# Patient Record
Sex: MALE | Race: WHITE | NOT HISPANIC OR LATINO | Employment: OTHER | URBAN - NONMETROPOLITAN AREA
[De-identification: names, ages, dates, MRNs, and addresses within clinical notes are randomized per-mention and may not be internally consistent; named-entity substitution may affect disease eponyms.]

---

## 2017-02-14 ENCOUNTER — COMMUNICATION - GICH (OUTPATIENT)
Dept: FAMILY MEDICINE | Facility: OTHER | Age: 57
End: 2017-02-14

## 2017-02-14 DIAGNOSIS — M96.1 POSTLAMINECTOMY SYNDROME, NOT ELSEWHERE CLASSIFIED: ICD-10-CM

## 2017-02-15 ENCOUNTER — HISTORY (OUTPATIENT)
Dept: FAMILY MEDICINE | Facility: OTHER | Age: 57
End: 2017-02-15

## 2017-02-15 ENCOUNTER — OFFICE VISIT - GICH (OUTPATIENT)
Dept: FAMILY MEDICINE | Facility: OTHER | Age: 57
End: 2017-02-15

## 2017-02-15 DIAGNOSIS — M96.1 POSTLAMINECTOMY SYNDROME, NOT ELSEWHERE CLASSIFIED: ICD-10-CM

## 2017-02-15 DIAGNOSIS — F43.21 ADJUSTMENT DISORDER WITH DEPRESSED MOOD: ICD-10-CM

## 2017-04-14 ENCOUNTER — COMMUNICATION - GICH (OUTPATIENT)
Dept: FAMILY MEDICINE | Facility: OTHER | Age: 57
End: 2017-04-14

## 2017-04-14 DIAGNOSIS — M96.1 POSTLAMINECTOMY SYNDROME, NOT ELSEWHERE CLASSIFIED: ICD-10-CM

## 2017-05-15 ENCOUNTER — OFFICE VISIT - GICH (OUTPATIENT)
Dept: FAMILY MEDICINE | Facility: OTHER | Age: 57
End: 2017-05-15

## 2017-05-15 ENCOUNTER — HISTORY (OUTPATIENT)
Dept: FAMILY MEDICINE | Facility: OTHER | Age: 57
End: 2017-05-15

## 2017-05-15 ENCOUNTER — COMMUNICATION - GICH (OUTPATIENT)
Dept: FAMILY MEDICINE | Facility: OTHER | Age: 57
End: 2017-05-15

## 2017-05-15 DIAGNOSIS — M96.1 POSTLAMINECTOMY SYNDROME, NOT ELSEWHERE CLASSIFIED: ICD-10-CM

## 2017-05-22 LAB
6-MONOACETYL MORPHINE - HISTORICAL: ABNORMAL NG/ML
AMPHETAMINE URINE - HISTORICAL: ABNORMAL NG/ML
BARBITURATE URINE - HISTORICAL: ABNORMAL NG/ML
BENZODIAZEPINE URINE - HISTORICAL: ABNORMAL NG/ML
BUPRENORPHRINE URINE - HISTORICAL: ABNORMAL NG/ML
COCAINE METAB URINE - HISTORICAL: ABNORMAL NG/ML
CREAT UR - HISTORICAL: 176 MG/DL
ETHYLGLUCURONIDE URINE - HISTORICAL: ABNORMAL NG/ML
FENTANYL URINE - HISTORICAL: ABNORMAL NG/ML
MASS SPECTROMETRY URINE - HISTORICAL: ABNORMAL
METHADONE URINE - HISTORICAL: ABNORMAL NG/ML
OPIATES URINE - HISTORICAL: ABNORMAL NG/ML
OXYCODONE URINE - HISTORICAL: ABNORMAL NG/ML
PH URINE - HISTORICAL: 5.7
PROPOXYPHENE URINE - HISTORICAL: ABNORMAL NG/ML
THC 50 URINE - HISTORICAL: ABNORMAL NG/ML
TRAMADOL - HISTORICAL: ABNORMAL NG/ML

## 2017-07-14 ENCOUNTER — COMMUNICATION - GICH (OUTPATIENT)
Dept: FAMILY MEDICINE | Facility: OTHER | Age: 57
End: 2017-07-14

## 2017-07-14 DIAGNOSIS — M96.1 POSTLAMINECTOMY SYNDROME, NOT ELSEWHERE CLASSIFIED: ICD-10-CM

## 2017-08-15 ENCOUNTER — HISTORY (OUTPATIENT)
Dept: FAMILY MEDICINE | Facility: OTHER | Age: 57
End: 2017-08-15

## 2017-08-15 ENCOUNTER — OFFICE VISIT - GICH (OUTPATIENT)
Dept: FAMILY MEDICINE | Facility: OTHER | Age: 57
End: 2017-08-15

## 2017-08-15 DIAGNOSIS — M96.1 POSTLAMINECTOMY SYNDROME, NOT ELSEWHERE CLASSIFIED: ICD-10-CM

## 2017-09-07 ENCOUNTER — COMMUNICATION - GICH (OUTPATIENT)
Dept: FAMILY MEDICINE | Facility: OTHER | Age: 57
End: 2017-09-07

## 2017-09-07 DIAGNOSIS — F43.21 ADJUSTMENT DISORDER WITH DEPRESSED MOOD: ICD-10-CM

## 2017-09-14 ENCOUNTER — COMMUNICATION - GICH (OUTPATIENT)
Dept: FAMILY MEDICINE | Facility: OTHER | Age: 57
End: 2017-09-14

## 2017-09-14 DIAGNOSIS — M50.30 OTHER CERVICAL DISC DEGENERATION, UNSPECIFIED CERVICAL REGION: ICD-10-CM

## 2017-10-02 ENCOUNTER — COMMUNICATION - GICH (OUTPATIENT)
Dept: FAMILY MEDICINE | Facility: OTHER | Age: 57
End: 2017-10-02

## 2017-10-02 DIAGNOSIS — I10 ESSENTIAL (PRIMARY) HYPERTENSION: ICD-10-CM

## 2017-10-14 ENCOUNTER — COMMUNICATION - GICH (OUTPATIENT)
Dept: FAMILY MEDICINE | Facility: OTHER | Age: 57
End: 2017-10-14

## 2017-10-14 DIAGNOSIS — M96.1 POSTLAMINECTOMY SYNDROME, NOT ELSEWHERE CLASSIFIED: ICD-10-CM

## 2017-11-15 ENCOUNTER — COMMUNICATION - GICH (OUTPATIENT)
Dept: FAMILY MEDICINE | Facility: OTHER | Age: 57
End: 2017-11-15

## 2017-11-15 ENCOUNTER — OFFICE VISIT - GICH (OUTPATIENT)
Dept: FAMILY MEDICINE | Facility: OTHER | Age: 57
End: 2017-11-15

## 2017-11-15 ENCOUNTER — HISTORY (OUTPATIENT)
Dept: FAMILY MEDICINE | Facility: OTHER | Age: 57
End: 2017-11-15

## 2017-11-15 DIAGNOSIS — M96.1 POSTLAMINECTOMY SYNDROME, NOT ELSEWHERE CLASSIFIED: ICD-10-CM

## 2017-11-21 LAB
6-MONOACETYL MORPHINE - HISTORICAL: ABNORMAL NG/ML
AMPHETAMINE URINE - HISTORICAL: ABNORMAL NG/ML
BARBITURATE URINE - HISTORICAL: ABNORMAL NG/ML
BENZODIAZEPINE URINE - HISTORICAL: ABNORMAL NG/ML
BUPRENORPHRINE URINE - HISTORICAL: ABNORMAL NG/ML
COCAINE METAB URINE - HISTORICAL: ABNORMAL NG/ML
CREAT UR - HISTORICAL: 284 MG/DL
ETHYLGLUCURONIDE URINE - HISTORICAL: ABNORMAL NG/ML
FENTANYL URINE - HISTORICAL: ABNORMAL NG/ML
MASS SPECTROMETRY URINE - HISTORICAL: ABNORMAL
METHADONE URINE - HISTORICAL: ABNORMAL NG/ML
OPIATES URINE - HISTORICAL: ABNORMAL NG/ML
OXYCODONE URINE - HISTORICAL: ABNORMAL NG/ML
PH URINE - HISTORICAL: 5.9
PROPOXYPHENE URINE - HISTORICAL: ABNORMAL NG/ML
THC 50 URINE - HISTORICAL: ABNORMAL NG/ML
TRAMADOL - HISTORICAL: ABNORMAL NG/ML

## 2017-12-28 NOTE — TELEPHONE ENCOUNTER
Patient Information     Patient Name MRN Sex Iker Choudhury [id number] Male 1960      Telephone Encounter by Sravani Robles at 2017 10:12 AM     Author:  Sravani Robles Service:  (none) Author Type:  (none)     Filed:  2017 10:12 AM Encounter Date:  11/15/2017 Status:  Signed     :  Sravani Robles            Faxed Jocy Robles ....................  2017   10:12 AM

## 2017-12-28 NOTE — TELEPHONE ENCOUNTER
Patient Information     Patient Name MRN Iker Clark [id number] Male 1960      Telephone Encounter by Haleigh Barfield RN at 2017  8:40 AM     Author:  Haleigh Barfield RN Service:  (none) Author Type:  NURS- Registered Nurse     Filed:  2017  8:51 AM Encounter Date:  11/15/2017 Status:  Signed     :  Haleigh Barfield RN (NURS- Registered Nurse)            PLEASE REVIEW, SIGN AND SEND AS APPROPRIATE    This is a Refill request from: [facility]  Name of Medication: Tramadol HCL 50 mg tablet  Quantity requested: 240  Last fill date: 10/14/17  Due for refill: Yes, per pharmacy  Last visit with SOHAIL HARRIS was on: 11/15/2017 in [facility]  PCP:  Sohail Harris MD  Controlled Substance Agreement: Not for this medication  Diagnosis r/t this medication request: Failed back syndrome of lumbar spine     Unable to complete prescription refill per RN Medication Refill Policy.................... Haleigh Barfield RN ....................  2017   8:43 AM      Nsaids    Office visit in the past 12 months or per provider note.    Last visit with SOHAIL HARRIS was on: 11/15/2017 in [facility]  Next visit with SOHAIL HARRIS is on: 02/15/2018 in [facility]  Next visit with Family Practice is on: 02/15/2018 in [facility]    Max refill for 12 months from last office visit or per provider note.    Nsaids    Office visit in the past 12 months or per provider note.    Last visit with SOHAIL HARRIS was on: 11/15/2017 in [facility]  Next visit with SOHAIL HARRIS is on: 02/15/2018 in [facility]  Next visit with Family Practice is on: 02/15/2018 in [facility]    Max refill for 12 months from last office visit or per provider note.    Prescription refilled per RN Medication Refill Policy.................... Haleigh Barfield RN ....................  2017   8:43 AM

## 2017-12-28 NOTE — TELEPHONE ENCOUNTER
Patient Information     Patient Name MRN Iker Clark [id number] Male 1960      Telephone Encounter by Lucille Barclay RN at 2017 10:36 AM     Author:  Lucille Barclay RN Service:  (none) Author Type:  NURS- Registered Nurse     Filed:  2017 11:10 AM Encounter Date:  2017 Status:  Signed     :  Lucille Barclay RN (NURS- Registered Nurse)            ,  Pt called for an early refill for his Zoloft, last order date was 2/15/2017.  He explained he has been taking Zoloft 50 mg 2 tabs daily.  The RX last written was only for one tab daily.  He stated he was mixed up because at one point he had been prescribed two tabs a day.  There is one RX for 100mg that was discontinued per pt stating he was no longer taking it ().  He stated today that the medication has been working and he'd like to continue taking the 100 mg if possible.  I sarah'd up medication per pt request.    PHQ Depression Screening 5/15/2017 8/15/2017   Date of PHQ exam (doc flow) 5/15/2017 8/15/2017   1. Lack of interest/pleasure 0 - Not at all 0 - Not at all   2. Feeling down/depressed 0 - Not at all 0 - Not at all   PHQ-2 TOTAL SCORE 0 0   3. Trouble sleeping - -   4. Decreased energy - -   5. Appetite change - -   6. Feelings of failure - -   7. Trouble concentrating - -   8. Activity level - -   9. Hurting yourself - -   PHQ-9 TOTAL SCORE - -   PHQ-9 Severity Level - -   Functional Impairment - -   Some recent data might be hidden           Unable to complete prescription refill per RN Medication Refill Policy.................... Lucille Barclay RN ....................  2017   10:47 AM

## 2017-12-28 NOTE — PROGRESS NOTES
Patient Information     Patient Name MRN Sex Iker Choudhury [id number] Male 1960      Progress Notes by Sohail Harris MD at 8/15/2017  9:45 AM     Author:  Sohail Harris MD Service:  (none) Author Type:  Physician     Filed:  8/15/2017 10:55 AM Encounter Date:  8/15/2017 Status:  Signed     :  Sohail Harris MD (Physician)            SUBJECTIVE:    Iker Young is a 57 y.o. male who presents for follow up pain medications and back injury    HPI    tox screen was neg in May apart from the oxycodone.  He was able to take a trip to [place] over the summer, with frequent stops.  Was doing yard work over the weekend and had more pain last night.  No side effects from the medications.  Some tingling in anterior thighs for the last few months.  Last MRI was in , and showed no significant findings then (I reviewed this report again with him).    Allergies     Allergen  Reactions     Betadine [Povidone-Iodine] Contact Dermatitis     Tape [Adhesive] Contact Dermatitis   ,   Current Outpatient Prescriptions on File Prior to Visit       Medication  Sig Dispense Refill     acetaminophen (TYLENOL) 325 mg tablet Take  by mouth every 4 hours if needed. Max acetaminophen dose: 4000mg in 24 hrs.       amitriptyline (ELAVIL) 50 mg tablet TAKE 2 TABLETS BY MOUTH AT BEDTIME 60 tablet 11     atenolol (TENORMIN) 100 mg tablet TAKE ONE TABLET BY MOUTH EVERY DAY 90 tablet 3     gabapentin (NEURONTIN) 300 mg capsule Take 1 capsule by mouth 2 times daily. 180 capsule 2     ibuprofen (ADVIL; MOTRIN) 600 mg tablet Take 1 tablet by mouth every 6 hours if needed. Maximum of 3200 mg in 24 hours. 100 tablet 12     sertraline (ZOLOFT) 50 mg tablet Take 1 tablet by mouth once daily. 90 tablet 3     traMADol (ULTRAM) 50 mg tablet TAKE 1 TO 2 TABLETS BY MOUTH EVERY 6 HOURS AS NEEDED 240 tablet 5     No current facility-administered medications on file prior to visit.    ,   Past Medical History:     Diagnosis  Date      Chest pain 12/12/2016     History of MRI of cervical spine 04/2009    MRI cervical spine      Status post colonoscopy     Status post colonoscopy      and   Past Surgical History:      Procedure  Laterality Date     COLONOSCOPY SCREENING       INCISION AND DRAINAGE      from infection from nerve stimulator       LUMBAR FUSION  2004    Back surgery x 5 (fusion, hardware removal, stimulator and removal)       PERIPHERAL NERVE STIMULATOR  2005    Lumbar nerve stimulator and subsequent removal        REMOVAL OF FOREIGN BODY  2005     subsequent removal        SCAN-MRI INTERPRETATION  04/2009    MRI cervical spine.         REVIEW OF SYSTEMS:  Review of Systems   Musculoskeletal: Positive for back pain.   Neurological: Positive for tingling. Negative for focal weakness.   Psychiatric/Behavioral: Negative for substance abuse.       OBJECTIVE:  /70  Resp 14  Wt 126.9 kg (279 lb 12.8 oz)  BMI 41.32 kg/m2    EXAM:   Physical Exam   Constitutional: He is oriented to person, place, and time and well-developed, well-nourished, and in no distress. No distress.   Musculoskeletal:   No lumbar pain on palpation.  Neg straight leg raise.  Lower extremity reflexes are equal.   Neurological: He is alert and oriented to person, place, and time.   Skin: He is not diaphoretic.   Psychiatric: Memory, affect and judgment normal.       ASSESSMENT/PLAN:    ICD-10-CM    1. Failed back syndrome of lumbar spine M96.1 oxyCODONE (ROXICODONE) 15 mg immediate release tablet      DISCONTINUED: oxyCODONE (ROXICODONE) 15 mg immediate release tablet      DISCONTINUED: oxyCODONE (ROXICODONE) 15 mg immediate release tablet        Plan:  No concerns of abuse or diversion at this time.   reviewed.  Stable.  3 months of medications given.  Advise weight loss as well for pain relief.    Sohail Harris MD ....................  8/15/2017   10:54 AM

## 2017-12-28 NOTE — PROGRESS NOTES
Patient Information     Patient Name MRN Sex Iker Choudhury [id number] Male 1960      Progress Notes by Sohail Harris MD at 11/15/2017  9:30 AM     Author:  Sohail Harris MD Service:  (none) Author Type:  Physician     Filed:  2017  7:36 AM Encounter Date:  11/15/2017 Status:  Signed     :  Sohail Harris MD (Physician)            SUBJECTIVE:    Iker Young is a 57 y.o. male who presents for follow up back injury, pain medications     HPI    His allergies have flared, causing more cough. The coughing makes the lumbar pain worse.  At times will radiate into both legs.  Feet feel cold all the time, uses a heating pad at night.  Leg weakness progressing in the last year as well.  Last MRI was , showed No nerve root compression.    Allergies     Allergen  Reactions     Betadine [Povidone-Iodine] Contact Dermatitis     Tape [Adhesive] Contact Dermatitis    and   Current Outpatient Prescriptions on File Prior to Visit       Medication  Sig Dispense Refill     acetaminophen (TYLENOL) 325 mg tablet Take  by mouth every 4 hours if needed. Max acetaminophen dose: 4000mg in 24 hrs.       amitriptyline (ELAVIL) 50 mg tablet TAKE 2 TABLETS BY MOUTH AT BEDTIME 60 tablet 3     atenolol (TENORMIN) 100 mg tablet TAKE ONE TABLET BY MOUTH EVERY DAY 90 tablet 3     gabapentin (NEURONTIN) 300 mg capsule Take 1 capsule by mouth 2 times daily. 180 capsule 2     ibuprofen (ADVIL; MOTRIN) 600 mg tablet Take 1 tablet by mouth every 6 hours if needed. Maximum of 3200 mg in 24 hours. 100 tablet 12     sertraline (ZOLOFT) 100 mg tablet Take 1 tablet by mouth once daily. 90 tablet 3     sertraline (ZOLOFT) 50 mg tablet Take 1 tablet by mouth once daily. 90 tablet 3     traMADol (ULTRAM) 50 mg tablet TAKE 1 TO 2 TABLETS BY MOUTH EVERY 6 HOURS AS NEEDED 240 tablet 5     No current facility-administered medications on file prior to visit.        REVIEW OF SYSTEMS:  Review of Systems   Constitutional: Negative  for chills and fever.   Musculoskeletal: Positive for back pain.   Neurological: Positive for tingling and focal weakness.       OBJECTIVE:  /68  Pulse 62  Resp 16  Wt 128.8 kg (284 lb)  BMI 41.94 kg/m2    EXAM:   Physical Exam   Constitutional: He is well-developed, well-nourished, and in no distress. No distress.   Musculoskeletal:   No lumbar pain on palpation.  Positive bilateral straight leg raise at 165 degrees or so.  No lower extremity weakness.   Skin: He is not diaphoretic.   Psychiatric: Memory, affect and judgment normal.       ASSESSMENT/PLAN:    ICD-10-CM    1. Failed back syndrome of lumbar spine M96.1 oxyCODONE (ROXICODONE) 15 mg immediate release tablet      DRUG SCREEN - PAIN MANAGEMENT - TOXASSURE      DISCONTINUED: oxyCODONE (ROXICODONE) 15 mg immediate release tablet      DISCONTINUED: oxyCODONE (ROXICODONE) 15 mg immediate release tablet        Plan:  Exam remains stable.  A repeat MRI would be helpful to see if there is progression of dz causing the weakness.  He wants to wait and with an unchanged exam, this seems reasonable.  Medications refilled for 3 months.  Updated contract signed.   reviewed and unchanged.    Sohail Harris MD ....................  11/15/2017   10:01 AM

## 2017-12-28 NOTE — TELEPHONE ENCOUNTER
Patient Information     Patient Name MRN Iker Clark [id number] Male 1960      Telephone Encounter by Debbie Paul RN at 10/4/2017 10:58 AM     Author:  Debbie Paul RN Service:  (none) Author Type:  NURS- Registered Nurse     Filed:  10/4/2017 11:00 AM Encounter Date:  10/2/2017 Status:  Signed     :  Debbie Paul RN (NURS- Registered Nurse)            Beta Blockers     Office visit in the past 12 months or per provider note.    Last visit with KEVIN GOLD was on: 08/15/2017 in [facility]  Next visit with KEVIN GOLD is on: 11/15/2017 in [facility]  Next visit with Family Practice is on: 11/15/2017 in [facility]    Max refill for 12 months from last office visit or per provider note.  Prescription refilled per RN Medication Refill Policy.................... DEBBIE PAUL RN ....................  10/4/2017   10:59 AM

## 2017-12-30 NOTE — NURSING NOTE
Patient Information     Patient Name MRN Sex Iker Choudhury [id number] Male 1960      Nursing Note by Sravani Robles at 11/15/2017  9:30 AM     Author:  Sravani Robles Service:  (none) Author Type:  (none)     Filed:  11/15/2017  9:53 AM Encounter Date:  11/15/2017 Status:  Signed     :  Sravani Robles            Coming in for a f/u on work comp  Sravani Robles ....................  11/15/2017   9:40 AM

## 2017-12-30 NOTE — NURSING NOTE
Patient Information     Patient Name MRN Sex Iker Choudhury [id number] Male 1960      Nursing Note by Sravani Robles at 8/15/2017  9:45 AM     Author:  Sravani Robles Service:  (none) Author Type:  (none)     Filed:  8/15/2017 10:43 AM Encounter Date:  8/15/2017 Status:  Signed     :  Sravani Robles            Coming in for a work comp f/u  Sravani Robles ....................  8/15/2017   10:32 AM

## 2018-01-03 NOTE — TELEPHONE ENCOUNTER
Patient Information     Patient Name MRN Iker Clark [id number] Male 1960      Telephone Encounter by Mirian Andrea at 2017  2:02 PM     Author:  Mirian Andrea Service:  (none) Author Type:  NURS- Registered Nurse     Filed:  2017  2:08 PM Encounter Date:  2017 Status:  Signed     :  Mirian Andrea (NURS- Registered Nurse)            Nsaids    Office visit in the past 12 months or per provider note.    Last visit with KEVIN GOLD was on: 2016 in [facility]  Next visit with KEVIN GOLD is on: 02/15/2017 in [facility]  Next visit with Family Practice is on: 02/15/2017 in [facility]    Max refill for 12 months from last office visit or per provider note.      Prescription refilled per RN Medication Refill Policy.................... Mirian Andrea RN ....................  2017   2:08 PM

## 2018-01-03 NOTE — PROGRESS NOTES
Patient Information     Patient Name MRN Iker Clark [id number] Male 1960      Progress Notes by Sohail Harris MD at 2/15/2017 10:45 AM     Author:  Sohail Harris MD Service:  (none) Author Type:  Physician     Filed:  2017  9:52 AM Encounter Date:  2/15/2017 Status:  Signed     :  Sohail Harris MD (Physician)            See note from Cheyenne Marie, MS 3.  Patient was seen and examined by me as well.    Sohail Harris MD ....................  2017   9:52 AM

## 2018-01-03 NOTE — PROGRESS NOTES
Patient Information     Patient Name MRN Sex Iker Choudhury [id number] Male 1960      Progress Notes by Cheyenne Marie, Med Student at 2/15/2017 10:45 AM     Author:  Cheyenne Marie, Med Student Service:  (none) Author Type:  PHYS- Medical Student     Filed:  2017  9:52 AM Encounter Date:  2/15/2017 Status:  Signed     :  Sohail Harris MD (Physician)            SUBJECTIVE:    Iker Young is a 56 y.o. male who presents for worker's compensation appointment.      HPI Comments: Mr. Young is a 56 year old man with work related chronic back pain.  He reports that his pain is stable and mostly tolerable with current medications and adaptations.  He is having trouble dealing with a new worker's comp. representative who does not believe that the gabapentin and amitriptyline are necessary.  He also has felt the loss of his daughter more acutely over Dawkins's Day and would like to resume sertraline use.  His wife is seeing a therapist in home and he wants to look into this as well.  He also reports that he is working on weight loss by cutting out pop and is going to exercise more and start counting his calories.  He also says that he has frequent blood with bowel movements and has a history of hemorrhoids.        Allergies     Allergen  Reactions     Betadine [Povidone-Iodine] Contact Dermatitis     Tape [Adhesive] Contact Dermatitis   ,   Current Outpatient Prescriptions:      acetaminophen (TYLENOL) 325 mg tablet, Take  by mouth every 4 hours if needed. Max acetaminophen dose: 4000mg in 24 hrs., Disp: , Rfl:      amitriptyline (ELAVIL) 50 mg tablet, TAKE 2 TABLETS BY MOUTH AT BEDTIME, Disp: 60 tablet, Rfl: 11     atenolol (TENORMIN) 100 mg tablet, TAKE ONE TABLET BY MOUTH EVERY DAY, Disp: 90 tablet, Rfl: 3     gabapentin (NEURONTIN) 300 mg capsule, Take 1 capsule by mouth 2 times daily., Disp: 180 capsule, Rfl: 2     ibuprofen (ADVIL; MOTRIN) 600 mg tablet, Take 1 tablet by mouth  every 6 hours if needed. Maximum of 3200 mg in 24 hours., Disp: 100 tablet, Rfl: 12     oxyCODONE (ROXICODONE) 15 mg immediate release tablet, TAKE 1 TABLET BY MOUTH EVERY FOUR HOURS AS NEEDED FOR PAIN, fill on/after 4/15/17, Disp: 180 tablet, Rfl: 0     sertraline (ZOLOFT) 50 mg tablet, Take 1 tablet by mouth once daily., Disp: 90 tablet, Rfl: 3     traMADol (ULTRAM) 50 mg tablet, TAKE 1 TO 2 TABLETS BY MOUTH EVERY 6 HOURS AS NEEDED, Disp: 240 tablet, Rfl: 5  Medications have been reviewed by me and are current to the best of my knowledge and ability.,   Patient Active Problem List      Diagnosis Date Noted     Grief reaction with prolonged bereavement 02/15/2017     Chest pain 12/12/2016     Aortic valve sclerosis 12/08/2016     HTN (hypertension) 06/18/2015     Knee pain, chronic 06/18/2015     Primary osteoarthritis of right knee 06/18/2015     Pain medication agreement signed 7/2/13 02/06/2014     Controlled substance agreement signed 07/02/2013     ALLERGIC  RHINITIS 06/14/2011     HYPERLIPIDEMIA 06/14/2011     BACK PAIN, CHRONIC      DEGENERATIVE DISC DISEASE, CERVICAL SPINE     and   Past Surgical History       Procedure   Laterality Date     Lumbar fusion   2004     Back surgery x 5 (fusion, hardware removal, stimulator and removal)       Peripheral nerve stimulator   2005     Lumbar nerve stimulator and subsequent removal        Removal of foreign body   2005      subsequent removal        Incision and drainage        from infection from nerve stimulator       Colonoscopy screening        Scan-mri interpretation   04/2009     MRI cervical spine.         REVIEW OF SYSTEMS:  Review of Systems   Constitutional: Positive for diaphoresis. Negative for chills, fever, malaise/fatigue and weight loss.   Respiratory: Negative for hemoptysis and shortness of breath.    Cardiovascular: Negative for chest pain and palpitations.   Gastrointestinal: Positive for blood in stool. Negative for abdominal pain, constipation,  diarrhea, heartburn, nausea and vomiting.   Genitourinary: Negative for dysuria, frequency, hematuria and urgency.   Musculoskeletal: Positive for back pain and neck pain. Negative for falls.   Neurological: Negative for dizziness, tingling, sensory change, loss of consciousness, weakness and headaches.   Psychiatric/Behavioral: Positive for depression. Negative for memory loss, substance abuse and suicidal ideas. The patient is not nervous/anxious and does not have insomnia.        OBJECTIVE:  /72  Resp 16  Wt 127.7 kg (281 lb 9.6 oz)  BMI 41.59 kg/m2    EXAM:   Physical Exam   Constitutional: He is oriented to person, place, and time and well-developed, well-nourished, and in no distress. No distress.   HENT:   Head: Normocephalic and atraumatic.   Eyes: Conjunctivae and EOM are normal. Pupils are equal, round, and reactive to light. Right eye exhibits no discharge. Left eye exhibits no discharge. No scleral icterus.   Neck: Neck supple. No JVD present. No tracheal deviation present. No thyromegaly present.   Cardiovascular: Normal rate and regular rhythm.  Exam reveals no gallop and no friction rub.    Murmur heard.  Soft and blowing 2/6 systolic murmur heard best over the right second intercostal space.     Pulmonary/Chest: Effort normal and breath sounds normal. No stridor. No respiratory distress. He has no wheezes. He has no rales. He exhibits no tenderness.   Abdominal: Soft. Bowel sounds are normal. He exhibits no distension and no mass. There is no tenderness. There is no rebound and no guarding.   Obese abdomen.     Musculoskeletal: Normal range of motion. He exhibits no edema, tenderness or deformity.   Lymphadenopathy:     He has no cervical adenopathy.   Neurological: He is alert and oriented to person, place, and time. He has normal reflexes. No cranial nerve deficit. He exhibits normal muscle tone. GCS score is 15.   Skin: Skin is warm and dry. No rash noted. He is not diaphoretic. No erythema.  No pallor.   Psychiatric: Memory, affect and judgment normal.   Tearful while discussing daughter's death.     Vitals reviewed.    PHQ Depression Screen  Date of PHQ exam: 02/15/17  Over the last 2 weeks, how often have you been bothered by any of the following problems?  1. Little interest or pleasure in doing things: 0 - Not at all  2. Feeling down, depressed, or hopeless: 0 - Not at all     ASSESSMENT/PLAN:    ICD-10-CM    1. Failed back syndrome of lumbar spine M96.1 oxyCODONE (ROXICODONE) 15 mg immediate release tablet      DISCONTINUED: oxyCODONE (ROXICODONE) 15 mg immediate release tablet      DISCONTINUED: oxyCODONE (ROXICODONE) 15 mg immediate release tablet   2. Grief reaction with prolonged bereavement F43.21 sertraline (ZOLOFT) 50 mg tablet        Mr. Young is a very nice 56 year old man with work injury related chronic back pain and depression related to the death of his daughter.  He is stable with his pain, but needs to be supported in his attempts to exercise and diet.  He also would benefit from an SSRI and will look into counseling.      Plan:      1.  Depression:  Would like to resume sertraline.  Will assess wife's therapy and decide whether he wishes to pursue counseling as well.  Not suicidal.      2.  Chronic back pain:  Would like medications refilled.  Needs a letter to worker's compensation stating that acetaminophen, amitriptyline, gabapentin, ibuprofen, oxycodone, and tramadol are appropriately prescribed to help manage his chronic back pain.  Encourage weight loss and exercise.      3.  Blood with BM:  Should assess for presence of hemorrhoids with next health maintenance exam.  Last colonoscopy in 2008.      Follow up in 3 months or sooner if necessary.      Forwarded to Dr. Juno Harris for review.    Cheyenne Marie, Med Student ....................  2/15/2017   11:48 AM     reviewed and stable.  Sohail Harris MD ....................  2/16/2017   9:51 AM

## 2018-01-04 NOTE — TELEPHONE ENCOUNTER
Patient Information     Patient Name MRN Iker Clark [id number] Male 1960      Telephone Encounter by Gini Hull RN at 2017  2:44 PM     Author:  Gini Hull RN Service:  (none) Author Type:  (none)     Filed:  2017  2:48 PM Encounter Date:  2017 Status:  Signed     :  Gini Hull RN (NURS- Registered Nurse)            This is a Refill request from: [facility]   Name of Medication: Tramadol  Quantity requested: #240  Last fill date: 2016    Per pharmacy technician this was refilled today. Refill REFUSED.     Unable to complete prescription refill per RN Medication Refill Policy.................... Gini Hull ....................  2017   2:45 PM

## 2018-01-05 NOTE — NURSING NOTE
Patient Information     Patient Name MRN Sex Iker Choudhury [id number] Male 1960      Nursing Note by Sravani Robles at 5/15/2017 10:45 AM     Author:  Sravani Robles Service:  (none) Author Type:  (none)     Filed:  5/15/2017 11:09 AM Encounter Date:  5/15/2017 Status:  Signed     :  Sravani Robles            Coming for a medication check for work comp  Sravani Robles ....................  5/15/2017   10:53 AM

## 2018-01-05 NOTE — PROGRESS NOTES
Patient Information     Patient Name MRN Sex Iker Choudhury [id number] Male 1960      Progress Notes by Sohail Harris MD at 5/15/2017 10:45 AM     Author:  Sohail Harris MD Service:  (none) Author Type:  Physician     Filed:  5/15/2017 12:05 PM Encounter Date:  5/15/2017 Status:  Signed     :  Sohail Harris MD (Physician)            SUBJECTIVE:    Iker Young is a 56 y.o. male who presents for follow up back pain, med refilled    HPI    He has more pain today, spent most of yesterday using a weedeater.  Pain in lumbar area, at times will migrate into legs.  Comes and goes in the legs.  Feels the medications are adequate.  Gets the most relief from the ultram.     Allergies     Allergen  Reactions     Betadine [Povidone-Iodine] Contact Dermatitis     Tape [Adhesive] Contact Dermatitis   ,   Current Outpatient Prescriptions on File Prior to Visit       Medication  Sig Dispense Refill     acetaminophen (TYLENOL) 325 mg tablet Take  by mouth every 4 hours if needed. Max acetaminophen dose: 4000mg in 24 hrs.       amitriptyline (ELAVIL) 50 mg tablet TAKE 2 TABLETS BY MOUTH AT BEDTIME 60 tablet 11     atenolol (TENORMIN) 100 mg tablet TAKE ONE TABLET BY MOUTH EVERY DAY 90 tablet 3     gabapentin (NEURONTIN) 300 mg capsule Take 1 capsule by mouth 2 times daily. 180 capsule 2     ibuprofen (ADVIL; MOTRIN) 600 mg tablet Take 1 tablet by mouth every 6 hours if needed. Maximum of 3200 mg in 24 hours. 100 tablet 12     sertraline (ZOLOFT) 50 mg tablet Take 1 tablet by mouth once daily. 90 tablet 3     No current facility-administered medications on file prior to visit.    ,   Past Medical History:     Diagnosis  Date     Chest pain 2016     History of MRI of cervical spine 2009    MRI cervical spine      Status post colonoscopy     Status post colonoscopy      and   Past Surgical History:      Procedure  Laterality Date     COLONOSCOPY SCREENING       INCISION AND DRAINAGE      from  infection from nerve stimulator       LUMBAR FUSION  2004    Back surgery x 5 (fusion, hardware removal, stimulator and removal)       PERIPHERAL NERVE STIMULATOR  2005    Lumbar nerve stimulator and subsequent removal        REMOVAL OF FOREIGN BODY  2005     subsequent removal        SCAN-MRI INTERPRETATION  04/2009    MRI cervical spine.         REVIEW OF SYSTEMS:  Review of Systems   Constitutional: Negative for chills and fever.   Musculoskeletal: Positive for back pain.   Neurological: Positive for tingling. Negative for sensory change.   Psychiatric/Behavioral: Negative for substance abuse.       OBJECTIVE:  /68  Resp 16  Wt 126.7 kg (279 lb 6.4 oz)  BMI 41.26 kg/m2    EXAM:   Physical Exam   Constitutional: He is oriented to person, place, and time and well-developed, well-nourished, and in no distress. No distress.   Musculoskeletal:   Mild pain at right L3/4 perispinous muscles.  Neg straight leg raise.  Lower extremity reflexes remain equal.   Neurological: He is alert and oriented to person, place, and time.   Skin: He is not diaphoretic.       ASSESSMENT/PLAN:    ICD-10-CM    1. Failed back syndrome of lumbar spine M96.1 traMADol (ULTRAM) 50 mg tablet      oxyCODONE (ROXICODONE) 15 mg immediate release tablet      DISCONTINUED: oxyCODONE (ROXICODONE) 15 mg immediate release tablet      DISCONTINUED: oxyCODONE (ROXICODONE) 15 mg immediate release tablet        Plan:   reviewed and remains stable.  3 months of oxycodone given, follow up in 3 months.  I again discussed with him how weight loss would improve his pain.  We also talked about increased activity (like yard work) will actually be better in the long run.    Sohail Harris MD ....................  5/15/2017   11:15 AM

## 2018-01-15 ENCOUNTER — COMMUNICATION - GICH (OUTPATIENT)
Dept: FAMILY MEDICINE | Facility: OTHER | Age: 58
End: 2018-01-15

## 2018-01-15 DIAGNOSIS — M50.30 OTHER CERVICAL DISC DEGENERATION, UNSPECIFIED CERVICAL REGION: ICD-10-CM

## 2018-01-27 VITALS
SYSTOLIC BLOOD PRESSURE: 128 MMHG | WEIGHT: 279.8 LBS | DIASTOLIC BLOOD PRESSURE: 70 MMHG | BODY MASS INDEX: 41.32 KG/M2 | RESPIRATION RATE: 14 BRPM

## 2018-01-27 VITALS
DIASTOLIC BLOOD PRESSURE: 68 MMHG | BODY MASS INDEX: 41.26 KG/M2 | SYSTOLIC BLOOD PRESSURE: 126 MMHG | RESPIRATION RATE: 16 BRPM | WEIGHT: 279.4 LBS

## 2018-01-27 VITALS
HEART RATE: 62 BPM | BODY MASS INDEX: 41.94 KG/M2 | SYSTOLIC BLOOD PRESSURE: 128 MMHG | WEIGHT: 284 LBS | RESPIRATION RATE: 16 BRPM | DIASTOLIC BLOOD PRESSURE: 68 MMHG

## 2018-01-27 VITALS
DIASTOLIC BLOOD PRESSURE: 72 MMHG | WEIGHT: 281.6 LBS | BODY MASS INDEX: 41.59 KG/M2 | SYSTOLIC BLOOD PRESSURE: 126 MMHG | RESPIRATION RATE: 16 BRPM

## 2018-02-07 ENCOUNTER — DOCUMENTATION ONLY (OUTPATIENT)
Dept: FAMILY MEDICINE | Facility: OTHER | Age: 58
End: 2018-02-07

## 2018-02-07 PROBLEM — F43.29 GRIEF REACTION WITH PROLONGED BEREAVEMENT: Status: ACTIVE | Noted: 2017-02-15

## 2018-02-07 PROBLEM — G89.29 BACK PAIN, CHRONIC: Status: ACTIVE | Noted: 2018-02-07

## 2018-02-07 PROBLEM — M54.9 BACK PAIN, CHRONIC: Status: ACTIVE | Noted: 2018-02-07

## 2018-02-07 PROBLEM — M50.30 DEGENERATION OF CERVICAL INTERVERTEBRAL DISC: Status: ACTIVE | Noted: 2018-02-07

## 2018-02-07 RX ORDER — AMITRIPTYLINE HYDROCHLORIDE 50 MG/1
100 TABLET ORAL AT BEDTIME
COMMUNITY
Start: 2017-09-15 | End: 2018-02-15

## 2018-02-07 RX ORDER — GABAPENTIN 300 MG/1
300 CAPSULE ORAL 2 TIMES DAILY
COMMUNITY
Start: 2017-11-17 | End: 2018-11-14

## 2018-02-07 RX ORDER — ACETAMINOPHEN 500 MG
500 TABLET ORAL EVERY 4 HOURS PRN
COMMUNITY

## 2018-02-07 RX ORDER — OXYCODONE HYDROCHLORIDE 15 MG/1
1 TABLET ORAL EVERY 4 HOURS PRN
COMMUNITY
Start: 2017-11-15 | End: 2018-02-15

## 2018-02-07 RX ORDER — TRAMADOL HYDROCHLORIDE 50 MG/1
1-2 TABLET ORAL EVERY 6 HOURS PRN
COMMUNITY
Start: 2017-11-17 | End: 2018-05-15

## 2018-02-07 RX ORDER — ATENOLOL 100 MG/1
100 TABLET ORAL DAILY
COMMUNITY
Start: 2017-10-04 | End: 2018-10-10

## 2018-02-07 RX ORDER — SERTRALINE HYDROCHLORIDE 100 MG/1
100 TABLET, FILM COATED ORAL DAILY
COMMUNITY
Start: 2017-09-07 | End: 2018-06-28

## 2018-02-07 RX ORDER — IBUPROFEN 600 MG/1
1 TABLET, FILM COATED ORAL EVERY 6 HOURS PRN
COMMUNITY
Start: 2017-11-17 | End: 2018-11-14

## 2018-02-08 ENCOUNTER — DOCUMENTATION ONLY (OUTPATIENT)
Dept: FAMILY MEDICINE | Facility: OTHER | Age: 58
End: 2018-02-08

## 2018-02-13 NOTE — TELEPHONE ENCOUNTER
Patient Information     Patient Name MRN Sex Iker Choudhury [id number] Male 1960      Telephone Encounter by Nilton Valdes RN at 2018  1:13 PM     Author:  Nilton Valdes RN Service:  (none) Author Type:  NURS- Registered Nurse     Filed:  2018  1:18 PM Encounter Date:  1/15/2018 Status:  Signed     :  Nilton Valdes RN (NURS- Registered Nurse)            This is a Refill request from: [facility]  Name of Medication: Amitriptyline  Quantity requested: 60 tabs with 1 refill  Last fill date: 12/15/17 for a 30 day supply  Due for refill: As per rx request and per chart review, yes  Last visit with SOHAIL HARRIS was on: 11/15/2017 in [facility]  PCP:  Sohail Harris MD  Controlled Substance Agreement: N/A   Diagnosis r/t this medication request: Degeneration of cervical intervertebral disc    Chart review shows that patient was seen by PCP last on 11/15/17. Rx as requested was noted in office visit notes on that date. Patient is to follow up in 3 months time. PCP is out of the office until 18. Writer will sarah up rx as requested and will route to PCP's teamlet for her consideration/approval in PCP's absence. Will sarah up a 2 month supply.     Unable to complete prescription refill per RN Medication Refill Policy.................... Nilton Valdes RN ....................  2018   1:13 PM

## 2018-02-15 ENCOUNTER — OFFICE VISIT (OUTPATIENT)
Dept: FAMILY MEDICINE | Facility: OTHER | Age: 58
End: 2018-02-15
Attending: FAMILY MEDICINE
Payer: OTHER MISCELLANEOUS

## 2018-02-15 VITALS
DIASTOLIC BLOOD PRESSURE: 66 MMHG | BODY MASS INDEX: 42.38 KG/M2 | SYSTOLIC BLOOD PRESSURE: 128 MMHG | WEIGHT: 287 LBS | RESPIRATION RATE: 16 BRPM | HEART RATE: 64 BPM

## 2018-02-15 DIAGNOSIS — M54.50 CHRONIC RIGHT-SIDED LOW BACK PAIN WITHOUT SCIATICA: Primary | ICD-10-CM

## 2018-02-15 DIAGNOSIS — G89.29 CHRONIC RIGHT-SIDED LOW BACK PAIN WITHOUT SCIATICA: Primary | ICD-10-CM

## 2018-02-15 PROCEDURE — 99213 OFFICE O/P EST LOW 20 MIN: CPT | Performed by: FAMILY MEDICINE

## 2018-02-15 RX ORDER — AMITRIPTYLINE HYDROCHLORIDE 50 MG/1
100 TABLET ORAL AT BEDTIME
Qty: 60 TABLET | Refills: 11 | Status: SHIPPED | OUTPATIENT
Start: 2018-02-15 | End: 2018-05-15

## 2018-02-15 RX ORDER — OXYCODONE HYDROCHLORIDE 10 MG/1
10 TABLET ORAL EVERY 4 HOURS PRN
Qty: 180 TABLET | Refills: 0 | Status: SHIPPED | OUTPATIENT
Start: 2018-02-15 | End: 2018-02-15

## 2018-02-15 RX ORDER — OXYCODONE HYDROCHLORIDE 10 MG/1
10 TABLET ORAL EVERY 4 HOURS PRN
Qty: 180 TABLET | Refills: 0 | Status: SHIPPED | OUTPATIENT
Start: 2018-02-15 | End: 2018-05-15

## 2018-02-15 ASSESSMENT — ENCOUNTER SYMPTOMS
SLEEP DISTURBANCE: 1
BACK PAIN: 1
NECK PAIN: 0
FATIGUE: 0

## 2018-02-15 ASSESSMENT — PAIN SCALES - GENERAL: PAINLEVEL: MODERATE PAIN (4)

## 2018-02-15 NOTE — MR AVS SNAPSHOT
"              After Visit Summary   2/15/2018    Iker Young    MRN: [id number]           Patient Information     Date Of Birth          1960        Visit Information        Provider Department      2/15/2018 10:00 AM Sohail Harris MD [facility]        Today's Diagnoses     Chronic right-sided low back pain without sciatica    -  1       Follow-ups after your visit        Follow-up notes from your care team     Return in about 3 months (around 5/15/2018).      Your next 10 appointments already scheduled     May 15, 2018 10:15 AM CDT   SHORT with Sohail Harris MD   [facility]    [address]  [place] [address] [phone]              Who to contact     If you have questions or need follow up information about today's clinic visit or your schedule please contact [facility] directly at [phone].  Normal or non-critical lab and imaging results will be communicated to you by Wheelzhart, letter or phone within 4 business days after the clinic has received the results. If you do not hear from us within 7 days, please contact the clinic through ScaleXtremet or phone. If you have a critical or abnormal lab result, we will notify you by phone as soon as possible.  Submit refill requests through playnik or call your pharmacy and they will forward the refill request to us. Please allow 3 business days for your refill to be completed.          Additional Information About Your Visit        Wheelzhart Information     playnik lets you send messages to your doctor, view your test results, renew your prescriptions, schedule appointments and more. To sign up, go to [email or web address] . Click on \"Log in\" on the left side of the screen, which will take you to the Welcome page. Then click on \"Sign up Now\" on the right side of the page.     You will be asked to enter the access code listed below, as well as some " personal information. Please follow the directions to create your username and password.     Your access code is: [email or web address]  Expires: 2018 10:40 AM     Your access code will  in 90 days. If you need help or a new code, please call your [facility] clinic or [phone].        Care EveryWhere ID     This is your Care EveryWhere ID. This could be used by other organizations to access your [facility] medical records  [id number]        Your Vitals Were     Pulse Respirations BMI (Body Mass Index)             64 16 42.38 kg/m2          Blood Pressure from Last 3 Encounters:   02/15/18 128/66   11/15/17 128/68   08/15/17 128/70    Weight from Last 3 Encounters:   02/15/18 130.2 kg (287 lb)   11/15/17 128.8 kg (284 lb)   08/15/17 126.9 kg (279 lb 12.8 oz)              Today, you had the following     No orders found for display         Today's Medication Changes          These changes are accurate as of 2/15/18 10:45 AM.  If you have any questions, ask your nurse or doctor.               These medicines have changed or have updated prescriptions.        Dose/Directions    oxyCODONE IR 10 MG tablet   Commonly known as:  ROXICODONE   This may have changed:    - medication strength  - how much to take  - reasons to take this  - additional instructions   Used for:  Chronic right-sided low back pain without sciatica   Changed by:  Sohail Harris MD        Dose:  10 mg   Take 1 tablet (10 mg) by mouth every 4 hours as needed for moderate to severe pain Fill on/after 18   Quantity:  180 tablet   Refills:  0            Where to get your medicines      These medications were sent to [facility] - [place], [place] - [address]  [address] [facility] [address]     Phone:  [phone]     amitriptyline 50 MG tablet         Some of these will need a paper prescription and others can be bought over the counter.  Ask your nurse if you have questions.     Bring a paper  prescription for each of these medications     oxyCODONE IR 10 MG tablet                Primary Care Provider Office Phone # Fax #    Sohail Harris [phone] [address] [facility] [address]        Equal Access to Services     JAKE COBOS : Brenna nicole figueroastephane Somaniali, waaxda luqadaha, qaybta kaalmada adejudyyada, kay moctezuma barbralizbeth bricejosecatalino pérez. So [facility] [place] [phone].    ATENCIÓN: Si habla español, tiene a dumont disposición servicios gratuitos de asistencia lingüística. Llame al [phone].    We comply with applicable federal civil rights laws and [place] laws. We do not discriminate on the basis of race, color, national origin, age, disability, sex, sexual orientation, or gender identity.            Thank you!     Thank you for choosing [facility] AND [facility]  for your care. Our goal is always to provide you with excellent care. Hearing back from our patients is one way we can continue to improve our services. Please take a few minutes to complete the written survey that you may receive in the mail after your visit with us. Thank you!             Your Updated Medication List - Protect others around you: Learn how to safely use, store and throw away your medicines at www.disposemymeds.org.          This list is accurate as of 2/15/18 10:45 AM.  Always use your most recent med list.                   Brand Name Dispense Instructions for use Diagnosis    acetaminophen 325 MG tablet    TYLENOL     Take by mouth every 4 hours as needed        amitriptyline 50 MG tablet    ELAVIL    60 tablet    Take 2 tablets (100 mg) by mouth At Bedtime    Chronic right-sided low back pain without sciatica       * atenolol 100 MG tablet    TENORMIN    45    1/2 TABLET DAILY    Unspecified essential hypertension       * atenolol 100 MG tablet    TENORMIN     Take 100 mg by mouth daily        gabapentin 300 MG capsule    NEURONTIN     Take 300 mg by mouth 2 times daily        *  ibuprofen 400 MG tablet    ADVIL/MOTRIN    200    Take 1-2 tablets up to 4 times a day for pain    Other chronic pain       * ibuprofen 600 MG tablet    ADVIL/MOTRIN     Take 1 tablet by mouth every 6 hours as needed        oxyCODONE IR 10 MG tablet    ROXICODONE    180 tablet    Take 1 tablet (10 mg) by mouth every 4 hours as needed for moderate to severe pain Fill on/after 4/14/18    Chronic right-sided low back pain without sciatica       * sertraline 50 MG tablet    ZOLOFT     Take 50 mg by mouth daily        * sertraline 100 MG tablet    ZOLOFT     Take 100 mg by mouth daily        SINGULAIR 10 MG tablet   Generic drug:  montelukast     30    ONE TABLET DAILY    Cough       traMADol 50 MG tablet    ULTRAM     Take 1-2 tablets by mouth every 6 hours as needed        * Notice:  This list has 6 medication(s) that are the same as other medications prescribed for you. Read the directions carefully, and ask your doctor or other care provider to review them with you.

## 2018-02-15 NOTE — PROGRESS NOTES
SUBJECTIVE:   Iker Young is a 57 year old male who presents to clinic today for the following health issues:    HPI Comments: Here for follow up on the chronic pain meds.  He is on a contract, does has been stable.  Pain is actually a little improved.  He notes it correlates with what he eats.  Some foods make it worse.  Eats out of boredom mostly.  He will wake up at night and eat a sandwich.  Pain is still in the right lower lumbar area.      Last drug screen was 11/27/17.      Past Medical History:   Diagnosis Date     Chest pain     12/12/2016     Other specified postprocedural states     Status post colonoscopy     Personal history of other medical treatment (CODE)     04/2009,MRI cervical spine      Current Outpatient Prescriptions   Medication Sig Dispense Refill     amitriptyline (ELAVIL) 50 MG tablet Take 2 tablets (100 mg) by mouth At Bedtime 60 tablet 11     oxyCODONE IR (ROXICODONE) 10 MG tablet Take 1 tablet (10 mg) by mouth every 4 hours as needed for moderate to severe pain Fill on/after 4/14/18 180 tablet 0     acetaminophen (TYLENOL) 325 MG tablet Take by mouth every 4 hours as needed       sertraline (ZOLOFT) 100 MG tablet Take 100 mg by mouth daily       sertraline (ZOLOFT) 50 MG tablet Take 50 mg by mouth daily       atenolol (TENORMIN) 100 MG tablet Take 100 mg by mouth daily       gabapentin (NEURONTIN) 300 MG capsule Take 300 mg by mouth 2 times daily       ibuprofen (ADVIL/MOTRIN) 600 MG tablet Take 1 tablet by mouth every 6 hours as needed       traMADol (ULTRAM) 50 MG tablet Take 1-2 tablets by mouth every 6 hours as needed       SINGULAIR 10 MG OR TABS ONE TABLET DAILY 30 0     ATENOLOL 100 MG OR TABS 1/2 TABLET DAILY 45 3     IBUPROFEN 400 MG OR TABS Take 1-2 tablets up to 4 times a day for pain 200 through 9/08     [DISCONTINUED] amitriptyline (ELAVIL) 50 MG tablet Take 100 mg by mouth At Bedtime       Allergies   Allergen Reactions     Betadine [Povidone Iodine] Rash and  Dermatitis     Severe blistering      Liquid Adhesive Dermatitis       Review of Systems   Constitutional: Negative for fatigue.   Musculoskeletal: Positive for back pain. Negative for neck pain.   Psychiatric/Behavioral: Positive for sleep disturbance.        OBJECTIVE:     /66 (BP Location: Right arm, Patient Position: Sitting, Cuff Size: Adult Large)  Pulse 64  Resp 16  Wt 287 lb (130.2 kg)  BMI 42.38 kg/m2  Body mass index is 42.38 kg/(m^2).  Physical Exam   Constitutional: He is oriented to person, place, and time. He appears well-developed. No distress.   Musculoskeletal:   Mild limp.  No pain on palpation to lumbar spine.  Neg straight leg raise.   Neurological: He is alert and oriented to person, place, and time.   Skin: He is not diaphoretic.       Diagnostic Test Results:  none     ASSESSMENT/PLAN:         1. Chronic right-sided low back pain without sciatica  We talked about reducing the dose, to get down to 60 milligram total of oxycodone.  He is willing so new scripts given.  We also talked about opoid induced hyperalgesia.  He is thinking about a complete taper.  When he is ready, I can guide him through a complete taper.   reviewed.  - amitriptyline (ELAVIL) 50 MG tablet; Take 2 tablets (100 mg) by mouth At Bedtime  Dispense: 60 tablet; Refill: 11  - oxyCODONE IR (ROXICODONE) 10 MG tablet; Take 1 tablet (10 mg) by mouth every 4 hours as needed for moderate to severe pain Fill on/after 4/14/18  Dispense: 180 tablet; Refill: 0      Sohail Harris MD  [facility] AND [facility]

## 2018-05-15 ENCOUNTER — HOSPITAL ENCOUNTER (OUTPATIENT)
Dept: GENERAL RADIOLOGY | Facility: OTHER | Age: 58
Discharge: HOME | End: 2018-05-15
Attending: FAMILY MEDICINE | Admitting: FAMILY MEDICINE
Payer: OTHER MISCELLANEOUS

## 2018-05-15 ENCOUNTER — OFFICE VISIT (OUTPATIENT)
Dept: FAMILY MEDICINE | Facility: OTHER | Age: 58
End: 2018-05-15
Attending: FAMILY MEDICINE
Payer: OTHER MISCELLANEOUS

## 2018-05-15 VITALS
RESPIRATION RATE: 18 BRPM | BODY MASS INDEX: 41.79 KG/M2 | WEIGHT: 283 LBS | DIASTOLIC BLOOD PRESSURE: 68 MMHG | SYSTOLIC BLOOD PRESSURE: 138 MMHG

## 2018-05-15 DIAGNOSIS — M50.30 DEGENERATION OF CERVICAL INTERVERTEBRAL DISC: Primary | ICD-10-CM

## 2018-05-15 DIAGNOSIS — G89.29 CHRONIC RIGHT-SIDED LOW BACK PAIN WITHOUT SCIATICA: Primary | ICD-10-CM

## 2018-05-15 DIAGNOSIS — M54.50 CHRONIC RIGHT-SIDED LOW BACK PAIN WITHOUT SCIATICA: Primary | ICD-10-CM

## 2018-05-15 DIAGNOSIS — G89.29 OTHER CHRONIC PAIN: ICD-10-CM

## 2018-05-15 DIAGNOSIS — G89.29 CHRONIC RIGHT-SIDED LOW BACK PAIN WITHOUT SCIATICA: ICD-10-CM

## 2018-05-15 DIAGNOSIS — M54.50 CHRONIC RIGHT-SIDED LOW BACK PAIN WITHOUT SCIATICA: ICD-10-CM

## 2018-05-15 PROCEDURE — 80307 DRUG TEST PRSMV CHEM ANLYZR: CPT | Performed by: FAMILY MEDICINE

## 2018-05-15 PROCEDURE — 72100 X-RAY EXAM L-S SPINE 2/3 VWS: CPT

## 2018-05-15 PROCEDURE — 99213 OFFICE O/P EST LOW 20 MIN: CPT | Performed by: FAMILY MEDICINE

## 2018-05-15 RX ORDER — OXYCODONE HYDROCHLORIDE 10 MG/1
10 TABLET ORAL EVERY 4 HOURS PRN
Qty: 180 TABLET | Refills: 0 | Status: SHIPPED | OUTPATIENT
Start: 2018-05-15 | End: 2018-05-15

## 2018-05-15 RX ORDER — IBUPROFEN 400 MG/1
800 TABLET, FILM COATED ORAL EVERY 6 HOURS PRN
Qty: 200 TABLET | Refills: 3 | Status: SHIPPED | OUTPATIENT
Start: 2018-05-15 | End: 2018-11-14

## 2018-05-15 RX ORDER — OXYCODONE HYDROCHLORIDE 10 MG/1
10 TABLET ORAL EVERY 4 HOURS PRN
Qty: 180 TABLET | Refills: 0 | Status: SHIPPED | OUTPATIENT
Start: 2018-05-15 | End: 2018-08-15

## 2018-05-15 RX ORDER — LORAZEPAM 1 MG/1
0.5-1 TABLET ORAL ONCE
Qty: 1 TABLET | Refills: 0 | Status: SHIPPED | OUTPATIENT
Start: 2018-05-15 | End: 2019-02-14

## 2018-05-15 RX ORDER — ATENOLOL 100 MG/1
50 TABLET ORAL DAILY
Qty: 45 TABLET | Refills: 3 | Status: CANCELLED | OUTPATIENT
Start: 2018-05-15

## 2018-05-15 RX ORDER — AMITRIPTYLINE HYDROCHLORIDE 50 MG/1
100 TABLET ORAL AT BEDTIME
Qty: 60 TABLET | Refills: 11 | Status: SHIPPED | OUTPATIENT
Start: 2018-05-15 | End: 2019-06-14

## 2018-05-15 ASSESSMENT — ENCOUNTER SYMPTOMS
WEAKNESS: 1
FEVER: 0
DYSPHORIC MOOD: 1
BACK PAIN: 1
PARESTHESIAS: 0
FATIGUE: 0
SLEEP DISTURBANCE: 1

## 2018-05-15 ASSESSMENT — ANXIETY QUESTIONNAIRES
2. NOT BEING ABLE TO STOP OR CONTROL WORRYING: NOT AT ALL
5. BEING SO RESTLESS THAT IT IS HARD TO SIT STILL: NOT AT ALL
7. FEELING AFRAID AS IF SOMETHING AWFUL MIGHT HAPPEN: NOT AT ALL
GAD7 TOTAL SCORE: 0
6. BECOMING EASILY ANNOYED OR IRRITABLE: NOT AT ALL
3. WORRYING TOO MUCH ABOUT DIFFERENT THINGS: NOT AT ALL
IF YOU CHECKED OFF ANY PROBLEMS ON THIS QUESTIONNAIRE, HOW DIFFICULT HAVE THESE PROBLEMS MADE IT FOR YOU TO DO YOUR WORK, TAKE CARE OF THINGS AT HOME, OR GET ALONG WITH OTHER PEOPLE: NOT DIFFICULT AT ALL
1. FEELING NERVOUS, ANXIOUS, OR ON EDGE: NOT AT ALL

## 2018-05-15 ASSESSMENT — PAIN SCALES - GENERAL: PAINLEVEL: SEVERE PAIN (6)

## 2018-05-15 ASSESSMENT — PATIENT HEALTH QUESTIONNAIRE - PHQ9: 5. POOR APPETITE OR OVEREATING: NOT AT ALL

## 2018-05-15 NOTE — PROGRESS NOTES
"  SUBJECTIVE:   Iker Young is a 57 year old male who presents to clinic today for the following health issues:    HPI  Follow up WC back injury.  He says the pain is getting even worse.  Having a hard time sleeping, sometimes only can get a few hours.  Usually standing up helps, but not lately. Pinching in right SI area, into right buttock.  At times feel like the leg \"wants to give out\".  He feels this is more from pain than vlad weakness.  Last MRI was 6/13.  tox screen was positive only for the oxycodone.  He feels the drop in dose made the pain worse.        Current Outpatient Prescriptions   Medication Sig Dispense Refill     acetaminophen (TYLENOL) 325 MG tablet Take by mouth every 4 hours as needed       amitriptyline (ELAVIL) 50 MG tablet Take 2 tablets (100 mg) by mouth At Bedtime 60 tablet 11     atenolol (TENORMIN) 100 MG tablet Take 100 mg by mouth daily       ATENOLOL 100 MG OR TABS 1/2 TABLET DAILY 45 3     gabapentin (NEURONTIN) 300 MG capsule Take 300 mg by mouth 2 times daily       ibuprofen (ADVIL/MOTRIN) 400 MG tablet Take 2 tablets (800 mg) by mouth every 6 hours as needed for moderate pain 200 tablet 3     ibuprofen (ADVIL/MOTRIN) 600 MG tablet Take 1 tablet by mouth every 6 hours as needed       oxyCODONE IR (ROXICODONE) 10 MG tablet Take 1 tablet (10 mg) by mouth every 4 hours as needed for moderate to severe pain Fill on/after 7/14/18 180 tablet 0     sertraline (ZOLOFT) 100 MG tablet Take 100 mg by mouth daily       sertraline (ZOLOFT) 50 MG tablet Take 50 mg by mouth daily       SINGULAIR 10 MG OR TABS ONE TABLET DAILY 30 0     traMADol (ULTRAM) 50 MG tablet Take 1-2 tablets by mouth every 6 hours as needed       [DISCONTINUED] amitriptyline (ELAVIL) 50 MG tablet Take 2 tablets (100 mg) by mouth At Bedtime 60 tablet 11     Allergies   Allergen Reactions     Betadine [Povidone Iodine] Rash and Dermatitis     Severe blistering      Liquid Adhesive Dermatitis       Review of Systems "   Constitutional: Negative for fatigue and fever.   Musculoskeletal: Positive for back pain.   Neurological: Positive for weakness. Negative for paresthesias.   Psychiatric/Behavioral: Positive for dysphoric mood and sleep disturbance.        OBJECTIVE:     /68 (BP Location: Right arm, Patient Position: Sitting, Cuff Size: Adult Regular)  Resp 18  Wt 283 lb (128.4 kg)  BMI 41.79 kg/m2  Body mass index is 41.79 kg/(m^2).  Physical Exam   Constitutional: He is oriented to person, place, and time. He appears well-developed. No distress.   Musculoskeletal:   Slow to stand.  Significant loss of flexion in lumbar spine.  4/5 lower extremity weakness, cannot do a squat without using his arms to pulls himself back up.   Neurological: He is alert and oriented to person, place, and time.   Skin: He is not diaphoretic.       Diagnostic Test Results:  X-ray shows no acute changes, lower fusion remains intact      ASSESSMENT/PLAN:         (M54.5,  G89.29) Chronic right-sided low back pain without sciatica  (primary encounter diagnosis)  Comment: progressing  Plan: amitriptyline (ELAVIL) 50 MG tablet, oxyCODONE         IR (ROXICODONE) 10 MG tablet, MR Lumbar Spine         w/o Contrast, XR Lumbar Spine 2/3 Views,         LORazepam (ATIVAN) 1 MG tablet, Drug  Screen         Comprehensive , Urine with Reported Meds         (MedTox) (Pain Care Package), DISCONTINUED:         oxyCODONE IR (ROXICODONE) 10 MG tablet,         DISCONTINUED: oxyCODONE IR (ROXICODONE) 10 MG         tablet        Will get a follow up MRI.  No med changes, refilled the oxycodone for 3 months,  reviewed and stable.    (G89.29) PAIN CHRONIC( NEC)  Comment: see above  Plan: ibuprofen (ADVIL/MOTRIN) 400 MG tablet         Refilled this as well.        Sohail Harris MD  [facility] AND hospitals

## 2018-05-15 NOTE — MR AVS SNAPSHOT
After Visit Summary   5/15/2018    Iker Young    MRN: [id number]           Patient Information     Date Of Birth          1960        Visit Information        Provider Department      5/15/2018 10:15 AM Sohail Harris MD [facility]        Today's Diagnoses     Chronic right-sided low back pain without sciatica    -  1    PAIN CHRONIC( [facility])           Follow-ups after your visit        Your next 10 appointments already scheduled     Aug 15, 2018 10:15 AM CDT   Office Visit with Sohail Harris MD   [facility]    [address]  [place] [address]   [phone]           Bring a current list of meds and any records pertaining to this visit. For Physicals, please bring immunization records and any forms needing to be filled out. Please arrive 10 minutes early to complete paperwork.              Future tests that were ordered for you today     Open Future Orders        Priority Expected Expires Ordered    XR Lumbar Spine 2/3 Views Routine 5/15/2018 5/15/2019 5/15/2018    MR Lumbar Spine w/o Contrast Routine  5/15/2019 5/15/2018            Who to contact     If you have questions or need follow up information about today's clinic visit or your schedule please contact [facility] directly at [phone].  Normal or non-critical lab and imaging results will be communicated to you by Kinnekhart, letter or phone within 4 business days after the clinic has received the results. If you do not hear from us within 7 days, please contact the clinic through Kinnekhart or phone. If you have a critical or abnormal lab result, we will notify you by phone as soon as possible.  Submit refill requests through Fluencr or call your pharmacy and they will forward the refill request to us. Please allow 3 business days for your refill to be completed.          Additional Information About Your Visit        [facility]  "Information     Appuri lets you send messages to your doctor, view your test results, renew your prescriptions, schedule appointments and more. To sign up, go to [email or web address] . Click on \"Log in\" on the left side of the screen, which will take you to the Welcome page. Then click on \"Sign up Now\" on the right side of the page.     You will be asked to enter the access code listed below, as well as some personal information. Please follow the directions to create your username and password.     Your access code is: [email or web address]  Expires: 2018 11:40 AM     Your access code will  in 90 days. If you need help or a new code, please call your [facility] clinic or [phone].        Care EveryWhere ID     This is your Care EveryWhere ID. This could be used by other organizations to access your [facility] medical records  [id number]        Your Vitals Were     Respirations BMI (Body Mass Index)                18 41.79 kg/m2           Blood Pressure from Last 3 Encounters:   05/15/18 138/68   02/15/18 128/66   11/15/17 128/68    Weight from Last 3 Encounters:   05/15/18 283 lb (128.4 kg)   02/15/18 287 lb (130.2 kg)   11/15/17 284 lb (128.8 kg)              We Performed the Following     Drug  Screen Comprehensive , Urine with Reported Meds (MedTox) (Pain Care Package)          Today's Medication Changes          These changes are accurate as of 5/15/18  2:43 PM.  If you have any questions, ask your nurse or doctor.               Start taking these medicines.        Dose/Directions    LORazepam 1 MG tablet   Commonly known as:  ATIVAN   Used for:  Chronic right-sided low back pain without sciatica   Started by:  Sohail Harris MD        Dose:  0.5-1 mg   Take 0.5-1 tablets (0.5-1 mg) by mouth once for 1 dose Take 30 minutes prior to departure.  Do not operate a vehicle after taking this medication   Quantity:  1 tablet   Refills:  0       oxyCODONE IR 10 MG tablet   Commonly known as:  ROXICODONE   Used " for:  Chronic right-sided low back pain without sciatica   Started by:  Sohail Harris MD        Dose:  10 mg   Take 1 tablet (10 mg) by mouth every 4 hours as needed for moderate to severe pain Fill on/after 7/14/18   Quantity:  180 tablet   Refills:  0         These medicines have changed or have updated prescriptions.        Dose/Directions    * ibuprofen 600 MG tablet   Commonly known as:  ADVIL/MOTRIN   This may have changed:  Another medication with the same name was changed. Make sure you understand how and when to take each.   Changed by:  Sohail Harris MD        Dose:  1 tablet   Take 1 tablet by mouth every 6 hours as needed   Refills:  0       * ibuprofen 400 MG tablet   Commonly known as:  ADVIL/MOTRIN   This may have changed:  See the new instructions.   Used for:  Other chronic pain   Changed by:  Sohail Harris MD        Dose:  800 mg   Take 2 tablets (800 mg) by mouth every 6 hours as needed for moderate pain   Quantity:  200 tablet   Refills:  3       * Notice:  This list has 2 medication(s) that are the same as other medications prescribed for you. Read the directions carefully, and ask your doctor or other care provider to review them with you.         Where to get your medicines      These medications were sent to [facility] - [place], [place] - [facility]  [address] [facility] [address] [facility] [address]     Phone:  [phone]     amitriptyline 50 MG tablet    ibuprofen 400 MG tablet         Some of these will need a paper prescription and others can be bought over the counter.  Ask your nurse if you have questions.     Bring a paper prescription for each of these medications     LORazepam 1 MG tablet    oxyCODONE IR 10 MG tablet               Information about OPIOIDS     PRESCRIPTION OPIOIDS: WHAT YOU NEED TO KNOW   You have a prescription for an opioid (narcotic) pain medicine. Opioids can cause addiction. If you have a history of chemical dependency of any type,  you are at a higher risk of becoming addicted to opioids. Only take this medicine after all other options have been tried. Take it for as short a time and as few doses as possible.     Do not:    Drive. If you drive while taking these medicines, you could be arrested for driving under the influence (DUI).    Operate heavy machinery    Do any other dangerous activities while taking these medicines.     Drink any alcohol while taking these medicines.      Take with any other medicines that contain acetaminophen. Read all labels carefully. Look for the word  acetaminophen  or  Tylenol.  Ask your pharmacist if you have questions or are unsure.    Store your pills in a secure place, locked if possible. We will not replace any lost or stolen medicine. If you don t finish your medicine, please throw away (dispose) as directed by your pharmacist. The [place] Pollution [employer] has more information about safe disposal: https://www.pca.[facility].us/living-green/managing-unwanted-medications    All opioids tend to cause constipation. Drink plenty of water and eat foods that have a lot of fiber, such as fruits, vegetables, prune juice, apple juice and high-fiber cereal. Take a laxative (Miralax, milk of magnesia, Colace, Senna) if you don t move your bowels at least every other day.          Primary Care Provider Office Phone # Fax #    Sohail Harris [phone]       [address] [facility] [place] [facility] [address]        Equal Access to Services     JAKE COBOS : Hadii christina sullivan Sonuno, waaxda luqadaha, qaybta kaalkay robert. So [facility] [phone].    ATENCIÓN: Si habla español, tiene a dumont disposición servicios gratuitos de asistencia lingüística. James santacruz [phone].    We comply with applicable federal civil rights laws and [place] laws. We do not discriminate on the basis of race, color, national origin, age, disability, sex, sexual orientation,  or gender identity.            Thank you!     Thank you for choosing [facility]  for your care. Our goal is always to provide you with excellent care. Hearing back from our patients is one way we can continue to improve our services. Please take a few minutes to complete the written survey that you may receive in the mail after your visit with us. Thank you!             Your Updated Medication List - Protect others around you: Learn how to safely use, store and throw away your medicines at www.disposemymeds.org.          This list is accurate as of 5/15/18  2:43 PM.  Always use your most recent med list.                   Brand Name Dispense Instructions for use Diagnosis    acetaminophen 325 MG tablet    TYLENOL     Take by mouth every 4 hours as needed        amitriptyline 50 MG tablet    ELAVIL    60 tablet    Take 2 tablets (100 mg) by mouth At Bedtime    Chronic right-sided low back pain without sciatica       * atenolol 100 MG tablet    TENORMIN    45    1/2 TABLET DAILY    Unspecified essential hypertension       * atenolol 100 MG tablet    TENORMIN     Take 100 mg by mouth daily        gabapentin 300 MG capsule    NEURONTIN     Take 300 mg by mouth 2 times daily        * ibuprofen 600 MG tablet    ADVIL/MOTRIN     Take 1 tablet by mouth every 6 hours as needed        * ibuprofen 400 MG tablet    ADVIL/MOTRIN    200 tablet    Take 2 tablets (800 mg) by mouth every 6 hours as needed for moderate pain    Other chronic pain       LORazepam 1 MG tablet    ATIVAN    1 tablet    Take 0.5-1 tablets (0.5-1 mg) by mouth once for 1 dose Take 30 minutes prior to departure.  Do not operate a vehicle after taking this medication    Chronic right-sided low back pain without sciatica       oxyCODONE IR 10 MG tablet    ROXICODONE    180 tablet    Take 1 tablet (10 mg) by mouth every 4 hours as needed for moderate to severe pain Fill on/after 7/14/18    Chronic right-sided low back pain without sciatica        * sertraline 50 MG tablet    ZOLOFT     Take 50 mg by mouth daily        * sertraline 100 MG tablet    ZOLOFT     Take 100 mg by mouth daily        SINGULAIR 10 MG tablet   Generic drug:  montelukast     30    ONE TABLET DAILY    Cough       traMADol 50 MG tablet    ULTRAM     Take 1-2 tablets by mouth every 6 hours as needed        * Notice:  This list has 6 medication(s) that are the same as other medications prescribed for you. Read the directions carefully, and ask your doctor or other care provider to review them with you.

## 2018-05-15 NOTE — LETTER
May 21, 2018      Iker Young  [address] [address] [facility] [address]        Dear Iker,     The urine drug screen showed only the meds I have been giving you, as expected.        Sincerely,        Sohail Harris MD

## 2018-05-16 ASSESSMENT — ANXIETY QUESTIONNAIRES: GAD7 TOTAL SCORE: 0

## 2018-05-18 RX ORDER — TRAMADOL HYDROCHLORIDE 50 MG/1
TABLET ORAL
Qty: 240 TABLET | Refills: 5 | Status: SHIPPED | OUTPATIENT
Start: 2018-05-18 | End: 2018-11-14

## 2018-05-18 NOTE — TELEPHONE ENCOUNTER
This is a Refill request from: [facility]  Name of Medication and Dose:  Tramadol 50 mg   Quantity requested:  240  Last fill date: 11/17/17 - with 5 refills   Last Office Visit: 05/15/18   PCP:  [facility]  Controlled Substance Agreement: yes - for Oxycodone   Associated Diagnosis: no  Cherelle Merritt LPN........................5/18/2018  3:45 PM

## 2018-05-19 LAB — PAIN DRUG SCR UR W RPTD MEDS: NORMAL

## 2018-06-13 ENCOUNTER — HOSPITAL ENCOUNTER (OUTPATIENT)
Dept: MRI IMAGING | Facility: OTHER | Age: 58
Discharge: HOME | End: 2018-06-13
Attending: FAMILY MEDICINE | Admitting: FAMILY MEDICINE
Payer: OTHER MISCELLANEOUS

## 2018-06-13 DIAGNOSIS — G89.29 CHRONIC RIGHT-SIDED LOW BACK PAIN WITHOUT SCIATICA: ICD-10-CM

## 2018-06-13 DIAGNOSIS — M54.50 CHRONIC RIGHT-SIDED LOW BACK PAIN WITHOUT SCIATICA: ICD-10-CM

## 2018-06-13 PROCEDURE — 72148 MRI LUMBAR SPINE W/O DYE: CPT

## 2018-06-28 DIAGNOSIS — F43.29 GRIEF REACTION WITH PROLONGED BEREAVEMENT: Primary | ICD-10-CM

## 2018-07-05 RX ORDER — SERTRALINE HYDROCHLORIDE 100 MG/1
TABLET, FILM COATED ORAL
Qty: 90 TABLET | Refills: 3 | Status: SHIPPED | OUTPATIENT
Start: 2018-07-05 | End: 2018-11-14

## 2018-07-13 RX ORDER — ATENOLOL 100 MG/1
TABLET ORAL
Qty: 90 TABLET | OUTPATIENT
Start: 2018-07-13

## 2018-07-24 NOTE — PROGRESS NOTES
Patient Information     Patient Name  Iker Young MRN  [id number] Sex  Male   1960      Letter by Sohail Harris MD at      Author:  Sohail Harris MD Service:  (none) Author Type:  (none)    Filed:   Encounter Date:  5/15/2017 Status:  (Other)           Iker Young  [address] [facility] [address]          May 23, 2017    Dear Mr. Young:    Following are the tests completed during your last clinic visit.  The results of these tests are normal and require no further attention unless otherwise noted.    Results for orders placed or performed in visit on 05/15/17      DRUG SCREEN - PAIN MANAGEMENT - TOXASSURE      Result  Value Ref Range    6-MONOACETYL MORPHINE NEG NEG ng/mL    AMPHETAMINE URINE NEG <=500 ng/mL    BARBITURATE URINE NEG <=200 ng/mL    BENZODIAZEPINE URINE NEG <=200 ng/mL    BUPRENORPHRINE URINE NEG <=5 ng/mL    COCAINE METAB URINE NEG <=300 ng/mL    ETHYLGLUCURONIDE URINE NEG <=250 ng/mL    FENTANYL URINE NEG <=4 ng/mL    METHADONE URINE NEG <=300 ng/mL    OPIATES URINE NEG <=300 ng/mL    OXYCODONE URINE POS (A) <=100 ng/mL    PROPOXYPHENE URINE NEG <=300 ng/mL    THC 50 URINE NEG <=50 ng/mL    TRAMADOL POS (A) <=200 ng/mL    PH URINE 5.7 5.0 - 7.0    CREAT  >=20 mg/dL    MASS SPECTROMETRY URINE See Below          If you have any further questions or problems contact my office at  [phone].    Thank you,    Sohail Harris MD

## 2018-07-24 NOTE — PROGRESS NOTES
Patient Information     Patient Name  Iker Young MRN  [id number] Sex  Male   1960      Letter by Sohail Harris MD at      Author:  Sohail Harris MD Service:  (none) Author Type:  (none)    Filed:   Encounter Date:  11/15/2017 Status:  (Other)           Iker Young  [address] [facility] [address]          2017    Dear Mr. Young:    Following are the tests completed during your last clinic visit.  The results of these tests are normal and require no further attention unless otherwise noted.    Results for orders placed or performed in visit on 11/15/17      DRUG SCREEN - PAIN MANAGEMENT - TOXASSURE      Result  Value Ref Range    6-MONOACETYL MORPHINE NEG NEG ng/mL    AMPHETAMINE URINE NEG <=500 ng/mL    BARBITURATE URINE NEG <=200 ng/mL    BENZODIAZEPINE URINE NEG <=200 ng/mL    BUPRENORPHRINE URINE NEG <=5 ng/mL    COCAINE METAB URINE NEG <=300 ng/mL    ETHYLGLUCURONIDE URINE NEG <=250 ng/mL    FENTANYL URINE NEG <=4 ng/mL    METHADONE URINE NEG <=300 ng/mL    OPIATES URINE NEG <=300 ng/mL    OXYCODONE URINE POS (A) <=100 ng/mL    PROPOXYPHENE URINE NEG <=300 ng/mL    THC 50 URINE NEG <=50 ng/mL    TRAMADOL POS (A) <=200 ng/mL    PH URINE 5.9 5.0 - 7.0    CREAT  >=20 mg/dL    MASS SPECTROMETRY URINE See Below          If you have any further questions or problems contact my office at  [phone].    Thank you,    Sohail Harris MD

## 2018-07-24 NOTE — PROGRESS NOTES
Patient Information     Patient Name  Iker Young MRN  [id number] Sex  Male   1960      Letter by Sohail Harris MD at      Author:  Sohail Harris MD Service:  (none) Author Type:  (none)    Filed:   Encounter Date:  2/15/2017 Status:  (Other)           Iker Young  [address] [address] [facility] [address]          February 15, 2017    Dear Mr. Young:    I have been caring for your chronic work related back pain for at least 5 years now.  In this time we have tired several different modalities to get relief, including physical therapy and many different medication combinations.  There has been a national trend towards using lower doses of narcotics, and combining these medications with additional forms of medications that have shown improvement in chronic pain in medical research.  In addition to the oxycodone, the amitriptyline and gabapentin are being used for pain relief.  Although this is off label, it is the standard of care for treating chronic pain.    Sincerely,    Sohail Harris MD

## 2018-08-15 ENCOUNTER — OFFICE VISIT (OUTPATIENT)
Dept: FAMILY MEDICINE | Facility: OTHER | Age: 58
End: 2018-08-15
Attending: FAMILY MEDICINE
Payer: OTHER MISCELLANEOUS

## 2018-08-15 VITALS — DIASTOLIC BLOOD PRESSURE: 72 MMHG | WEIGHT: 279 LBS | SYSTOLIC BLOOD PRESSURE: 122 MMHG | BODY MASS INDEX: 41.2 KG/M2

## 2018-08-15 DIAGNOSIS — G89.29 CHRONIC RIGHT-SIDED LOW BACK PAIN WITHOUT SCIATICA: ICD-10-CM

## 2018-08-15 DIAGNOSIS — M54.50 CHRONIC RIGHT-SIDED LOW BACK PAIN WITHOUT SCIATICA: ICD-10-CM

## 2018-08-15 PROCEDURE — 99213 OFFICE O/P EST LOW 20 MIN: CPT | Performed by: FAMILY MEDICINE

## 2018-08-15 RX ORDER — OXYCODONE HYDROCHLORIDE 10 MG/1
10 TABLET ORAL EVERY 4 HOURS PRN
Qty: 180 TABLET | Refills: 0 | Status: SHIPPED | OUTPATIENT
Start: 2018-08-15 | End: 2018-11-14

## 2018-08-15 RX ORDER — OXYCODONE HYDROCHLORIDE 10 MG/1
10 TABLET ORAL EVERY 4 HOURS PRN
Qty: 180 TABLET | Refills: 0 | Status: SHIPPED | OUTPATIENT
Start: 2018-08-15 | End: 2018-08-15

## 2018-08-15 ASSESSMENT — PAIN SCALES - GENERAL: PAINLEVEL: SEVERE PAIN (6)

## 2018-08-15 ASSESSMENT — ANXIETY QUESTIONNAIRES
IF YOU CHECKED OFF ANY PROBLEMS ON THIS QUESTIONNAIRE, HOW DIFFICULT HAVE THESE PROBLEMS MADE IT FOR YOU TO DO YOUR WORK, TAKE CARE OF THINGS AT HOME, OR GET ALONG WITH OTHER PEOPLE: NOT DIFFICULT AT ALL
5. BEING SO RESTLESS THAT IT IS HARD TO SIT STILL: NOT AT ALL
3. WORRYING TOO MUCH ABOUT DIFFERENT THINGS: NOT AT ALL
1. FEELING NERVOUS, ANXIOUS, OR ON EDGE: NOT AT ALL
7. FEELING AFRAID AS IF SOMETHING AWFUL MIGHT HAPPEN: NOT AT ALL
2. NOT BEING ABLE TO STOP OR CONTROL WORRYING: NOT AT ALL
GAD7 TOTAL SCORE: 0
6. BECOMING EASILY ANNOYED OR IRRITABLE: NOT AT ALL

## 2018-08-15 ASSESSMENT — ENCOUNTER SYMPTOMS
FEVER: 0
NUMBNESS: 0
BACK PAIN: 1
FATIGUE: 0
PARESTHESIAS: 0

## 2018-08-15 ASSESSMENT — PATIENT HEALTH QUESTIONNAIRE - PHQ9: 5. POOR APPETITE OR OVEREATING: NOT AT ALL

## 2018-08-15 NOTE — PROGRESS NOTES
SUBJECTIVE:   Iker Young is a 58 year old male who presents to clinic today for the following health issues:    HPI  Follow up on back pains.  Due for refills on the percocet.  Pain is about the same.  Had a repeat MRI in June, showed only mild progression in the spinal stenosis.  Pains are in the lumbar area only, sometimes with needle like poking sensations in the left L3/4 area.  No lower extremity symptoms with this.  Had a severe flare in the spring with significant trouble even walking.  He has improved from this.  Wonders if injections would help.  He had them prior to the fusion, without any relief.  He feels the percocet continues to provide relief, without significant side effects.  tox screen done 5/15 was as expected.    MRI:    IMPRESSION:     Diffusely progressive mild degenerative changes above a chronic fusion  of L4-S1, when compared to 2007. Findings are most pronounced at L3-4,  where there is mild to moderate spinal stenosis and moderate foraminal  stenoses with possible impingement of the far lateral exiting L3 nerve  roots due to components of a disc bulge.     CHESTER BRYANT MD    Patient Active Problem List    Diagnosis Date Noted     Back pain, chronic 02/07/2018     Priority: Medium     Degeneration of cervical intervertebral disc 02/07/2018     Priority: Medium     Grief reaction with prolonged bereavement 02/15/2017     Priority: Medium     Chest pain 12/12/2016     Priority: Medium     Aortic valve sclerosis 12/08/2016     Priority: Medium     HTN (hypertension) 06/18/2015     Priority: Medium     Knee pain, chronic 06/18/2015     Priority: Medium     Primary osteoarthritis of right knee 06/18/2015     Priority: Medium     Pain medication agreement 02/06/2014     Priority: Medium     Controlled substance agreement signed 07/02/2013     Priority: Medium     Allergic rhinitis 06/14/2011     Priority: Medium     HYPERLIPIDEMIA LDL GOAL <130 10/31/2010     Priority: Medium      PAIN CHRONIC( [facility]) 12/04/2007     Priority: Medium     Obesity 04/24/2007     Priority: Medium     Problem list name updated by automated process. Provider to review       Essential hypertension      Priority: Medium     Problem list name updated by automated process. Provider to review       Hyperlipidemia      Priority: Medium     Problem list name updated by automated process. Provider to review       Past Surgical History:   Procedure Laterality Date     COLONOSCOPY      No Comments Provided     FUSION LUMBAR ANTERIOR ONE LEVEL      2004,Back surgery x 5 (fusion, hardware removal, stimulator and removal)     OTHER SURGICAL HISTORY      2005,205736,PERIPHERAL NERVE STIMULATOR,Lumbar nerve stimulator and subsequent removal     OTHER SURGICAL HISTORY      2005,205448,REMOVAL OF FOREIGN BODY, subsequent removal     OTHER SURGICAL HISTORY      208566,INCISION AND DRAINAGE,from infection from nerve stimulator     OTHER SURGICAL HISTORY      04/2009,207388,SCAN-MRI INTERPRETATION,MRI cervical spine.     Social History   Substance Use Topics     Smoking status: Never Smoker     Smokeless tobacco: Never Used     Alcohol use No     Social History     Social History Narrative    , on disability.    Preloaded 1/22/2013.     Current Outpatient Prescriptions   Medication Sig Dispense Refill     acetaminophen (TYLENOL) 325 MG tablet Take by mouth every 4 hours as needed       amitriptyline (ELAVIL) 50 MG tablet Take 2 tablets (100 mg) by mouth At Bedtime 60 tablet 11     atenolol (TENORMIN) 100 MG tablet Take 100 mg by mouth daily       ATENOLOL 100 MG OR TABS 1/2 TABLET DAILY 45 3     gabapentin (NEURONTIN) 300 MG capsule Take 300 mg by mouth 2 times daily       ibuprofen (ADVIL/MOTRIN) 400 MG tablet Take 2 tablets (800 mg) by mouth every 6 hours as needed for moderate pain 200 tablet 3     ibuprofen (ADVIL/MOTRIN) 600 MG tablet Take 1 tablet by mouth every 6 hours as needed       oxyCODONE IR (ROXICODONE) 10 MG tablet  Take 1 tablet (10 mg) by mouth every 4 hours as needed for moderate to severe pain Fill on/after 10/14/18 180 tablet 0     sertraline (ZOLOFT) 100 MG tablet TAKE 1 TABLET BY MOUTH ONCE DAILY 90 tablet 3     sertraline (ZOLOFT) 50 MG tablet Take 50 mg by mouth daily       SINGULAIR 10 MG OR TABS ONE TABLET DAILY 30 0     traMADol (ULTRAM) 50 MG tablet TAKE 1 OR 2 TABLETS BY MOUTH EVERY 6 HOURS AS NEEDED 240 tablet 5     Allergies   Allergen Reactions     Betadine [Povidone Iodine] Rash and Dermatitis     Severe blistering      Liquid Adhesive Dermatitis       Review of Systems   Constitutional: Negative for fatigue and fever.   Musculoskeletal: Positive for back pain.   Neurological: Negative for numbness and paresthesias.        OBJECTIVE:     /72 (BP Location: Right arm, Patient Position: Sitting, Cuff Size: Adult Regular)  Wt 279 lb (126.6 kg)  BMI 41.2 kg/m2  Body mass index is 41.2 kg/(m^2).  Physical Exam   Constitutional: He is oriented to person, place, and time. He appears well-developed. No distress.   Musculoskeletal:   Negative straight leg raise and no lower extremity weakness.  Some pain at the left L3/4 facet level.   Neurological: He is alert and oriented to person, place, and time.   Skin: He is not diaphoretic.   Psychiatric: He has a normal mood and affect. His behavior is normal. Thought content normal.       Diagnostic Test Results:  none     ASSESSMENT/PLAN:         (M54.5,  G89.29) Chronic right-sided low back pain without sciatica  Comment: stable  Plan: oxyCODONE IR (ROXICODONE) 10 MG tablet,         DISCONTINUED: oxyCODONE IR (ROXICODONE) 10 MG         tablet, DISCONTINUED: oxyCODONE IR (ROXICODONE)        10 MG tablet        Discussed with him some of the risks of the injections, and he is worried about the risks of complications.  Will continue with just the pain meds. Follow up in 3 months.   reviewed and stable.        Sohail Harris MD  [facility]

## 2018-08-15 NOTE — MR AVS SNAPSHOT
After Visit Summary   8/15/2018    Iker Young    MRN: [id number]           Patient Information     Date Of Birth          1960        Visit Information        Provider Department      8/15/2018 10:15 AM Sohail Harris MD [facility]        Today's Diagnoses     Chronic right-sided low back pain without sciatica           Follow-ups after your visit        Follow-up notes from your care team     Return in about 3 months (around 11/15/2018).      Who to contact     If you have questions or need follow up information about today's clinic visit or your schedule please contact [facility] directly at [phone].  Normal or non-critical lab and imaging results will be communicated to you by MyChart, letter or phone within 4 business days after the clinic has received the results. If you do not hear from us within 7 days, please contact the clinic through MyChart or phone. If you have a critical or abnormal lab result, we will notify you by phone as soon as possible.  Submit refill requests through [employer] or call your pharmacy and they will forward the refill request to us. Please allow 3 business days for your refill to be completed.          Additional Information About Your Visit        Care EveryWhere ID     This is your Care EveryWhere ID. This could be used by other organizations to access your [facility] medical records  [id number]        Your Vitals Were     BMI (Body Mass Index)                   41.2 kg/m2            Blood Pressure from Last 3 Encounters:   08/15/18 122/72   05/15/18 138/68   02/15/18 128/66    Weight from Last 3 Encounters:   08/15/18 279 lb (126.6 kg)   05/15/18 283 lb (128.4 kg)   02/15/18 287 lb (130.2 kg)              Today, you had the following     No orders found for display         Today's Medication Changes          These changes are accurate as of 8/15/18 10:43 AM.  If you have any questions, ask your nurse or doctor.                Start taking these medicines.        Dose/Directions    oxyCODONE IR 10 MG tablet   Commonly known as:  ROXICODONE   Used for:  Chronic right-sided low back pain without sciatica   Started by:  Sohail Harris MD        Dose:  10 mg   Take 1 tablet (10 mg) by mouth every 4 hours as needed for moderate to severe pain Fill on/after 10/14/18   Quantity:  180 tablet   Refills:  0            Where to get your medicines      Some of these will need a paper prescription and others can be bought over the counter.  Ask your nurse if you have questions.     Bring a paper prescription for each of these medications     oxyCODONE IR 10 MG tablet               Information about OPIOIDS     PRESCRIPTION OPIOIDS: WHAT YOU NEED TO KNOW   We gave you an opioid (narcotic) pain medicine. It is important to manage your pain, but opioids are not always the best choice. You should first try all the other options your care team gave you. Take this medicine for as short a time (and as few doses) as possible.    Some activities can increase your pain, such as bandage changes or therapy sessions. It may help to take your pain medicine 30 to 60 minutes before these activities. Reduce your stress by getting enough sleep, working on hobbies you enjoy and practicing relaxation or meditation. Talk to your care team about ways to manage your pain beyond prescription opioids.    These medicines have risks:    DO NOT drive when on new or higher doses of pain medicine. These medicines can affect your alertness and reaction times, and you could be arrested for driving under the influence (DUI). If you need to use opioids long-term, talk to your care team about driving.    DO NOT operate heavy machinery    DO NOT do any other dangerous activities while taking these medicines.    DO NOT drink any alcohol while taking these medicines.     If the opioid prescribed includes acetaminophen, DO NOT take with any other medicines that contain  acetaminophen. Read all labels carefully. Look for the word  acetaminophen  or  Tylenol.  Ask your pharmacist if you have questions or are unsure.    You can get addicted to pain medicines, especially if you have a history of addiction (chemical, alcohol or substance dependence). Talk to your care team about ways to reduce this risk.    All opioids tend to cause constipation. Drink plenty of water and eat foods that have a lot of fiber, such as fruits, vegetables, prune juice, apple juice and high-fiber cereal. Take a laxative (Miralax, milk of magnesia, Colace, Senna) if you don t move your bowels at least every other day. Other side effects include upset stomach, sleepiness, dizziness, throwing up, tolerance (needing more of the medicine to have the same effect), physical dependence and slowed breathing.    Store your pills in a secure place, locked if possible. We will not replace any lost or stolen medicine. If you don t finish your medicine, please throw away (dispose) as directed by your pharmacist. The [place] Pollution Control Agency has more information about safe disposal: [email or web address]/living-[email or web address]/managing-unwanted-medications         Primary Care Provider Office Phone # Fax #    Sohail Harris -[phone] [address] [facility] [address]        Equal Access to Services     JAKE COBOS AH: Brenna martio Sonuno, waaxda luqadaha, qaybta kaalmada aderell, kay pérez. So [facility] [phone].    ATENCIÓN: Si habla español, tiene a dumont disposición servicios gratuitos de asistencia lingüística. Llame al [phone].    We comply with applicable federal civil rights laws and [place] laws. We do not discriminate on the basis of race, color, national origin, age, disability, sex, sexual orientation, or gender identity.            Thank you!     Thank you for choosing [facility] AND [facility]  for your care. Our goal  is always to provide you with excellent care. Hearing back from our patients is one way we can continue to improve our services. Please take a few minutes to complete the written survey that you may receive in the mail after your visit with us. Thank you!             Your Updated Medication List - Protect others around you: Learn how to safely use, store and throw away your medicines at www.disposemymeds.org.          This list is accurate as of 8/15/18 10:43 AM.  Always use your most recent med list.                   Brand Name Dispense Instructions for use Diagnosis    acetaminophen 325 MG tablet    TYLENOL     Take by mouth every 4 hours as needed        amitriptyline 50 MG tablet    ELAVIL    60 tablet    Take 2 tablets (100 mg) by mouth At Bedtime    Chronic right-sided low back pain without sciatica       * atenolol 100 MG tablet    TENORMIN    45    1/2 TABLET DAILY    Unspecified essential hypertension       * atenolol 100 MG tablet    TENORMIN     Take 100 mg by mouth daily        gabapentin 300 MG capsule    NEURONTIN     Take 300 mg by mouth 2 times daily        * ibuprofen 600 MG tablet    ADVIL/MOTRIN     Take 1 tablet by mouth every 6 hours as needed        * ibuprofen 400 MG tablet    ADVIL/MOTRIN    200 tablet    Take 2 tablets (800 mg) by mouth every 6 hours as needed for moderate pain    Other chronic pain       oxyCODONE IR 10 MG tablet    ROXICODONE    180 tablet    Take 1 tablet (10 mg) by mouth every 4 hours as needed for moderate to severe pain Fill on/after 10/14/18    Chronic right-sided low back pain without sciatica       * sertraline 50 MG tablet    ZOLOFT     Take 50 mg by mouth daily        * sertraline 100 MG tablet    ZOLOFT    90 tablet    TAKE 1 TABLET BY MOUTH ONCE DAILY    Grief reaction with prolonged bereavement       SINGULAIR 10 MG tablet   Generic drug:  montelukast     30    ONE TABLET DAILY    Cough       traMADol 50 MG tablet    ULTRAM    240 tablet    TAKE 1 OR 2 TABLETS  BY MOUTH EVERY 6 HOURS AS NEEDED    Degeneration of cervical intervertebral disc       * Notice:  This list has 6 medication(s) that are the same as other medications prescribed for you. Read the directions carefully, and ask your doctor or other care provider to review them with you.

## 2018-08-16 ASSESSMENT — ANXIETY QUESTIONNAIRES: GAD7 TOTAL SCORE: 0

## 2018-10-10 DIAGNOSIS — I10 ESSENTIAL HYPERTENSION: Primary | ICD-10-CM

## 2018-10-15 RX ORDER — ATENOLOL 100 MG/1
100 TABLET ORAL DAILY
Qty: 90 TABLET | Refills: 3 | Status: SHIPPED | OUTPATIENT
Start: 2018-10-15 | End: 2019-10-11

## 2018-10-15 NOTE — TELEPHONE ENCOUNTER
Prescription approved per [facility] Refill Protocol.  Haleigh Ortiz RN on 10/15/2018 at 9:40 AM

## 2018-11-14 ENCOUNTER — OFFICE VISIT (OUTPATIENT)
Dept: FAMILY MEDICINE | Facility: OTHER | Age: 58
End: 2018-11-14
Attending: FAMILY MEDICINE
Payer: OTHER MISCELLANEOUS

## 2018-11-14 VITALS
WEIGHT: 282.6 LBS | RESPIRATION RATE: 16 BRPM | BODY MASS INDEX: 41.73 KG/M2 | DIASTOLIC BLOOD PRESSURE: 72 MMHG | SYSTOLIC BLOOD PRESSURE: 124 MMHG

## 2018-11-14 DIAGNOSIS — M54.50 CHRONIC RIGHT-SIDED LOW BACK PAIN WITHOUT SCIATICA: ICD-10-CM

## 2018-11-14 DIAGNOSIS — G89.29 CHRONIC RIGHT-SIDED LOW BACK PAIN WITHOUT SCIATICA: ICD-10-CM

## 2018-11-14 DIAGNOSIS — M50.30 DEGENERATION OF CERVICAL INTERVERTEBRAL DISC: Primary | ICD-10-CM

## 2018-11-14 DIAGNOSIS — F43.29 GRIEF REACTION WITH PROLONGED BEREAVEMENT: ICD-10-CM

## 2018-11-14 PROCEDURE — 80307 DRUG TEST PRSMV CHEM ANLYZR: CPT | Performed by: FAMILY MEDICINE

## 2018-11-14 PROCEDURE — 99214 OFFICE O/P EST MOD 30 MIN: CPT | Performed by: FAMILY MEDICINE

## 2018-11-14 RX ORDER — GABAPENTIN 300 MG/1
300 CAPSULE ORAL 2 TIMES DAILY
Qty: 180 CAPSULE | Refills: 3 | Status: SHIPPED | OUTPATIENT
Start: 2018-11-14 | End: 2019-08-15

## 2018-11-14 RX ORDER — SERTRALINE HYDROCHLORIDE 100 MG/1
100 TABLET, FILM COATED ORAL DAILY
Qty: 90 TABLET | Refills: 3 | Status: CANCELLED | OUTPATIENT
Start: 2018-11-14

## 2018-11-14 RX ORDER — TRAMADOL HYDROCHLORIDE 50 MG/1
100 TABLET ORAL EVERY 6 HOURS PRN
Qty: 240 TABLET | Refills: 5 | Status: SHIPPED | OUTPATIENT
Start: 2018-11-14 | End: 2019-02-14

## 2018-11-14 RX ORDER — OXYCODONE HYDROCHLORIDE 10 MG/1
10 TABLET ORAL EVERY 4 HOURS PRN
Qty: 180 TABLET | Refills: 0 | Status: SHIPPED | OUTPATIENT
Start: 2018-11-14 | End: 2019-02-14

## 2018-11-14 RX ORDER — OXYCODONE HYDROCHLORIDE 10 MG/1
10 TABLET ORAL EVERY 4 HOURS PRN
Qty: 180 TABLET | Refills: 0 | Status: SHIPPED | OUTPATIENT
Start: 2018-11-14 | End: 2018-11-14

## 2018-11-14 RX ORDER — IBUPROFEN 600 MG/1
600 TABLET, FILM COATED ORAL EVERY 6 HOURS PRN
Qty: 120 TABLET | Refills: 3 | Status: SHIPPED | OUTPATIENT
Start: 2018-11-14 | End: 2019-03-14

## 2018-11-14 RX ORDER — SERTRALINE HYDROCHLORIDE 100 MG/1
100 TABLET, FILM COATED ORAL DAILY
Qty: 90 TABLET | Refills: 3 | Status: SHIPPED | OUTPATIENT
Start: 2018-11-14 | End: 2019-08-15

## 2018-11-14 ASSESSMENT — ANXIETY QUESTIONNAIRES
3. WORRYING TOO MUCH ABOUT DIFFERENT THINGS: NOT AT ALL
IF YOU CHECKED OFF ANY PROBLEMS ON THIS QUESTIONNAIRE, HOW DIFFICULT HAVE THESE PROBLEMS MADE IT FOR YOU TO DO YOUR WORK, TAKE CARE OF THINGS AT HOME, OR GET ALONG WITH OTHER PEOPLE: NOT DIFFICULT AT ALL
2. NOT BEING ABLE TO STOP OR CONTROL WORRYING: NOT AT ALL
1. FEELING NERVOUS, ANXIOUS, OR ON EDGE: NOT AT ALL
GAD7 TOTAL SCORE: 0
7. FEELING AFRAID AS IF SOMETHING AWFUL MIGHT HAPPEN: NOT AT ALL
6. BECOMING EASILY ANNOYED OR IRRITABLE: NOT AT ALL
5. BEING SO RESTLESS THAT IT IS HARD TO SIT STILL: NOT AT ALL

## 2018-11-14 ASSESSMENT — PAIN SCALES - GENERAL: PAINLEVEL: MILD PAIN (2)

## 2018-11-14 ASSESSMENT — PATIENT HEALTH QUESTIONNAIRE - PHQ9: 5. POOR APPETITE OR OVEREATING: NOT AT ALL

## 2018-11-14 ASSESSMENT — ENCOUNTER SYMPTOMS
BACK PAIN: 1
CONSTIPATION: 1
SLEEP DISTURBANCE: 1
WEAKNESS: 0
SEIZURES: 0
FATIGUE: 0
FEVER: 0

## 2018-11-14 NOTE — NURSING NOTE
"Coming in for a 3 month work comp f/u    Chief Complaint   Patient presents with     Work Comp     3 month medication check       Initial /72 (BP Location: Right arm, Patient Position: Sitting, Cuff Size: Adult Regular)  Resp 16  Wt 282 lb 9.6 oz (128.2 kg)  BMI 41.73 kg/m2 Estimated body mass index is 41.73 kg/(m^2) as calculated from the following:    Height as of 2/10/16: 5' 9\" (1.753 m).    Weight as of this encounter: 282 lb 9.6 oz (128.2 kg).  Medication Reconciliation: complete    Sravani Robles LPN    "

## 2018-11-14 NOTE — PROGRESS NOTES
SUBJECTIVE:   Iker Young is a 58 year old male who presents to clinic today for the following health issues:    HPI  Follow up on work comp pain meds.  He is considering leaving from Feb to May.  He says pain is still up and down.  A few nights ago only slept about 1 our, due to the pain, then it relented.  Cannot recall a trigger specifically.  Has not moved, has pain only in the lumbar area, not radiating.  Uses Fast Freeze, with some relief.  No significant side effects from the narcotics.  Last tox screen was in May, and was as expected.  Has been on 8 tabs of ultram for about 10 years, on top of the oxycodone.  He says work comp has stopped paying for his meds.    Did a pain clinic several years ago.    Past Medical History:   Diagnosis Date     Chest pain     12/12/2016     Other specified postprocedural states     Status post colonoscopy     Personal history of other medical treatment (CODE)     04/2009,MRI cervical spine      Past Surgical History:   Procedure Laterality Date     COLONOSCOPY      No Comments Provided     FUSION LUMBAR ANTERIOR ONE LEVEL      2004,Back surgery x 5 (fusion, hardware removal, stimulator and removal)     OTHER SURGICAL HISTORY      2005,205736,PERIPHERAL NERVE STIMULATOR,Lumbar nerve stimulator and subsequent removal     OTHER SURGICAL HISTORY      2005,205448,REMOVAL OF FOREIGN BODY, subsequent removal     OTHER SURGICAL HISTORY      208566,INCISION AND DRAINAGE,from infection from nerve stimulator     OTHER SURGICAL HISTORY      04/2009,207388,SCAN-MRI INTERPRETATION,MRI cervical spine.     Social History   Substance Use Topics     Smoking status: Never Smoker     Smokeless tobacco: Never Used     Alcohol use No     Current Outpatient Prescriptions   Medication Sig Dispense Refill     acetaminophen (TYLENOL) 325 MG tablet Take by mouth every 4 hours as needed       amitriptyline (ELAVIL) 50 MG tablet Take 2 tablets (100 mg) by mouth At Bedtime 60 tablet 11     atenolol  (TENORMIN) 100 MG tablet Take 1 tablet (100 mg) by mouth daily 90 tablet 3     gabapentin (NEURONTIN) 300 MG capsule Take 300 mg by mouth 2 times daily       ibuprofen (ADVIL/MOTRIN) 600 MG tablet Take 1 tablet by mouth every 6 hours as needed       oxyCODONE IR (ROXICODONE) 10 MG tablet Take 1 tablet (10 mg) by mouth every 4 hours as needed for moderate to severe pain Fill on/after 10/14/18 180 tablet 0     sertraline (ZOLOFT) 100 MG tablet TAKE 1 TABLET BY MOUTH ONCE DAILY 90 tablet 3     traMADol (ULTRAM) 50 MG tablet TAKE 1 OR 2 TABLETS BY MOUTH EVERY 6 HOURS AS NEEDED 240 tablet 5     [DISCONTINUED] ATENOLOL 100 MG OR TABS 1/2 TABLET DAILY (Patient not taking: No sig reported) 45 3     [DISCONTINUED] sertraline (ZOLOFT) 50 MG tablet Take 50 mg by mouth daily       Allergies   Allergen Reactions     Betadine [Povidone Iodine] Rash and Dermatitis     Severe blistering      Liquid Adhesive Dermatitis       Review of Systems   Constitutional: Negative for fatigue and fever.   Gastrointestinal: Positive for constipation.   Musculoskeletal: Positive for back pain.   Neurological: Negative for seizures and weakness.   Psychiatric/Behavioral: Positive for sleep disturbance.        OBJECTIVE:     /72 (BP Location: Right arm, Patient Position: Sitting, Cuff Size: Adult Regular)  Resp 16  Wt 282 lb 9.6 oz (128.2 kg)  BMI 41.73 kg/m2  Body mass index is 41.73 kg/(m^2).  Physical Exam   Constitutional: He is oriented to person, place, and time. He appears well-developed. No distress.   Musculoskeletal:   slow to stand, but no pain in lumbar spine on palpation, negative straight leg raise.  Can flex and touch toes.   Neurological: He is alert and oriented to person, place, and time.   Skin: Skin is warm. No rash noted. He is not diaphoretic. No erythema.   Psychiatric: He has a normal mood and affect. His behavior is normal. Thought content normal.           ASSESSMENT/PLAN:         (M50.30) Degeneration of cervical  intervertebral disc  (primary encounter diagnosis)  Comment: this is essentially a failed back surgical syndrome.   reviewed and stable.  Discussed with him today how the meds can be altering his pain perception, with a significant overall improvement in health and lowered risk if he cuts back on ultram and oxycodone.  He intends to trial a taper, with reducing by 1 tab daily for 2-3 weeks at a time.  We talked about expectations, pain might flare at first.  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    Sohail Harris MD  [facility] AND [facility]

## 2018-11-14 NOTE — MR AVS SNAPSHOT
After Visit Summary   11/14/2018    Iker Young    MRN: [id number]           Patient Information     Date Of Birth          1960        Visit Information        Provider Department      11/14/2018 11:00 AM Sohail Harris MD [facility]        Today's Diagnoses     Degeneration of cervical intervertebral disc    -  1    Chronic right-sided low back pain without sciatica        Grief reaction with prolonged bereavement           Follow-ups after your visit        Follow-up notes from your care team     Return in about 3 months (around 2/14/2019).      Who to contact     If you have questions or need follow up information about today's clinic visit or your schedule please contact [facility] directly at [phone].  Normal or non-critical lab and imaging results will be communicated to you by MyChart, letter or phone within 4 business days after the clinic has received the results. If you do not hear from us within 7 days, please contact the clinic through MyChart or phone. If you have a critical or abnormal lab result, we will notify you by phone as soon as possible.  Submit refill requests through [employer] or call your pharmacy and they will forward the refill request to us. Please allow 3 business days for your refill to be completed.          Additional Information About Your Visit        Care EveryWhere ID     This is your Care EveryWhere ID. This could be used by other organizations to access your [facility] medical records  [id number]        Your Vitals Were     Respirations BMI (Body Mass Index)                16 41.73 kg/m2           Blood Pressure from Last 3 Encounters:   11/14/18 124/72   08/15/18 122/72   05/15/18 138/68    Weight from Last 3 Encounters:   11/14/18 282 lb 9.6 oz (128.2 kg)   08/15/18 279 lb (126.6 kg)   05/15/18 283 lb (128.4 kg)              We Performed the Following     Drug  Screen Comprehensive , Urine with Reported Meds  (MedTox) (Pain Care Package)          Today's Medication Changes          These changes are accurate as of 11/14/18 11:44 AM.  If you have any questions, ask your nurse or doctor.               Start taking these medicines.        Dose/Directions    oxyCODONE IR 10 MG tablet   Commonly known as:  ROXICODONE   Used for:  Chronic right-sided low back pain without sciatica   Started by:  Sohail Harris MD        Dose:  10 mg   Take 1 tablet (10 mg) by mouth every 4 hours as needed for moderate to severe pain Fill on/after 1/13/19   Quantity:  180 tablet   Refills:  0         These medicines have changed or have updated prescriptions.        Dose/Directions    ibuprofen 600 MG tablet   Commonly known as:  ADVIL/MOTRIN   This may have changed:  reasons to take this   Used for:  Degeneration of cervical intervertebral disc   Changed by:  Sohail Harris MD        Dose:  600 mg   Take 1 tablet (600 mg) by mouth every 6 hours as needed for moderate pain   Quantity:  120 tablet   Refills:  3       sertraline 100 MG tablet   Commonly known as:  ZOLOFT   This may have changed:  See the new instructions.   Used for:  Grief reaction with prolonged bereavement   Changed by:  Sohail Harris MD        Dose:  100 mg   Take 1 tablet (100 mg) by mouth daily   Quantity:  90 tablet   Refills:  3       traMADol 50 MG tablet   Commonly known as:  ULTRAM   This may have changed:  See the new instructions.   Used for:  Degeneration of cervical intervertebral disc   Changed by:  Sohail Harris MD        Dose:  100 mg   Take 2 tablets (100 mg) by mouth every 6 hours as needed for severe pain   Quantity:  240 tablet   Refills:  5            Where to get your medicines      These medications were sent to [id number] - [place], [place] - [address]  [address] [facility] [address]     Phone:  [phone]     gabapentin 300 MG capsule    ibuprofen 600 MG tablet    sertraline 100 MG tablet         Some of  these will need a paper prescription and others can be bought over the counter.  Ask your nurse if you have questions.     Bring a paper prescription for each of these medications     oxyCODONE IR 10 MG tablet    traMADol 50 MG tablet               Information about OPIOIDS     PRESCRIPTION OPIOIDS: WHAT YOU NEED TO KNOW   We gave you an opioid (narcotic) pain medicine. It is important to manage your pain, but opioids are not always the best choice. You should first try all the other options your care team gave you. Take this medicine for as short a time (and as few doses) as possible.    Some activities can increase your pain, such as bandage changes or therapy sessions. It may help to take your pain medicine 30 to 60 minutes before these activities. Reduce your stress by getting enough sleep, working on hobbies you enjoy and practicing relaxation or meditation. Talk to your care team about ways to manage your pain beyond prescription opioids.    These medicines have risks:    DO NOT drive when on new or higher doses of pain medicine. These medicines can affect your alertness and reaction times, and you could be arrested for driving under the influence (DUI). If you need to use opioids long-term, talk to your care team about driving.    DO NOT operate heavy machinery    DO NOT do any other dangerous activities while taking these medicines.    DO NOT drink any alcohol while taking these medicines.     If the opioid prescribed includes acetaminophen, DO NOT take with any other medicines that contain acetaminophen. Read all labels carefully. Look for the word  acetaminophen  or  Tylenol.  Ask your pharmacist if you have questions or are unsure.    You can get addicted to pain medicines, especially if you have a history of addiction (chemical, alcohol or substance dependence). Talk to your care team about ways to reduce this risk.    All opioids tend to cause constipation. Drink plenty of water and eat foods that have  a lot of fiber, such as fruits, vegetables, prune juice, apple juice and high-fiber cereal. Take a laxative (Miralax, milk of magnesia, Colace, Senna) if you don t move your bowels at least every other day. Other side effects include upset stomach, sleepiness, dizziness, throwing up, tolerance (needing more of the medicine to have the same effect), physical dependence and slowed breathing.    Store your pills in a secure place, locked if possible. We will not replace any lost or stolen medicine. If you don t finish your medicine, please throw away (dispose) as directed by your pharmacist. The [place] Pollution Control Agency has more information about safe disposal: [email or web address]/living-[email or web address]/managing-unwanted-medications         Primary Care Provider Office Phone # Fax #    Sohail Harris -[phone] [address] [facility] [address]        Equal Access to Services     JAKE COBOS : Hadii aad ku hadasho Sonuno, waaxda luqadaha, qaybta kaalmada aderell, kay adan . So [facility] [phone].    ATENCIÓN: Si habla español, tiene a dumont disposición servicios gratuitos de asistencia lingüística. Llame al [phone].    We comply with applicable federal civil rights laws and [place] laws. We do not discriminate on the basis of race, color, national origin, age, disability, sex, sexual orientation, or gender identity.            Thank you!     Thank you for choosing [facility] AND [facility]  for your care. Our goal is always to provide you with excellent care. Hearing back from our patients is one way we can continue to improve our services. Please take a few minutes to complete the written survey that you may receive in the mail after your visit with us. Thank you!             Your Updated Medication List - Protect others around you: Learn how to safely use, store and throw away your medicines at www.disposemymeds.org.           This list is accurate as of 11/14/18 11:44 AM.  Always use your most recent med list.                   Brand Name Dispense Instructions for use Diagnosis    acetaminophen 325 MG tablet    TYLENOL     Take by mouth every 4 hours as needed        amitriptyline 50 MG tablet    ELAVIL    60 tablet    Take 2 tablets (100 mg) by mouth At Bedtime    Chronic right-sided low back pain without sciatica       atenolol 100 MG tablet    TENORMIN    90 tablet    Take 1 tablet (100 mg) by mouth daily    Essential hypertension       gabapentin 300 MG capsule    NEURONTIN    180 capsule    Take 1 capsule (300 mg) by mouth 2 times daily    Degeneration of cervical intervertebral disc       ibuprofen 600 MG tablet    ADVIL/MOTRIN    120 tablet    Take 1 tablet (600 mg) by mouth every 6 hours as needed for moderate pain    Degeneration of cervical intervertebral disc       oxyCODONE IR 10 MG tablet    ROXICODONE    180 tablet    Take 1 tablet (10 mg) by mouth every 4 hours as needed for moderate to severe pain Fill on/after 1/13/19    Chronic right-sided low back pain without sciatica       sertraline 100 MG tablet    ZOLOFT    90 tablet    Take 1 tablet (100 mg) by mouth daily    Grief reaction with prolonged bereavement       traMADol 50 MG tablet    ULTRAM    240 tablet    Take 2 tablets (100 mg) by mouth every 6 hours as needed for severe pain    Degeneration of cervical intervertebral disc

## 2018-11-15 ASSESSMENT — ANXIETY QUESTIONNAIRES: GAD7 TOTAL SCORE: 0

## 2018-11-21 LAB — PAIN DRUG SCR UR W RPTD MEDS: NORMAL

## 2018-12-14 NOTE — TELEPHONE ENCOUNTER
"Refill request from  728 for:  Oxycodone 10 mg tablet  \"pt request\"       Note Rx to fill on or after 12/14/2018 and one to fill on or after 1/13/2019.    Contacted [facility] and they share that both are on file and to disregard.    Unable to complete prescription refill per RN Medication Refill Policy.................... Miranda Martinez ....................  12/14/2018   11:25 AM        "

## 2019-02-14 ENCOUNTER — OFFICE VISIT (OUTPATIENT)
Dept: FAMILY MEDICINE | Facility: OTHER | Age: 59
End: 2019-02-14
Attending: FAMILY MEDICINE
Payer: OTHER MISCELLANEOUS

## 2019-02-14 VITALS
RESPIRATION RATE: 18 BRPM | HEART RATE: 58 BPM | TEMPERATURE: 98 F | SYSTOLIC BLOOD PRESSURE: 126 MMHG | WEIGHT: 283 LBS | DIASTOLIC BLOOD PRESSURE: 74 MMHG | BODY MASS INDEX: 41.79 KG/M2

## 2019-02-14 DIAGNOSIS — G89.29 CHRONIC RIGHT-SIDED LOW BACK PAIN WITHOUT SCIATICA: ICD-10-CM

## 2019-02-14 DIAGNOSIS — M50.30 DEGENERATION OF CERVICAL INTERVERTEBRAL DISC: ICD-10-CM

## 2019-02-14 DIAGNOSIS — M54.50 CHRONIC RIGHT-SIDED LOW BACK PAIN WITHOUT SCIATICA: ICD-10-CM

## 2019-02-14 PROCEDURE — 99213 OFFICE O/P EST LOW 20 MIN: CPT | Performed by: FAMILY MEDICINE

## 2019-02-14 RX ORDER — OXYCODONE HYDROCHLORIDE 10 MG/1
10 TABLET ORAL EVERY 4 HOURS PRN
Qty: 180 TABLET | Refills: 0 | Status: SHIPPED | OUTPATIENT
Start: 2019-02-14 | End: 2019-05-15

## 2019-02-14 RX ORDER — TRAMADOL HYDROCHLORIDE 50 MG/1
50 TABLET ORAL EVERY 6 HOURS PRN
Qty: 120 TABLET | Refills: 5 | Status: SHIPPED | OUTPATIENT
Start: 2019-02-14 | End: 2019-07-15

## 2019-02-14 RX ORDER — OXYCODONE HYDROCHLORIDE 10 MG/1
10 TABLET ORAL EVERY 4 HOURS PRN
Qty: 180 TABLET | Refills: 0 | Status: SHIPPED | OUTPATIENT
Start: 2019-02-14 | End: 2019-02-14

## 2019-02-14 ASSESSMENT — ANXIETY QUESTIONNAIRES
IF YOU CHECKED OFF ANY PROBLEMS ON THIS QUESTIONNAIRE, HOW DIFFICULT HAVE THESE PROBLEMS MADE IT FOR YOU TO DO YOUR WORK, TAKE CARE OF THINGS AT HOME, OR GET ALONG WITH OTHER PEOPLE: NOT DIFFICULT AT ALL
5. BEING SO RESTLESS THAT IT IS HARD TO SIT STILL: NOT AT ALL
7. FEELING AFRAID AS IF SOMETHING AWFUL MIGHT HAPPEN: NOT AT ALL
3. WORRYING TOO MUCH ABOUT DIFFERENT THINGS: NOT AT ALL
1. FEELING NERVOUS, ANXIOUS, OR ON EDGE: NOT AT ALL
GAD7 TOTAL SCORE: 0
6. BECOMING EASILY ANNOYED OR IRRITABLE: NOT AT ALL
2. NOT BEING ABLE TO STOP OR CONTROL WORRYING: NOT AT ALL

## 2019-02-14 ASSESSMENT — PAIN SCALES - GENERAL: PAINLEVEL: MODERATE PAIN (4)

## 2019-02-14 ASSESSMENT — PATIENT HEALTH QUESTIONNAIRE - PHQ9: 5. POOR APPETITE OR OVEREATING: NOT AT ALL

## 2019-02-14 NOTE — LETTER
February 14, 2019      Iker Young  [id number] [facility] [id number]        Dear Iker,     For your work related back injury we are using Ibuprofen, Amitriptyline, Neurontin, oxycodone and tramadol.  This is all from the work related injury and due to chronic back pain.  I have been treating you for over 7 years for this now.  I continue to see you every 3 months for refills on the narcotics, and your serial drug testing has shown only the meds I am prescribing.      Sincerely,        Sohail Harris MD

## 2019-02-14 NOTE — NURSING NOTE
"Coming in for a 3 month f/u on work comp    Chief Complaint   Patient presents with     Work Comp     medication check       Initial /74 (BP Location: Right arm, Patient Position: Sitting, Cuff Size: Adult Large)   Pulse 58   Temp 98  F (36.7  C) (Tympanic)   Resp 18   Wt 128.4 kg (283 lb)   BMI 41.79 kg/m   Estimated body mass index is 41.79 kg/m  as calculated from the following:    Height as of 2/10/16: 1.753 m (5' 9\").    Weight as of this encounter: 128.4 kg (283 lb).  Medication Reconciliation: complete    Sravani Robles LPN  "

## 2019-02-15 ASSESSMENT — ANXIETY QUESTIONNAIRES: GAD7 TOTAL SCORE: 0

## 2019-03-14 DIAGNOSIS — M50.30 DEGENERATION OF CERVICAL INTERVERTEBRAL DISC: ICD-10-CM

## 2019-03-15 RX ORDER — IBUPROFEN 600 MG/1
TABLET, FILM COATED ORAL
Qty: 120 TABLET | Refills: 11 | Status: SHIPPED | OUTPATIENT
Start: 2019-03-15 | End: 2019-08-15

## 2019-03-15 NOTE — TELEPHONE ENCOUNTER
"Refill request received from [id number] for: Ibuprofen (ADVIL/MOTRIN) 600 MG tablet; take 1 tablet by PO Q6H as needed for moderate pain.      Last Written Prescription Date: 11/14/18  Last Fill Quantity: 120,   # refills: 3  Last Office Visit: 02/19/19  Future Office Visit: 05/15/19 with PCP    Requested Prescriptions   Pending Prescriptions Disp Refills     ibuprofen (ADVIL/MOTRIN) 600 MG tablet  120 tablet 3     Sig: TAKE 1 TABLET (600 MG) BY MOUTH EVERY 6 HOURS AS NEEDED FOR MODERATEPAIN    NSAID Medications Failed - 3/15/2019  3:34 PM       Failed - Normal ALT on file in past 12 months    No lab results found.       Failed - Normal AST on file in past 12 months    No lab results found.       Failed - Normal CBC on file in past 12 months    No lab results found.       Failed - Normal serum creatinine on file in past 12 months    No lab results found.       Passed - Blood pressure under 140/90 in past 12 months    BP Readings from Last 3 Encounters:   02/14/19 126/74   11/14/18 124/72   08/15/18 122/72          Passed - Recent (12 mo) or future (30 days) visit within the authorizing provider's specialty    Patient had office visit in the last 12 months or has a visit in the next 30 days with authorizing provider or within the authorizing provider's specialty.  See \"Patient Info\" tab in inbasket, or \"Choose Columns\" in Meds & Orders section of the refill encounter.         Passed - Patient is age 6-64 years       Passed - Medication is active on med list          Routing refill request to provider for review/approval because:  Would like PCP to review due to failed protocol and review necessity of medication.     Joanne Rubin RN on 3/15/2019 at 3:40 PM    "

## 2019-05-15 ENCOUNTER — OFFICE VISIT (OUTPATIENT)
Dept: FAMILY MEDICINE | Facility: OTHER | Age: 59
End: 2019-05-15
Attending: FAMILY MEDICINE
Payer: OTHER MISCELLANEOUS

## 2019-05-15 VITALS
RESPIRATION RATE: 14 BRPM | HEART RATE: 58 BPM | WEIGHT: 284 LBS | TEMPERATURE: 98.2 F | DIASTOLIC BLOOD PRESSURE: 72 MMHG | SYSTOLIC BLOOD PRESSURE: 126 MMHG | BODY MASS INDEX: 41.94 KG/M2

## 2019-05-15 DIAGNOSIS — J01.00 ACUTE NON-RECURRENT MAXILLARY SINUSITIS: Primary | ICD-10-CM

## 2019-05-15 DIAGNOSIS — G89.29 CHRONIC RIGHT-SIDED LOW BACK PAIN WITHOUT SCIATICA: Primary | ICD-10-CM

## 2019-05-15 DIAGNOSIS — M54.50 CHRONIC RIGHT-SIDED LOW BACK PAIN WITHOUT SCIATICA: Primary | ICD-10-CM

## 2019-05-15 PROCEDURE — 99213 OFFICE O/P EST LOW 20 MIN: CPT | Performed by: FAMILY MEDICINE

## 2019-05-15 RX ORDER — OXYCODONE HYDROCHLORIDE 10 MG/1
10 TABLET ORAL EVERY 4 HOURS PRN
Qty: 180 TABLET | Refills: 0 | Status: SHIPPED | OUTPATIENT
Start: 2019-05-15 | End: 2019-05-15

## 2019-05-15 RX ORDER — OXYCODONE HYDROCHLORIDE 10 MG/1
10 TABLET ORAL EVERY 4 HOURS PRN
Qty: 180 TABLET | Refills: 0 | Status: SHIPPED | OUTPATIENT
Start: 2019-05-15 | End: 2019-07-15

## 2019-05-15 ASSESSMENT — ANXIETY QUESTIONNAIRES
7. FEELING AFRAID AS IF SOMETHING AWFUL MIGHT HAPPEN: NOT AT ALL
5. BEING SO RESTLESS THAT IT IS HARD TO SIT STILL: NOT AT ALL
6. BECOMING EASILY ANNOYED OR IRRITABLE: NOT AT ALL
GAD7 TOTAL SCORE: 0
1. FEELING NERVOUS, ANXIOUS, OR ON EDGE: NOT AT ALL
2. NOT BEING ABLE TO STOP OR CONTROL WORRYING: NOT AT ALL
3. WORRYING TOO MUCH ABOUT DIFFERENT THINGS: NOT AT ALL
IF YOU CHECKED OFF ANY PROBLEMS ON THIS QUESTIONNAIRE, HOW DIFFICULT HAVE THESE PROBLEMS MADE IT FOR YOU TO DO YOUR WORK, TAKE CARE OF THINGS AT HOME, OR GET ALONG WITH OTHER PEOPLE: NOT DIFFICULT AT ALL

## 2019-05-15 ASSESSMENT — PATIENT HEALTH QUESTIONNAIRE - PHQ9: 5. POOR APPETITE OR OVEREATING: NOT AT ALL

## 2019-05-15 ASSESSMENT — ENCOUNTER SYMPTOMS
BACK PAIN: 1
FATIGUE: 0
WEAKNESS: 0

## 2019-05-15 ASSESSMENT — PAIN SCALES - GENERAL: PAINLEVEL: MODERATE PAIN (4)

## 2019-05-15 NOTE — NURSING NOTE
"Coming in for a 3 month WC and has a bad sinus infection    Chief Complaint   Patient presents with     Recheck Medication     3 month, has a bad sinus infection       Initial /72 (BP Location: Right arm, Patient Position: Sitting, Cuff Size: Adult Large)   Pulse 58   Temp 98.2  F (36.8  C) (Tympanic)   Resp 14   Wt 128.8 kg (284 lb)   BMI 41.94 kg/m   Estimated body mass index is 41.94 kg/m  as calculated from the following:    Height as of 2/10/16: 1.753 m (5' 9\").    Weight as of this encounter: 128.8 kg (284 lb).  Medication Reconciliation: complete    Sravani Robles LPN  "

## 2019-05-15 NOTE — PROGRESS NOTES
SUBJECTIVE:   Iker Young is a 58 year old male who presents to clinic today for the following health issues:    HPI  Follow up on work comp pain issues.  He had more pain last night, improved by today.  Feels with dropping the ultram dose he has had more pain.  Down from 8 daily to 4 daily.  Due for refills on the narcotics.  Does mix in tylenol as well as ibuprofen OTC as well.  Pain is still only in lumbar area.  No radiating symptoms.  No lower extremity weakness.  Has had a prior lumbar fusion. Last MRI was 6/18:    IMPRESSION:     Diffusely progressive mild degenerative changes above a chronic fusion  of L4-S1, when compared to 2007. Findings are most pronounced at L3-4,  where there is mild to moderate spinal stenosis and moderate foraminal  stenoses with possible impingement of the far lateral exiting L3 nerve  roots due to components of a disc bulge.     CHESTER BRYANT MD    Current Outpatient Medications   Medication Sig Dispense Refill     acetaminophen (TYLENOL) 325 MG tablet Take by mouth every 4 hours as needed       amitriptyline (ELAVIL) 50 MG tablet Take 2 tablets (100 mg) by mouth At Bedtime 60 tablet 11     atenolol (TENORMIN) 100 MG tablet Take 1 tablet (100 mg) by mouth daily 90 tablet 3     gabapentin (NEURONTIN) 300 MG capsule Take 1 capsule (300 mg) by mouth 2 times daily 180 capsule 3     ibuprofen (ADVIL/MOTRIN) 600 MG tablet TAKE 1 TABLET (600 MG) BY MOUTH EVERY 6 HOURS AS NEEDED FOR MODERATEPAIN 120 tablet 11     naloxone (NARCAN) 4 MG/0.1ML nasal spray Spray 1 spray (4 mg) into one nostril alternating nostrils once as needed for opioid reversal every 2-3 minutes until assistance arrives 0.2 mL 3     oxyCODONE IR (ROXICODONE) 10 MG tablet Take 1 tablet (10 mg) by mouth every 4 hours as needed for moderate to severe pain Fill on/after 5/15/19 180 tablet 0     sertraline (ZOLOFT) 100 MG tablet Take 1 tablet (100 mg) by mouth daily 90 tablet 3     traMADol (ULTRAM) 50 MG tablet  Take 1 tablet (50 mg) by mouth every 6 hours as needed for severe pain 120 tablet 5     Allergies   Allergen Reactions     Betadine [Povidone Iodine] Rash and Dermatitis     Severe blistering      Liquid Adhesive Dermatitis       Review of Systems   Constitutional: Negative for fatigue.   Musculoskeletal: Positive for back pain.   Neurological: Negative for weakness.        OBJECTIVE:     /72 (BP Location: Right arm, Patient Position: Sitting, Cuff Size: Adult Large)   Pulse 58   Temp 98.2  F (36.8  C) (Tympanic)   Resp 14   Wt 128.8 kg (284 lb)   BMI 41.94 kg/m    Body mass index is 41.94 kg/m .  Physical Exam   Constitutional: He is oriented to person, place, and time. He appears well-developed. No distress.   Musculoskeletal:   Able to get on table without assistance.  Mild to mod pain on palpation of lumbar area diffusely.  Negative straight leg raise    Neurological: He is alert and oriented to person, place, and time.   Skin: He is not diaphoretic.       Diagnostic Test Results:  none     ASSESSMENT/PLAN:         (M54.5,  G89.29) Chronic right-sided low back pain without sciatica  (primary encounter diagnosis)  Comment:  reviewed.  3 months of meds given. Follow up in 3 months  Plan: naloxone (NARCAN) 4 MG/0.1ML nasal spray,         oxyCODONE IR (ROXICODONE) 10 MG tablet,         DISCONTINUED: oxyCODONE IR (ROXICODONE) 10 MG         tablet, DISCONTINUED: oxyCODONE IR (ROXICODONE)        10 MG tablet                 Sohail Harris MD  [facility]

## 2019-05-16 ASSESSMENT — ANXIETY QUESTIONNAIRES: GAD7 TOTAL SCORE: 0

## 2019-06-13 DIAGNOSIS — G89.29 CHRONIC RIGHT-SIDED LOW BACK PAIN WITHOUT SCIATICA: Primary | ICD-10-CM

## 2019-06-13 DIAGNOSIS — M54.50 CHRONIC RIGHT-SIDED LOW BACK PAIN WITHOUT SCIATICA: Primary | ICD-10-CM

## 2019-06-14 RX ORDER — AMITRIPTYLINE HYDROCHLORIDE 50 MG/1
100 TABLET ORAL AT BEDTIME
Qty: 60 TABLET | Refills: 10 | Status: SHIPPED | OUTPATIENT
Start: 2019-06-14 | End: 2019-08-15

## 2019-06-14 NOTE — TELEPHONE ENCOUNTER
"Requested Prescriptions   Pending Prescriptions Disp Refills     amitriptyline (ELAVIL) 50 MG tablet 60 tablet 11     Sig: Take 2 tablets (100 mg) by mouth At Bedtime       Tricyclic Agents ( Annual appt and no PHQ9) Passed - 6/13/2019 12:04 PM        Passed - Blood Pressure under 140/90 in past 12 mos     BP Readings from Last 3 Encounters:   05/15/19 126/72   02/14/19 126/74   11/14/18 124/72                 Passed - Recent (12 mo) or future (30 days) visit within authorizing provider's specialty     Patient had office visit in the last 12 months or has a visit in the next 30 days with authorizing provider or within the authorizing provider's specialty.  See \"Patient Info\" tab in inbasket, or \"Choose Columns\" in Meds & Orders section of the refill encounter.              Passed - Medication is active on med list        Passed - Patient is age 18 or older        LOV 5/15/19    Prescription approved per [facility] Refill Protocol.    "

## 2019-07-15 ENCOUNTER — OFFICE VISIT (OUTPATIENT)
Dept: FAMILY MEDICINE | Facility: OTHER | Age: 59
End: 2019-07-15
Attending: PHYSICIAN ASSISTANT
Payer: MEDICARE

## 2019-07-15 ENCOUNTER — OFFICE VISIT (OUTPATIENT)
Dept: FAMILY MEDICINE | Facility: OTHER | Age: 59
End: 2019-07-15
Attending: FAMILY MEDICINE
Payer: OTHER MISCELLANEOUS

## 2019-07-15 VITALS
TEMPERATURE: 98.2 F | DIASTOLIC BLOOD PRESSURE: 90 MMHG | WEIGHT: 285.8 LBS | SYSTOLIC BLOOD PRESSURE: 142 MMHG | BODY MASS INDEX: 42.33 KG/M2 | HEIGHT: 69 IN | HEART RATE: 64 BPM | RESPIRATION RATE: 12 BRPM

## 2019-07-15 VITALS
HEART RATE: 64 BPM | RESPIRATION RATE: 20 BRPM | BODY MASS INDEX: 42.37 KG/M2 | TEMPERATURE: 96.5 F | DIASTOLIC BLOOD PRESSURE: 90 MMHG | WEIGHT: 286.1 LBS | HEIGHT: 69 IN | SYSTOLIC BLOOD PRESSURE: 150 MMHG

## 2019-07-15 DIAGNOSIS — M50.30 DEGENERATION OF CERVICAL INTERVERTEBRAL DISC: ICD-10-CM

## 2019-07-15 DIAGNOSIS — M10.9 ACUTE GOUT OF RIGHT FOOT, UNSPECIFIED CAUSE: Primary | ICD-10-CM

## 2019-07-15 DIAGNOSIS — M54.50 CHRONIC RIGHT-SIDED LOW BACK PAIN WITHOUT SCIATICA: ICD-10-CM

## 2019-07-15 DIAGNOSIS — M25.571 ARTHRALGIA OF RIGHT FOOT: ICD-10-CM

## 2019-07-15 DIAGNOSIS — G89.29 CHRONIC RIGHT-SIDED LOW BACK PAIN WITHOUT SCIATICA: ICD-10-CM

## 2019-07-15 DIAGNOSIS — G89.29 OTHER CHRONIC PAIN: Primary | ICD-10-CM

## 2019-07-15 LAB
ALBUMIN SERPL-MCNC: 4.4 G/DL (ref 3.5–5.7)
ALP SERPL-CCNC: 84 U/L (ref 34–104)
ALT SERPL W P-5'-P-CCNC: 28 U/L (ref 7–52)
ANION GAP SERPL CALCULATED.3IONS-SCNC: 6 MMOL/L (ref 3–14)
AST SERPL W P-5'-P-CCNC: 18 U/L (ref 13–39)
BASOPHILS # BLD AUTO: 0.1 10E9/L (ref 0–0.2)
BASOPHILS NFR BLD AUTO: 0.7 %
BILIRUB SERPL-MCNC: 0.4 MG/DL (ref 0.3–1)
BUN SERPL-MCNC: 14 MG/DL (ref 7–25)
CALCIUM SERPL-MCNC: 9.4 MG/DL (ref 8.6–10.3)
CHLORIDE SERPL-SCNC: 100 MMOL/L (ref 98–107)
CO2 SERPL-SCNC: 31 MMOL/L (ref 21–31)
CREAT SERPL-MCNC: 0.69 MG/DL (ref 0.7–1.3)
DIFFERENTIAL METHOD BLD: ABNORMAL
EOSINOPHIL # BLD AUTO: 0.3 10E9/L (ref 0–0.7)
EOSINOPHIL NFR BLD AUTO: 2.8 %
ERYTHROCYTE [DISTWIDTH] IN BLOOD BY AUTOMATED COUNT: 12.9 % (ref 10–15)
ERYTHROCYTE [SEDIMENTATION RATE] IN BLOOD BY WESTERGREN METHOD: 13 MM/H (ref 1–10)
GFR SERPL CREATININE-BSD FRML MDRD: >90 ML/MIN/{1.73_M2}
GLUCOSE SERPL-MCNC: 98 MG/DL (ref 70–105)
HCT VFR BLD AUTO: 41.1 % (ref 40–53)
HGB BLD-MCNC: 13.7 G/DL (ref 13.3–17.7)
IMM GRANULOCYTES # BLD: 0.1 10E9/L (ref 0–0.4)
IMM GRANULOCYTES NFR BLD: 0.6 %
LYMPHOCYTES # BLD AUTO: 2.5 10E9/L (ref 0.8–5.3)
LYMPHOCYTES NFR BLD AUTO: 22.6 %
MCH RBC QN AUTO: 30.3 PG (ref 26.5–33)
MCHC RBC AUTO-ENTMCNC: 33.3 G/DL (ref 31.5–36.5)
MCV RBC AUTO: 91 FL (ref 78–100)
MONOCYTES # BLD AUTO: 0.8 10E9/L (ref 0–1.3)
MONOCYTES NFR BLD AUTO: 7.1 %
NEUTROPHILS # BLD AUTO: 7.3 10E9/L (ref 1.6–8.3)
NEUTROPHILS NFR BLD AUTO: 66.2 %
PLATELET # BLD AUTO: 188 10E9/L (ref 150–450)
POTASSIUM SERPL-SCNC: 3.7 MMOL/L (ref 3.5–5.1)
PROT SERPL-MCNC: 7.5 G/DL (ref 6.4–8.9)
RBC # BLD AUTO: 4.52 10E12/L (ref 4.4–5.9)
SODIUM SERPL-SCNC: 137 MMOL/L (ref 134–144)
URATE SERPL-MCNC: 4.3 MG/DL (ref 4.4–7.6)
WBC # BLD AUTO: 11.1 10E9/L (ref 4–11)

## 2019-07-15 PROCEDURE — 80053 COMPREHEN METABOLIC PANEL: CPT | Mod: ZL | Performed by: NURSE PRACTITIONER

## 2019-07-15 PROCEDURE — 85652 RBC SED RATE AUTOMATED: CPT | Mod: ZL | Performed by: NURSE PRACTITIONER

## 2019-07-15 PROCEDURE — 36415 COLL VENOUS BLD VENIPUNCTURE: CPT | Mod: ZL | Performed by: NURSE PRACTITIONER

## 2019-07-15 PROCEDURE — 84550 ASSAY OF BLOOD/URIC ACID: CPT | Mod: ZL | Performed by: NURSE PRACTITIONER

## 2019-07-15 PROCEDURE — 99213 OFFICE O/P EST LOW 20 MIN: CPT | Performed by: NURSE PRACTITIONER

## 2019-07-15 PROCEDURE — 85025 COMPLETE CBC W/AUTO DIFF WBC: CPT | Mod: ZL | Performed by: NURSE PRACTITIONER

## 2019-07-15 PROCEDURE — G0463 HOSPITAL OUTPT CLINIC VISIT: HCPCS

## 2019-07-15 PROCEDURE — 99214 OFFICE O/P EST MOD 30 MIN: CPT | Performed by: FAMILY MEDICINE

## 2019-07-15 RX ORDER — TRAMADOL HYDROCHLORIDE 50 MG/1
50 TABLET ORAL EVERY 6 HOURS PRN
Qty: 120 TABLET | Refills: 5 | Status: SHIPPED | OUTPATIENT
Start: 2019-07-15 | End: 2019-08-15

## 2019-07-15 RX ORDER — PREDNISONE 20 MG/1
TABLET ORAL
Qty: 10 TABLET | Refills: 0 | Status: SHIPPED | OUTPATIENT
Start: 2019-07-15 | End: 2019-09-12

## 2019-07-15 RX ORDER — OXYCODONE HYDROCHLORIDE 10 MG/1
10 TABLET ORAL EVERY 4 HOURS PRN
Qty: 180 TABLET | Refills: 0 | Status: SHIPPED | OUTPATIENT
Start: 2019-07-15 | End: 2019-08-15

## 2019-07-15 ASSESSMENT — PAIN SCALES - GENERAL
PAINLEVEL: SEVERE PAIN (6)
PAINLEVEL: SEVERE PAIN (7)

## 2019-07-15 ASSESSMENT — MIFFLIN-ST. JEOR
SCORE: 2093.12
SCORE: 2091.76

## 2019-07-15 NOTE — NURSING NOTE
Patient presents to the clinic for right foot pain and swelling x 1 week. Patient reports no known injury.  Medication Reconciliation: complete    Kristina Aggarwal, CMA

## 2019-07-15 NOTE — NURSING NOTE
Patient here for 30 day medication refill for pain medication for Work comp for back pain,Medication Reconciliation: complete.    Portia Ramírez LPN  7/15/2019 2:13 PM

## 2019-07-15 NOTE — PATIENT INSTRUCTIONS
"Patient Education     What is Gout?  Gout is a disease that affects the joints. Left untreated, it can lead to painful foot and joint deformities and even kidney problems. But, by treating gout early, you can relieve pain and help prevent future problems. Gout can usually be treated with medicine and proper diet. In severe cases, surgery may be needed.  What causes gout?  Gout is caused by an excess of uric acid (a waste product made by the body). Uric acid is excreted by the kidneys. If the uric acid level in your blood rises too high, the uric acid may form crystals that collect in the joints, bringing on a gout attack. If you have many gout attacks, crystals may form large deposits called tophi. Tophi can damage joints and cause deformity.  Who is at risk for gout?  Men are more likely to have gout than women. But women can also be affected, mostly after menopause. Some health problems, such as obesity and high cholesterol, make gout more likely. And some medicines, such as diuretics ( water pills ), can trigger a gout attack. People who drink a lot of alcohol are at high risk for gout. Certain foods can also trigger a gout attack.  Substances that may trigger a gout attack  To help prevent a gout attack, avoid these foods:    Alcohol (particularly beer, but also red wine and spirits)    Certain meats (red meat, processed meat, turkey)    Organ meats (kidney, liver, sweetbread)    Shellfish (lobster, crab, shrimp, scallop, mussel)    Certain fish (anchovy, sardine, herring, mackerel)   Treatment    Lifestyle changes, including weight loss, exercise, and quitting tobacco use    Reducing consumption of the food groups above as well as high fructose corn syrup, found in many foods including sodas and energy drinks    Changing non-essential medicines that may contribute to gout (such as thiazide diuretics--\"water pills\")    Medicines to reduce the amount of uric acid in the blood, such as allopurinol, probencid, " febuxostat, and lesinurad.    Medicines to treat acute gout attacks, including NSAIDs (nonsteroidal anti-inflammatory medicines), steroids, and colchicine  Date Last Reviewed: 4/1/2018 2000-2018 [employer]. [address] [place] [address], [place] [address]. All rights reserved. This information is not intended as a substitute for professional medical care. Always follow your healthcare professional's instructions.

## 2019-07-15 NOTE — PROGRESS NOTES
SUBJECTIVE:   Iker Young is a 59 year old male who presents to clinic today for the following health issues:    HPI  Red painful right foot for 1 week. No injury. Concerned with gout. Eats a lot of red meat. No recent deer tick bites.       Patient Active Problem List    Diagnosis Date Noted     Back pain, chronic 02/07/2018     Priority: Medium     Degeneration of cervical intervertebral disc 02/07/2018     Priority: Medium     Grief reaction with prolonged bereavement 02/15/2017     Priority: Medium     Chest pain 12/12/2016     Priority: Medium     Aortic valve sclerosis 12/08/2016     Priority: Medium     HTN (hypertension) 06/18/2015     Priority: Medium     Knee pain, chronic 06/18/2015     Priority: Medium     Primary osteoarthritis of right knee 06/18/2015     Priority: Medium     Pain medication agreement 02/06/2014     Priority: Medium     Controlled substance agreement signed 07/02/2013     Priority: Medium     Allergic rhinitis 06/14/2011     Priority: Medium     HYPERLIPIDEMIA LDL GOAL <130 10/31/2010     Priority: Medium     PAIN CHRONIC( NEC) 12/04/2007     Priority: Medium     Obesity 04/24/2007     Priority: Medium     Problem list name updated by automated process. Provider to review       Essential hypertension      Priority: Medium     Problem list name updated by automated process. Provider to review       Hyperlipidemia      Priority: Medium     Problem list name updated by automated process. Provider to review       Past Medical History:   Diagnosis Date     Chest pain     12/12/2016     Other specified postprocedural states     Status post colonoscopy     Personal history of other medical treatment (CODE)     04/2009,MRI cervical spine      Past Surgical History:   Procedure Laterality Date     COLONOSCOPY      No Comments Provided     FUSION LUMBAR ANTERIOR ONE LEVEL      2004,Back surgery x 5 (fusion, hardware removal, stimulator and removal)     OTHER SURGICAL HISTORY       2005,205736,PERIPHERAL NERVE STIMULATOR,Lumbar nerve stimulator and subsequent removal     OTHER SURGICAL HISTORY      2005,205448,REMOVAL OF FOREIGN BODY, subsequent removal     OTHER SURGICAL HISTORY      [id number],INCISION AND DRAINAGE,from infection from nerve stimulator     OTHER SURGICAL HISTORY      04/2009,207388,SCAN-MRI INTERPRETATION,MRI cervical spine.     Family History   Problem Relation Age of Onset     Family History Negative Daughter         Good Health     Social History     Tobacco Use     Smoking status: Never Smoker     Smokeless tobacco: Never Used   Substance Use Topics     Alcohol use: No     Social History     Social History Narrative    , on disability.    Preloaded 1/22/2013.     Current Outpatient Medications   Medication Sig Dispense Refill     predniSONE (DELTASONE) 20 MG tablet Take two tablets (= 40mg) each day for 5 (five) days 10 tablet 0     acetaminophen (TYLENOL) 325 MG tablet Take by mouth every 4 hours as needed       amitriptyline (ELAVIL) 50 MG tablet Take 2 tablets (100 mg) by mouth At Bedtime 60 tablet 10     atenolol (TENORMIN) 100 MG tablet Take 1 tablet (100 mg) by mouth daily 90 tablet 3     gabapentin (NEURONTIN) 300 MG capsule Take 1 capsule (300 mg) by mouth 2 times daily 180 capsule 3     ibuprofen (ADVIL/MOTRIN) 600 MG tablet TAKE 1 TABLET (600 MG) BY MOUTH EVERY 6 HOURS AS NEEDED FOR MODERATEPAIN 120 tablet 11     naloxone (NARCAN) 4 MG/0.1ML nasal spray Spray 1 spray (4 mg) into one nostril alternating nostrils once as needed for opioid reversal every 2-3 minutes until assistance arrives 0.2 mL 3     oxyCODONE IR (ROXICODONE) 10 MG tablet Take 1 tablet (10 mg) by mouth every 4 hours as needed for moderate to severe pain Fill on/after 7/14/19 180 tablet 0     sertraline (ZOLOFT) 100 MG tablet Take 1 tablet (100 mg) by mouth daily 90 tablet 3     traMADol (ULTRAM) 50 MG tablet Take 1 tablet (50 mg) by mouth every 6 hours as needed for severe pain 120 tablet 5  "    Allergies   Allergen Reactions     Betadine [Povidone Iodine] Rash and Dermatitis     Severe blistering      Liquid Adhesive Dermatitis       Review of Systems   Constitutional: Negative.    Musculoskeletal: Positive for joint swelling.   Skin: Positive for color change. Negative for rash and wound.   Neurological: Negative for dizziness and weakness.   Hematological: Does not bruise/bleed easily.        OBJECTIVE:     /90 (BP Location: Right arm, Patient Position: Sitting, Cuff Size: Adult Regular)   Pulse 64   Temp 96.5  F (35.8  C) (Tympanic)   Resp 20   Ht 1.753 m (5' 9\")   Wt 129.8 kg (286 lb 1.6 oz)   BMI 42.25 kg/m    Body mass index is 42.25 kg/m .  Physical Exam   Constitutional: He is oriented to person, place, and time. He appears well-developed and well-nourished. No distress.   Cardiovascular: Normal rate.   Pulmonary/Chest: Effort normal.   Musculoskeletal: Normal range of motion.   Erythematous, warm and pain out of proportion to the right foot, no abrasions, no fluctuance, no ecchymosis.    Neurological: He is alert and oriented to person, place, and time.   Skin: Skin is warm and dry. He is not diaphoretic.   Nursing note and vitals reviewed.      Diagnostic Test Results:  No results found for this or any previous visit (from the past 24 hour(s)).  Results for orders placed or performed in visit on 07/15/19   Uric acid   Result Value Ref Range    Uric Acid 4.3 (L) 4.4 - 7.6 mg/dL   Comprehensive metabolic panel   Result Value Ref Range    Sodium 137 134 - 144 mmol/L    Potassium 3.7 3.5 - 5.1 mmol/L    Chloride 100 98 - 107 mmol/L    Carbon Dioxide 31 21 - 31 mmol/L    Anion Gap 6 3 - 14 mmol/L    Glucose 98 70 - 105 mg/dL    Urea Nitrogen 14 7 - 25 mg/dL    Creatinine 0.69 (L) 0.70 - 1.30 mg/dL    GFR Estimate >90 >60 mL/min/[1.73_m2]    GFR Estimate If Black >90 >60 mL/min/[1.73_m2]    Calcium 9.4 8.6 - 10.3 mg/dL    Bilirubin Total 0.4 0.3 - 1.0 mg/dL    Albumin 4.4 3.5 - 5.7 g/dL "    Protein Total 7.5 6.4 - 8.9 g/dL    Alkaline Phosphatase 84 34 - 104 U/L    ALT 28 7 - 52 U/L    AST 18 13 - 39 U/L   CBC with platelets differential   Result Value Ref Range    WBC 11.1 (H) 4.0 - 11.0 10e9/L    RBC Count 4.52 4.4 - 5.9 10e12/L    Hemoglobin 13.7 13.3 - 17.7 g/dL    Hematocrit 41.1 40.0 - 53.0 %    MCV 91 78 - 100 fl    MCH 30.3 26.5 - 33.0 pg    MCHC 33.3 31.5 - 36.5 g/dL    RDW 12.9 10.0 - 15.0 %    Platelet Count 188 150 - 450 10e9/L    Diff Method Automated Method     % Neutrophils 66.2 %    % Lymphocytes 22.6 %    % Monocytes 7.1 %    % Eosinophils 2.8 %    % Basophils 0.7 %    % Immature Granulocytes 0.6 %    Absolute Neutrophil 7.3 1.6 - 8.3 10e9/L    Absolute Lymphocytes 2.5 0.8 - 5.3 10e9/L    Absolute Monocytes 0.8 0.0 - 1.3 10e9/L    Absolute Eosinophils 0.3 0.0 - 0.7 10e9/L    Absolute Basophils 0.1 0.0 - 0.2 10e9/L    Abs Immature Granulocytes 0.1 0 - 0.4 10e9/L   Erythrocyte sedimentation rate auto   Result Value Ref Range    Sed Rate 13 (H) 1 - 10 mm/h     ASSESSMENT/PLAN:       ICD-10-CM    1. Acute gout of right foot, unspecified cause M10.9 predniSONE (DELTASONE) 20 MG tablet   2. Arthralgia of right foot M25.571 Uric acid     Comprehensive metabolic panel     CBC with platelets differential     Erythrocyte sedimentation rate auto     CANCELED: XR Foot Right G/E 3 Views       PLAN:  Patient is afebrile, he has mild erythema, tenderness and swelling to the right foot without injury.  His uric acid level is 4.3, WBCs 11.1, no shift, sed rate is 13.  Patient declined for x-ray as he is able to walk on it and did not injure it.  He would prefer to treat this as gout after we discussed the risks and benefits.  We will order him a 5-day burst of prednisone.  Advised to elevate his foot and apply ice for pain.  Reviewed diet causes with patient.  Encouraged to drink extra fluids and avoid foods and drinks that are more likely to increase his symptoms.  Given epic education  materials.  Advised to follow-up with primary care in 3 to 5 days if not improving, sooner if symptoms worsen.  I explained my diagnostic considerations and recommendations to pt who voiced understanding and agreement with the treatment plan. All questions were answered. We discussed potential side effects of any prescribed or recommended therapies, as well as expectations for response to treatments.    Disclaimer:  This note consists of words and symbols derived from keyboarding, dictation, or using voice recognition software. As a result, there may be errors in the script that have gone undetected. Please consider this when interpreting information found in this note.      STEFFEN Mariee, NP-C  7/15/2019 at 3:47 PM  [facility]

## 2019-07-16 DIAGNOSIS — M50.30 DEGENERATION OF CERVICAL INTERVERTEBRAL DISC: ICD-10-CM

## 2019-07-16 DIAGNOSIS — G89.29 CHRONIC RIGHT-SIDED LOW BACK PAIN WITHOUT SCIATICA: ICD-10-CM

## 2019-07-16 DIAGNOSIS — M54.50 CHRONIC RIGHT-SIDED LOW BACK PAIN WITHOUT SCIATICA: ICD-10-CM

## 2019-07-16 RX ORDER — OXYCODONE HYDROCHLORIDE 10 MG/1
10 TABLET ORAL EVERY 4 HOURS PRN
Qty: 180 TABLET | Refills: 0 | Status: CANCELLED | OUTPATIENT
Start: 2019-07-16

## 2019-07-16 RX ORDER — TRAMADOL HYDROCHLORIDE 50 MG/1
50 TABLET ORAL EVERY 6 HOURS PRN
Qty: 120 TABLET | Refills: 5 | Status: CANCELLED | OUTPATIENT
Start: 2019-07-16

## 2019-07-16 NOTE — TELEPHONE ENCOUNTER
Pharmacy states- Patient needs hard copy.  Please call [facility] as soon as we can  hard copy!  Patient out Sunday.  All hard copies on file are in VOID.  **HIGH PRIORITY**  Thanks.

## 2019-07-16 NOTE — TELEPHONE ENCOUNTER
Both noted as refilled yesterday and patient had OV.  Called patient and patient states that he already had refilled yesterday.    Cass Augustin RN on 7/16/2019 at 11:44 AM

## 2019-07-17 ASSESSMENT — ENCOUNTER SYMPTOMS
JOINT SWELLING: 1
DIZZINESS: 0
COLOR CHANGE: 1
BRUISES/BLEEDS EASILY: 0
WOUND: 0
WEAKNESS: 0
CONSTITUTIONAL NEGATIVE: 1

## 2019-07-17 NOTE — PROGRESS NOTES
SUBJECTIVE:   Iker Young is a 59 year old male who presents to clinic today for the following health issues:    Patient arrives here for refill of his tramadol and oxycodone.  He does have an appointment with his PCP on August 15 but is in need of a prescription until then.  He has a history of low back pain.  Degenerative arthritis.  Chronic knee pain.  Patient has a diagnosis of postlaminectomy syndrome.  He has been tolerating the medications well.  Reports no difficulty with him.        Patient Active Problem List    Diagnosis Date Noted     Back pain, chronic 02/07/2018     Priority: Medium     Degeneration of cervical intervertebral disc 02/07/2018     Priority: Medium     Grief reaction with prolonged bereavement 02/15/2017     Priority: Medium     Chest pain 12/12/2016     Priority: Medium     Aortic valve sclerosis 12/08/2016     Priority: Medium     HTN (hypertension) 06/18/2015     Priority: Medium     Knee pain, chronic 06/18/2015     Priority: Medium     Primary osteoarthritis of right knee 06/18/2015     Priority: Medium     Pain medication agreement 02/06/2014     Priority: Medium     Controlled substance agreement signed 07/02/2013     Priority: Medium     Allergic rhinitis 06/14/2011     Priority: Medium     HYPERLIPIDEMIA LDL GOAL <130 10/31/2010     Priority: Medium     PAIN CHRONIC( NEC) 12/04/2007     Priority: Medium     Obesity 04/24/2007     Priority: Medium     Problem list name updated by automated process. Provider to review       Essential hypertension      Priority: Medium     Problem list name updated by automated process. Provider to review       Hyperlipidemia      Priority: Medium     Problem list name updated by automated process. Provider to review       Past Medical History:   Diagnosis Date     Chest pain     12/12/2016     Other specified postprocedural states     Status post colonoscopy     Personal history of other medical treatment (CODE)     04/2009,MRI cervical spine       Past Surgical History:   Procedure Laterality Date     COLONOSCOPY      No Comments Provided     FUSION LUMBAR ANTERIOR ONE LEVEL      2004,Back surgery x 5 (fusion, hardware removal, stimulator and removal)     OTHER SURGICAL HISTORY      2005,205736,PERIPHERAL NERVE STIMULATOR,Lumbar nerve stimulator and subsequent removal     OTHER SURGICAL HISTORY      2005,205448,REMOVAL OF FOREIGN BODY, subsequent removal     OTHER SURGICAL HISTORY      208566,INCISION AND DRAINAGE,from infection from nerve stimulator     OTHER SURGICAL HISTORY      04/2009,207388,SCAN-MRI INTERPRETATION,MRI cervical spine.     Social History     Tobacco Use     Smoking status: Never Smoker     Smokeless tobacco: Never Used   Substance Use Topics     Alcohol use: No     Current Outpatient Medications   Medication Sig Dispense Refill     acetaminophen (TYLENOL) 325 MG tablet Take by mouth every 4 hours as needed       amitriptyline (ELAVIL) 50 MG tablet Take 2 tablets (100 mg) by mouth At Bedtime 60 tablet 10     atenolol (TENORMIN) 100 MG tablet Take 1 tablet (100 mg) by mouth daily 90 tablet 3     gabapentin (NEURONTIN) 300 MG capsule Take 1 capsule (300 mg) by mouth 2 times daily 180 capsule 3     ibuprofen (ADVIL/MOTRIN) 600 MG tablet TAKE 1 TABLET (600 MG) BY MOUTH EVERY 6 HOURS AS NEEDED FOR MODERATEPAIN 120 tablet 11     naloxone (NARCAN) 4 MG/0.1ML nasal spray Spray 1 spray (4 mg) into one nostril alternating nostrils once as needed for opioid reversal every 2-3 minutes until assistance arrives 0.2 mL 3     oxyCODONE IR (ROXICODONE) 10 MG tablet Take 1 tablet (10 mg) by mouth every 4 hours as needed for moderate to severe pain Fill on/after 7/14/19 180 tablet 0     sertraline (ZOLOFT) 100 MG tablet Take 1 tablet (100 mg) by mouth daily 90 tablet 3     traMADol (ULTRAM) 50 MG tablet Take 1 tablet (50 mg) by mouth every 6 hours as needed for severe pain 120 tablet 5     predniSONE (DELTASONE) 20 MG tablet Take two tablets (= 40mg)  "each day for 5 (five) days 10 tablet 0     Allergies   Allergen Reactions     Betadine [Povidone Iodine] Rash and Dermatitis     Severe blistering      Liquid Adhesive Dermatitis       Review of Systems     OBJECTIVE:     /90   Pulse 64   Temp 98.2  F (36.8  C)   Resp 12   Ht 1.753 m (5' 9\")   Wt 129.6 kg (285 lb 12.8 oz)   BMI 42.21 kg/m    Body mass index is 42.21 kg/m .  Physical Exam   Constitutional: He appears well-developed.   Eyes: Pupils are equal, round, and reactive to light.   Pulmonary/Chest: Effort normal.   Neurological: He is alert.   Skin: Skin is warm.           ASSESSMENT/PLAN:         1. PAIN CHRONIC( NEC)  His  from May appears to be appropriate.  Pain drug screen from November also appears to be appropriate.    2. Chronic right-sided low back pain without sciatica  Refill  - oxyCODONE IR (ROXICODONE) 10 MG tablet; Take 1 tablet (10 mg) by mouth every 4 hours as needed for moderate to severe pain Fill on/after 7/14/19  Dispense: 180 tablet; Refill: 0    3. Degeneration of cervical intervertebral disc  Refill  - traMADol (ULTRAM) 50 MG tablet; Take 1 tablet (50 mg) by mouth every 6 hours as needed for severe pain  Dispense: 120 tablet; Refill: 5    Patient is advised to follow-up with his PCP for further refills.  Encouraged always to have follow-up appointments made before he leaves the clinic.  27  Minutes spent with the patient reviewing his medical history reviewing  drug screens.  Claude Merlos MD  [facility] AND \[facility]\""  "

## 2019-08-15 ENCOUNTER — OFFICE VISIT (OUTPATIENT)
Dept: FAMILY MEDICINE | Facility: OTHER | Age: 59
End: 2019-08-15
Attending: FAMILY MEDICINE
Payer: MEDICARE

## 2019-08-15 VITALS
HEART RATE: 62 BPM | WEIGHT: 290.2 LBS | SYSTOLIC BLOOD PRESSURE: 136 MMHG | RESPIRATION RATE: 18 BRPM | BODY MASS INDEX: 42.86 KG/M2 | DIASTOLIC BLOOD PRESSURE: 76 MMHG | TEMPERATURE: 98.7 F

## 2019-08-15 DIAGNOSIS — F43.29 GRIEF REACTION WITH PROLONGED BEREAVEMENT: ICD-10-CM

## 2019-08-15 DIAGNOSIS — M54.50 CHRONIC RIGHT-SIDED LOW BACK PAIN WITHOUT SCIATICA: ICD-10-CM

## 2019-08-15 DIAGNOSIS — G89.29 CHRONIC RIGHT-SIDED LOW BACK PAIN WITHOUT SCIATICA: ICD-10-CM

## 2019-08-15 DIAGNOSIS — M50.30 DEGENERATION OF CERVICAL INTERVERTEBRAL DISC: Primary | ICD-10-CM

## 2019-08-15 DIAGNOSIS — Z79.891 LONG TERM CURRENT USE OF OPIATE ANALGESIC: ICD-10-CM

## 2019-08-15 PROCEDURE — 99213 OFFICE O/P EST LOW 20 MIN: CPT | Performed by: FAMILY MEDICINE

## 2019-08-15 PROCEDURE — G0463 HOSPITAL OUTPT CLINIC VISIT: HCPCS

## 2019-08-15 PROCEDURE — 80307 DRUG TEST PRSMV CHEM ANLYZR: CPT | Mod: ZL | Performed by: FAMILY MEDICINE

## 2019-08-15 RX ORDER — TRAMADOL HYDROCHLORIDE 50 MG/1
50 TABLET ORAL EVERY 6 HOURS PRN
Qty: 120 TABLET | Refills: 5 | Status: SHIPPED | OUTPATIENT
Start: 2019-08-15 | End: 2019-11-05

## 2019-08-15 RX ORDER — SERTRALINE HYDROCHLORIDE 100 MG/1
100 TABLET, FILM COATED ORAL DAILY
Qty: 90 TABLET | Refills: 3 | Status: SHIPPED | OUTPATIENT
Start: 2019-08-15 | End: 2020-07-17

## 2019-08-15 RX ORDER — AMITRIPTYLINE HYDROCHLORIDE 50 MG/1
100 TABLET ORAL AT BEDTIME
Qty: 60 TABLET | Refills: 10 | Status: SHIPPED | OUTPATIENT
Start: 2019-08-15 | End: 2020-07-15

## 2019-08-15 RX ORDER — GABAPENTIN 300 MG/1
300 CAPSULE ORAL 2 TIMES DAILY
Qty: 180 CAPSULE | Refills: 3 | Status: SHIPPED | OUTPATIENT
Start: 2019-08-15 | End: 2019-12-06

## 2019-08-15 RX ORDER — IBUPROFEN 600 MG/1
600 TABLET, FILM COATED ORAL EVERY 6 HOURS PRN
Qty: 120 TABLET | Refills: 11 | Status: SHIPPED | OUTPATIENT
Start: 2019-08-15 | End: 2020-07-17

## 2019-08-15 RX ORDER — OXYCODONE HYDROCHLORIDE 10 MG/1
10 TABLET ORAL EVERY 4 HOURS PRN
Qty: 180 TABLET | Refills: 0 | Status: SHIPPED | OUTPATIENT
Start: 2019-08-15 | End: 2019-09-12

## 2019-08-15 ASSESSMENT — ENCOUNTER SYMPTOMS
BACK PAIN: 1
WEAKNESS: 0
FATIGUE: 0
FEVER: 0

## 2019-08-15 ASSESSMENT — PAIN SCALES - GENERAL: PAINLEVEL: MILD PAIN (3)

## 2019-08-15 NOTE — LETTER
August 26, 2019      Iker Young  [id number] [address] [id number]        Dear Iker,     The urine testing showed only the meds I have been prescribing and a few over the counter cold meds.      Sincerely,        Sohail Harris MD

## 2019-08-15 NOTE — NURSING NOTE
"coming in for a medication check up    Chief Complaint   Patient presents with     Work Comp     medication check up       Initial /76 (BP Location: Right arm, Patient Position: Sitting, Cuff Size: Adult Large)   Pulse 62   Temp 98.7  F (37.1  C) (Tympanic)   Resp 18   Wt 131.6 kg (290 lb 3.2 oz)   BMI 42.86 kg/m   Estimated body mass index is 42.86 kg/m  as calculated from the following:    Height as of 7/15/19: 1.753 m (5' 9\").    Weight as of this encounter: 131.6 kg (290 lb 3.2 oz).  Medication Reconciliation: complete    Sravani Robles LPN  "

## 2019-08-15 NOTE — PROGRESS NOTES
SUBJECTIVE:   Iker Young is a 59 year old male who presents to clinic today for the following health issues:    HPI  Follow up on chronic back pain. Last tox screen was 11/18.  Has cut back on tramadol to 4 daily from 8. Noted no worsening of pain with this change.  No withdrawl symptoms at all.  Back pain remains about the same at 3/10.  Pain sometimes will keep him awake at night, perhaps once a week or so.      4 weeks ago had foot pain, was seen and diagnosed with gout.  Prednisone resolved the symptoms.  Rare beer, likes lots processed meats.    Patient Active Problem List    Diagnosis Date Noted     Back pain, chronic 02/07/2018     Priority: Medium     Degeneration of cervical intervertebral disc 02/07/2018     Priority: Medium     Grief reaction with prolonged bereavement 02/15/2017     Priority: Medium     Chest pain 12/12/2016     Priority: Medium     Aortic valve sclerosis 12/08/2016     Priority: Medium     HTN (hypertension) 06/18/2015     Priority: Medium     Knee pain, chronic 06/18/2015     Priority: Medium     Primary osteoarthritis of right knee 06/18/2015     Priority: Medium     Pain medication agreement 02/06/2014     Priority: Medium     Controlled substance agreement signed 07/02/2013     Priority: Medium     Allergic rhinitis 06/14/2011     Priority: Medium     HYPERLIPIDEMIA LDL GOAL <130 10/31/2010     Priority: Medium     PAIN CHRONIC( NEC) 12/04/2007     Priority: Medium     Obesity 04/24/2007     Priority: Medium     Problem list name updated by automated process. Provider to review       Essential hypertension      Priority: Medium     Problem list name updated by automated process. Provider to review       Hyperlipidemia      Priority: Medium     Problem list name updated by automated process. Provider to review       Past Surgical History:   Procedure Laterality Date     COLONOSCOPY      No Comments Provided     FUSION LUMBAR ANTERIOR ONE LEVEL      2004,Back surgery x 5  (fusion, hardware removal, stimulator and removal)     OTHER SURGICAL HISTORY      2005,205736,PERIPHERAL NERVE STIMULATOR,Lumbar nerve stimulator and subsequent removal     OTHER SURGICAL HISTORY      2005,205448,REMOVAL OF FOREIGN BODY, subsequent removal     OTHER SURGICAL HISTORY      208566,INCISION AND DRAINAGE,from infection from nerve stimulator     OTHER SURGICAL HISTORY      04/2009,207388,SCAN-MRI INTERPRETATION,MRI cervical spine.     Social History     Tobacco Use     Smoking status: Never Smoker     Smokeless tobacco: Never Used   Substance Use Topics     Alcohol use: No     Current Outpatient Medications   Medication Sig Dispense Refill     acetaminophen (TYLENOL) 325 MG tablet Take by mouth every 4 hours as needed       amitriptyline (ELAVIL) 50 MG tablet Take 2 tablets (100 mg) by mouth At Bedtime 60 tablet 10     atenolol (TENORMIN) 100 MG tablet Take 1 tablet (100 mg) by mouth daily 90 tablet 3     gabapentin (NEURONTIN) 300 MG capsule Take 1 capsule (300 mg) by mouth 2 times daily 180 capsule 3     ibuprofen (ADVIL/MOTRIN) 600 MG tablet Take 1 tablet (600 mg) by mouth every 6 hours as needed for moderate pain 120 tablet 11     naloxone (NARCAN) 4 MG/0.1ML nasal spray Spray 1 spray (4 mg) into one nostril alternating nostrils once as needed for opioid reversal every 2-3 minutes until assistance arrives 0.2 mL 3     oxyCODONE IR (ROXICODONE) 10 MG tablet Take 1 tablet (10 mg) by mouth every 4 hours as needed for moderate to severe pain Fill on/after 8/15/19 180 tablet 0     predniSONE (DELTASONE) 20 MG tablet Take two tablets (= 40mg) each day for 5 (five) days 10 tablet 0     sertraline (ZOLOFT) 100 MG tablet Take 1 tablet (100 mg) by mouth daily 90 tablet 3     traMADol (ULTRAM) 50 MG tablet Take 1 tablet (50 mg) by mouth every 6 hours as needed for severe pain 120 tablet 5     Allergies   Allergen Reactions     Betadine [Povidone Iodine] Rash and Dermatitis     Severe blistering      Liquid  Adhesive Dermatitis       Review of Systems   Constitutional: Negative for fatigue and fever.   Musculoskeletal: Positive for back pain.   Neurological: Negative for weakness.        OBJECTIVE:     /76 (BP Location: Right arm, Patient Position: Sitting, Cuff Size: Adult Large)   Pulse 62   Temp 98.7  F (37.1  C) (Tympanic)   Resp 18   Wt 131.6 kg (290 lb 3.2 oz)   BMI 42.86 kg/m    Body mass index is 42.86 kg/m .  Physical Exam   Constitutional: He appears well-developed and well-nourished. No distress.   Musculoskeletal:   No lumbar pain on palpation, negative straight leg raise.   Skin: Skin is warm and dry. He is not diaphoretic.           ASSESSMENT/PLAN:         (M50.30) Degeneration of cervical intervertebral disc  (primary encounter diagnosis)  Comment: stable  Plan: gabapentin (NEURONTIN) 300 MG capsule,         ibuprofen (ADVIL/MOTRIN) 600 MG tablet,         traMADol (ULTRAM) 50 MG tablet, Drug  Screen         Comprehensive , Urine with Reported Meds         ([facility]) (Pain Care Package)         He is going to try to stop the tramadol completely, if unable to do this, at next visit would cut oxycodone to 5 daily.    (M54.5,  G89.29) Chronic right-sided low back pain without sciatica  Comment:  stable  Plan: amitriptyline (ELAVIL) 50 MG tablet, oxyCODONE         IR (ROXICODONE) 10 MG tablet        Refilled, follow up in 4 weeks.    (F43.21) Grief reaction with prolonged bereavement  Comment:    Plan: sertraline (ZOLOFT) 100 MG tablet             (Z79.891) Long term current use of opiate analgesic   Comment:    Plan: Drug  Screen Comprehensive , Urine with         Reported Meds ([facility]) (Pain Care Package)                 Sohail Harris MD  [facility] [place]

## 2019-08-21 LAB — PAIN DRUG SCR UR W RPTD MEDS: NORMAL

## 2019-09-12 ENCOUNTER — OFFICE VISIT (OUTPATIENT)
Dept: FAMILY MEDICINE | Facility: OTHER | Age: 59
End: 2019-09-12
Attending: FAMILY MEDICINE
Payer: OTHER MISCELLANEOUS

## 2019-09-12 VITALS
WEIGHT: 284.2 LBS | DIASTOLIC BLOOD PRESSURE: 83 MMHG | OXYGEN SATURATION: 96 % | TEMPERATURE: 97.2 F | SYSTOLIC BLOOD PRESSURE: 152 MMHG | HEART RATE: 64 BPM | HEIGHT: 68 IN | BODY MASS INDEX: 43.07 KG/M2 | RESPIRATION RATE: 16 BRPM

## 2019-09-12 DIAGNOSIS — M54.50 CHRONIC RIGHT-SIDED LOW BACK PAIN WITHOUT SCIATICA: ICD-10-CM

## 2019-09-12 DIAGNOSIS — G89.29 CHRONIC RIGHT-SIDED LOW BACK PAIN WITHOUT SCIATICA: ICD-10-CM

## 2019-09-12 PROCEDURE — 99213 OFFICE O/P EST LOW 20 MIN: CPT | Performed by: FAMILY MEDICINE

## 2019-09-12 RX ORDER — OXYCODONE HYDROCHLORIDE 10 MG/1
10 TABLET ORAL EVERY 4 HOURS PRN
Qty: 180 TABLET | Refills: 0 | Status: SHIPPED | OUTPATIENT
Start: 2019-09-12 | End: 2019-10-11

## 2019-09-12 ASSESSMENT — ENCOUNTER SYMPTOMS
BACK PAIN: 1
FATIGUE: 0
WEAKNESS: 0

## 2019-09-12 ASSESSMENT — PAIN SCALES - GENERAL: PAINLEVEL: MODERATE PAIN (5)

## 2019-09-12 ASSESSMENT — MIFFLIN-ST. JEOR: SCORE: 2068.62

## 2019-09-12 NOTE — PROGRESS NOTES
SUBJECTIVE:   Iker Young is a 59 year old male who presents to clinic today for the following health issues:    HPI  Follow up work comp chronic pain.  Is about the same today, lumbar pain at 5/10 or so.  Has been helping his brother by building a WC ramp.  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  tox screen was as expected on 8/15/19.  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  Instead of stopping the ultram he would rather taper down on the oxycodone.  Has lost 6 # from last month.  Is trying to lose weight to improve the pain.      Patient Active Problem List    Diagnosis Date Noted     Back pain, chronic 02/07/2018     Priority: Medium     Degeneration of cervical intervertebral disc 02/07/2018     Priority: Medium     Grief reaction with prolonged bereavement 02/15/2017     Priority: Medium     Chest pain 12/12/2016     Priority: Medium     Aortic valve sclerosis 12/08/2016     Priority: Medium     HTN (hypertension) 06/18/2015     Priority: Medium     Knee pain, chronic 06/18/2015     Priority: Medium     Primary osteoarthritis of right knee 06/18/2015     Priority: Medium     Pain medication agreement 02/06/2014     Priority: Medium     Controlled substance agreement signed 07/02/2013     Priority: Medium     Allergic rhinitis 06/14/2011     Priority: Medium     HYPERLIPIDEMIA LDL GOAL <130 10/31/2010     Priority: Medium     PAIN CHRONIC( NEC) 12/04/2007     Priority: Medium     Obesity 04/24/2007     Priority: Medium     Problem list name updated by automated process. Provider to review       Essential hypertension      Priority: Medium     Problem list name updated by automated process. Provider to review       Hyperlipidemia      Priority: Medium     Problem list name updated by automated process. Provider to review       Past Surgical History:   Procedure  Laterality Date     COLONOSCOPY      No Comments Provided     FUSION LUMBAR ANTERIOR ONE LEVEL      2004,Back surgery x 5 (fusion, hardware removal, stimulator and removal)     OTHER SURGICAL HISTORY      2005,205736,PERIPHERAL NERVE STIMULATOR,Lumbar nerve stimulator and subsequent removal     OTHER SURGICAL HISTORY      2005,205448,REMOVAL OF FOREIGN BODY, subsequent removal     OTHER SURGICAL HISTORY      208566,INCISION AND DRAINAGE,from infection from nerve stimulator     OTHER SURGICAL HISTORY      04/2009,207388,SCAN-MRI INTERPRETATION,MRI cervical spine.     Social History     Tobacco Use     Smoking status: Never Smoker     Smokeless tobacco: Never Used   Substance Use Topics     Alcohol use: Yes     Comment: rare     Current Outpatient Medications   Medication Sig Dispense Refill     acetaminophen (TYLENOL) 325 MG tablet Take by mouth every 4 hours as needed       amitriptyline (ELAVIL) 50 MG tablet Take 2 tablets (100 mg) by mouth At Bedtime 60 tablet 10     atenolol (TENORMIN) 100 MG tablet Take 1 tablet (100 mg) by mouth daily 90 tablet 3     gabapentin (NEURONTIN) 300 MG capsule Take 1 capsule (300 mg) by mouth 2 times daily 180 capsule 3     ibuprofen (ADVIL/MOTRIN) 600 MG tablet Take 1 tablet (600 mg) by mouth every 6 hours as needed for moderate pain 120 tablet 11     naloxone (NARCAN) 4 MG/0.1ML nasal spray Spray 1 spray (4 mg) into one nostril alternating nostrils once as needed for opioid reversal every 2-3 minutes until assistance arrives 0.2 mL 3     oxyCODONE IR (ROXICODONE) 10 MG tablet Take 1 tablet (10 mg) by mouth every 4 hours as needed for moderate to severe pain Fill on/after 8/15/19 180 tablet 0     sertraline (ZOLOFT) 100 MG tablet Take 1 tablet (100 mg) by mouth daily 90 tablet 3     traMADol (ULTRAM) 50 MG tablet Take 1 tablet (50 mg) by mouth every 6 hours as needed for severe pain 120 tablet 5     Allergies   Allergen Reactions     Betadine [Povidone Iodine] Rash and Dermatitis     " Severe blistering      Liquid Adhesive Dermatitis       Review of Systems   Constitutional: Negative for fatigue.   Musculoskeletal: Positive for back pain.   Neurological: Negative for weakness.        OBJECTIVE:     BP (!) 152/83   Pulse 64   Temp 97.2  F (36.2  C) (Tympanic)   Resp 16   Ht 1.727 m (5' 8\")   Wt 128.9 kg (284 lb 3.2 oz)   SpO2 96%   BMI 43.21 kg/m    Body mass index is 43.21 kg/m .  Physical Exam   Constitutional: He is oriented to person, place, and time. He appears well-developed and well-nourished. No distress.   Musculoskeletal:   Slow to stand.  Negative straight leg raise.   Neurological: He is alert and oriented to person, place, and time.   Skin: Skin is warm and dry. He is not diaphoretic.       Diagnostic Test Results:  none     ASSESSMENT/PLAN:     {    (M54.5,  G89.29) Chronic right-sided low back pain without sciatica  Comment: he now intends to do a slow taper off the oxycodone, to initiate in 30 days.  I gave him another 1 month supply.    Plan: oxyCODONE IR (ROXICODONE) 10 MG tablet        Follow up in 4 weeks.          Sohail Harris MD  [facility]    "

## 2019-09-12 NOTE — NURSING NOTE
"Patient presents to clinic for 3 month follow up work comp-controlled substance.   Chief Complaint   Patient presents with     Work Comp       Initial BP (!) 152/83   Pulse 64   Temp 97.2  F (36.2  C) (Tympanic)   Resp 16   Ht 1.727 m (5' 8\")   Wt 128.9 kg (284 lb 3.2 oz)   SpO2 96%   BMI 43.21 kg/m   Estimated body mass index is 43.21 kg/m  as calculated from the following:    Height as of this encounter: 1.727 m (5' 8\").    Weight as of this encounter: 128.9 kg (284 lb 3.2 oz).  Medication Reconciliation: complete    Odessa Boo LPN    "

## 2019-10-11 ENCOUNTER — OFFICE VISIT (OUTPATIENT)
Dept: FAMILY MEDICINE | Facility: OTHER | Age: 59
End: 2019-10-11
Attending: FAMILY MEDICINE
Payer: OTHER MISCELLANEOUS

## 2019-10-11 VITALS
OXYGEN SATURATION: 96 % | HEART RATE: 71 BPM | BODY MASS INDEX: 43.03 KG/M2 | DIASTOLIC BLOOD PRESSURE: 86 MMHG | WEIGHT: 283 LBS | RESPIRATION RATE: 16 BRPM | TEMPERATURE: 98.6 F | SYSTOLIC BLOOD PRESSURE: 160 MMHG

## 2019-10-11 DIAGNOSIS — M54.50 CHRONIC RIGHT-SIDED LOW BACK PAIN WITHOUT SCIATICA: Primary | ICD-10-CM

## 2019-10-11 DIAGNOSIS — G89.29 CHRONIC RIGHT-SIDED LOW BACK PAIN WITHOUT SCIATICA: Primary | ICD-10-CM

## 2019-10-11 DIAGNOSIS — I10 ESSENTIAL HYPERTENSION: ICD-10-CM

## 2019-10-11 PROCEDURE — 99213 OFFICE O/P EST LOW 20 MIN: CPT | Performed by: FAMILY MEDICINE

## 2019-10-11 RX ORDER — OXYCODONE HYDROCHLORIDE 10 MG/1
10 TABLET ORAL EVERY 6 HOURS PRN
Qty: 120 TABLET | Refills: 0 | Status: SHIPPED | OUTPATIENT
Start: 2019-10-11 | End: 2019-11-05

## 2019-10-11 RX ORDER — ATENOLOL 100 MG/1
TABLET ORAL
Qty: 90 TABLET | Refills: 3 | Status: SHIPPED | OUTPATIENT
Start: 2019-10-11 | End: 2020-07-17

## 2019-10-11 ASSESSMENT — ENCOUNTER SYMPTOMS
EYE ITCHING: 1
FEVER: 0
BACK PAIN: 1
CHILLS: 0
FATIGUE: 0
SHORTNESS OF BREATH: 0
HEADACHES: 0
COUGH: 0
DIFFICULTY URINATING: 0

## 2019-10-11 ASSESSMENT — PAIN SCALES - GENERAL: PAINLEVEL: SEVERE PAIN (6)

## 2019-10-11 NOTE — TELEPHONE ENCOUNTER
Routing refill request to provider for review/approval because:  Labs out of range:  Blood pressure under 140/90 in past 12 months          BP Readings from Last 3 Encounters:   10/11/19 (!) 160/86   09/12/19 (!) 152/83   08/15/19 136/76        LOV; 10/11/19  Cass Augustin RN on 10/11/2019 at 3:15 PM

## 2019-10-11 NOTE — NURSING NOTE
"coming in for work comp back pain    Chief Complaint   Patient presents with     Work Comp       Initial BP (!) 160/86 (BP Location: Right arm, Patient Position: Sitting, Cuff Size: Adult Large)   Pulse 71   Temp 98.6  F (37  C) (Tympanic)   Resp 16   Wt 128.4 kg (283 lb)   SpO2 96%   BMI 43.03 kg/m   Estimated body mass index is 43.03 kg/m  as calculated from the following:    Height as of 9/12/19: 1.727 m (5' 8\").    Weight as of this encounter: 128.4 kg (283 lb).  Medication Reconciliation: complete    Sravani Robles LPN  "

## 2019-10-11 NOTE — PROGRESS NOTES
SUBJECTIVE:   Iker Young is a 59 year old male who presents to clinic today for the following health issues:    Refill medications, no side effects. Is okay with lowering dose of narcotic and re-evaluating in a month. [facility]     Had a tox screen 8/15/19, which was as expected.    Patient Active Problem List    Diagnosis Date Noted     Back pain, chronic 02/07/2018     Priority: Medium     Degeneration of cervical intervertebral disc 02/07/2018     Priority: Medium     Grief reaction with prolonged bereavement 02/15/2017     Priority: Medium     Chest pain 12/12/2016     Priority: Medium     Aortic valve sclerosis 12/08/2016     Priority: Medium     HTN (hypertension) 06/18/2015     Priority: Medium     Knee pain, chronic 06/18/2015     Priority: Medium     Primary osteoarthritis of right knee 06/18/2015     Priority: Medium     Pain medication agreement 02/06/2014     Priority: Medium     Controlled substance agreement signed 07/02/2013     Priority: Medium     Allergic rhinitis 06/14/2011     Priority: Medium     HYPERLIPIDEMIA LDL GOAL <130 10/31/2010     Priority: Medium     PAIN CHRONIC( NEC) 12/04/2007     Priority: Medium     Obesity 04/24/2007     Priority: Medium     Problem list name updated by automated process. Provider to review       Essential hypertension      Priority: Medium     Problem list name updated by automated process. Provider to review       Hyperlipidemia      Priority: Medium     Problem list name updated by automated process. Provider to review       Past Medical History:   Diagnosis Date     Chest pain     12/12/2016     Other specified postprocedural states     Status post colonoscopy     Personal history of other medical treatment (CODE)     04/2009,MRI cervical spine      Family History   Problem Relation Age of Onset     Family History Negative Daughter         Good Health     Current Outpatient Medications   Medication Sig Dispense  Refill     acetaminophen (TYLENOL) 325 MG tablet Take by mouth every 4 hours as needed       amitriptyline (ELAVIL) 50 MG tablet Take 2 tablets (100 mg) by mouth At Bedtime 60 tablet 10     atenolol (TENORMIN) 100 MG tablet Take 1 tablet (100 mg) by mouth daily 90 tablet 3     gabapentin (NEURONTIN) 300 MG capsule Take 1 capsule (300 mg) by mouth 2 times daily 180 capsule 3     ibuprofen (ADVIL/MOTRIN) 600 MG tablet Take 1 tablet (600 mg) by mouth every 6 hours as needed for moderate pain 120 tablet 11     naloxone (NARCAN) 4 MG/0.1ML nasal spray Spray 1 spray (4 mg) into one nostril alternating nostrils once as needed for opioid reversal every 2-3 minutes until assistance arrives 0.2 mL 3     oxyCODONE IR (ROXICODONE) 10 MG tablet Take 1 tablet (10 mg) by mouth every 4 hours as needed for moderate to severe pain Fill on/after 8/15/19 180 tablet 0     sertraline (ZOLOFT) 100 MG tablet Take 1 tablet (100 mg) by mouth daily 90 tablet 3     traMADol (ULTRAM) 50 MG tablet Take 1 tablet (50 mg) by mouth every 6 hours as needed for severe pain 120 tablet 5     Allergies   Allergen Reactions     Betadine [Povidone Iodine] Rash and Dermatitis     Severe blistering      Liquid Adhesive Dermatitis       Review of Systems   Constitutional: Negative for chills, fatigue and fever.   HENT: Positive for congestion and postnasal drip.    Eyes: Positive for itching.   Respiratory: Negative for cough and shortness of breath.    Cardiovascular: Negative for chest pain.   Genitourinary: Negative for difficulty urinating.   Musculoskeletal: Positive for back pain.   Neurological: Negative for headaches.        OBJECTIVE:     BP (!) 160/86 (BP Location: Right arm, Patient Position: Sitting, Cuff Size: Adult Large)   Pulse 71   Temp 98.6  F (37  C) (Tympanic)   Resp 16   Wt 128.4 kg (283 lb)   SpO2 96%   BMI 43.03 kg/m    Body mass index is 43.03 kg/m .  Physical Exam  Constitutional:       Appearance: Normal appearance.   HENT:       Mouth/Throat:      Mouth: Mucous membranes are moist.   Cardiovascular:      Rate and Rhythm: Normal rate and regular rhythm.      Heart sounds: Normal heart sounds.   Pulmonary:      Effort: Pulmonary effort is normal.      Breath sounds: Normal breath sounds.   Skin:     General: Skin is warm and dry.   Neurological:      Mental Status: He is alert.   Psychiatric:         Mood and Affect: Mood normal.         Diagnostic Test Results:  Labs reviewed in Epic  none     ASSESSMENT/PLAN:       (M54.5,  G89.29) Chronic right-sided low back pain without sciatica  (primary encounter diagnosis)  Comment: Stable/improving. Patient decreased dose to 4x daily from 6x daily. Will re-evaluate in a month. Has no side effects but would like to be off the medications. Talked about supplemented narcotic with ibuprofen or tylenol for breakthrough pain.   Plan: oxyCODONE IR (ROXICODONE) 10 MG tablet        Follow up in a month for new prescription.     Rosette Hussein, MS3, RPAP student   Working under the supervision of MD Sohail Steel MD  [facility]    Pt was seen and examined by me as well as Rsoette Hussein, MS 3, I was present for the exam portion also.     stable.    Sohail Harris MD on 10/14/2019 at 8:02 AM

## 2019-11-01 ENCOUNTER — TELEPHONE (OUTPATIENT)
Dept: FAMILY MEDICINE | Facility: OTHER | Age: 59
End: 2019-11-01

## 2019-11-01 NOTE — TELEPHONE ENCOUNTER
No phone refills, will have to come in and see me sooner.  Offer him a double book visit next week somewhere.  Sohail Harris MD on 11/1/2019 at 1:19 PM

## 2019-11-01 NOTE — TELEPHONE ENCOUNTER
Spoke with patient and scheduled him for Tuesday next week.   Chaya Dietz LPN...................11/1/2019  1:45 PM

## 2019-11-01 NOTE — TELEPHONE ENCOUNTER
Spoke with patient  At his last appointment his Oxycodone 10 mg tablets were cut down from 6 tablets a day to 4 tablets a day.He could not stand the pain and increased his meds to 5 tablets a day. He is scheduled to see you on Monday 11/11/2019 but will run out prior to that. He is wondering if you can write a script for 20 pills today to get him through the weekend. Please advise. Portia Ramírez LPN .......................11/1/2019  12:52 PM

## 2019-11-05 ENCOUNTER — OFFICE VISIT (OUTPATIENT)
Dept: FAMILY MEDICINE | Facility: OTHER | Age: 59
End: 2019-11-05
Attending: FAMILY MEDICINE
Payer: OTHER MISCELLANEOUS

## 2019-11-05 VITALS
WEIGHT: 279.6 LBS | DIASTOLIC BLOOD PRESSURE: 76 MMHG | TEMPERATURE: 98.7 F | BODY MASS INDEX: 42.51 KG/M2 | SYSTOLIC BLOOD PRESSURE: 144 MMHG | OXYGEN SATURATION: 98 % | RESPIRATION RATE: 16 BRPM | HEART RATE: 61 BPM

## 2019-11-05 DIAGNOSIS — M54.50 CHRONIC RIGHT-SIDED LOW BACK PAIN WITHOUT SCIATICA: ICD-10-CM

## 2019-11-05 DIAGNOSIS — M50.30 DEGENERATION OF CERVICAL INTERVERTEBRAL DISC: ICD-10-CM

## 2019-11-05 DIAGNOSIS — G89.29 CHRONIC RIGHT-SIDED LOW BACK PAIN WITHOUT SCIATICA: ICD-10-CM

## 2019-11-05 PROCEDURE — 99213 OFFICE O/P EST LOW 20 MIN: CPT | Performed by: FAMILY MEDICINE

## 2019-11-05 RX ORDER — TRAMADOL HYDROCHLORIDE 50 MG/1
50 TABLET ORAL EVERY 6 HOURS PRN
Qty: 120 TABLET | Refills: 5 | Status: SHIPPED | OUTPATIENT
Start: 2019-11-05 | End: 2020-01-07

## 2019-11-05 RX ORDER — OXYCODONE HYDROCHLORIDE 10 MG/1
10 TABLET ORAL EVERY 4 HOURS PRN
Qty: 150 TABLET | Refills: 0 | Status: SHIPPED | OUTPATIENT
Start: 2019-11-05 | End: 2019-12-06

## 2019-11-05 RX ORDER — OXYCODONE HYDROCHLORIDE 10 MG/1
10 TABLET ORAL EVERY 4 HOURS PRN
Qty: 120 TABLET | Refills: 0 | Status: SHIPPED | OUTPATIENT
Start: 2019-11-05 | End: 2019-11-05

## 2019-11-05 ASSESSMENT — ENCOUNTER SYMPTOMS
FEVER: 0
FATIGUE: 0
DYSURIA: 0
DIFFICULTY URINATING: 0
BACK PAIN: 1
WEAKNESS: 1

## 2019-11-05 ASSESSMENT — ANXIETY QUESTIONNAIRES
IF YOU CHECKED OFF ANY PROBLEMS ON THIS QUESTIONNAIRE, HOW DIFFICULT HAVE THESE PROBLEMS MADE IT FOR YOU TO DO YOUR WORK, TAKE CARE OF THINGS AT HOME, OR GET ALONG WITH OTHER PEOPLE: NOT DIFFICULT AT ALL
3. WORRYING TOO MUCH ABOUT DIFFERENT THINGS: NOT AT ALL
6. BECOMING EASILY ANNOYED OR IRRITABLE: NOT AT ALL
2. NOT BEING ABLE TO STOP OR CONTROL WORRYING: NOT AT ALL
GAD7 TOTAL SCORE: 0
7. FEELING AFRAID AS IF SOMETHING AWFUL MIGHT HAPPEN: NOT AT ALL
5. BEING SO RESTLESS THAT IT IS HARD TO SIT STILL: NOT AT ALL
1. FEELING NERVOUS, ANXIOUS, OR ON EDGE: NOT AT ALL

## 2019-11-05 ASSESSMENT — PATIENT HEALTH QUESTIONNAIRE - PHQ9: 5. POOR APPETITE OR OVEREATING: NOT AT ALL

## 2019-11-05 ASSESSMENT — PAIN SCALES - GENERAL: PAINLEVEL: SEVERE PAIN (7)

## 2019-11-05 NOTE — NURSING NOTE
"Coming in for a medication check up    Chief Complaint   Patient presents with     Recheck Medication     check up       Initial BP (!) 144/76 (BP Location: Right arm, Patient Position: Sitting, Cuff Size: Adult Large)   Pulse 61   Temp 98.7  F (37.1  C) (Tympanic)   Resp 16   Wt 126.8 kg (279 lb 9.6 oz)   SpO2 98%   BMI 42.51 kg/m   Estimated body mass index is 42.51 kg/m  as calculated from the following:    Height as of 9/12/19: 1.727 m (5' 8\").    Weight as of this encounter: 126.8 kg (279 lb 9.6 oz).  Medication Reconciliation: complete    Sravani Robles LPN  "

## 2019-11-05 NOTE — PROGRESS NOTES
"  SUBJECTIVE:   Iker Young is a 59 year old male who presents to clinic today for the following health issues:    HPI  Follow up on pain meds. I had dropped his dose to 4 daily.  We had dropped tramadol by 2 daily too, but with all of this his pain flared and he went back up to 5 tabs daily.  Ran out yesterday of oxycodone.  This is 6 days early.  Has a follow up with me next week.  Pain is in his low back and into both legs.  The leg pain is much worse than ever before- \"terrible\".  Had a few nights of chills, without fevers.  No longer.  No fevers.  Some mild nausea, he felt was from \"eating something\".  Last night he had no oxycodone and did not sleep at all.  Emotionally has a hard time in the fall, due to memories of hunting with daughter who  5 years ago.  Standing always helps pain out.  tox screen 8/15 was as expected.     MRI was :    IMPRESSION:     Diffusely progressive mild degenerative changes above a chronic fusion  of L4-S1, when compared to . Findings are most pronounced at L3-4,  where there is mild to moderate spinal stenosis and moderate foraminal  stenoses with possible impingement of the far lateral exiting L3 nerve  roots due to components of a disc bulge.     CHESTER BRYANT MD    Patient Active Problem List    Diagnosis Date Noted     Back pain, chronic 2018     Priority: Medium     Degeneration of cervical intervertebral disc 2018     Priority: Medium     Grief reaction with prolonged bereavement 02/15/2017     Priority: Medium     Chest pain 2016     Priority: Medium     Aortic valve sclerosis 2016     Priority: Medium     HTN (hypertension) 2015     Priority: Medium     Knee pain, chronic 2015     Priority: Medium     Primary osteoarthritis of right knee 2015     Priority: Medium     Pain medication agreement 2014     Priority: Medium     Controlled substance agreement signed 2013     Priority: Medium     Allergic " rhinitis 06/14/2011     Priority: Medium     HYPERLIPIDEMIA LDL GOAL <130 10/31/2010     Priority: Medium     PAIN CHRONIC( NEC) 12/04/2007     Priority: Medium     Obesity 04/24/2007     Priority: Medium     Problem list name updated by automated process. Provider to review       Essential hypertension      Priority: Medium     Problem list name updated by automated process. Provider to review       Hyperlipidemia      Priority: Medium     Problem list name updated by automated process. Provider to review       Past Surgical History:   Procedure Laterality Date     COLONOSCOPY      No Comments Provided     FUSION LUMBAR ANTERIOR ONE LEVEL      2004,Back surgery x 5 (fusion, hardware removal, stimulator and removal)     OTHER SURGICAL HISTORY      2005,205736,PERIPHERAL NERVE STIMULATOR,Lumbar nerve stimulator and subsequent removal     OTHER SURGICAL HISTORY      2005,205448,REMOVAL OF FOREIGN BODY, subsequent removal     OTHER SURGICAL HISTORY      208566,INCISION AND DRAINAGE,from infection from nerve stimulator     OTHER SURGICAL HISTORY      04/2009,207388,SCAN-MRI INTERPRETATION,MRI cervical spine.     Social History     Tobacco Use     Smoking status: Never Smoker     Smokeless tobacco: Never Used   Substance Use Topics     Alcohol use: Yes     Comment: rare     Current Outpatient Medications   Medication Sig Dispense Refill     acetaminophen (TYLENOL) 325 MG tablet Take by mouth every 4 hours as needed       amitriptyline (ELAVIL) 50 MG tablet Take 2 tablets (100 mg) by mouth At Bedtime 60 tablet 10     atenolol (TENORMIN) 100 MG tablet TAKE 1 TABLET BY MOUTH EVERY DAY 90 tablet 3     gabapentin (NEURONTIN) 300 MG capsule Take 1 capsule (300 mg) by mouth 2 times daily 180 capsule 3     ibuprofen (ADVIL/MOTRIN) 600 MG tablet Take 1 tablet (600 mg) by mouth every 6 hours as needed for moderate pain 120 tablet 11     naloxone (NARCAN) 4 MG/0.1ML nasal spray Spray 1 spray (4 mg) into one nostril alternating  nostrils once as needed for opioid reversal every 2-3 minutes until assistance arrives 0.2 mL 3     oxyCODONE IR (ROXICODONE) 10 MG tablet Take 1 tablet (10 mg) by mouth every 6 hours as needed for moderate to severe pain Fill on/after 10/11/2019 120 tablet 0     sertraline (ZOLOFT) 100 MG tablet Take 1 tablet (100 mg) by mouth daily 90 tablet 3     traMADol (ULTRAM) 50 MG tablet Take 1 tablet (50 mg) by mouth every 6 hours as needed for severe pain 120 tablet 5     Allergies   Allergen Reactions     Betadine [Povidone Iodine] Rash and Dermatitis     Severe blistering      Liquid Adhesive Dermatitis       Review of Systems   Constitutional: Negative for fatigue and fever.   Genitourinary: Negative for difficulty urinating, dysuria and urgency.   Musculoskeletal: Positive for back pain.   Neurological: Positive for weakness.        OBJECTIVE:     BP (!) 144/76 (BP Location: Right arm, Patient Position: Sitting, Cuff Size: Adult Large)   Pulse 61   Temp 98.7  F (37.1  C) (Tympanic)   Resp 16   Wt 126.8 kg (279 lb 9.6 oz)   SpO2 98%   BMI 42.51 kg/m    Body mass index is 42.51 kg/m .  Physical Exam  Constitutional:       Appearance: Normal appearance.   Musculoskeletal:      Comments: Stands for about half of the visit.  Negative straight leg raise.  No lumbar pain on palpation    Skin:     General: Skin is warm.   Neurological:      General: No focal deficit present.      Mental Status: He is alert.         Diagnostic Test Results:  none     ASSESSMENT/PLAN:         (M54.5,  G89.29) Chronic right-sided low back pain without sciatica  Comment: I wrote a new prescription for 5 tabs daily.  Will continue the taper very slowly, with goal of getting back down to 4 daily total (would then be at 80 MEQ).  Some of the symptoms he had are likely withdrawal. I shared this opinion with him.  Plan: oxyCODONE IR (ROXICODONE) 10 MG tablet                 Sohail Harris MD  [facility]

## 2019-11-06 ASSESSMENT — ANXIETY QUESTIONNAIRES: GAD7 TOTAL SCORE: 0

## 2019-12-06 ENCOUNTER — OFFICE VISIT (OUTPATIENT)
Dept: FAMILY MEDICINE | Facility: OTHER | Age: 59
End: 2019-12-06
Attending: FAMILY MEDICINE
Payer: OTHER MISCELLANEOUS

## 2019-12-06 VITALS
BODY MASS INDEX: 42.73 KG/M2 | DIASTOLIC BLOOD PRESSURE: 102 MMHG | HEART RATE: 79 BPM | TEMPERATURE: 98.6 F | RESPIRATION RATE: 16 BRPM | SYSTOLIC BLOOD PRESSURE: 157 MMHG | WEIGHT: 281 LBS | OXYGEN SATURATION: 94 %

## 2019-12-06 DIAGNOSIS — G89.29 CHRONIC RIGHT-SIDED LOW BACK PAIN WITHOUT SCIATICA: Primary | ICD-10-CM

## 2019-12-06 DIAGNOSIS — M54.50 CHRONIC RIGHT-SIDED LOW BACK PAIN WITHOUT SCIATICA: Primary | ICD-10-CM

## 2019-12-06 PROCEDURE — 99213 OFFICE O/P EST LOW 20 MIN: CPT | Performed by: FAMILY MEDICINE

## 2019-12-06 RX ORDER — GABAPENTIN 300 MG/1
300 CAPSULE ORAL 3 TIMES DAILY
Qty: 270 CAPSULE | Refills: 3 | Status: SHIPPED | OUTPATIENT
Start: 2019-12-06 | End: 2020-12-04

## 2019-12-06 RX ORDER — OXYCODONE HYDROCHLORIDE 10 MG/1
10 TABLET ORAL EVERY 4 HOURS PRN
Qty: 150 TABLET | Refills: 0 | Status: SHIPPED | OUTPATIENT
Start: 2019-12-06 | End: 2020-01-06

## 2019-12-06 ASSESSMENT — ANXIETY QUESTIONNAIRES
3. WORRYING TOO MUCH ABOUT DIFFERENT THINGS: NOT AT ALL
6. BECOMING EASILY ANNOYED OR IRRITABLE: NOT AT ALL
2. NOT BEING ABLE TO STOP OR CONTROL WORRYING: NOT AT ALL
IF YOU CHECKED OFF ANY PROBLEMS ON THIS QUESTIONNAIRE, HOW DIFFICULT HAVE THESE PROBLEMS MADE IT FOR YOU TO DO YOUR WORK, TAKE CARE OF THINGS AT HOME, OR GET ALONG WITH OTHER PEOPLE: NOT DIFFICULT AT ALL
5. BEING SO RESTLESS THAT IT IS HARD TO SIT STILL: NOT AT ALL
GAD7 TOTAL SCORE: 0
7. FEELING AFRAID AS IF SOMETHING AWFUL MIGHT HAPPEN: NOT AT ALL
1. FEELING NERVOUS, ANXIOUS, OR ON EDGE: NOT AT ALL

## 2019-12-06 ASSESSMENT — PATIENT HEALTH QUESTIONNAIRE - PHQ9: 5. POOR APPETITE OR OVEREATING: NOT AT ALL

## 2019-12-06 ASSESSMENT — PAIN SCALES - GENERAL: PAINLEVEL: SEVERE PAIN (7)

## 2019-12-06 ASSESSMENT — ENCOUNTER SYMPTOMS
WEAKNESS: 0
FATIGUE: 0
BACK PAIN: 1

## 2019-12-06 NOTE — PROGRESS NOTES
SUBJECTIVE:   Iker Young is a 59 year old male who presents to clinic today for the following health issues:    HPI  Follow up on WC back problems.  Pain remains about the same.  Has been trying to taper down a bit on the narcotics, and at times will get severe pain.  Max of 6 tabs on the severe nights.  Pain now also radiating into both legs, right more than left.  Would like to increase the gabapentin if possible, doing this 300 milligram twice daily now.  bladder function is normal.  No vlad lower extremity weakness.  Last tox screen was as expected 8/15/19.    Last MRI 6/18:    IMPRESSION:     Diffusely progressive mild degenerative changes above a chronic fusion  of L4-S1, when compared to 2007. Findings are most pronounced at L3-4,  where there is mild to moderate spinal stenosis and moderate foraminal  stenoses with possible impingement of the far lateral exiting L3 nerve  roots due to components of a disc bulge.     CHESTER BRYANT MD    Current Outpatient Medications   Medication Sig Dispense Refill     acetaminophen (TYLENOL) 325 MG tablet Take by mouth every 4 hours as needed       amitriptyline (ELAVIL) 50 MG tablet Take 2 tablets (100 mg) by mouth At Bedtime 60 tablet 10     atenolol (TENORMIN) 100 MG tablet TAKE 1 TABLET BY MOUTH EVERY DAY 90 tablet 3     gabapentin (NEURONTIN) 300 MG capsule Take 1 capsule (300 mg) by mouth 2 times daily 180 capsule 3     ibuprofen (ADVIL/MOTRIN) 600 MG tablet Take 1 tablet (600 mg) by mouth every 6 hours as needed for moderate pain 120 tablet 11     naloxone (NARCAN) 4 MG/0.1ML nasal spray Spray 1 spray (4 mg) into one nostril alternating nostrils once as needed for opioid reversal every 2-3 minutes until assistance arrives 0.2 mL 3     oxyCODONE IR (ROXICODONE) 10 MG tablet Take 1 tablet (10 mg) by mouth every 4 hours as needed for moderate to severe pain Max 5 daily. 150 tablet 0     sertraline (ZOLOFT) 100 MG tablet Take 1 tablet (100 mg) by mouth daily  90 tablet 3     traMADol (ULTRAM) 50 MG tablet Take 1 tablet (50 mg) by mouth every 6 hours as needed for severe pain 120 tablet 5     Allergies   Allergen Reactions     Betadine [Povidone Iodine] Rash and Dermatitis     Severe blistering      Liquid Adhesive Dermatitis       Review of Systems   Constitutional: Negative for fatigue.   Musculoskeletal: Positive for back pain.   Neurological: Negative for weakness.        OBJECTIVE:     BP (!) 157/102   Pulse 79   Temp 98.6  F (37  C) (Tympanic)   Resp 16   Wt 127.5 kg (281 lb)   SpO2 94%   BMI 42.73 kg/m    Body mass index is 42.73 kg/m .  Physical Exam  Constitutional:       Appearance: Normal appearance.   Musculoskeletal:      Comments: Somewhat slow to stand but has normal lower extremity strength. Is able to bend forward to touch toes, with knees bent.     Neurological:      General: No focal deficit present.      Mental Status: He is alert and oriented to person, place, and time.         Diagnostic Test Results:  none     ASSESSMENT/PLAN:         (M54.5,  G89.29) Chronic right-sided low back pain without sciatica  (primary encounter diagnosis)  Comment: New contract filled up.  Dose unchanged.  Plan: oxyCODONE IR (ROXICODONE) 10 MG tablet,         gabapentin (NEURONTIN) 300 MG capsule                 Sohail Harris MD  [facility]

## 2019-12-06 NOTE — NURSING NOTE
"Coming in for a work comp    Chief Complaint   Patient presents with     Work Comp       Initial BP (!) 157/102   Pulse 79   Temp 98.6  F (37  C) (Tympanic)   Resp 16   Wt 127.5 kg (281 lb)   SpO2 94%   BMI 42.73 kg/m   Estimated body mass index is 42.73 kg/m  as calculated from the following:    Height as of 9/12/19: 1.727 m (5' 8\").    Weight as of this encounter: 127.5 kg (281 lb).  Medication Reconciliation: complete    Sravani Robles LPN  "

## 2019-12-07 ASSESSMENT — ANXIETY QUESTIONNAIRES: GAD7 TOTAL SCORE: 0

## 2020-01-06 ENCOUNTER — OFFICE VISIT (OUTPATIENT)
Dept: FAMILY MEDICINE | Facility: OTHER | Age: 60
End: 2020-01-06
Attending: FAMILY MEDICINE
Payer: OTHER MISCELLANEOUS

## 2020-01-06 ENCOUNTER — OFFICE VISIT (OUTPATIENT)
Dept: FAMILY MEDICINE | Facility: OTHER | Age: 60
End: 2020-01-06
Attending: FAMILY MEDICINE
Payer: MEDICARE

## 2020-01-06 VITALS
HEART RATE: 72 BPM | SYSTOLIC BLOOD PRESSURE: 142 MMHG | TEMPERATURE: 98.6 F | RESPIRATION RATE: 16 BRPM | WEIGHT: 279 LBS | DIASTOLIC BLOOD PRESSURE: 72 MMHG | BODY MASS INDEX: 42.42 KG/M2 | OXYGEN SATURATION: 93 %

## 2020-01-06 VITALS
TEMPERATURE: 98.6 F | WEIGHT: 279.6 LBS | OXYGEN SATURATION: 93 % | DIASTOLIC BLOOD PRESSURE: 72 MMHG | BODY MASS INDEX: 42.51 KG/M2 | HEART RATE: 72 BPM | RESPIRATION RATE: 16 BRPM | SYSTOLIC BLOOD PRESSURE: 142 MMHG

## 2020-01-06 DIAGNOSIS — G89.29 CHRONIC RIGHT-SIDED LOW BACK PAIN WITHOUT SCIATICA: ICD-10-CM

## 2020-01-06 DIAGNOSIS — M50.30 DEGENERATION OF CERVICAL INTERVERTEBRAL DISC: ICD-10-CM

## 2020-01-06 DIAGNOSIS — M54.50 CHRONIC RIGHT-SIDED LOW BACK PAIN WITHOUT SCIATICA: ICD-10-CM

## 2020-01-06 DIAGNOSIS — M75.101 ROTATOR CUFF SYNDROME, RIGHT: Primary | ICD-10-CM

## 2020-01-06 PROCEDURE — G0463 HOSPITAL OUTPT CLINIC VISIT: HCPCS

## 2020-01-06 PROCEDURE — 99213 OFFICE O/P EST LOW 20 MIN: CPT | Performed by: FAMILY MEDICINE

## 2020-01-06 RX ORDER — OXYCODONE HYDROCHLORIDE 10 MG/1
10 TABLET ORAL EVERY 4 HOURS PRN
Qty: 150 TABLET | Refills: 0 | Status: SHIPPED | OUTPATIENT
Start: 2020-01-06 | End: 2020-02-07

## 2020-01-06 ASSESSMENT — ENCOUNTER SYMPTOMS
DIFFICULTY URINATING: 0
CONSTIPATION: 1
BACK PAIN: 1
ARTHRALGIAS: 1
FEVER: 0
FATIGUE: 0
WEAKNESS: 0
FATIGUE: 0

## 2020-01-06 ASSESSMENT — ANXIETY QUESTIONNAIRES
7. FEELING AFRAID AS IF SOMETHING AWFUL MIGHT HAPPEN: NOT AT ALL
6. BECOMING EASILY ANNOYED OR IRRITABLE: NOT AT ALL
1. FEELING NERVOUS, ANXIOUS, OR ON EDGE: NOT AT ALL
3. WORRYING TOO MUCH ABOUT DIFFERENT THINGS: NOT AT ALL
2. NOT BEING ABLE TO STOP OR CONTROL WORRYING: NOT AT ALL
5. BEING SO RESTLESS THAT IT IS HARD TO SIT STILL: NOT AT ALL
GAD7 TOTAL SCORE: 0

## 2020-01-06 ASSESSMENT — PAIN SCALES - GENERAL
PAINLEVEL: SEVERE PAIN (7)
PAINLEVEL: MODERATE PAIN (4)

## 2020-01-06 ASSESSMENT — PATIENT HEALTH QUESTIONNAIRE - PHQ9: 5. POOR APPETITE OR OVEREATING: NOT AT ALL

## 2020-01-06 NOTE — NURSING NOTE
"Coming in for right shoulder pain and along with right foot pain ? Gout    Chief Complaint   Patient presents with     Shoulder Pain     rignt and right foot gout       Initial BP (!) 142/72   Pulse 72   Temp 98.6  F (37  C) (Tympanic)   Resp 16   Wt 126.6 kg (279 lb)   SpO2 93%   BMI 42.42 kg/m   Estimated body mass index is 42.42 kg/m  as calculated from the following:    Height as of 9/12/19: 1.727 m (5' 8\").    Weight as of this encounter: 126.6 kg (279 lb).  Medication Reconciliation: complete    Sravani Robles LPN  "

## 2020-01-06 NOTE — PROGRESS NOTES
SUBJECTIVE:   Iker Young is a 59 year old male who presents to clinic today for the following health issues:    HPI  Follow up on work comp back pain.  Pain is about the same to perhaps a little worse at 7/10.  meds are adequate, but he feels he did better on the higher dose.  Is not interested in further tapering.  tox screen was as expected on 8/15/19.  If no longer is radiating down right posterior leg.  Some in bilateral anterior upper legs which is chronic.      Current Outpatient Medications   Medication Sig Dispense Refill     acetaminophen (TYLENOL) 325 MG tablet Take by mouth every 4 hours as needed       amitriptyline (ELAVIL) 50 MG tablet Take 2 tablets (100 mg) by mouth At Bedtime 60 tablet 10     atenolol (TENORMIN) 100 MG tablet TAKE 1 TABLET BY MOUTH EVERY DAY 90 tablet 3     gabapentin (NEURONTIN) 300 MG capsule Take 1 capsule (300 mg) by mouth 3 times daily 270 capsule 3     ibuprofen (ADVIL/MOTRIN) 600 MG tablet Take 1 tablet (600 mg) by mouth every 6 hours as needed for moderate pain 120 tablet 11     naloxone (NARCAN) 4 MG/0.1ML nasal spray Spray 1 spray (4 mg) into one nostril alternating nostrils once as needed for opioid reversal every 2-3 minutes until assistance arrives 0.2 mL 3     oxyCODONE IR (ROXICODONE) 10 MG tablet Take 1 tablet (10 mg) by mouth every 4 hours as needed for moderate to severe pain Max 5 daily. 150 tablet 0     sertraline (ZOLOFT) 100 MG tablet Take 1 tablet (100 mg) by mouth daily 90 tablet 3     traMADol (ULTRAM) 50 MG tablet Take 1 tablet (50 mg) by mouth every 6 hours as needed for severe pain 120 tablet 5     Allergies   Allergen Reactions     Betadine [Povidone Iodine] Rash and Dermatitis     Severe blistering      Liquid Adhesive Dermatitis       Review of Systems   Constitutional: Negative for fatigue and fever.   Gastrointestinal: Positive for constipation.   Endocrine: Negative for polyuria.   Genitourinary: Negative for difficulty urinating.    Musculoskeletal: Positive for back pain.        OBJECTIVE:     BP (!) 142/72   Pulse 72   Temp 98.6  F (37  C) (Tympanic)   Resp 16   Wt 126.8 kg (279 lb 9.6 oz)   SpO2 93%   BMI 42.51 kg/m    Body mass index is 42.51 kg/m .  Physical Exam  Constitutional:       Appearance: Normal appearance.   Musculoskeletal:      Comments: Mild pain on palpation at L3/4 facets bilateral.  Negative straight leg raise.     Neurological:      General: No focal deficit present.      Mental Status: He is alert and oriented to person, place, and time.   Psychiatric:         Mood and Affect: Mood normal.         Behavior: Behavior normal.         Thought Content: Thought content normal.         Diagnostic Test Results:  none     ASSESSMENT/PLAN:         (M54.5,  G89.29) Chronic right-sided low back pain without sciatica  Comment: Stable  Plan: oxyCODONE IR (ROXICODONE) 10 MG tablet         reviewed and stable.  Refilled for 4 weeks.  Discussed with him again the heal benefits of reduced dose.  He is not ready.        Sohail Harris MD  [facility] AND \[facility]\""

## 2020-01-06 NOTE — PROGRESS NOTES
SUBJECTIVE:   Iker Young is a 59 year old male who presents to clinic today for the following health issues:    HPI  Right shoulder pain and right foot gout.  Fell on the shoulder in early Nov.  Now with 2 months of pain, slowly improving.  Cannot reach overhead with flexion.  Needs to use the other hand to lift it up. Pain is also into the lateral epicondyle area.  Cannot brush his teeth.  Landed on the elbow proper.  Some relief with topicals.      Patient Active Problem List    Diagnosis Date Noted     Back pain, chronic 02/07/2018     Priority: Medium     Degeneration of cervical intervertebral disc 02/07/2018     Priority: Medium     Grief reaction with prolonged bereavement 02/15/2017     Priority: Medium     Chest pain 12/12/2016     Priority: Medium     Aortic valve sclerosis 12/08/2016     Priority: Medium     HTN (hypertension) 06/18/2015     Priority: Medium     Knee pain, chronic 06/18/2015     Priority: Medium     Primary osteoarthritis of right knee 06/18/2015     Priority: Medium     Pain medication agreement 02/06/2014     Priority: Medium     Controlled substance agreement signed 11/15/17 07/02/2013     Priority: Medium     Allergic rhinitis 06/14/2011     Priority: Medium     HYPERLIPIDEMIA LDL GOAL <130 10/31/2010     Priority: Medium     PAIN CHRONIC( NEC) 12/04/2007     Priority: Medium     Obesity 04/24/2007     Priority: Medium     Problem list name updated by automated process. Provider to review       Essential hypertension      Priority: Medium     Problem list name updated by automated process. Provider to review       Hyperlipidemia      Priority: Medium     Problem list name updated by automated process. Provider to review       Past Surgical History:   Procedure Laterality Date     COLONOSCOPY      No Comments Provided     FUSION LUMBAR ANTERIOR ONE LEVEL      2004,Back surgery x 5 (fusion, hardware removal, stimulator and removal)     OTHER SURGICAL HISTORY       2005,205736,PERIPHERAL NERVE STIMULATOR,Lumbar nerve stimulator and subsequent removal     OTHER SURGICAL HISTORY      2005,205448,REMOVAL OF FOREIGN BODY, subsequent removal     OTHER SURGICAL HISTORY      208566,INCISION AND DRAINAGE,from infection from nerve stimulator     OTHER SURGICAL HISTORY      04/2009,207388,SCAN-MRI INTERPRETATION,MRI cervical spine.     Social History     Tobacco Use     Smoking status: Never Smoker     Smokeless tobacco: Never Used   Substance Use Topics     Alcohol use: Yes     Comment: rare     Current Outpatient Medications   Medication Sig Dispense Refill     acetaminophen (TYLENOL) 325 MG tablet Take by mouth every 4 hours as needed       amitriptyline (ELAVIL) 50 MG tablet Take 2 tablets (100 mg) by mouth At Bedtime 60 tablet 10     atenolol (TENORMIN) 100 MG tablet TAKE 1 TABLET BY MOUTH EVERY DAY 90 tablet 3     gabapentin (NEURONTIN) 300 MG capsule Take 1 capsule (300 mg) by mouth 3 times daily 270 capsule 3     ibuprofen (ADVIL/MOTRIN) 600 MG tablet Take 1 tablet (600 mg) by mouth every 6 hours as needed for moderate pain 120 tablet 11     naloxone (NARCAN) 4 MG/0.1ML nasal spray Spray 1 spray (4 mg) into one nostril alternating nostrils once as needed for opioid reversal every 2-3 minutes until assistance arrives 0.2 mL 3     sertraline (ZOLOFT) 100 MG tablet Take 1 tablet (100 mg) by mouth daily 90 tablet 3     traMADol (ULTRAM) 50 MG tablet Take 1 tablet (50 mg) by mouth every 6 hours as needed for severe pain 120 tablet 5     oxyCODONE IR (ROXICODONE) 10 MG tablet Take 1 tablet (10 mg) by mouth every 4 hours as needed for moderate to severe pain Max 5 daily. 150 tablet 0     Allergies   Allergen Reactions     Betadine [Povidone Iodine] Rash and Dermatitis     Severe blistering      Liquid Adhesive Dermatitis       Review of Systems   Constitutional: Negative for fatigue.   Musculoskeletal: Positive for arthralgias.   Neurological: Negative for weakness.        OBJECTIVE:      BP (!) 142/72   Pulse 72   Temp 98.6  F (37  C) (Tympanic)   Resp 16   Wt 126.6 kg (279 lb)   SpO2 93%   BMI 42.42 kg/m    Body mass index is 42.42 kg/m .  Physical Exam  Constitutional:       Appearance: Normal appearance.   Musculoskeletal:      Comments: Right shoulder with loss of internal rotation and can abduct about 150 degrees only.  Mild weakness with empty can and mild pain with impingement testing.  Over coracoid, mild pain on palpation.   Neurological:      General: No focal deficit present.      Mental Status: He is alert and oriented to person, place, and time.         Diagnostic Test Results:  none     ASSESSMENT/PLAN:         (M75.101) Rotator cuff syndrome, right  (primary encounter diagnosis)  Comment: I suspect a partial tear of the supraspinatus.  Given the loss of range of motion is mild and is improving, I offered him PT, or simply observation.  MRI would give us the answer, but surgery would not really improve upon what he has on exam currently  Plan: he wants to just observe for now.      Sohail Harris MD  [facility]

## 2020-01-06 NOTE — NURSING NOTE
"Coming in for a work comp check up    Chief Complaint   Patient presents with     Work Comp     medication check up       Initial BP (!) 142/72   Pulse 72   Temp 98.6  F (37  C) (Tympanic)   Resp 16   Wt 126.8 kg (279 lb 9.6 oz)   SpO2 93%   BMI 42.51 kg/m   Estimated body mass index is 42.51 kg/m  as calculated from the following:    Height as of 9/12/19: 1.727 m (5' 8\").    Weight as of this encounter: 126.8 kg (279 lb 9.6 oz).  Medication Reconciliation: complete    Sravani Robles LPN  "

## 2020-01-07 DIAGNOSIS — M50.30 DEGENERATION OF CERVICAL INTERVERTEBRAL DISC: ICD-10-CM

## 2020-01-07 RX ORDER — TRAMADOL HYDROCHLORIDE 50 MG/1
50 TABLET ORAL EVERY 6 HOURS PRN
Qty: 120 TABLET | Refills: 5 | OUTPATIENT
Start: 2020-01-07

## 2020-01-07 RX ORDER — TRAMADOL HYDROCHLORIDE 50 MG/1
50 TABLET ORAL EVERY 6 HOURS PRN
Qty: 120 TABLET | Refills: 5 | Status: SHIPPED | OUTPATIENT
Start: 2020-01-07 | End: 2020-04-07

## 2020-01-07 ASSESSMENT — ANXIETY QUESTIONNAIRES: GAD7 TOTAL SCORE: 0

## 2020-01-07 NOTE — TELEPHONE ENCOUNTER
TJP-Patient called and stated he was seen yesterday and the refill for his tramadol didn't get sent to the pharmacy. He states he is out. [facility] and he stated when it is faxed just give him a call and leave a message if needed.   Mike Johnston on 1/7/2020 at 10:13 AM

## 2020-01-08 RX ORDER — TRAMADOL HYDROCHLORIDE 50 MG/1
TABLET ORAL
Qty: 120 TABLET | OUTPATIENT
Start: 2020-01-08

## 2020-01-08 NOTE — TELEPHONE ENCOUNTER
Redundant refill request refused: Too soon:    traMADol (ULTRAM) 50 MG tablet 120 tablet 5 1/7/2020  No   Sig - Route: Take 1 tablet (50 mg) by mouth every 6 hours as needed for severe pain - Oral   Sent to pharmacy as: traMADol (ULTRAM) 50 MG tablet   Class: E-Prescribe   Order: [id number]   E-Prescribing Status: Receipt confirmed by pharmacy (1/7/2020 11:16 AM CST)     [id number] - [place], [place] - [address]     Unable to complete prescription refill per RN Medication Refill Policy. Haleigh Barfield RN .............. 1/8/2020  9:35 AM

## 2020-02-07 ENCOUNTER — OFFICE VISIT (OUTPATIENT)
Dept: FAMILY MEDICINE | Facility: OTHER | Age: 60
End: 2020-02-07
Attending: FAMILY MEDICINE
Payer: OTHER MISCELLANEOUS

## 2020-02-07 VITALS
TEMPERATURE: 97.5 F | DIASTOLIC BLOOD PRESSURE: 93 MMHG | SYSTOLIC BLOOD PRESSURE: 142 MMHG | WEIGHT: 280.8 LBS | HEART RATE: 71 BPM | RESPIRATION RATE: 24 BRPM | OXYGEN SATURATION: 93 % | BODY MASS INDEX: 42.7 KG/M2

## 2020-02-07 DIAGNOSIS — M54.50 CHRONIC RIGHT-SIDED LOW BACK PAIN WITHOUT SCIATICA: Primary | ICD-10-CM

## 2020-02-07 DIAGNOSIS — G89.29 CHRONIC RIGHT-SIDED LOW BACK PAIN WITHOUT SCIATICA: Primary | ICD-10-CM

## 2020-02-07 PROCEDURE — 80307 DRUG TEST PRSMV CHEM ANLYZR: CPT | Mod: ZL | Performed by: FAMILY MEDICINE

## 2020-02-07 PROCEDURE — 99213 OFFICE O/P EST LOW 20 MIN: CPT | Performed by: FAMILY MEDICINE

## 2020-02-07 RX ORDER — OXYCODONE HYDROCHLORIDE 10 MG/1
10 TABLET ORAL EVERY 4 HOURS PRN
Qty: 150 TABLET | Refills: 0 | Status: SHIPPED | OUTPATIENT
Start: 2020-02-07 | End: 2020-03-06

## 2020-02-07 ASSESSMENT — ENCOUNTER SYMPTOMS
BACK PAIN: 1
DIFFICULTY URINATING: 0
WEAKNESS: 0

## 2020-02-07 ASSESSMENT — PAIN SCALES - GENERAL: PAINLEVEL: MODERATE PAIN (5)

## 2020-02-07 NOTE — LETTER
February 18, 2020      Iker Young  [address] [place] [id number]        Dear Iker,     The urine testing showed only the meds I have been prescribing.      Sincerely,        Sohail Harris MD

## 2020-02-07 NOTE — NURSING NOTE
"Chief Complaint   Patient presents with     Work Comp     Medication Managment        Initial There were no vitals taken for this visit. Estimated body mass index is 42.42 kg/m  as calculated from the following:    Height as of 9/12/19: 1.727 m (5' 8\").    Weight as of 1/6/20: 126.6 kg (279 lb).  Medication Reconciliation: complete    Nicole Garcia LPN  "

## 2020-02-07 NOTE — PROGRESS NOTES
SUBJECTIVE:   Iker Young is a 59 year old male who presents to clinic today for the following health issues:    HPI  Follow up on work related back pain.  He is on tramadol and oxycodone.  Feels this is about the same.  Rates it at 5/10.  Has no significant side effects.  No diversion or abuse worries.  Last tox screen was as expected on 8/15/19.    Current Outpatient Medications   Medication Sig Dispense Refill     acetaminophen (TYLENOL) 325 MG tablet Take by mouth every 4 hours as needed       amitriptyline (ELAVIL) 50 MG tablet Take 2 tablets (100 mg) by mouth At Bedtime 60 tablet 10     atenolol (TENORMIN) 100 MG tablet TAKE 1 TABLET BY MOUTH EVERY DAY 90 tablet 3     gabapentin (NEURONTIN) 300 MG capsule Take 1 capsule (300 mg) by mouth 3 times daily 270 capsule 3     ibuprofen (ADVIL/MOTRIN) 600 MG tablet Take 1 tablet (600 mg) by mouth every 6 hours as needed for moderate pain 120 tablet 11     naloxone (NARCAN) 4 MG/0.1ML nasal spray Spray 1 spray (4 mg) into one nostril alternating nostrils once as needed for opioid reversal every 2-3 minutes until assistance arrives 0.2 mL 3     oxyCODONE IR (ROXICODONE) 10 MG tablet Take 1 tablet (10 mg) by mouth every 4 hours as needed for moderate to severe pain Max 5 daily. 150 tablet 0     sertraline (ZOLOFT) 100 MG tablet Take 1 tablet (100 mg) by mouth daily 90 tablet 3     traMADol (ULTRAM) 50 MG tablet Take 1 tablet (50 mg) by mouth every 6 hours as needed for severe pain 120 tablet 5     Allergies   Allergen Reactions     Betadine [Povidone Iodine] Rash and Dermatitis     Severe blistering      Liquid Adhesive Dermatitis       Review of Systems   Genitourinary: Negative for difficulty urinating.   Musculoskeletal: Positive for back pain.   Neurological: Negative for weakness.        OBJECTIVE:     BP (!) 142/93   Pulse 71   Temp 97.5  F (36.4  C) (Tympanic)   Resp 24   Wt 127.4 kg (280 lb 12.8 oz)   SpO2 93%   BMI 42.70 kg/m    Body mass index is  42.7 kg/m .  Physical Exam  Constitutional:       General: He is not in acute distress.     Appearance: He is not diaphoretic.   Musculoskeletal:      Comments: No lumbar pain on palpation and negative straight leg raise    Neurological:      General: No focal deficit present.      Mental Status: He is alert and oriented to person, place, and time.         Diagnostic Test Results:  tox screen ordered    ASSESSMENT/PLAN:         (M54.5,  G89.29) Chronic right-sided low back pain without sciatica  (primary encounter diagnosis)  Comment: stable.  reviewed and stable.  Refilled for 4 weeks  Plan: oxyCODONE IR (ROXICODONE) 10 MG tablet, Drug          Screen Comprehensive , Urine with Reported Meds        ([facility]) (Pain Care Package)                 Sohail Harris MD  [facility]

## 2020-02-14 LAB — PAIN DRUG SCR UR W RPTD MEDS: NORMAL

## 2020-03-06 ENCOUNTER — OFFICE VISIT (OUTPATIENT)
Dept: FAMILY MEDICINE | Facility: OTHER | Age: 60
End: 2020-03-06
Attending: FAMILY MEDICINE
Payer: OTHER MISCELLANEOUS

## 2020-03-06 VITALS
DIASTOLIC BLOOD PRESSURE: 92 MMHG | HEART RATE: 72 BPM | TEMPERATURE: 98.6 F | WEIGHT: 281.4 LBS | RESPIRATION RATE: 16 BRPM | SYSTOLIC BLOOD PRESSURE: 152 MMHG | OXYGEN SATURATION: 94 % | BODY MASS INDEX: 42.79 KG/M2

## 2020-03-06 DIAGNOSIS — M54.50 CHRONIC RIGHT-SIDED LOW BACK PAIN WITHOUT SCIATICA: Primary | ICD-10-CM

## 2020-03-06 DIAGNOSIS — I10 ESSENTIAL HYPERTENSION: Primary | ICD-10-CM

## 2020-03-06 DIAGNOSIS — G89.29 CHRONIC RIGHT-SIDED LOW BACK PAIN WITHOUT SCIATICA: Primary | ICD-10-CM

## 2020-03-06 PROCEDURE — 99213 OFFICE O/P EST LOW 20 MIN: CPT | Performed by: FAMILY MEDICINE

## 2020-03-06 RX ORDER — LISINOPRIL 20 MG/1
20 TABLET ORAL DAILY
Qty: 90 TABLET | Refills: 3 | Status: SHIPPED | OUTPATIENT
Start: 2020-03-06 | End: 2020-06-04

## 2020-03-06 RX ORDER — OXYCODONE HYDROCHLORIDE 10 MG/1
10 TABLET ORAL EVERY 4 HOURS PRN
Qty: 150 TABLET | Refills: 0 | Status: SHIPPED | OUTPATIENT
Start: 2020-03-06 | End: 2020-04-07

## 2020-03-06 ASSESSMENT — ENCOUNTER SYMPTOMS
NUMBNESS: 1
FATIGUE: 0
WEAKNESS: 0
BACK PAIN: 1

## 2020-03-06 ASSESSMENT — ANXIETY QUESTIONNAIRES
7. FEELING AFRAID AS IF SOMETHING AWFUL MIGHT HAPPEN: NOT AT ALL
IF YOU CHECKED OFF ANY PROBLEMS ON THIS QUESTIONNAIRE, HOW DIFFICULT HAVE THESE PROBLEMS MADE IT FOR YOU TO DO YOUR WORK, TAKE CARE OF THINGS AT HOME, OR GET ALONG WITH OTHER PEOPLE: NOT DIFFICULT AT ALL
5. BEING SO RESTLESS THAT IT IS HARD TO SIT STILL: NOT AT ALL
GAD7 TOTAL SCORE: 0
2. NOT BEING ABLE TO STOP OR CONTROL WORRYING: NOT AT ALL
1. FEELING NERVOUS, ANXIOUS, OR ON EDGE: NOT AT ALL
6. BECOMING EASILY ANNOYED OR IRRITABLE: NOT AT ALL
3. WORRYING TOO MUCH ABOUT DIFFERENT THINGS: NOT AT ALL

## 2020-03-06 ASSESSMENT — PAIN SCALES - GENERAL: PAINLEVEL: SEVERE PAIN (6)

## 2020-03-06 ASSESSMENT — PATIENT HEALTH QUESTIONNAIRE - PHQ9: 5. POOR APPETITE OR OVEREATING: NOT AT ALL

## 2020-03-06 NOTE — PROGRESS NOTES
SUBJECTIVE:   Iker Young is a 59 year old male who presents to clinic today for the following health issues:    HPI  Follow up on chronic back pain.  Overall symptoms are about the same.  Gets some pain in right foot, he feels is like gout, but burning pans into the foot. Worse at night.  Has never had an EMG of legs. No vlad weakness.  Walking seems to help a little.  Lat MRI was 6/18:    IMPRESSION:     Diffusely progressive mild degenerative changes above a chronic fusion  of L4-S1, when compared to 2007. Findings are most pronounced at L3-4,  where there is mild to moderate spinal stenosis and moderate foraminal  stenoses with possible impingement of the far lateral exiting L3 nerve  roots due to components of a disc bulge.     CHESTER BRYANT MD    tox screen as expected 2/7/20.    Past Surgical History:   Procedure Laterality Date     COLONOSCOPY      No Comments Provided     FUSION LUMBAR ANTERIOR ONE LEVEL      2004,Back surgery x 5 (fusion, hardware removal, stimulator and removal)     OTHER SURGICAL HISTORY      2005,205736,PERIPHERAL NERVE STIMULATOR,Lumbar nerve stimulator and subsequent removal     OTHER SURGICAL HISTORY      2005,205448,REMOVAL OF FOREIGN BODY, subsequent removal     OTHER SURGICAL HISTORY      208566,INCISION AND DRAINAGE,from infection from nerve stimulator     OTHER SURGICAL HISTORY      04/2009,207388,SCAN-MRI INTERPRETATION,MRI cervical spine.     Current Outpatient Medications   Medication Sig Dispense Refill     acetaminophen (TYLENOL) 325 MG tablet Take by mouth every 4 hours as needed       amitriptyline (ELAVIL) 50 MG tablet Take 2 tablets (100 mg) by mouth At Bedtime 60 tablet 10     atenolol (TENORMIN) 100 MG tablet TAKE 1 TABLET BY MOUTH EVERY DAY 90 tablet 3     gabapentin (NEURONTIN) 300 MG capsule Take 1 capsule (300 mg) by mouth 3 times daily 270 capsule 3     ibuprofen (ADVIL/MOTRIN) 600 MG tablet Take 1 tablet (600 mg) by mouth every 6 hours as needed for  moderate pain 120 tablet 11     naloxone (NARCAN) 4 MG/0.1ML nasal spray Spray 1 spray (4 mg) into one nostril alternating nostrils once as needed for opioid reversal every 2-3 minutes until assistance arrives 0.2 mL 3     oxyCODONE IR (ROXICODONE) 10 MG tablet Take 1 tablet (10 mg) by mouth every 4 hours as needed for moderate to severe pain Max 5 daily. 150 tablet 0     sertraline (ZOLOFT) 100 MG tablet Take 1 tablet (100 mg) by mouth daily 90 tablet 3     traMADol (ULTRAM) 50 MG tablet Take 1 tablet (50 mg) by mouth every 6 hours as needed for severe pain 120 tablet 5     Allergies   Allergen Reactions     Betadine [Povidone Iodine] Rash and Dermatitis     Severe blistering      Liquid Adhesive Dermatitis       Review of Systems   Constitutional: Negative for fatigue.   Musculoskeletal: Positive for back pain.   Neurological: Positive for numbness. Negative for weakness.        OBJECTIVE:     BP (!) 152/92   Pulse 72   Temp 98.6  F (37  C) (Tympanic)   Resp 16   Wt 127.6 kg (281 lb 6.4 oz)   SpO2 94%   BMI 42.79 kg/m    Body mass index is 42.79 kg/m .  Physical Exam  Constitutional:       Appearance: Normal appearance.   Musculoskeletal:      Comments: No lumbar pain on palpation, negative straight leg raise. Lower extremity strength is normal bilateral    Neurological:      General: No focal deficit present.      Mental Status: He is alert and oriented to person, place, and time.         Diagnostic Test Results:  none     ASSESSMENT/PLAN:         (M54.5,  G89.29) Chronic right-sided low back pain without sciatica  (primary encounter diagnosis)  Comment: I suspect he is getting right lower extremity sciatica now, on history.  Refilled oxycodone at 150 tabs.  stable. Follow up in 4 weeks.    Plan: oxyCODONE IR (ROXICODONE) 10 MG tablet,         NEUROLOGY ADULT REFERRAL                 Sohail Harris MD  [facility]

## 2020-03-06 NOTE — NURSING NOTE
"Coming in for work comp  Low back pain    Chief Complaint   Patient presents with     Work Comp     medication       Initial BP (!) 152/92   Pulse 72   Temp 98.6  F (37  C) (Tympanic)   Resp 16   Wt 127.6 kg (281 lb 6.4 oz)   SpO2 94%   BMI 42.79 kg/m   Estimated body mass index is 42.79 kg/m  as calculated from the following:    Height as of 9/12/19: 1.727 m (5' 8\").    Weight as of this encounter: 127.6 kg (281 lb 6.4 oz).  Medication Reconciliation: complete    Sravani Robles LPN    "

## 2020-03-07 ASSESSMENT — ANXIETY QUESTIONNAIRES: GAD7 TOTAL SCORE: 0

## 2020-03-09 NOTE — TELEPHONE ENCOUNTER
Was just seen 1/6/20 and went to fill Tramadol and since it's been 31 days he was unable to get that refilled.  Please send new Rx.  Norma J. Gosselin, LPN .......  1/6/2020  3:54 PM       came back from cath lab. Patient unable to be cardioverted, still in afib.

## 2020-03-25 DIAGNOSIS — M54.50 CHRONIC RIGHT-SIDED LOW BACK PAIN WITHOUT SCIATICA: ICD-10-CM

## 2020-03-25 DIAGNOSIS — G89.29 CHRONIC RIGHT-SIDED LOW BACK PAIN WITHOUT SCIATICA: ICD-10-CM

## 2020-03-26 RX ORDER — OXYCODONE HYDROCHLORIDE 10 MG/1
TABLET ORAL
Qty: 150 TABLET | OUTPATIENT
Start: 2020-03-26

## 2020-03-26 NOTE — TELEPHONE ENCOUNTER
[facility] sent Rx request for the following:   oxyCODONE IR (ROXICODONE) 10 MG tablet     Sig: TAKE 1 TABLET (10 MG) BY MOUTH EVERY 4 HOURS AS NEEDED FOR MODERATETO SEVERE PAIN MAX 5 DAILY.      Last Prescription Date:   03/06/2020  Last Fill Qty/Refills:         150, R-0    Last Office Visit:              03/06/2020  Future Office visit:           04/07/2020    Call to patient as he should still have medication to last until his upcoming appointment. He states he should have enough until that day.     Loren Gutierrez RN on 3/26/2020 at 8:09 AM

## 2020-04-07 ENCOUNTER — VIRTUAL VISIT (OUTPATIENT)
Dept: FAMILY MEDICINE | Facility: OTHER | Age: 60
End: 2020-04-07
Attending: FAMILY MEDICINE
Payer: MEDICARE

## 2020-04-07 ENCOUNTER — VIRTUAL VISIT (OUTPATIENT)
Dept: FAMILY MEDICINE | Facility: OTHER | Age: 60
End: 2020-04-07
Attending: FAMILY MEDICINE
Payer: OTHER MISCELLANEOUS

## 2020-04-07 DIAGNOSIS — M54.50 CHRONIC RIGHT-SIDED LOW BACK PAIN WITHOUT SCIATICA: Primary | ICD-10-CM

## 2020-04-07 DIAGNOSIS — G89.29 CHRONIC RIGHT-SIDED LOW BACK PAIN WITHOUT SCIATICA: Primary | ICD-10-CM

## 2020-04-07 DIAGNOSIS — Z53.9 ERRONEOUS ENCOUNTER--DISREGARD: Primary | ICD-10-CM

## 2020-04-07 PROCEDURE — 99212 OFFICE O/P EST SF 10 MIN: CPT | Mod: 95 | Performed by: FAMILY MEDICINE

## 2020-04-07 RX ORDER — TRAMADOL HYDROCHLORIDE 50 MG/1
50 TABLET ORAL EVERY 6 HOURS PRN
Qty: 120 TABLET | Refills: 3 | Status: SHIPPED | OUTPATIENT
Start: 2020-04-07 | End: 2020-07-17

## 2020-04-07 RX ORDER — OXYCODONE HYDROCHLORIDE 10 MG/1
10 TABLET ORAL EVERY 4 HOURS PRN
Qty: 150 TABLET | Refills: 0 | Status: SHIPPED | OUTPATIENT
Start: 2020-04-07 | End: 2020-05-01

## 2020-04-07 RX ORDER — OXYCODONE HYDROCHLORIDE 10 MG/1
5 TABLET ORAL EVERY 4 HOURS PRN
Qty: 150 TABLET | Refills: 0 | Status: SHIPPED | OUTPATIENT
Start: 2020-04-07 | End: 2020-04-07

## 2020-04-07 ASSESSMENT — PAIN SCALES - GENERAL: PAINLEVEL: MODERATE PAIN (5)

## 2020-04-07 NOTE — PROGRESS NOTES
"Subjective     Iker Young is a 59 year old male who is being evaluated via a billable telephone visit.      The patient has been notified of following:     \"This telephone visit will be conducted via a call between you and your physician/provider. We have found that certain health care needs can be provided without the need for a physical exam.  This service lets us provide the care you need with a short phone conversation.  If a prescription is necessary we can send it directly to your pharmacy.  If lab work is needed we can place an order for that and you can then stop by our lab to have the test done at a later time.    Telephone visits are billed at different rates depending on your insurance coverage. During this emergency period, for some insurers they may be billed the same as an in-person visit.  Please reach out to your insurance provider with any questions.    If during the course of the call the physician/provider feels a telephone visit is not appropriate, you will not be charged for this service.\"    Patient has given verbal consent for Telephone visit?  Yes    Iker Young complains of   Chief Complaint   Patient presents with     Work Comp     medication     Follow up on  chronic pain meds.  He has some burning type pains in both feet.  Get red at times too.  Radiates into legs.  Worried it is from his back injury.  Has tried soaking them in cold water , does help.   has been covering the elavil, oxycodone and the gabapentin as well as the tramadol.  Back pain is a little worse, has stopped the OTC ibuprofen out of fear it makes COVID worse.  He resumed it at a lower dose.  Was taking 2400 milligram daily, now at 1200mg total. Had a tox screen 2/7/20, was as expected.    ALLERGIES  Betadine [povidone iodine] and Liquid adhesive               Current Outpatient Medications   Medication Sig Dispense Refill     acetaminophen (TYLENOL) 325 MG tablet Take by mouth every 4 hours as needed       " amitriptyline (ELAVIL) 50 MG tablet Take 2 tablets (100 mg) by mouth At Bedtime 60 tablet 10     atenolol (TENORMIN) 100 MG tablet TAKE 1 TABLET BY MOUTH EVERY DAY 90 tablet 3     gabapentin (NEURONTIN) 300 MG capsule Take 1 capsule (300 mg) by mouth 3 times daily 270 capsule 3     ibuprofen (ADVIL/MOTRIN) 600 MG tablet Take 1 tablet (600 mg) by mouth every 6 hours as needed for moderate pain 120 tablet 11     lisinopril (ZESTRIL) 20 MG tablet Take 1 tablet (20 mg) by mouth daily 90 tablet 3     naloxone (NARCAN) 4 MG/0.1ML nasal spray Spray 1 spray (4 mg) into one nostril alternating nostrils once as needed for opioid reversal every 2-3 minutes until assistance arrives 0.2 mL 3     oxyCODONE IR (ROXICODONE) 10 MG tablet Take 1 tablet (10 mg) by mouth every 4 hours as needed for moderate to severe pain Max 5 daily. 150 tablet 0     sertraline (ZOLOFT) 100 MG tablet Take 1 tablet (100 mg) by mouth daily 90 tablet 3     traMADol (ULTRAM) 50 MG tablet Take 1 tablet (50 mg) by mouth every 6 hours as needed for severe pain 120 tablet 5     Allergies   Allergen Reactions     Betadine [Povidone Iodine] Rash and Dermatitis     Severe blistering      Liquid Adhesive Dermatitis       Reviewed and updated as needed this visit by Provider         Review of Systems   ROS COMP:         Objective   Reported vitals:  There were no vitals taken for this visit.   healthy, alert and no distress  Psych: Alert and oriented times 3; coherent speech, normal   rate and volume, able to articulate logical thoughts, able   to abstract reason, no tangential thoughts, no hallucinations   or delusions  His affect is normal and full      Diagnostic Test Results:  Labs reviewed in Epic        Assessment/Plan:  1. Chronic right-sided low back pain without sciatica  I filled the meds for the next 3 months.  Will need a follow up EMG at some point, can delay after COVID.   stable.  - oxyCODONE IR (ROXICODONE) 10 MG tablet; Take 1 tablet (10 mg) by  mouth every 4 hours as needed for moderate to severe pain Max 5 daily.  Dispense: 150 tablet; Refill: 0  - oxyCODONE IR (ROXICODONE) 10 MG tablet; Take 1 tablet (10 mg) by mouth every 4 hours as needed for severe pain Max 5 daily, fill on/after 6/4/20  Dispense: 150 tablet; Refill: 0  - oxyCODONE IR (ROXICODONE) 10 MG tablet; Take 1 tablet (10 mg) by mouth every 4 hours as needed for severe pain Fill on/after 5/5/2020.  Max of 5 daily  Dispense: 150 tablet; Refill: 0    No follow-ups on file.      Phone call duration:  13 minutes    Sohail Harris MD

## 2020-04-07 NOTE — PROGRESS NOTES
"Subjective     Iker Young is a 59 year old male who is being evaluated via a billable telephone visit.      The patient has been notified of following:     \"This telephone visit will be conducted via a call between you and your physician/provider. We have found that certain health care needs can be provided without the need for a physical exam.  This service lets us provide the care you need with a short phone conversation.  If a prescription is necessary we can send it directly to your pharmacy.  If lab work is needed we can place an order for that and you can then stop by our lab to have the test done at a later time.    Telephone visits are billed at different rates depending on your insurance coverage. During this emergency period, for some insurers they may be billed the same as an in-person visit.  Please reach out to your insurance provider with any questions.    If during the course of the call the physician/provider feels a telephone visit is not appropriate, you will not be charged for this service.\"    Patient has given verbal consent for Telephone visit?      Iker Young complains of   Chief Complaint   Patient presents with     RECHECK       ALLERGIES  Betadine [povidone iodine] and Liquid adhesive                   Reviewed and updated as needed this visit by Provider         Review of Systems          Objective   Reported vitals:  There were no vitals taken for this visit.     Psych: Alert and oriented times 3; coherent speech, normal   rate and volume, able to articulate logical thoughts, able   to abstract reason, no tangential thoughts, no hallucinations   or delusions  His affect is              Assessment/Plan:      No follow-ups on file.      Phone call duration:  0 minutes    Sohail Harris MD      "

## 2020-04-07 NOTE — NURSING NOTE
Patient is needing a telephone visit today for work com.  Medication Reconciliation Complete    Zuri Domingo LPN  4/7/2020 1:52 PM

## 2020-05-01 ENCOUNTER — VIRTUAL VISIT (OUTPATIENT)
Dept: FAMILY MEDICINE | Facility: OTHER | Age: 60
End: 2020-05-01
Attending: FAMILY MEDICINE
Payer: MEDICARE

## 2020-05-01 VITALS — DIASTOLIC BLOOD PRESSURE: 81 MMHG | SYSTOLIC BLOOD PRESSURE: 127 MMHG | HEART RATE: 61 BPM

## 2020-05-01 DIAGNOSIS — G89.29 CHRONIC RIGHT-SIDED LOW BACK PAIN WITHOUT SCIATICA: ICD-10-CM

## 2020-05-01 DIAGNOSIS — M54.50 CHRONIC RIGHT-SIDED LOW BACK PAIN WITHOUT SCIATICA: ICD-10-CM

## 2020-05-01 PROCEDURE — 99441 ZZC PHYSICIAN TELEPHONE EVALUATION 5-10 MIN: CPT | Performed by: FAMILY MEDICINE

## 2020-05-01 RX ORDER — OXYCODONE HYDROCHLORIDE 10 MG/1
10 TABLET ORAL EVERY 4 HOURS PRN
Qty: 150 TABLET | Refills: 0 | Status: SHIPPED | OUTPATIENT
Start: 2020-05-01 | End: 2020-09-28

## 2020-05-01 RX ORDER — OXYCODONE HYDROCHLORIDE 10 MG/1
10 TABLET ORAL EVERY 4 HOURS PRN
Qty: 150 TABLET | Refills: 0 | Status: SHIPPED | OUTPATIENT
Start: 2020-05-01 | End: 2020-08-17

## 2020-05-01 RX ORDER — OXYCODONE HYDROCHLORIDE 10 MG/1
10 TABLET ORAL EVERY 4 HOURS PRN
Qty: 150 TABLET | Refills: 0 | Status: ON HOLD | OUTPATIENT
Start: 2020-05-01 | End: 2020-08-24

## 2020-05-01 ASSESSMENT — PAIN SCALES - GENERAL: PAINLEVEL: MODERATE PAIN (5)

## 2020-05-01 NOTE — NURSING NOTE
Patient calling to get refill of medications.   Medication Reconciliation: complete    Jennifer Lee LPN  5/1/2020 1:04 PM

## 2020-05-01 NOTE — PROGRESS NOTES
"Iker Young is a 59 year old male who is being evaluated via a billable telephone visit.      The patient has been notified of following:     \"This telephone visit will be conducted via a call between you and your physician/provider. We have found that certain health care needs can be provided without the need for a physical exam.  This service lets us provide the care you need with a short phone conversation.  If a prescription is necessary we can send it directly to your pharmacy.  If lab work is needed we can place an order for that and you can then stop by our lab to have the test done at a later time.    Telephone visits are billed at different rates depending on your insurance coverage. During this emergency period, for some insurers they may be billed the same as an in-person visit.  Please reach out to your insurance provider with any questions.    If during the course of the call the physician/provider feels a telephone visit is not appropriate, you will not be charged for this service.\"    Patient has given verbal consent for Telephone visit?  Yes    How would you like to obtain your AVS? Send copy in mail     Iker Young is a 59 year old male who presents to clinic today for the following health issues:    HPI    Follow up on work comp pain.  Still has significant foot symptoms.  Was going to get an EMG, but was delayed for COVID.  No leg weakness, it has a sense of burning in the foot and the toes feel \"tight\".   Bladder and bowels work well.  Had a tox screen 2/7/20, was as expected.  Feels perhaps the low back pain is progressing.  Last MRI was 6/13/18    IMPRESSION:     Diffusely progressive mild degenerative changes above a chronic fusion  of L4-S1, when compared to 2007. Findings are most pronounced at L3-4,  where there is mild to moderate spinal stenosis and moderate foraminal  stenoses with possible impingement of the far lateral exiting L3 nerve  roots due to components of a disc " bulge.     CHESTER BRYANT MD         Current Outpatient Medications   Medication Sig Dispense Refill     acetaminophen (TYLENOL) 325 MG tablet Take by mouth every 4 hours as needed       amitriptyline (ELAVIL) 50 MG tablet Take 2 tablets (100 mg) by mouth At Bedtime 60 tablet 10     atenolol (TENORMIN) 100 MG tablet TAKE 1 TABLET BY MOUTH EVERY DAY 90 tablet 3     gabapentin (NEURONTIN) 300 MG capsule Take 1 capsule (300 mg) by mouth 3 times daily 270 capsule 3     ibuprofen (ADVIL/MOTRIN) 600 MG tablet Take 1 tablet (600 mg) by mouth every 6 hours as needed for moderate pain 120 tablet 11     lisinopril (ZESTRIL) 20 MG tablet Take 1 tablet (20 mg) by mouth daily 90 tablet 3     oxyCODONE IR (ROXICODONE) 10 MG tablet Take 1 tablet (10 mg) by mouth every 4 hours as needed for moderate to severe pain Max 5 daily. 150 tablet 0     oxyCODONE IR (ROXICODONE) 10 MG tablet Take 1 tablet (10 mg) by mouth every 4 hours as needed for severe pain Max 5 daily, fill on/after 6/4/20 150 tablet 0     oxyCODONE IR (ROXICODONE) 10 MG tablet Take 1 tablet (10 mg) by mouth every 4 hours as needed for severe pain Fill on/after 5/5/2020.  Max of 5 daily 150 tablet 0     sertraline (ZOLOFT) 100 MG tablet Take 1 tablet (100 mg) by mouth daily 90 tablet 3     traMADol (ULTRAM) 50 MG tablet Take 1 tablet (50 mg) by mouth every 6 hours as needed for severe pain 120 tablet 3     naloxone (NARCAN) 4 MG/0.1ML nasal spray Spray 1 spray (4 mg) into one nostril alternating nostrils once as needed for opioid reversal every 2-3 minutes until assistance arrives (Patient not taking: Reported on 5/1/2020) 0.2 mL 3     Allergies   Allergen Reactions     Betadine [Povidone Iodine] Rash and Dermatitis     Severe blistering      Liquid Adhesive Dermatitis       Reviewed and updated as needed this visit by Provider         Review of Systems   ROS COMP:         Objective   Reported vitals:  /81   Pulse 61    healthy, alert and no distress  PSYCH:  Alert and oriented times 3; coherent speech, normal   rate and volume, able to articulate logical thoughts, able   to abstract reason, no tangential thoughts, no hallucinations   or delusions  His affect is normal  RESP: No cough, no audible wheezing, able to talk in full sentences  Remainder of exam unable to be completed due to telephone visits    Diagnostic Test Results:  Labs reviewed in Epic        Assessment/Plan:  1. Chronic right-sided low back pain without sciatica  Has some radiculopathy, basically stable. Awaiting EMG ,on hold with CME.   reviewed and stable.  - oxyCODONE IR (ROXICODONE) 10 MG tablet; Take 1 tablet (10 mg) by mouth every 4 hours as needed for moderate to severe pain Max 5 daily.  Dispense: 150 tablet; Refill: 0  - oxyCODONE IR (ROXICODONE) 10 MG tablet; Take 1 tablet (10 mg) by mouth every 4 hours as needed for severe pain Max 5 daily, fill on/after 6/4/20  Dispense: 150 tablet; Refill: 0  - oxyCODONE IR (ROXICODONE) 10 MG tablet; Take 1 tablet (10 mg) by mouth every 4 hours as needed for severe pain Fill on/after 5/5/2020.  Max of 5 daily  Dispense: 150 tablet; Refill: 0    Return in about 3 months (around 8/1/2020).      Phone call duration:  6 minutes    Sohail Harris MD

## 2020-05-29 ENCOUNTER — VIRTUAL VISIT (OUTPATIENT)
Dept: FAMILY MEDICINE | Facility: OTHER | Age: 60
End: 2020-05-29
Attending: FAMILY MEDICINE
Payer: MEDICARE

## 2020-05-29 VITALS — BODY MASS INDEX: 42.57 KG/M2 | WEIGHT: 280 LBS

## 2020-05-29 DIAGNOSIS — G89.29 CHRONIC RIGHT-SIDED LOW BACK PAIN WITHOUT SCIATICA: Primary | ICD-10-CM

## 2020-05-29 DIAGNOSIS — M54.50 CHRONIC RIGHT-SIDED LOW BACK PAIN WITHOUT SCIATICA: Primary | ICD-10-CM

## 2020-05-29 PROCEDURE — 99441 ZZC PHYSICIAN TELEPHONE EVALUATION 5-10 MIN: CPT | Performed by: FAMILY MEDICINE

## 2020-05-29 ASSESSMENT — ANXIETY QUESTIONNAIRES
1. FEELING NERVOUS, ANXIOUS, OR ON EDGE: NOT AT ALL
GAD7 TOTAL SCORE: 0
5. BEING SO RESTLESS THAT IT IS HARD TO SIT STILL: NOT AT ALL
3. WORRYING TOO MUCH ABOUT DIFFERENT THINGS: NOT AT ALL
7. FEELING AFRAID AS IF SOMETHING AWFUL MIGHT HAPPEN: NOT AT ALL
2. NOT BEING ABLE TO STOP OR CONTROL WORRYING: NOT AT ALL
6. BECOMING EASILY ANNOYED OR IRRITABLE: NOT AT ALL
IF YOU CHECKED OFF ANY PROBLEMS ON THIS QUESTIONNAIRE, HOW DIFFICULT HAVE THESE PROBLEMS MADE IT FOR YOU TO DO YOUR WORK, TAKE CARE OF THINGS AT HOME, OR GET ALONG WITH OTHER PEOPLE: NOT DIFFICULT AT ALL

## 2020-05-29 ASSESSMENT — PAIN SCALES - GENERAL: PAINLEVEL: MODERATE PAIN (4)

## 2020-05-29 ASSESSMENT — PATIENT HEALTH QUESTIONNAIRE - PHQ9: 5. POOR APPETITE OR OVEREATING: NOT AT ALL

## 2020-05-29 NOTE — PROGRESS NOTES
"Iker Young is a 59 year old male who is being evaluated via a billable telephone visit.      The patient has been notified of following:     \"This telephone visit will be conducted via a call between you and your physician/provider. We have found that certain health care needs can be provided without the need for a physical exam.  This service lets us provide the care you need with a short phone conversation.  If a prescription is necessary we can send it directly to your pharmacy.  If lab work is needed we can place an order for that and you can then stop by our lab to have the test done at a later time.    Telephone visits are billed at different rates depending on your insurance coverage. During this emergency period, for some insurers they may be billed the same as an in-person visit.  Please reach out to your insurance provider with any questions.    If during the course of the call the physician/provider feels a telephone visit is not appropriate, you will not be charged for this service.\"    Patient has given verbal consent for Telephone visit?  Yes    What phone number would you like to be contacted at? [phone]    How would you like to obtain your AVS?     Subjective     Iker Young is a 59 year old male who presents via phone visit today for the following health issues:    HPI  Work comp for low back pain.   Patient is doing okay today, notes he had some severe lower back pain last night. Notes it is usually worse when lying down, but today it is improved. He notes the pain is both dull and sharp and is located in his lower back. No bowel or bladder incontinence. He states the pain medication does help his symptoms.   He notes his feet are still bothering since the last visit. They are still tingling at times. He would like to reschedule the EMG now that things are starting to open up again.   He has no other concerns today.   It looks like he already has the refills scheduled for this month and " next month so his pharmacy should have the refills.   He does note that he needs to come in to fill out the pain medication agreement form.            Patient Active Problem List   Diagnosis     Essential hypertension     Hyperlipidemia     Obesity     PAIN CHRONIC( NEC)     HYPERLIPIDEMIA LDL GOAL <130     Allergic rhinitis     Aortic valve sclerosis     Back pain, chronic     Chest pain     Controlled substance agreement signed 12/6/19     Degeneration of cervical intervertebral disc     Grief reaction with prolonged bereavement     HTN (hypertension)     Knee pain, chronic     Primary osteoarthritis of right knee     Past Surgical History:   Procedure Laterality Date     COLONOSCOPY      No Comments Provided     FUSION LUMBAR ANTERIOR ONE LEVEL      2004,Back surgery x 5 (fusion, hardware removal, stimulator and removal)     OTHER SURGICAL HISTORY      2005,205736,PERIPHERAL NERVE STIMULATOR,Lumbar nerve stimulator and subsequent removal     OTHER SURGICAL HISTORY      2005,205448,REMOVAL OF FOREIGN BODY, subsequent removal     OTHER SURGICAL HISTORY      208566,INCISION AND DRAINAGE,from infection from nerve stimulator     OTHER SURGICAL HISTORY      04/2009,207388,SCAN-MRI INTERPRETATION,MRI cervical spine.       Social History     Tobacco Use     Smoking status: Never Smoker     Smokeless tobacco: Never Used   Substance Use Topics     Alcohol use: Yes     Comment: rare     Family History   Problem Relation Age of Onset     Family History Negative Daughter         Good Health         Current Outpatient Medications   Medication Sig Dispense Refill     acetaminophen (TYLENOL) 325 MG tablet Take by mouth every 4 hours as needed       amitriptyline (ELAVIL) 50 MG tablet Take 2 tablets (100 mg) by mouth At Bedtime 60 tablet 10     atenolol (TENORMIN) 100 MG tablet TAKE 1 TABLET BY MOUTH EVERY DAY 90 tablet 3     gabapentin (NEURONTIN) 300 MG capsule Take 1 capsule (300 mg) by mouth 3 times daily 270 capsule 3      ibuprofen (ADVIL/MOTRIN) 600 MG tablet Take 1 tablet (600 mg) by mouth every 6 hours as needed for moderate pain 120 tablet 11     lisinopril (ZESTRIL) 20 MG tablet Take 1 tablet (20 mg) by mouth daily 90 tablet 3     naloxone (NARCAN) 4 MG/0.1ML nasal spray Spray 1 spray (4 mg) into one nostril alternating nostrils once as needed for opioid reversal every 2-3 minutes until assistance arrives 0.2 mL 3     oxyCODONE IR (ROXICODONE) 10 MG tablet Take 1 tablet (10 mg) by mouth every 4 hours as needed for moderate to severe pain Max 5 daily. 150 tablet 0     oxyCODONE IR (ROXICODONE) 10 MG tablet Take 1 tablet (10 mg) by mouth every 4 hours as needed for severe pain Max 5 daily, fill on/after 6/4/20 150 tablet 0     oxyCODONE IR (ROXICODONE) 10 MG tablet Take 1 tablet (10 mg) by mouth every 4 hours as needed for severe pain Fill on/after 5/5/2020.  Max of 5 daily 150 tablet 0     sertraline (ZOLOFT) 100 MG tablet Take 1 tablet (100 mg) by mouth daily 90 tablet 3     traMADol (ULTRAM) 50 MG tablet Take 1 tablet (50 mg) by mouth every 6 hours as needed for severe pain 120 tablet 3     Allergies   Allergen Reactions     Betadine [Povidone Iodine] Rash and Dermatitis     Severe blistering      Liquid Adhesive Dermatitis       Reviewed and updated as needed this visit by Provider         Review of Systems   Constitutional, HEENT, cardiovascular, pulmonary, gi and gu systems are negative, except as otherwise noted.       Objective   Reported vitals:  There were no vitals taken for this visit.   healthy, alert and no distress  PSYCH: Alert and oriented times 3; coherent speech, normal   rate and volume, able to articulate logical thoughts, able   to abstract reason, no tangential thoughts, no hallucinations   or delusions  His affect is normal  RESP: No cough, no audible wheezing, able to talk in full sentences  Remainder of exam unable to be completed due to telephone visits    Diagnostic Test Results:  Labs reviewed in  "Epic        Assessment/Plan:  (M54.5,  G89.29) Chronic right-sided low back pain without sciatica  (primary encounter diagnosis)  Comment: Ongoing. Still experiencing pain when he is lying flat at night when sleeping. Pain improves when he is up during the day. The pain medication is improving his symptoms as well. No recent fevers, or bladder or bowel incontinence. Will reschedule EMG due to radiculopathy in his legs.   Plan: Schedule EMG, Refill pain medication. Have him fill out pain agreement form.         No follow-ups on file.      Phone call duration:  6 minutes    Rosette Husseni, MS3, RPAP student   Working under the supervision of Dr. Juno Harris MD    I called him personally.  He had gotten a 3 month supply of the oxycodone 4 weeks ago, so should be fine for 2 more months.  Legs getting \"squeezing\" and pinching sensation.  In both feet from mid foot to toes, in all 10 toes.  Gets worse at night as his back pain flares, he feels it is linked.  This distribution argues more for a peripheral neuropathy more then radiculopathy.  Needs the EMG next to sort this out.       Follow up in 2 months, with tox screen and new contract for opiates.    Sohail Harris MD on 5/29/2020 at 10:56 AM          "

## 2020-05-30 ASSESSMENT — ANXIETY QUESTIONNAIRES: GAD7 TOTAL SCORE: 0

## 2020-06-03 DIAGNOSIS — I10 ESSENTIAL HYPERTENSION: Primary | ICD-10-CM

## 2020-06-04 RX ORDER — LISINOPRIL 20 MG/1
20 TABLET ORAL DAILY
Qty: 90 TABLET | Refills: 3 | Status: SHIPPED | OUTPATIENT
Start: 2020-06-04 | End: 2021-06-11

## 2020-06-25 ENCOUNTER — HOSPITAL ENCOUNTER (OUTPATIENT)
Dept: GENERAL RADIOLOGY | Facility: OTHER | Age: 60
End: 2020-06-25
Attending: FAMILY MEDICINE
Payer: MEDICARE

## 2020-06-25 ENCOUNTER — OFFICE VISIT (OUTPATIENT)
Dept: FAMILY MEDICINE | Facility: OTHER | Age: 60
End: 2020-06-25
Attending: FAMILY MEDICINE
Payer: MEDICARE

## 2020-06-25 VITALS
DIASTOLIC BLOOD PRESSURE: 88 MMHG | OXYGEN SATURATION: 97 % | HEART RATE: 65 BPM | RESPIRATION RATE: 16 BRPM | TEMPERATURE: 98.6 F | SYSTOLIC BLOOD PRESSURE: 142 MMHG | WEIGHT: 277.4 LBS | BODY MASS INDEX: 42.18 KG/M2

## 2020-06-25 DIAGNOSIS — G89.29 CHRONIC RIGHT SHOULDER PAIN: ICD-10-CM

## 2020-06-25 DIAGNOSIS — G89.29 CHRONIC RIGHT SHOULDER PAIN: Primary | ICD-10-CM

## 2020-06-25 DIAGNOSIS — M25.511 CHRONIC RIGHT SHOULDER PAIN: ICD-10-CM

## 2020-06-25 DIAGNOSIS — M25.511 CHRONIC RIGHT SHOULDER PAIN: Primary | ICD-10-CM

## 2020-06-25 PROCEDURE — 73030 X-RAY EXAM OF SHOULDER: CPT | Mod: RT

## 2020-06-25 PROCEDURE — G0463 HOSPITAL OUTPT CLINIC VISIT: HCPCS | Mod: 25

## 2020-06-25 PROCEDURE — 99213 OFFICE O/P EST LOW 20 MIN: CPT | Performed by: FAMILY MEDICINE

## 2020-06-25 PROCEDURE — G0463 HOSPITAL OUTPT CLINIC VISIT: HCPCS

## 2020-06-25 RX ORDER — LORAZEPAM 1 MG/1
TABLET ORAL
Qty: 1 TABLET | Refills: 0 | Status: SHIPPED | OUTPATIENT
Start: 2020-06-25 | End: 2020-07-17

## 2020-06-25 ASSESSMENT — ANXIETY QUESTIONNAIRES
6. BECOMING EASILY ANNOYED OR IRRITABLE: NOT AT ALL
GAD7 TOTAL SCORE: 0
1. FEELING NERVOUS, ANXIOUS, OR ON EDGE: NOT AT ALL
IF YOU CHECKED OFF ANY PROBLEMS ON THIS QUESTIONNAIRE, HOW DIFFICULT HAVE THESE PROBLEMS MADE IT FOR YOU TO DO YOUR WORK, TAKE CARE OF THINGS AT HOME, OR GET ALONG WITH OTHER PEOPLE: NOT DIFFICULT AT ALL
3. WORRYING TOO MUCH ABOUT DIFFERENT THINGS: NOT AT ALL
5. BEING SO RESTLESS THAT IT IS HARD TO SIT STILL: NOT AT ALL
2. NOT BEING ABLE TO STOP OR CONTROL WORRYING: NOT AT ALL
7. FEELING AFRAID AS IF SOMETHING AWFUL MIGHT HAPPEN: NOT AT ALL

## 2020-06-25 ASSESSMENT — ENCOUNTER SYMPTOMS
FEVER: 0
PARESTHESIAS: 1
WEAKNESS: 0
ARTHRALGIAS: 1
FATIGUE: 0

## 2020-06-25 ASSESSMENT — PAIN SCALES - GENERAL: PAINLEVEL: MILD PAIN (3)

## 2020-06-25 ASSESSMENT — PATIENT HEALTH QUESTIONNAIRE - PHQ9: 5. POOR APPETITE OR OVEREATING: NOT AT ALL

## 2020-06-25 NOTE — PROGRESS NOTES
SUBJECTIVE:   Iker Young is a 59 year old male who presents to clinic today for the following health issues:    HPI    Right shoulder follow up.  He had this for several months now.  Injured before deer hunting last fall, had slipped on a sidewalk with hands in coat pockets. Landed on lateral shoulder. Pain mostly now over the AC joint. Now cannot lift a case of water into a grocery cart.  Uses the left hand to move the right hand with certain activities, like brushing teeth.  Pain especially bad if he lets it drop quickly.      Patient Active Problem List    Diagnosis Date Noted     Back pain, chronic 02/07/2018     Priority: Medium     Degeneration of cervical intervertebral disc 02/07/2018     Priority: Medium     Grief reaction with prolonged bereavement 02/15/2017     Priority: Medium     Chest pain 12/12/2016     Priority: Medium     Aortic valve sclerosis 12/08/2016     Priority: Medium     HTN (hypertension) 06/18/2015     Priority: Medium     Knee pain, chronic 06/18/2015     Priority: Medium     Primary osteoarthritis of right knee 06/18/2015     Priority: Medium     Controlled substance agreement signed 12/6/19 07/02/2013     Priority: Medium     Allergic rhinitis 06/14/2011     Priority: Medium     HYPERLIPIDEMIA LDL GOAL <130 10/31/2010     Priority: Medium     PAIN CHRONIC( NEC) 12/04/2007     Priority: Medium     Obesity 04/24/2007     Priority: Medium     Problem list name updated by automated process. Provider to review       Essential hypertension      Priority: Medium     Problem list name updated by automated process. Provider to review       Hyperlipidemia      Priority: Medium     Problem list name updated by automated process. Provider to review       Past Surgical History:   Procedure Laterality Date     COLONOSCOPY      No Comments Provided     FUSION LUMBAR ANTERIOR ONE LEVEL      2004,Back surgery x 5 (fusion, hardware removal, stimulator and removal)     OTHER SURGICAL HISTORY       2005,205736,PERIPHERAL NERVE STIMULATOR,Lumbar nerve stimulator and subsequent removal     OTHER SURGICAL HISTORY      2005,205448,REMOVAL OF FOREIGN BODY, subsequent removal     OTHER SURGICAL HISTORY      208566,INCISION AND DRAINAGE,from infection from nerve stimulator     OTHER SURGICAL HISTORY      04/2009,207388,SCAN-MRI INTERPRETATION,MRI cervical spine.     Social History     Tobacco Use     Smoking status: Never Smoker     Smokeless tobacco: Never Used   Substance Use Topics     Alcohol use: Yes     Comment: rare     Current Outpatient Medications   Medication Sig Dispense Refill     acetaminophen (TYLENOL) 325 MG tablet Take by mouth every 4 hours as needed       amitriptyline (ELAVIL) 50 MG tablet Take 2 tablets (100 mg) by mouth At Bedtime 60 tablet 10     atenolol (TENORMIN) 100 MG tablet TAKE 1 TABLET BY MOUTH EVERY DAY 90 tablet 3     gabapentin (NEURONTIN) 300 MG capsule Take 1 capsule (300 mg) by mouth 3 times daily 270 capsule 3     ibuprofen (ADVIL/MOTRIN) 600 MG tablet Take 1 tablet (600 mg) by mouth every 6 hours as needed for moderate pain 120 tablet 11     lisinopril (ZESTRIL) 20 MG tablet Take 1 tablet (20 mg) by mouth daily 90 tablet 3     naloxone (NARCAN) 4 MG/0.1ML nasal spray Spray 1 spray (4 mg) into one nostril alternating nostrils once as needed for opioid reversal every 2-3 minutes until assistance arrives 0.2 mL 3     oxyCODONE IR (ROXICODONE) 10 MG tablet Take 1 tablet (10 mg) by mouth every 4 hours as needed for moderate to severe pain Max 5 daily. 150 tablet 0     oxyCODONE IR (ROXICODONE) 10 MG tablet Take 1 tablet (10 mg) by mouth every 4 hours as needed for severe pain Max 5 daily, fill on/after 6/4/20 150 tablet 0     oxyCODONE IR (ROXICODONE) 10 MG tablet Take 1 tablet (10 mg) by mouth every 4 hours as needed for severe pain Fill on/after 5/5/2020.  Max of 5 daily 150 tablet 0     sertraline (ZOLOFT) 100 MG tablet Take 1 tablet (100 mg) by mouth daily 90 tablet 3      traMADol (ULTRAM) 50 MG tablet Take 1 tablet (50 mg) by mouth every 6 hours as needed for severe pain 120 tablet 3     Allergies   Allergen Reactions     Betadine [Povidone Iodine] Rash and Dermatitis     Severe blistering      Liquid Adhesive Dermatitis       Review of Systems   Constitutional: Negative for fatigue and fever.   Musculoskeletal: Positive for arthralgias.   Neurological: Positive for paresthesias. Negative for weakness.        OBJECTIVE:     BP (!) 142/88   Pulse 65   Temp 98.6  F (37  C)   Resp 16   Wt 125.8 kg (277 lb 6.4 oz)   SpO2 97%   BMI 42.18 kg/m    Body mass index is 42.18 kg/m .  Physical Exam  Constitutional:       Appearance: Normal appearance.   Musculoskeletal:      Comments: Right shoulder with large osteophyte over the AC joint. No pain on palpation.  Moderate pain with impingement testing.  Has loss of abduction, needs left arm to get to 90 degrees of abduction, then can slowly get to full abduction.     Neurological:      General: No focal deficit present.      Mental Status: He is alert and oriented to person, place, and time.   Psychiatric:         Mood and Affect: Mood normal.         Behavior: Behavior normal.         Thought Content: Thought content normal.         Diagnostic Test Results:  Xray - AC hypertrophy otherwise no bony abnormalities.     ASSESSMENT/PLAN:         (M25.511,  G89.29) Chronic right shoulder pain  (primary encounter diagnosis)  Comment: I suspect a tear of the rotator cuff, perhaps laraum too.  Plan: XR Shoulder Right G/E 3 Views, MR Shoulder         Right w/o Contrast        Follow up based on the MRI.        Sohail Harris MD  [facility] AND [facility]

## 2020-06-25 NOTE — NURSING NOTE
"Coming in for a sore right shoulder that is getting worse    Chief Complaint   Patient presents with     Shoulder Pain     right, getting worse       Initial BP (!) 142/88   Pulse 65   Temp 98.6  F (37  C)   Resp 16   Wt 125.8 kg (277 lb 6.4 oz)   SpO2 97%   BMI 42.18 kg/m   Estimated body mass index is 42.18 kg/m  as calculated from the following:    Height as of 9/12/19: 1.727 m (5' 8\").    Weight as of this encounter: 125.8 kg (277 lb 6.4 oz).  Medication Reconciliation: complete    Sravani Robles LPN  "

## 2020-06-26 ASSESSMENT — ANXIETY QUESTIONNAIRES: GAD7 TOTAL SCORE: 0

## 2020-06-28 DIAGNOSIS — M25.511 CHRONIC RIGHT SHOULDER PAIN: ICD-10-CM

## 2020-06-28 DIAGNOSIS — G89.29 CHRONIC RIGHT SHOULDER PAIN: ICD-10-CM

## 2020-06-29 RX ORDER — LORAZEPAM 1 MG/1
TABLET ORAL
Qty: 1 TABLET | OUTPATIENT
Start: 2020-06-29

## 2020-06-29 NOTE — TELEPHONE ENCOUNTER
Pharmacy [facility][id number] sent Rx request for the following:         Last Office Visit:              6/25/2020  Future Office visit:           7/24/2020      Request for ativan refill from pharmacy. One time dose, no refill needed. Komal Weinstein RN .............. 6/29/2020  2:54 PM

## 2020-06-30 ENCOUNTER — TRANSFERRED RECORDS (OUTPATIENT)
Dept: HEALTH INFORMATION MANAGEMENT | Facility: OTHER | Age: 60
End: 2020-06-30

## 2020-06-30 DIAGNOSIS — M75.100 ROTATOR CUFF TEAR: Primary | ICD-10-CM

## 2020-07-01 NOTE — PROGRESS NOTES
Call him, MRI shows tear in rotator cuff.  Can either see ortho for possible surgery or try PT.  Sohail Harris MD on 7/1/2020 at 12:31 PM

## 2020-07-03 ENCOUNTER — TELEPHONE (OUTPATIENT)
Dept: FAMILY MEDICINE | Facility: OTHER | Age: 60
End: 2020-07-03

## 2020-07-03 DIAGNOSIS — M75.121 NONTRAUMATIC COMPLETE TEAR OF RIGHT ROTATOR CUFF: Primary | ICD-10-CM

## 2020-07-03 NOTE — PROGRESS NOTES
Notified patien tof providers note and he would like to see orthoHarry Blackman LPN ....................  7/3/2020   3:33 PM

## 2020-07-03 NOTE — TELEPHONE ENCOUNTER
Patient would like to see ortho. He feels like PT wont work at this point.  Misti Blackman LPN ....................  7/3/2020   4:06 PM       Providing appeals information      Pt was on extended release metformin which is a plan exclusion  Need letter of medical necessity stating why pt needs excluded medication    Pt has adverse reaction to immediate release metformin       Fax:    Attn: Pharmacy Appeals  [phone]    Please include clinical notes showing where extended release worked, and where immediate release did not  The more supporting information, the better

## 2020-07-17 DIAGNOSIS — M25.511 CHRONIC RIGHT SHOULDER PAIN: ICD-10-CM

## 2020-07-17 DIAGNOSIS — G89.29 CHRONIC RIGHT SHOULDER PAIN: ICD-10-CM

## 2020-07-17 RX ORDER — LORAZEPAM 1 MG/1
TABLET ORAL
Qty: 1 TABLET | Refills: 0 | Status: SHIPPED | OUTPATIENT
Start: 2020-07-17 | End: 2020-08-17

## 2020-07-27 ENCOUNTER — OFFICE VISIT (OUTPATIENT)
Dept: ORTHOPEDICS | Facility: OTHER | Age: 60
End: 2020-07-27
Attending: FAMILY MEDICINE
Payer: MEDICARE

## 2020-07-27 VITALS — WEIGHT: 285 LBS | SYSTOLIC BLOOD PRESSURE: 124 MMHG | BODY MASS INDEX: 43.33 KG/M2 | DIASTOLIC BLOOD PRESSURE: 76 MMHG

## 2020-07-27 DIAGNOSIS — M75.121 NONTRAUMATIC COMPLETE TEAR OF RIGHT ROTATOR CUFF: ICD-10-CM

## 2020-07-27 PROCEDURE — G0463 HOSPITAL OUTPT CLINIC VISIT: HCPCS

## 2020-07-27 PROCEDURE — 99203 OFFICE O/P NEW LOW 30 MIN: CPT | Performed by: ORTHOPAEDIC SURGERY

## 2020-07-27 ASSESSMENT — PAIN SCALES - GENERAL: PAINLEVEL: MODERATE PAIN (5)

## 2020-07-27 NOTE — NURSING NOTE
Chief Complaint   Patient presents with     Consult     Right shoulder       Norma J. Gosselin, HYUNN

## 2020-07-27 NOTE — PROGRESS NOTES
Visit Date:   07/27/2020      CHIEF COMPLAINT:  A 60-year-old gentleman with right shoulder pain.      HISTORY OF PRESENT ILLNESS:  Iker Young is a 60-year-old gentleman with right shoulder pain going all the way back to November, just a few days before deer season opener, and he was walking through [place] and slipped right in front of the [facility].  He ended up having a significant amount of right shoulder pain after that, and it got a little bit better and then slowly started to get worse.  He has difficulty raising his arm  at this point, was going to actually try and see somebody about his shoulder but just never really got around to it.  At this point, he is having significant pain in the shoulder, difficulty raising it up, difficulty sleeping, and finally decided to get an MRI.  The MRI showed a massive rotator cuff tear with quite a bit of atrophy, and so he was asked to follow up with a specialist; hence, he arrives today to see me.      PAST MEDICAL HISTORY:  Significant for chest pain in the past, morbid obesity, hyperlipidemia, knee pain, back pain.      SURGICAL HISTORY:  Multiple lumbar fusions in the past, history of colonoscopy.      SOCIAL HISTORY:  Negative for tobacco.  Uses alcohol occasionally.      ALLERGIES:  BETADINE AND LIQUID ADHESIVE.      MEDICATIONS:  Amitriptyline, atenolol, gabapentin, ibuprofen, lisinopril, lorazepam, Naloxone, and oxycodone.      PHYSICAL EXAMINATION:  Today, this is a 60-year-old gentleman in no acute distress, very pleasant on examination, morbidly obese.  He has about 90 degrees of forward flexion today, very poor external rotation with his arm at 90 degrees, and has no external rotation with his arm at his side either.  Full range of motion of the hand, elbow and wrist.  He has severe weakness of supraspinatus, infraspinatus and teres minor testing with the right shoulder but is otherwise neuro and vascularly intact.      He is tender to  palpation at Codman's point, tender to palpation of the AC joint, tender to palpation of the bicipital groove.      IMAGING:  Review of the MRI done at [facility] in the open MRI shows chondromalacia on the humeral head as well as the glenoid cartilage.  There is a large rotator cuff tear involving the supraspinatus, infraspinatus and teres minor.  There is significant atrophy of all of these as well, worse with teres minor and infraspinatus.      Marked AC joint arthritis is present as well.      IMPRESSION AND PLAN:  This is a 60-year-old gentleman who likely has early right shoulder rotator cuff tear arthropathy.  As he already had the MRI, it is time to proceed with a diagnostic arthroscopy.  We are going to try and repair his rotator cuff if there is no significant arthritis.  However, I fear that this may be the case.  If we cannot fix it, we will go ahead and give him an injection and start down the road, but he is not going to be one to tolerate a reverse total shoulder very well at all until he is willing to accept the restrictions.  I am going to see him back at the time of surgery.  All the risks and benefits of surgical intervention were discussed with him.  Again, this is just going to be a diagnostic arthroscopy with a possible open rotator cuff repair, subacromial decompression, distal clavicle excision and a biceps tenodesis.         ELLA SOFIA MD             D: 2020   T: 2020   MT: [id number]      Name:     GARRICK ROWELL   MRN:      [id number]        Account:      [id number]   :      1960           Visit Date:   2020      Document: [id number]

## 2020-08-17 ENCOUNTER — OFFICE VISIT (OUTPATIENT)
Dept: FAMILY MEDICINE | Facility: OTHER | Age: 60
End: 2020-08-17
Attending: PHYSICIAN ASSISTANT
Payer: MEDICARE

## 2020-08-17 VITALS
HEART RATE: 58 BPM | BODY MASS INDEX: 43.35 KG/M2 | DIASTOLIC BLOOD PRESSURE: 84 MMHG | OXYGEN SATURATION: 94 % | SYSTOLIC BLOOD PRESSURE: 132 MMHG | WEIGHT: 286 LBS | TEMPERATURE: 97.9 F | RESPIRATION RATE: 18 BRPM | HEIGHT: 68 IN

## 2020-08-17 DIAGNOSIS — Z01.818 PRE-OPERATIVE EXAMINATION: Primary | ICD-10-CM

## 2020-08-17 DIAGNOSIS — E66.01 CLASS 3 SEVERE OBESITY DUE TO EXCESS CALORIES WITHOUT SERIOUS COMORBIDITY WITH BODY MASS INDEX (BMI) OF 40.0 TO 44.9 IN ADULT (H): ICD-10-CM

## 2020-08-17 DIAGNOSIS — M75.121 COMPLETE TEAR OF RIGHT ROTATOR CUFF, UNSPECIFIED WHETHER TRAUMATIC: ICD-10-CM

## 2020-08-17 DIAGNOSIS — M54.50 CHRONIC RIGHT-SIDED LOW BACK PAIN WITHOUT SCIATICA: ICD-10-CM

## 2020-08-17 DIAGNOSIS — E78.5 HYPERLIPIDEMIA, UNSPECIFIED HYPERLIPIDEMIA TYPE: ICD-10-CM

## 2020-08-17 DIAGNOSIS — G89.29 CHRONIC RIGHT-SIDED LOW BACK PAIN WITHOUT SCIATICA: ICD-10-CM

## 2020-08-17 DIAGNOSIS — I10 ESSENTIAL HYPERTENSION: ICD-10-CM

## 2020-08-17 DIAGNOSIS — E66.813 CLASS 3 SEVERE OBESITY DUE TO EXCESS CALORIES WITHOUT SERIOUS COMORBIDITY WITH BODY MASS INDEX (BMI) OF 40.0 TO 44.9 IN ADULT (H): ICD-10-CM

## 2020-08-17 LAB — HGB BLD-MCNC: 12.3 G/DL (ref 13.3–17.7)

## 2020-08-17 PROCEDURE — 93010 ELECTROCARDIOGRAM REPORT: CPT | Performed by: INTERNAL MEDICINE

## 2020-08-17 PROCEDURE — 85018 HEMOGLOBIN: CPT | Mod: ZL | Performed by: PHYSICIAN ASSISTANT

## 2020-08-17 PROCEDURE — 36415 COLL VENOUS BLD VENIPUNCTURE: CPT | Mod: ZL | Performed by: PHYSICIAN ASSISTANT

## 2020-08-17 PROCEDURE — 99214 OFFICE O/P EST MOD 30 MIN: CPT | Performed by: PHYSICIAN ASSISTANT

## 2020-08-17 PROCEDURE — G0463 HOSPITAL OUTPT CLINIC VISIT: HCPCS

## 2020-08-17 PROCEDURE — 93005 ELECTROCARDIOGRAM TRACING: CPT

## 2020-08-17 RX ORDER — OXYCODONE HYDROCHLORIDE 10 MG/1
10 TABLET ORAL EVERY 4 HOURS PRN
Qty: 150 TABLET | Refills: 0 | Status: ON HOLD | OUTPATIENT
Start: 2020-08-17 | End: 2020-08-24

## 2020-08-17 ASSESSMENT — MIFFLIN-ST. JEOR: SCORE: 2063.85

## 2020-08-17 ASSESSMENT — PAIN SCALES - GENERAL: PAINLEVEL: MODERATE PAIN (4)

## 2020-08-17 NOTE — PROGRESS NOTES
[facility] AND \[facility]\""  [address]  [place] [address]  [phone]  Dept: [phone]    PRE-OP EVALUATION:  Today's date: 2020    Iker Young (: 1960) presents for pre-operative evaluation assessment as requested by Dr. Dean.  He requires evaluation and anesthesia risk assessment prior to undergoing surgery/procedure for treatment of tear of rotator cuff .    Proposed Surgery/ Procedure: Diagnostic Right Shulder Scope w/Subacromial Decompression, Distal Clavicle Excision, Rotator Cuff Repair & Biceps Tenodesis   Date of Surgery/ Procedure: 20  Time of Surgery/ Procedure: [facility]  Hospital/Surgical Facility: [facility]  Surgery Fax Number: Note does not need to be faxed, will be available electronically in Epic.  Primary Physician: Sohail Harris  Type of Anesthesia Anticipated: General    Preoperative Questionnaire:   No - Have you ever had a heart attack or stroke?  No - Have you ever had surgery on your heart or blood vessels, such as a stent, coronary (heart) bypass, or surgery on an artery in the head, neck, heart, or legs?  No - Do you have chest pain when you are physically active?  No - Do you have a history of heart failure?  No - Do you currently have a cold, bronchitis, or symptoms of other respiratory (head and chest) infections?  No - Do you have a cough, shortness of breath, or wheezing?  No - Do you or anyone in your family have a history of blood clots?  No - Do you or anyone in your family have a serious bleeding problem, such as long-lasting bleeding after surgeries or cuts?  No - Have you ever had anemia or been told to take iron pills?  No - Have you had any abnormal blood loss such as black, tarry or bloody stools, or abnormal vaginal bleeding?  No - Have you ever had a blood transfusion?  Yes - Are you willing to have a blood transfusion if it is medically needed before, during, or after your surgery?  YES, notes he had difficulties waking last time -  Have you or anyone in your family ever had problems with anesthesia (sedation for surgery)?  No - Do you have sleep apnea, excessive snoring, or daytime drowsiness?   No - Do you have any artifical heart valves or other implanted medical devices, such as a pacemaker, defibrillator, or continuous glucose monitor?  No - Do you have any artifical joints?  No - Are you allergic to latex?  No - Is there any chance that you may be pregnant?    Patient has a Health Care Directive or Living Will:  NO    HPI:     HPI related to upcoming procedure:   Patient fell while walking outside in 11/2019 and he developed right shoulder pain. This pain came and went. Difficulty raising right arm. Shoulder MRI completed 06/30/2020 and showed rotator cuff tear arthropathy. He is scheduled to have diagnostic arthroscopy completed with possible open rotator cuff repair. Has been taking Tylenol, iburpofen, and oxycodone for pain relief.     Patient has a history of difficulty when waking up from anesthesia per his report. Cannot see records of this in his chart. Also has allergies to betadine and liquid adhesive causing blistering.       Pertinent Medical Problems:  Hyperlipidemia: Currently diet controlled. Last lipid panel completed in 2015 and showed elevated cholesterol.     Hypertension: Currently well controlled with atenolol 100 mg once daily and lisinopril 20 mg once daily. Occasionally checks his blood pressure at home and is always under goal of <130/80. Denies chest pain or pressure, palpitations, dizziness, lightheadedness, syncope, headaches, vision changes, or shortness of breath.     No recent cough or cold symptoms.  No fevers, chills, sore throat, ear pain.  No chest pain or palpitations, shortness of breath today.  No stomachaches, nausea, vomiting, constipation, dysuria, frequency, urgency, blood in stool or urine.      MEDICAL HISTORY:     Patient Active Problem List    Diagnosis Date Noted     Back pain, chronic  02/07/2018     Priority: Medium     Degeneration of cervical intervertebral disc 02/07/2018     Priority: Medium     Grief reaction with prolonged bereavement 02/15/2017     Priority: Medium     Chest pain 12/12/2016     Priority: Medium     Aortic valve sclerosis 12/08/2016     Priority: Medium     HTN (hypertension) 06/18/2015     Priority: Medium     Knee pain, chronic 06/18/2015     Priority: Medium     Primary osteoarthritis of right knee 06/18/2015     Priority: Medium     Controlled substance agreement signed 12/6/19 07/02/2013     Priority: Medium     Allergic rhinitis 06/14/2011     Priority: Medium     HYPERLIPIDEMIA LDL GOAL <130 10/31/2010     Priority: Medium     PAIN CHRONIC( [facility]) 12/04/2007     Priority: Medium     Obesity 04/24/2007     Priority: Medium     Problem list name updated by automated process. Provider to review       Essential hypertension      Priority: Medium     Problem list name updated by automated process. Provider to review       Hyperlipidemia      Priority: Medium     Problem list name updated by automated process. Provider to review        Past Medical History:   Diagnosis Date     Chest pain     12/12/2016     Other specified postprocedural states     Status post colonoscopy     Personal history of other medical treatment (CODE)     04/2009,MRI cervical spine     Past Surgical History:   Procedure Laterality Date     COLONOSCOPY      No Comments Provided     FUSION LUMBAR ANTERIOR ONE LEVEL      2004,Back surgery x 5 (fusion, hardware removal, stimulator and removal)     OTHER SURGICAL HISTORY      2005,205736,PERIPHERAL NERVE STIMULATOR,Lumbar nerve stimulator and subsequent removal     OTHER SURGICAL HISTORY      2005,205448,REMOVAL OF FOREIGN BODY, subsequent removal     OTHER SURGICAL HISTORY      208566,INCISION AND DRAINAGE,from infection from nerve stimulator     OTHER SURGICAL HISTORY      04/2009,207388,SCAN-MRI INTERPRETATION,MRI cervical spine.     Current Outpatient  Medications   Medication Sig Dispense Refill     acetaminophen (TYLENOL) 325 MG tablet Take by mouth every 4 hours as needed       amitriptyline (ELAVIL) 50 MG tablet TAKE 2 TABLETS (100 MG) BY MOUTH AT BEDTIME 60 tablet 10     atenolol (TENORMIN) 100 MG tablet TAKE 1 TABLET BY MOUTH EVERY DAY 90 tablet 3     gabapentin (NEURONTIN) 300 MG capsule Take 1 capsule (300 mg) by mouth 3 times daily 270 capsule 3     ibuprofen (ADVIL/MOTRIN) 600 MG tablet TAKE 1 TABLET (600 MG) BY MOUTH EVERY 6 HOURS AS NEEDED FOR MODERATEPAIN 120 tablet 11     lisinopril (ZESTRIL) 20 MG tablet Take 1 tablet (20 mg) by mouth daily 90 tablet 3     naloxone (NARCAN) 4 MG/0.1ML nasal spray Spray 1 spray (4 mg) into one nostril alternating nostrils once as needed for opioid reversal every 2-3 minutes until assistance arrives 0.2 mL 3     oxyCODONE IR (ROXICODONE) 10 MG tablet Take 1 tablet (10 mg) by mouth every 4 hours as needed for moderate to severe pain Max 5 daily. Fill on 08/21/2020 150 tablet 0     oxyCODONE IR (ROXICODONE) 10 MG tablet Take 1 tablet (10 mg) by mouth every 4 hours as needed for severe pain Max 5 daily, fill on/after 6/4/20 150 tablet 0     oxyCODONE IR (ROXICODONE) 10 MG tablet Take 1 tablet (10 mg) by mouth every 4 hours as needed for severe pain Fill on/after 5/5/2020.  Max of 5 daily 150 tablet 0     sertraline (ZOLOFT) 100 MG tablet TAKE 1 TABLET (100 MG) BY MOUTH DAILY 90 tablet 3     traMADol (ULTRAM) 50 MG tablet TAKE 1 TABLET (50 MG) BY MOUTH EVERY 6 HOURS AS NEEDED FOR SEVERE PAIN 120 tablet 1     OTC products: None, except as noted above    Allergies   Allergen Reactions     Betadine [Povidone Iodine] Rash and Dermatitis     Severe blistering      Liquid Adhesive Dermatitis      Latex Allergy: YES: Precautions to take: Patient is allergic to liquid adhesive and betadine    Social History     Tobacco Use     Smoking status: Never Smoker     Smokeless tobacco: Never Used   Substance Use Topics     Alcohol use:  "Yes     Comment: rare     History   Drug Use Unknown       REVIEW OF SYSTEMS:   CONSTITUTIONAL: NEGATIVE for fever, chills, change in weight  INTEGUMENTARY/SKIN: NEGATIVE for worrisome rashes, moles or lesions  EYES: NEGATIVE for vision changes or irritation  ENT/MOUTH: NEGATIVE for ear, mouth and throat problems  RESP: NEGATIVE for significant cough or SOB  BREAST: NEGATIVE for masses, tenderness or discharge  CV: NEGATIVE for chest pain, palpitations or peripheral edema  GI: NEGATIVE for nausea, abdominal pain, heartburn, or change in bowel habits  : NEGATIVE for frequency, dysuria, or hematuria  MUSCULOSKELETAL: NEGATIVE for significant arthralgias or myalgia  NEURO: NEGATIVE for weakness, dizziness or paresthesias  ENDOCRINE: NEGATIVE for temperature intolerance, skin/hair changes  HEME: NEGATIVE for bleeding problems  PSYCHIATRIC: NEGATIVE for changes in mood or affect    EXAM:   /84 (BP Location: Right arm, Patient Position: Sitting, Cuff Size: Adult Large)   Pulse 58   Temp 97.9  F (36.6  C) (Tympanic)   Resp 18   Ht 1.715 m (5' 7.5\")   Wt 129.7 kg (286 lb)   SpO2 94%   BMI 44.13 kg/m    General: Pleasant, in no apparent distress.  Eyes: Sclera are white and conjunctiva are clear bilaterally. Lacrimal apparatus free of erythema, edema, and discharge bilaterally.  Ears: External ears without erythema or edema. Tympanic membranes are pearly white and without erythema, scarring or perforations bilaterally. External auditory canals are free of foreign bodies, erythema, ulcers, and masses.  Nose: External nose is symmetrical and free of lesions and deformities. Mucosa is soft pink and without erythema, edema, bleeding, or exudate. No septal perforation or deviation.  Oropharynx: Oral mucosa is pink and without ulcers, nodules, and white patches. Tongue is symmetrical, pink, and without masses or lesions. Pharynx is pink, symmetrical, and without lesions. Uvula is midline. Tonsils are pink, " symmetrical, and without edema, ulcers, or exudates, and 1+ bilaterally.  Neck: No cervical lymphadenopathy on inspection and palpation.  Cardiovascular: Regular rate and rhythm with S1 equal to S2. No murmurs, friction rubs, or gallops.   Respiratory: Lungs are resonant and clear to auscultation bilaterally. No wheezes, crackles, or rhonchi.  Abdomen: Abdomen is non-distended and without bulging flanks, prominent venous markings, or ecchymosis. Active bowel sounds heard in all four quadrants. No tenderness to palpation in all four quadrants. No rebound tenderness or guarding. No palpable masses noted. No hepatosplenomegaly.  Skin: No jaundice, pallor, rashes, or lesions.  Psych: Appropriate mood and affect.        DIAGNOSTICS:   EKG: Normal Sinus Rhythm, sinus bradycardia, normal axis, normal intervals, no acute ST/T changes c/w ischemia, no LVH by voltage criteria, unchanged from previous tracings    Results for orders placed or performed in visit on 08/17/20   Hemoglobin     Status: Abnormal   Result Value Ref Range    Hemoglobin 12.3 (L) 13.3 - 17.7 g/dL         IMPRESSION:   Reason for surgery/procedure: Right rotator cuff tear   Diagnosis/reason for consult: Preoperative exam    The proposed surgical procedure is considered INTERMEDIATE risk.    REVISED CARDIAC RISK INDEX  The patient has the following serious cardiovascular risks for perioperative complications such as (MI, PE, VFib and 3  AV Block):  No serious cardiac risks  INTERPRETATION: 0 risks: Class I (very low risk - 0.4% complication rate)    The patient has the following additional risks for perioperative complications:  No identified additional risks      ICD-10-CM    1. Pre-operative examination  Z01.818 EKG 12-lead, tracing only     Hemoglobin     Asymptomatic COVID-19 Virus (Coronavirus) by PCR     Hemoglobin   2. Complete tear of right rotator cuff, unspecified whether traumatic  M75.121 oxyCODONE IR (ROXICODONE) 10 MG tablet   3.  Hyperlipidemia, unspecified hyperlipidemia type  E78.5    4. Essential hypertension  I10    5. Class 3 severe obesity due to excess calories without serious comorbidity with body mass index (BMI) of 40.0 to 44.9 in adult (H)  E66.01     Z68.41    6. Chronic right-sided low back pain without sciatica  M54.5 oxyCODONE IR (ROXICODONE) 10 MG tablet    G89.29        RECOMMENDATIONS:   Patient is scheduled to have COVID testing completed on 08/21/2020.   Patient also requesting refill of oxycodone for which he takes for chronic pain. One refill given after reviewing  and it appeared appropriate. Educated on medication, use, and side effects.     --Patient is to take all scheduled medications on the day of surgery EXCEPT for modifications listed below.   --Discontinue ibuprofen today until after surgery    APPROVAL GIVEN to proceed with proposed procedure, without further diagnostic evaluation       Signed Electronically by: Sophie Martinez PA-C

## 2020-08-17 NOTE — NURSING NOTE
Patient is here for preop.      Medication Reconciliation: complete    Lena Blair LPN  8/17/2020 12:36 PM

## 2020-08-17 NOTE — H&P (VIEW-ONLY)
[facility] AND \[facility]\""  [address]  [place] [address]  [phone]  Dept: [phone]    PRE-OP EVALUATION:  Today's date: 2020    Iker Young (: 1960) presents for pre-operative evaluation assessment as requested by Dr. Dean.  He requires evaluation and anesthesia risk assessment prior to undergoing surgery/procedure for treatment of tear of rotator cuff .    Proposed Surgery/ Procedure: Diagnostic Right Shulder Scope w/Subacromial Decompression, Distal Clavicle Excision, Rotator Cuff Repair & Biceps Tenodesis   Date of Surgery/ Procedure: 20  Time of Surgery/ Procedure: [facility]  Hospital/Surgical Facility: [facility]  Surgery Fax Number: Note does not need to be faxed, will be available electronically in Epic.  Primary Physician: Sohail Harris  Type of Anesthesia Anticipated: General    Preoperative Questionnaire:   No - Have you ever had a heart attack or stroke?  No - Have you ever had surgery on your heart or blood vessels, such as a stent, coronary (heart) bypass, or surgery on an artery in the head, neck, heart, or legs?  No - Do you have chest pain when you are physically active?  No - Do you have a history of heart failure?  No - Do you currently have a cold, bronchitis, or symptoms of other respiratory (head and chest) infections?  No - Do you have a cough, shortness of breath, or wheezing?  No - Do you or anyone in your family have a history of blood clots?  No - Do you or anyone in your family have a serious bleeding problem, such as long-lasting bleeding after surgeries or cuts?  No - Have you ever had anemia or been told to take iron pills?  No - Have you had any abnormal blood loss such as black, tarry or bloody stools, or abnormal vaginal bleeding?  No - Have you ever had a blood transfusion?  Yes - Are you willing to have a blood transfusion if it is medically needed before, during, or after your surgery?  YES, notes he had difficulties waking last time -  Have you or anyone in your family ever had problems with anesthesia (sedation for surgery)?  No - Do you have sleep apnea, excessive snoring, or daytime drowsiness?   No - Do you have any artifical heart valves or other implanted medical devices, such as a pacemaker, defibrillator, or continuous glucose monitor?  No - Do you have any artifical joints?  No - Are you allergic to latex?  No - Is there any chance that you may be pregnant?    Patient has a Health Care Directive or Living Will:  NO    HPI:     HPI related to upcoming procedure:   Patient fell while walking outside in 11/2019 and he developed right shoulder pain. This pain came and went. Difficulty raising right arm. Shoulder MRI completed 06/30/2020 and showed rotator cuff tear arthropathy. He is scheduled to have diagnostic arthroscopy completed with possible open rotator cuff repair. Has been taking Tylenol, iburpofen, and oxycodone for pain relief.     Patient has a history of difficulty when waking up from anesthesia per his report. Cannot see records of this in his chart. Also has allergies to betadine and liquid adhesive causing blistering.       Pertinent Medical Problems:  Hyperlipidemia: Currently diet controlled. Last lipid panel completed in 2015 and showed elevated cholesterol.     Hypertension: Currently well controlled with atenolol 100 mg once daily and lisinopril 20 mg once daily. Occasionally checks his blood pressure at home and is always under goal of <130/80. Denies chest pain or pressure, palpitations, dizziness, lightheadedness, syncope, headaches, vision changes, or shortness of breath.     No recent cough or cold symptoms.  No fevers, chills, sore throat, ear pain.  No chest pain or palpitations, shortness of breath today.  No stomachaches, nausea, vomiting, constipation, dysuria, frequency, urgency, blood in stool or urine.      MEDICAL HISTORY:     Patient Active Problem List    Diagnosis Date Noted     Back pain, chronic  02/07/2018     Priority: Medium     Degeneration of cervical intervertebral disc 02/07/2018     Priority: Medium     Grief reaction with prolonged bereavement 02/15/2017     Priority: Medium     Chest pain 12/12/2016     Priority: Medium     Aortic valve sclerosis 12/08/2016     Priority: Medium     HTN (hypertension) 06/18/2015     Priority: Medium     Knee pain, chronic 06/18/2015     Priority: Medium     Primary osteoarthritis of right knee 06/18/2015     Priority: Medium     Controlled substance agreement signed 12/6/19 07/02/2013     Priority: Medium     Allergic rhinitis 06/14/2011     Priority: Medium     HYPERLIPIDEMIA LDL GOAL <130 10/31/2010     Priority: Medium     PAIN CHRONIC( NEC) 12/04/2007     Priority: Medium     Obesity 04/24/2007     Priority: Medium     Problem list name updated by automated process. Provider to review       Essential hypertension      Priority: Medium     Problem list name updated by automated process. Provider to review       Hyperlipidemia      Priority: Medium     Problem list name updated by automated process. Provider to review        Past Medical History:   Diagnosis Date     Chest pain     12/12/2016     Other specified postprocedural states     Status post colonoscopy     Personal history of other medical treatment (CODE)     04/2009,MRI cervical spine     Past Surgical History:   Procedure Laterality Date     COLONOSCOPY      No Comments Provided     FUSION LUMBAR ANTERIOR ONE LEVEL      2004,Back surgery x 5 (fusion, hardware removal, stimulator and removal)     OTHER SURGICAL HISTORY      2005,205736,PERIPHERAL NERVE STIMULATOR,Lumbar nerve stimulator and subsequent removal     OTHER SURGICAL HISTORY      2005,205448,REMOVAL OF FOREIGN BODY, subsequent removal     OTHER SURGICAL HISTORY      208566,INCISION AND DRAINAGE,from infection from nerve stimulator     OTHER SURGICAL HISTORY      04/2009,207388,SCAN-MRI INTERPRETATION,MRI cervical spine.     Current Outpatient  Medications   Medication Sig Dispense Refill     acetaminophen (TYLENOL) 325 MG tablet Take by mouth every 4 hours as needed       amitriptyline (ELAVIL) 50 MG tablet TAKE 2 TABLETS (100 MG) BY MOUTH AT BEDTIME 60 tablet 10     atenolol (TENORMIN) 100 MG tablet TAKE 1 TABLET BY MOUTH EVERY DAY 90 tablet 3     gabapentin (NEURONTIN) 300 MG capsule Take 1 capsule (300 mg) by mouth 3 times daily 270 capsule 3     ibuprofen (ADVIL/MOTRIN) 600 MG tablet TAKE 1 TABLET (600 MG) BY MOUTH EVERY 6 HOURS AS NEEDED FOR MODERATEPAIN 120 tablet 11     lisinopril (ZESTRIL) 20 MG tablet Take 1 tablet (20 mg) by mouth daily 90 tablet 3     naloxone (NARCAN) 4 MG/0.1ML nasal spray Spray 1 spray (4 mg) into one nostril alternating nostrils once as needed for opioid reversal every 2-3 minutes until assistance arrives 0.2 mL 3     oxyCODONE IR (ROXICODONE) 10 MG tablet Take 1 tablet (10 mg) by mouth every 4 hours as needed for moderate to severe pain Max 5 daily. Fill on 08/21/2020 150 tablet 0     oxyCODONE IR (ROXICODONE) 10 MG tablet Take 1 tablet (10 mg) by mouth every 4 hours as needed for severe pain Max 5 daily, fill on/after 6/4/20 150 tablet 0     oxyCODONE IR (ROXICODONE) 10 MG tablet Take 1 tablet (10 mg) by mouth every 4 hours as needed for severe pain Fill on/after 5/5/2020.  Max of 5 daily 150 tablet 0     sertraline (ZOLOFT) 100 MG tablet TAKE 1 TABLET (100 MG) BY MOUTH DAILY 90 tablet 3     traMADol (ULTRAM) 50 MG tablet TAKE 1 TABLET (50 MG) BY MOUTH EVERY 6 HOURS AS NEEDED FOR SEVERE PAIN 120 tablet 1     OTC products: None, except as noted above    Allergies   Allergen Reactions     Betadine [Povidone Iodine] Rash and Dermatitis     Severe blistering      Liquid Adhesive Dermatitis      Latex Allergy: YES: Precautions to take: Patient is allergic to liquid adhesive and betadine    Social History     Tobacco Use     Smoking status: Never Smoker     Smokeless tobacco: Never Used   Substance Use Topics     Alcohol use:  "Yes     Comment: rare     History   Drug Use Unknown       REVIEW OF SYSTEMS:   CONSTITUTIONAL: NEGATIVE for fever, chills, change in weight  INTEGUMENTARY/SKIN: NEGATIVE for worrisome rashes, moles or lesions  EYES: NEGATIVE for vision changes or irritation  ENT/MOUTH: NEGATIVE for ear, mouth and throat problems  RESP: NEGATIVE for significant cough or SOB  BREAST: NEGATIVE for masses, tenderness or discharge  CV: NEGATIVE for chest pain, palpitations or peripheral edema  GI: NEGATIVE for nausea, abdominal pain, heartburn, or change in bowel habits  : NEGATIVE for frequency, dysuria, or hematuria  MUSCULOSKELETAL: NEGATIVE for significant arthralgias or myalgia  NEURO: NEGATIVE for weakness, dizziness or paresthesias  ENDOCRINE: NEGATIVE for temperature intolerance, skin/hair changes  HEME: NEGATIVE for bleeding problems  PSYCHIATRIC: NEGATIVE for changes in mood or affect    EXAM:   /84 (BP Location: Right arm, Patient Position: Sitting, Cuff Size: Adult Large)   Pulse 58   Temp 97.9  F (36.6  C) (Tympanic)   Resp 18   Ht 1.715 m (5' 7.5\")   Wt 129.7 kg (286 lb)   SpO2 94%   BMI 44.13 kg/m    General: Pleasant, in no apparent distress.  Eyes: Sclera are white and conjunctiva are clear bilaterally. Lacrimal apparatus free of erythema, edema, and discharge bilaterally.  Ears: External ears without erythema or edema. Tympanic membranes are pearly white and without erythema, scarring or perforations bilaterally. External auditory canals are free of foreign bodies, erythema, ulcers, and masses.  Nose: External nose is symmetrical and free of lesions and deformities. Mucosa is soft pink and without erythema, edema, bleeding, or exudate. No septal perforation or deviation.  Oropharynx: Oral mucosa is pink and without ulcers, nodules, and white patches. Tongue is symmetrical, pink, and without masses or lesions. Pharynx is pink, symmetrical, and without lesions. Uvula is midline. Tonsils are pink, " symmetrical, and without edema, ulcers, or exudates, and 1+ bilaterally.  Neck: No cervical lymphadenopathy on inspection and palpation.  Cardiovascular: Regular rate and rhythm with S1 equal to S2. No murmurs, friction rubs, or gallops.   Respiratory: Lungs are resonant and clear to auscultation bilaterally. No wheezes, crackles, or rhonchi.  Abdomen: Abdomen is non-distended and without bulging flanks, prominent venous markings, or ecchymosis. Active bowel sounds heard in all four quadrants. No tenderness to palpation in all four quadrants. No rebound tenderness or guarding. No palpable masses noted. No hepatosplenomegaly.  Skin: No jaundice, pallor, rashes, or lesions.  Psych: Appropriate mood and affect.        DIAGNOSTICS:   EKG: Normal Sinus Rhythm, sinus bradycardia, normal axis, normal intervals, no acute ST/T changes c/w ischemia, no LVH by voltage criteria, unchanged from previous tracings    Results for orders placed or performed in visit on 08/17/20   Hemoglobin     Status: Abnormal   Result Value Ref Range    Hemoglobin 12.3 (L) 13.3 - 17.7 g/dL         IMPRESSION:   Reason for surgery/procedure: Right rotator cuff tear   Diagnosis/reason for consult: Preoperative exam    The proposed surgical procedure is considered INTERMEDIATE risk.    REVISED CARDIAC RISK INDEX  The patient has the following serious cardiovascular risks for perioperative complications such as (MI, PE, VFib and 3  AV Block):  No serious cardiac risks  INTERPRETATION: 0 risks: Class I (very low risk - 0.4% complication rate)    The patient has the following additional risks for perioperative complications:  No identified additional risks      ICD-10-CM    1. Pre-operative examination  Z01.818 EKG 12-lead, tracing only     Hemoglobin     Asymptomatic COVID-19 Virus (Coronavirus) by PCR     Hemoglobin   2. Complete tear of right rotator cuff, unspecified whether traumatic  M75.121 oxyCODONE IR (ROXICODONE) 10 MG tablet   3.  Hyperlipidemia, unspecified hyperlipidemia type  E78.5    4. Essential hypertension  I10    5. Class 3 severe obesity due to excess calories without serious comorbidity with body mass index (BMI) of 40.0 to 44.9 in adult (H)  E66.01     Z68.41    6. Chronic right-sided low back pain without sciatica  M54.5 oxyCODONE IR (ROXICODONE) 10 MG tablet    G89.29        RECOMMENDATIONS:   Patient is scheduled to have COVID testing completed on 08/21/2020.   Patient also requesting refill of oxycodone for which he takes for chronic pain. One refill given after reviewing  and it appeared appropriate. Educated on medication, use, and side effects.     --Patient is to take all scheduled medications on the day of surgery EXCEPT for modifications listed below.   --Discontinue ibuprofen today until after surgery    APPROVAL GIVEN to proceed with proposed procedure, without further diagnostic evaluation       Signed Electronically by: Sophie Martinez PA-C

## 2020-08-18 LAB — INTERPRETATION ECG - MUSE: NORMAL

## 2020-08-21 ENCOUNTER — ALLIED HEALTH/NURSE VISIT (OUTPATIENT)
Dept: FAMILY MEDICINE | Facility: OTHER | Age: 60
End: 2020-08-21
Attending: ORTHOPAEDIC SURGERY
Payer: MEDICARE

## 2020-08-21 ENCOUNTER — ANESTHESIA EVENT (OUTPATIENT)
Dept: SURGERY | Facility: OTHER | Age: 60
End: 2020-08-21
Payer: MEDICARE

## 2020-08-21 DIAGNOSIS — Z20.822 ENCOUNTER FOR LABORATORY TESTING FOR COVID-19 VIRUS: Primary | ICD-10-CM

## 2020-08-21 PROCEDURE — U0003 INFECTIOUS AGENT DETECTION BY NUCLEIC ACID (DNA OR RNA); SEVERE ACUTE RESPIRATORY SYNDROME CORONAVIRUS 2 (SARS-COV-2) (CORONAVIRUS DISEASE [COVID-19]), AMPLIFIED PROBE TECHNIQUE, MAKING USE OF HIGH THROUGHPUT TECHNOLOGIES AS DESCRIBED BY CMS-2020-01-R: HCPCS | Mod: ZL | Performed by: ORTHOPAEDIC SURGERY

## 2020-08-21 PROCEDURE — 99207 ZZC NO CHARGE NURSE ONLY: CPT

## 2020-08-21 PROCEDURE — C9803 HOPD COVID-19 SPEC COLLECT: HCPCS

## 2020-08-22 LAB
SARS-COV-2 RNA SPEC QL NAA+PROBE: NOT DETECTED
SPECIMEN SOURCE: NORMAL

## 2020-08-24 ENCOUNTER — SURGERY (OUTPATIENT)
Age: 60
End: 2020-08-24
Payer: MEDICARE

## 2020-08-24 ENCOUNTER — HOSPITAL ENCOUNTER (OUTPATIENT)
Facility: OTHER | Age: 60
Setting detail: OBSERVATION
Discharge: HOME | End: 2020-08-24
Attending: ORTHOPAEDIC SURGERY | Admitting: FAMILY MEDICINE
Payer: MEDICARE

## 2020-08-24 ENCOUNTER — ANESTHESIA (OUTPATIENT)
Dept: SURGERY | Facility: OTHER | Age: 60
End: 2020-08-24
Payer: MEDICARE

## 2020-08-24 ENCOUNTER — APPOINTMENT (OUTPATIENT)
Dept: GENERAL RADIOLOGY | Facility: OTHER | Age: 60
End: 2020-08-24
Attending: ORTHOPAEDIC SURGERY
Payer: MEDICARE

## 2020-08-24 VITALS
OXYGEN SATURATION: 95 % | HEART RATE: 63 BPM | DIASTOLIC BLOOD PRESSURE: 64 MMHG | WEIGHT: 286 LBS | RESPIRATION RATE: 16 BRPM | BODY MASS INDEX: 43.35 KG/M2 | HEIGHT: 68 IN | TEMPERATURE: 96.9 F | SYSTOLIC BLOOD PRESSURE: 106 MMHG

## 2020-08-24 DIAGNOSIS — M12.811 ROTATOR CUFF TEAR ARTHROPATHY OF RIGHT SHOULDER: Primary | ICD-10-CM

## 2020-08-24 DIAGNOSIS — M75.101 ROTATOR CUFF TEAR ARTHROPATHY OF RIGHT SHOULDER: Primary | ICD-10-CM

## 2020-08-24 PROBLEM — J98.11 ATELECTASIS: Status: ACTIVE | Noted: 2020-08-24

## 2020-08-24 PROBLEM — R09.02 HYPOXIA: Status: ACTIVE | Noted: 2020-08-24

## 2020-08-24 PROCEDURE — 29824 SHO ARTHRS SRG DSTL CLAVICLC: CPT | Mod: RT | Performed by: ORTHOPAEDIC SURGERY

## 2020-08-24 PROCEDURE — 25000125 ZZHC RX 250: Performed by: NURSE ANESTHETIST, CERTIFIED REGISTERED

## 2020-08-24 PROCEDURE — 71000027 ZZH RECOVERY PHASE 2 EACH 15 MINS: Performed by: ORTHOPAEDIC SURGERY

## 2020-08-24 PROCEDURE — 36000056 ZZH SURGERY LEVEL 3 1ST 30 MIN: Performed by: ORTHOPAEDIC SURGERY

## 2020-08-24 PROCEDURE — 40000306 ZZH STATISTIC PRE PROC ASSESS II: Performed by: ORTHOPAEDIC SURGERY

## 2020-08-24 PROCEDURE — 64415 NJX AA&/STRD BRCH PLXS IMG: CPT | Mod: RT | Performed by: NURSE ANESTHETIST, CERTIFIED REGISTERED

## 2020-08-24 PROCEDURE — 36000058 ZZH SURGERY LEVEL 3 EA 15 ADDTL MIN: Performed by: ORTHOPAEDIC SURGERY

## 2020-08-24 PROCEDURE — 20610 DRAIN/INJ JOINT/BURSA W/O US: CPT | Mod: RT | Performed by: ORTHOPAEDIC SURGERY

## 2020-08-24 PROCEDURE — 37000008 ZZH ANESTHESIA TECHNICAL FEE, 1ST 30 MIN: Performed by: ORTHOPAEDIC SURGERY

## 2020-08-24 PROCEDURE — 25000128 H RX IP 250 OP 636: Performed by: ORTHOPAEDIC SURGERY

## 2020-08-24 PROCEDURE — 29823 SHO ARTHRS SRG XTNSV DBRDMT: CPT | Mod: RT | Performed by: ORTHOPAEDIC SURGERY

## 2020-08-24 PROCEDURE — 27210794 ZZH OR GENERAL SUPPLY STERILE: Performed by: ORTHOPAEDIC SURGERY

## 2020-08-24 PROCEDURE — 40000275 ZZH STATISTIC RCP TIME EA 10 MIN

## 2020-08-24 PROCEDURE — 99219 ZZC INITIAL OBSERVATION CARE,LEVL II: CPT | Performed by: FAMILY MEDICINE

## 2020-08-24 PROCEDURE — 25800030 ZZH RX IP 258 OP 636: Performed by: NURSE ANESTHETIST, CERTIFIED REGISTERED

## 2020-08-24 PROCEDURE — 25000128 H RX IP 250 OP 636: Performed by: NURSE ANESTHETIST, CERTIFIED REGISTERED

## 2020-08-24 PROCEDURE — 25000566 ZZH SEVOFLURANE, EA 15 MIN: Performed by: ORTHOPAEDIC SURGERY

## 2020-08-24 PROCEDURE — 37000009 ZZH ANESTHESIA TECHNICAL FEE, EACH ADDTL 15 MIN: Performed by: ORTHOPAEDIC SURGERY

## 2020-08-24 PROCEDURE — G0378 HOSPITAL OBSERVATION PER HR: HCPCS

## 2020-08-24 PROCEDURE — 76942 ECHO GUIDE FOR BIOPSY: CPT | Mod: 26 | Performed by: NURSE ANESTHETIST, CERTIFIED REGISTERED

## 2020-08-24 PROCEDURE — A9270 NON-COVERED ITEM OR SERVICE: HCPCS | Mod: GY | Performed by: NURSE ANESTHETIST, CERTIFIED REGISTERED

## 2020-08-24 PROCEDURE — 29824 SHO ARTHRS SRG DSTL CLAVICLC: CPT | Performed by: NURSE ANESTHETIST, CERTIFIED REGISTERED

## 2020-08-24 PROCEDURE — 25000132 ZZH RX MED GY IP 250 OP 250 PS 637: Mod: GY | Performed by: ORTHOPAEDIC SURGERY

## 2020-08-24 PROCEDURE — 71045 X-RAY EXAM CHEST 1 VIEW: CPT

## 2020-08-24 PROCEDURE — 71000014 ZZH RECOVERY PHASE 1 LEVEL 2 FIRST HR: Performed by: ORTHOPAEDIC SURGERY

## 2020-08-24 PROCEDURE — 25000132 ZZH RX MED GY IP 250 OP 250 PS 637: Mod: GY | Performed by: NURSE ANESTHETIST, CERTIFIED REGISTERED

## 2020-08-24 PROCEDURE — 94640 AIRWAY INHALATION TREATMENT: CPT

## 2020-08-24 PROCEDURE — 25800025 ZZH RX 258: Performed by: ORTHOPAEDIC SURGERY

## 2020-08-24 RX ORDER — NALOXONE HYDROCHLORIDE 0.4 MG/ML
.1-.4 INJECTION, SOLUTION INTRAMUSCULAR; INTRAVENOUS; SUBCUTANEOUS
Status: CANCELLED | OUTPATIENT
Start: 2020-08-24

## 2020-08-24 RX ORDER — CEFAZOLIN SODIUM IN 0.9 % NACL 3 G/100 ML
3 INTRAVENOUS SOLUTION, PIGGYBACK (ML) INTRAVENOUS
Status: COMPLETED | OUTPATIENT
Start: 2020-08-24 | End: 2020-08-24

## 2020-08-24 RX ORDER — OXYCODONE HYDROCHLORIDE 5 MG/1
10 TABLET ORAL EVERY 4 HOURS PRN
Status: CANCELLED | OUTPATIENT
Start: 2020-08-24

## 2020-08-24 RX ORDER — AMOXICILLIN 250 MG
1 CAPSULE ORAL 2 TIMES DAILY PRN
Status: CANCELLED | OUTPATIENT
Start: 2020-08-24

## 2020-08-24 RX ORDER — ONDANSETRON 2 MG/ML
INJECTION INTRAMUSCULAR; INTRAVENOUS PRN
Status: DISCONTINUED | OUTPATIENT
Start: 2020-08-24 | End: 2020-08-24

## 2020-08-24 RX ORDER — ONDANSETRON 4 MG/1
4 TABLET, ORALLY DISINTEGRATING ORAL EVERY 6 HOURS PRN
Status: CANCELLED | OUTPATIENT
Start: 2020-08-24

## 2020-08-24 RX ORDER — CEFAZOLIN SODIUM 1 G/50ML
1 INJECTION, SOLUTION INTRAVENOUS SEE ADMIN INSTRUCTIONS
Status: DISCONTINUED | OUTPATIENT
Start: 2020-08-24 | End: 2020-08-24 | Stop reason: HOSPADM

## 2020-08-24 RX ORDER — FENTANYL CITRATE 50 UG/ML
25-50 INJECTION, SOLUTION INTRAMUSCULAR; INTRAVENOUS
Status: DISCONTINUED | OUTPATIENT
Start: 2020-08-24 | End: 2020-08-24 | Stop reason: HOSPADM

## 2020-08-24 RX ORDER — SODIUM CHLORIDE, SODIUM LACTATE, POTASSIUM CHLORIDE, CALCIUM CHLORIDE 600; 310; 30; 20 MG/100ML; MG/100ML; MG/100ML; MG/100ML
INJECTION, SOLUTION INTRAVENOUS CONTINUOUS
Status: DISCONTINUED | OUTPATIENT
Start: 2020-08-24 | End: 2020-08-24 | Stop reason: HOSPADM

## 2020-08-24 RX ORDER — PROPOFOL 10 MG/ML
INJECTION, EMULSION INTRAVENOUS PRN
Status: DISCONTINUED | OUTPATIENT
Start: 2020-08-24 | End: 2020-08-24

## 2020-08-24 RX ORDER — OXYCODONE HYDROCHLORIDE 5 MG/1
5-10 TABLET ORAL EVERY 4 HOURS PRN
Qty: 20 TABLET | Refills: 0 | Status: SHIPPED | OUTPATIENT
Start: 2020-08-24 | End: 2020-09-28

## 2020-08-24 RX ORDER — GABAPENTIN 300 MG/1
300 CAPSULE ORAL 3 TIMES DAILY
Status: CANCELLED | OUTPATIENT
Start: 2020-08-24

## 2020-08-24 RX ORDER — ACETAMINOPHEN 650 MG/1
650 SUPPOSITORY RECTAL EVERY 4 HOURS PRN
Status: CANCELLED | OUTPATIENT
Start: 2020-08-24

## 2020-08-24 RX ORDER — PROCHLORPERAZINE MALEATE 5 MG
10 TABLET ORAL EVERY 6 HOURS PRN
Status: CANCELLED | OUTPATIENT
Start: 2020-08-24

## 2020-08-24 RX ORDER — ACETAMINOPHEN 325 MG/1
650 TABLET ORAL EVERY 4 HOURS PRN
Status: CANCELLED | OUTPATIENT
Start: 2020-08-24

## 2020-08-24 RX ORDER — IBUPROFEN 200 MG
600 TABLET ORAL EVERY 6 HOURS PRN
Status: CANCELLED | OUTPATIENT
Start: 2020-08-24

## 2020-08-24 RX ORDER — PROCHLORPERAZINE 25 MG
25 SUPPOSITORY, RECTAL RECTAL EVERY 12 HOURS PRN
Status: CANCELLED | OUTPATIENT
Start: 2020-08-24

## 2020-08-24 RX ORDER — LORATADINE 10 MG/1
10 TABLET ORAL DAILY PRN
Status: ON HOLD | COMMUNITY
End: 2020-08-24

## 2020-08-24 RX ORDER — AMOXICILLIN 250 MG
2 CAPSULE ORAL 2 TIMES DAILY PRN
Status: CANCELLED | OUTPATIENT
Start: 2020-08-24

## 2020-08-24 RX ORDER — BUPIVACAINE HYDROCHLORIDE 5 MG/ML
INJECTION, SOLUTION EPIDURAL; INTRACAUDAL PRN
Status: DISCONTINUED | OUTPATIENT
Start: 2020-08-24 | End: 2020-08-24

## 2020-08-24 RX ORDER — ONDANSETRON 2 MG/ML
4 INJECTION INTRAMUSCULAR; INTRAVENOUS EVERY 30 MIN PRN
Status: DISCONTINUED | OUTPATIENT
Start: 2020-08-24 | End: 2020-08-24 | Stop reason: HOSPADM

## 2020-08-24 RX ORDER — FENTANYL CITRATE-0.9 % NACL/PF 10 MCG/ML
PLASTIC BAG, INJECTION (ML) INTRAVENOUS PRN
Status: DISCONTINUED | OUTPATIENT
Start: 2020-08-24 | End: 2020-08-24

## 2020-08-24 RX ORDER — KETOROLAC TROMETHAMINE 30 MG/ML
INJECTION, SOLUTION INTRAMUSCULAR; INTRAVENOUS PRN
Status: DISCONTINUED | OUTPATIENT
Start: 2020-08-24 | End: 2020-08-24

## 2020-08-24 RX ORDER — DEXAMETHASONE SODIUM PHOSPHATE 4 MG/ML
INJECTION, SOLUTION INTRA-ARTICULAR; INTRALESIONAL; INTRAMUSCULAR; INTRAVENOUS; SOFT TISSUE PRN
Status: DISCONTINUED | OUTPATIENT
Start: 2020-08-24 | End: 2020-08-24

## 2020-08-24 RX ORDER — LIDOCAINE HYDROCHLORIDE 20 MG/ML
INJECTION, SOLUTION INFILTRATION; PERINEURAL PRN
Status: DISCONTINUED | OUTPATIENT
Start: 2020-08-24 | End: 2020-08-24

## 2020-08-24 RX ORDER — ATENOLOL 25 MG/1
100 TABLET ORAL DAILY
Status: CANCELLED | OUTPATIENT
Start: 2020-08-24

## 2020-08-24 RX ORDER — ONDANSETRON 4 MG/1
4 TABLET, ORALLY DISINTEGRATING ORAL EVERY 30 MIN PRN
Status: DISCONTINUED | OUTPATIENT
Start: 2020-08-24 | End: 2020-08-24 | Stop reason: HOSPADM

## 2020-08-24 RX ORDER — MEPERIDINE HYDROCHLORIDE 50 MG/ML
12.5 INJECTION INTRAMUSCULAR; INTRAVENOUS; SUBCUTANEOUS
Status: DISCONTINUED | OUTPATIENT
Start: 2020-08-24 | End: 2020-08-24 | Stop reason: HOSPADM

## 2020-08-24 RX ORDER — DEXAMETHASONE SODIUM PHOSPHATE 10 MG/ML
INJECTION, SOLUTION INTRAMUSCULAR; INTRAVENOUS PRN
Status: DISCONTINUED | OUTPATIENT
Start: 2020-08-24 | End: 2020-08-24

## 2020-08-24 RX ORDER — HYDROMORPHONE HYDROCHLORIDE 1 MG/ML
.3-.5 INJECTION, SOLUTION INTRAMUSCULAR; INTRAVENOUS; SUBCUTANEOUS EVERY 10 MIN PRN
Status: DISCONTINUED | OUTPATIENT
Start: 2020-08-24 | End: 2020-08-24 | Stop reason: HOSPADM

## 2020-08-24 RX ORDER — IBUPROFEN 600 MG/1
600 TABLET, FILM COATED ORAL 4 TIMES DAILY
Status: ON HOLD | COMMUNITY
End: 2020-08-24

## 2020-08-24 RX ORDER — ONDANSETRON 2 MG/ML
4 INJECTION INTRAMUSCULAR; INTRAVENOUS EVERY 6 HOURS PRN
Status: CANCELLED | OUTPATIENT
Start: 2020-08-24

## 2020-08-24 RX ORDER — NALOXONE HYDROCHLORIDE 0.4 MG/ML
.1-.4 INJECTION, SOLUTION INTRAMUSCULAR; INTRAVENOUS; SUBCUTANEOUS
Status: DISCONTINUED | OUTPATIENT
Start: 2020-08-24 | End: 2020-08-24 | Stop reason: HOSPADM

## 2020-08-24 RX ORDER — ZOLPIDEM TARTRATE 5 MG/1
5 TABLET ORAL
Status: CANCELLED | OUTPATIENT
Start: 2020-08-24

## 2020-08-24 RX ORDER — LISINOPRIL 20 MG/1
20 TABLET ORAL DAILY
Status: CANCELLED | OUTPATIENT
Start: 2020-08-24

## 2020-08-24 RX ORDER — CETIRIZINE HYDROCHLORIDE 10 MG/1
10 TABLET ORAL DAILY PRN
Status: ON HOLD | COMMUNITY
End: 2020-08-24

## 2020-08-24 RX ORDER — OXYCODONE AND ACETAMINOPHEN 5; 325 MG/1; MG/1
2 TABLET ORAL
Status: COMPLETED | OUTPATIENT
Start: 2020-08-24 | End: 2020-08-24

## 2020-08-24 RX ORDER — FLUMAZENIL 0.1 MG/ML
0.2 INJECTION, SOLUTION INTRAVENOUS
Status: DISCONTINUED | OUTPATIENT
Start: 2020-08-24 | End: 2020-08-24 | Stop reason: HOSPADM

## 2020-08-24 RX ORDER — KETAMINE HYDROCHLORIDE 10 MG/ML
INJECTION INTRAMUSCULAR; INTRAVENOUS PRN
Status: DISCONTINUED | OUTPATIENT
Start: 2020-08-24 | End: 2020-08-24

## 2020-08-24 RX ORDER — ALBUTEROL SULFATE 90 UG/1
2 AEROSOL, METERED RESPIRATORY (INHALATION)
Status: DISCONTINUED | OUTPATIENT
Start: 2020-08-24 | End: 2020-08-24 | Stop reason: HOSPADM

## 2020-08-24 RX ADMIN — SODIUM CHLORIDE, POTASSIUM CHLORIDE, SODIUM LACTATE AND CALCIUM CHLORIDE: 600; 310; 30; 20 INJECTION, SOLUTION INTRAVENOUS at 09:51

## 2020-08-24 RX ADMIN — Medication 3 G: at 10:21

## 2020-08-24 RX ADMIN — PHENYLEPHRINE HYDROCHLORIDE 0.1 MCG/KG/MIN: 10 INJECTION INTRAVENOUS at 10:44

## 2020-08-24 RX ADMIN — SODIUM CHLORIDE 3000 ML: 900 IRRIGANT IRRIGATION at 11:02

## 2020-08-24 RX ADMIN — TRIAMCINOLONE ACETONIDE 5 ML: 40 INJECTION, SUSPENSION INTRA-ARTICULAR; INTRAMUSCULAR at 11:01

## 2020-08-24 RX ADMIN — OXYCODONE AND ACETAMINOPHEN 2 TABLET: 5; 325 TABLET ORAL at 15:23

## 2020-08-24 RX ADMIN — ONDANSETRON 4 MG: 2 INJECTION INTRAMUSCULAR; INTRAVENOUS at 10:18

## 2020-08-24 RX ADMIN — LIDOCAINE HYDROCHLORIDE 60 MG: 20 INJECTION, SOLUTION INFILTRATION; PERINEURAL at 10:18

## 2020-08-24 RX ADMIN — ALBUTEROL SULFATE 2 PUFF: 90 AEROSOL, METERED RESPIRATORY (INHALATION) at 13:42

## 2020-08-24 RX ADMIN — MIDAZOLAM 2 MG: 1 INJECTION INTRAMUSCULAR; INTRAVENOUS at 09:16

## 2020-08-24 RX ADMIN — PROPOFOL 200 MG: 10 INJECTION, EMULSION INTRAVENOUS at 10:18

## 2020-08-24 RX ADMIN — Medication 200 MCG: at 10:35

## 2020-08-24 RX ADMIN — Medication 40 MG: at 10:18

## 2020-08-24 RX ADMIN — KETOROLAC TROMETHAMINE 30 MG: 30 INJECTION, SOLUTION INTRAMUSCULAR at 10:54

## 2020-08-24 RX ADMIN — Medication 200 MCG: at 10:39

## 2020-08-24 RX ADMIN — MIDAZOLAM 2 MG: 1 INJECTION INTRAMUSCULAR; INTRAVENOUS at 10:14

## 2020-08-24 RX ADMIN — BUPIVACAINE HYDROCHLORIDE 25 ML: 5 INJECTION, SOLUTION EPIDURAL; INTRACAUDAL; PERINEURAL at 09:37

## 2020-08-24 RX ADMIN — DEXAMETHASONE SODIUM PHOSPHATE 4 MG: 4 INJECTION, SOLUTION INTRA-ARTICULAR; INTRALESIONAL; INTRAMUSCULAR; INTRAVENOUS; SOFT TISSUE at 10:49

## 2020-08-24 RX ADMIN — DEXAMETHASONE SODIUM PHOSPHATE 2 MG: 10 INJECTION, SOLUTION INTRAMUSCULAR; INTRAVENOUS at 09:37

## 2020-08-24 SDOH — HEALTH STABILITY: MENTAL HEALTH: CURRENT SMOKER: 0

## 2020-08-24 ASSESSMENT — MIFFLIN-ST. JEOR: SCORE: 2063.85

## 2020-08-24 NOTE — OR NURSING
PACU Transfer Note    Iker Young was transferred to 736 via cart.  Equipment used for transport:  none.  Accompanied by:  RN  Prescriptions were: printed in chart    PACU Respiratory Event Documentation     1) Episodes of Apnea greater than or equal to 10 seconds: none    2) Bradypnea - less than 8 breaths per minute: no    3) Pain score on 0 to 10 scale: 0    4) Pain-sedation mismatch (yes or no): no    5) Repeated 02 desaturation less than 90% (yes or no): no    Anesthesia notified? (yes or no): no    Any of the above events occuring repeatedly in separate 30 minute intervals may be considered recurrent PACU respiratory events.    Patient stable and meets phase 1 discharge criteria for transport from PACU.

## 2020-08-24 NOTE — OR NURSING
Pt continues to have sats of 87% on RA, despite multiple IS breaths. On 1L NC pt continues to be 92-94%. When RN auscultated lungs, crackles noted in R base. It was also diminished. Pt was clear in bilateral lobes prior to surgery. Wife is now back and was updated.

## 2020-08-24 NOTE — OR NURSING
Pt taken for a walk in the hallways on RA, O2 sat was 86% upon arrival back in to the room. Improved to 88% then pt placed back on 1L NC. Multiple fingers, earlobe checked with 2 different monitors, sat continues to read oscillating between 92-88% on RA. 92-94% on 1 L.

## 2020-08-24 NOTE — ANESTHESIA PROCEDURE NOTES
Airway    Patient location during procedure: OR    General Information and Staff   Performed: CRNA         Indications and Patient Condition  Indications for airway management: kristofer-procedural  Induction type:intravenous  Mask difficulty assessment: no attempt    Final Airway Details  Final airway type: supraglottic airway  Successful airway:LMA  Supraglottic airway: Size 5 (IGEL)    Number of attempts at approach: 1    Secured with:tape  Ease of procedure: easy    Additional Comments  [employer]. Pt in POC prior to induction with head elevated.

## 2020-08-24 NOTE — OP NOTE
Procedure Date: 08/24/2020      PREOPERATIVE DIAGNOSIS:  Right shoulder rotator cuff tear, Biceps tendon rupture, AC arthritis      POSTOPERATIVE DIAGNOSIS:  Right rotator cuff tear arthropathy, Biceps tendon rupture, AC arthritis      PROCEDURE PERFORMED:  Right shoulder arthroscopy, extensive debridement, distal clavicle excision, corticosteroid injection R shoulder      INDICATIONS FOR PROCEDURE:  A 60-year-old gentleman with right shoulder pain.      HISTORY OF PRESENT ILLNESS:  Iker Young is a 60-year-old gentleman with right shoulder pain.  He had a history of a small rotator cuff tear.  Subsequently, tore it more.  Could not get an MRI on his shoulder, so we chose to do a diagnostic arthroscopy, with plans to fix his rotator cuff in the event he does not have significant arthritis in his shoulder and it was actually reparable.  All the risks and benefits of surgical intervention were discussed with him and he wished to proceed.      DESCRIPTION OF PROCEDURE:  The patient was taken to the operating room.  After adequate induction of anesthesia, was positioned, prepped and draped in the usual sterile fashion on the operative table.  Universal protocol timeout was initiated.  Once all in the room in agreement, incision was made posterior of the shoulder for posterior portal.  The scope, trocar and cannula were advanced into the glenohumeral joint.  The trocar was then swabbed for the camera.  The glenoid cartilage showed some grade 2-3 wear.  The humeral cartilage showed grade 4 wear with full-thickness loss of cartilage on the mating surface to the glenoid.  There was a large rotator cuff tear involving the supraspinatus and infraspinatus tendons completely.  This appeared to be quite old.  There was no stump of tendon left and this had scarred in quite readily.  Note, the biceps was absent from the joint as well.  An anterolateral portal was created via needle localization and the rotator cuff was debrided  slightly to facilitate inspection of the shoulder.  We also debrided a small amount of the coracoacromial ligament that was significantly frayed.  We were able to identify the AC joint, which had already been worn down on its undersurface.  There was significant arthritis within the AC joint, and so the camera was then removed from the glenohumeral joint and placed in the subacromial space via the anterolateral portal and via the anterior portal created at this time.  A distal clavicle excision was also performed.  This actually concluded the arthroscopy.  It was not advised to fix the rotator cuff, given the severity of the arthritis within his shoulder already, and so we chose not to do so.  He was given an injection of 40 mg of Kenalog and 5 mL of local anesthetic following closure of the wounds.  His arm was placed in a sling.  We will place him into Physical Therapy for a rotator cuff deficient protocol with plans to eventually do a reverse total shoulder on him.         ELLA SOFIA MD             D: 2020   T: 2020   MT: JUDE      Name:     GARRICK ROWELL   MRN:      [id number]        Account:        [id number]   :      1960           Procedure Date: 2020      Document: [id number]

## 2020-08-24 NOTE — ANESTHESIA CARE TRANSFER NOTE
Patient: Iker Young    Procedure(s):  Diagnostic Right Shulder Scope w/ extensive debridement, and distal clavicle excision.    Diagnosis: Shoulder pain, right [M25.511]  Diagnosis Additional Information: No value filed.    Anesthesia Type:   General, Peripheral Nerve Block     Note:  Airway :Face Mask  Patient transferred to:PACU  Handoff Report: Identifed the Patient, Identified the Reponsible Provider, Reviewed the pertinent medical history, Discussed the surgical course, Reviewed Intra-OP anesthesia mangement and issues during anesthesia, Set expectations for post-procedure period and Allowed opportunity for questions and acknowledgement of understanding      Vitals: (Last set prior to Anesthesia Care Transfer)    CRNA VITALS  8/24/2020 1037 - 8/24/2020 1112      8/24/2020             Resp Rate (set):  10                Electronically Signed By: STEFFEN Ramos CRNA  August 24, 2020  11:12 AM

## 2020-08-24 NOTE — ANESTHESIA POSTPROCEDURE EVALUATION
Patient: Iker Young    Procedure(s):  Diagnostic Right Shulder Scope w/ extensive debridement, and distal clavicle excision.    Diagnosis:Shoulder pain, right [M25.511]  Diagnosis Additional Information: No value filed.    Anesthesia Type:  General, Peripheral Nerve Block    Note:  Anesthesia Post Evaluation    Patient location during evaluation: Phase 2  Patient participation: Able to fully participate in evaluation  Level of consciousness: awake and alert  Pain management: adequate  Airway patency: patent  Cardiovascular status: acceptable  Respiratory status: nasal cannula  Hydration status: acceptable  PONV: none     Anesthetic complications: None    Comments: Pt unable to maintain O2 saturation on room air with decreased lungs sounds and crackles to the right side even after incentive spirometry and an albuterol inhaler tx.  1 liter oxygen per nc applied and pt is 96%.  He denies any sob, other vital signs are stable.  He was able to walk around the department without any sob, no coughing. Chest x ray ordered.  Spoke with hospitalist Dr. Flynn for admission to floor.  Pt is stable otherwise and pain free.  His right sided lung issues may improve after the interscalene block wears off which will take time.  Pt is in agreement with this plan of care.        Last vitals:  Vitals:    08/24/20 1140 08/24/20 1145 08/24/20 1200   BP:  97/66 99/61   Pulse:  54 50   Resp:      Temp: 96.9  F (36.1  C)     SpO2:  91% 91%         Electronically Signed By: STEFFEN Ramos CRNA  August 24, 2020  12:06 PM

## 2020-08-24 NOTE — OR NURSING
.pacanPACU Transfer Note    Iker Young was transferred to [place] [id number] via wheelchair.  Equipment used for transport:  O2.  Accompanied by:  RN  Prescriptions were: printed and sent home with pts wife    PACU Respiratory Event Documentation     1) Episodes of Apnea greater than or equal to 10 seconds: no    2) Bradypnea - less than 8 breaths per minute: no    3) Pain score on 0 to 10 scale: 5/10 back pain, given 10 mg Percocet PO    4) Pain-sedation mismatch (yes or no): no    5) Repeated 02 desaturation less than 90% (yes or no): no    Anesthesia notified? (yes or no): no    Any of the above events occuring repeatedly in separate 30 minute intervals may be considered recurrent PACU respiratory events.    Patient stable and meets phase 1 discharge criteria for transport from PACU.

## 2020-08-24 NOTE — ANESTHESIA PROCEDURE NOTES
Procedure note : Interscalene  Staff -       CRNA: Shea Howell APRN CRNA    Performed By: CRNA        Pre-Procedure    Location: pre-op    Procedure Times:8/24/2020 9:15 AM and 8/24/2020 9:37 AM  Pre-Anesthestic Checklist: patient identified, IV checked, site marked, risks and benefits discussed, informed consent, monitors and equipment checked, pre-op evaluation, at physician/surgeon's request and post-op pain management    Timeout  Correct Patient: Yes   Correct Procedure: Yes   Correct Site: Yes   Correct Laterality: Yes   Correct Position: Yes   Site Marked: Yes   .   Procedure Documentation    Diagnosis:RIGHT SHOULDER ARTHROSCOPY.    Procedure: Interscalene, right.   Patient Position:sitting   Ultrasound used to identify targeted nerve, plexus, or vascular marker and placed a needle adjacent to it., Ultrasound was used to visualize the spread of the anesthetic in close proximity to the above stated nerve. A permanent image is entered into the patient's record.  Patient Prep/Sterile Barriers; mask, sterile gloves, chlorhexidine gluconate and isopropyl alcohol.  .  Needle:  Needle Gauge: 22.    Needle Length (Inches) 3.13   Insertion Method: Single Shot.        Assessment/Narrative  Paresthesias: No.  .  The placement was negative for: blood aspirated, painful injection and site bleeding.  Bolus given via. No blood aspirated via catheter.   Secured via.   Complications: none. Comments:  Fly Mason assisted with procedure    8/24/2020 9:37 AM

## 2020-08-24 NOTE — ANESTHESIA PREPROCEDURE EVALUATION
Anesthesia Pre-Procedure Evaluation    Patient: Iker Young   MRN: [id number] : 1960          Preoperative Diagnosis: Shoulder pain, right [M25.511]    Procedure(s):  Diagnostic Right Shulder Scope w/Subacromial Decompression, Distal Clavicle Excision, Rotator Cuff Repair & Biceps Tenodesis    Past Medical History:   Diagnosis Date     Chest pain     2016     Other specified postprocedural states     Status post colonoscopy     Personal history of other medical treatment (CODE)     2009,MRI cervical spine     Past Surgical History:   Procedure Laterality Date     COLONOSCOPY      No Comments Provided     FUSION LUMBAR ANTERIOR ONE LEVEL      ,Back surgery x 5 (fusion, hardware removal, stimulator and removal)     OTHER SURGICAL HISTORY      ,2057,PERIPHERAL NERVE STIMULATOR,Lumbar nerve stimulator and subsequent removal     OTHER SURGICAL HISTORY      ,REMOVAL OF FOREIGN BODY, subsequent removal     OTHER SURGICAL HISTORY      2085,INCISION AND DRAINAGE,from infection from nerve stimulator     OTHER SURGICAL HISTORY      2009,2073,SCAN-MRI INTERPRETATION,MRI cervical spine.       Anesthesia Evaluation     . Pt has had prior anesthetic.     History of anesthetic complications    slow to wake with back surgery      ROS/MED HX    ENT/Pulmonary:       Neurologic:  - neg neurologic ROS     Cardiovascular: Comment: Mild to moderate Aortic sclerosis    (+) hypertension----. : . . . :. .       METS/Exercise Tolerance: Comment: Slower pace at this time due to shoulder pain 4 - Raking leaves, gardening   Hematologic:  - neg hematologic  ROS       Musculoskeletal:  - neg musculoskeletal ROS       GI/Hepatic: Comment: GERD depends what he eats    (+) GERD       Renal/Genitourinary:  - ROS Renal section negative       Endo:     (+) Obesity, .      Psychiatric:  - neg psychiatric ROS       Infectious Disease:  - neg infectious disease ROS       Malignancy:      - no malignancy  "  Other:    (+) H/O Chronic Pain,H/O chronic opiod use ,                         Physical Exam  Normal systems: pulmonary and dental    Airway   Mallampati: II  TM distance: >3 FB  Neck ROM: full  Comment: beard    Dental     Cardiovascular   Rhythm and rate: regular and normal      Pulmonary             Lab Results   Component Value Date    WBC 11.1 (H) 07/15/2019    HGB 12.3 (L) 08/17/2020    HCT 41.1 07/15/2019     07/15/2019    SED 13 (H) 07/15/2019     07/15/2019    POTASSIUM 3.7 07/15/2019    CHLORIDE 100 07/15/2019    CO2 31 07/15/2019    BUN 14 07/15/2019    CR 0.69 (L) 07/15/2019    GLC 98 07/15/2019    ALYSA 9.4 07/15/2019    ALBUMIN 4.4 07/15/2019    PROTTOTAL 7.5 07/15/2019    ALT 28 07/15/2019    AST 18 07/15/2019    ALKPHOS 84 07/15/2019    BILITOTAL 0.4 07/15/2019    TSH 0.55 10/23/2003       Preop Vitals  BP Readings from Last 3 Encounters:   08/24/20 (!) 120/91   08/17/20 132/84   07/27/20 124/76    Pulse Readings from Last 3 Encounters:   08/17/20 58   06/25/20 65   05/01/20 61      Resp Readings from Last 3 Encounters:   08/24/20 16   08/17/20 18   06/25/20 16    SpO2 Readings from Last 3 Encounters:   08/24/20 94%   08/17/20 94%   06/25/20 97%      Temp Readings from Last 1 Encounters:   08/24/20 98.7  F (37.1  C) (Tympanic)    Ht Readings from Last 1 Encounters:   08/24/20 1.715 m (5' 7.5\")      Wt Readings from Last 1 Encounters:   08/24/20 129.7 kg (286 lb)    Estimated body mass index is 44.13 kg/m  as calculated from the following:    Height as of this encounter: 1.715 m (5' 7.5\").    Weight as of this encounter: 129.7 kg (286 lb).       Anesthesia Plan      History & Physical Review      ASA Status:  3 .    NPO Status:  > 4 hours (water with tramadol and tylenol this am)    Plan for General and Peripheral Nerve Block with Propofol induction. Maintenance will be Balanced.    PONV prophylaxis:  Ondansetron (or other 5HT-3) and Dexamethasone or Solumedrol    The patient is not a " current smoker      Postoperative Care  Postoperative pain management:  IV analgesics, Multi-modal analgesia and Peripheral nerve block (Single Shot).      Consents  Anesthetic plan, risks, benefits and alternatives discussed with:  Patient..                 STEFFEN BATISTA CRNA

## 2020-08-24 NOTE — H&P
[facility]    History and Physical  Hospitalist       Date of Admission:  8/24/2020    Assessment & Plan   Iker Young is a 60 year old male who presents from same day surgery for low oxygen after anesthesia for shoulder arthroscopy    Principal Problem:    Hypoxia due to atelectasis from anesthesia.  Chest x-ray negative for acute infiltrate or effusion.  Does however show atelectasis.  Had preoperative COVID testing which was negative.  -admit obs  -continuous oxygen monitoring  -may have undiagnosed sleep apnea, outpt sleep study  -continue home meds  -incentive spirometer  -ambulate as able  -diet as tolerated  -if maintains oxygen >90% on RA, ok to discharge later tonight.       Obesity    HTN (hypertension)   -resume home meds    DVT Prophylaxis: Low Risk/Ambulatory with no VTE prophylaxis indicated  Code Status: No Order    Sheri Flynn    Primary Care Physician   Sohail Harris    Chief Complaint   Low oxygen after anesthesia    History is obtained from the patient and chart review.    History of Present Illness   Iker Young is a 60 year old male who I am asked to evaluate after right shoulder arthroscopy requiring general anesthesia with peripheral nerve block.  Did well operatively.  However, had low oxygen saturation on the monitor while waking up from surgery.    Past Medical History    I have reviewed this patient's medical history and updated it with pertinent information if needed.   Past Medical History:   Diagnosis Date     Chest pain     12/12/2016     Other specified postprocedural states     Status post colonoscopy     Personal history of other medical treatment (CODE)     04/2009,MRI cervical spine       Past Surgical History   I have reviewed this patient's surgical history and updated it with pertinent information if needed.  Past Surgical History:   Procedure Laterality Date     COLONOSCOPY      No Comments Provided     FUSION LUMBAR ANTERIOR ONE LEVEL       2004,Back surgery x 5 (fusion, hardware removal, stimulator and removal)     OTHER SURGICAL HISTORY      2005,205736,PERIPHERAL NERVE STIMULATOR,Lumbar nerve stimulator and subsequent removal     OTHER SURGICAL HISTORY      2005,205448,REMOVAL OF FOREIGN BODY, subsequent removal     OTHER SURGICAL HISTORY      208566,INCISION AND DRAINAGE,from infection from nerve stimulator     OTHER SURGICAL HISTORY      04/2009,207388,SCAN-MRI INTERPRETATION,MRI cervical spine.       Prior to Admission Medications   Prior to Admission Medications   Prescriptions Last Dose Informant Patient Reported? Taking?   acetaminophen (TYLENOL) 325 MG tablet 8/23/2020 at 2000  Yes Yes   Sig: Take by mouth every 4 hours as needed   amitriptyline (ELAVIL) 50 MG tablet 8/23/2020 at 2000  No Yes   Sig: TAKE 2 TABLETS (100 MG) BY MOUTH AT BEDTIME   atenolol (TENORMIN) 100 MG tablet 8/24/2020 at 0630  No Yes   Sig: TAKE 1 TABLET BY MOUTH EVERY DAY   gabapentin (NEURONTIN) 300 MG capsule 8/23/2020 at 2000  No Yes   Sig: Take 1 capsule (300 mg) by mouth 3 times daily   ibuprofen (ADVIL/MOTRIN) 600 MG tablet 8/23/2020 at 0900  No Yes   Sig: TAKE 1 TABLET (600 MG) BY MOUTH EVERY 6 HOURS AS NEEDED FOR MODERATEPAIN   lisinopril (ZESTRIL) 20 MG tablet 8/23/2020 at 0800  No Yes   Sig: Take 1 tablet (20 mg) by mouth daily   naloxone (NARCAN) 4 MG/0.1ML nasal spray More than a month at Unknown time  No No   Sig: Spray 1 spray (4 mg) into one nostril alternating nostrils once as needed for opioid reversal every 2-3 minutes until assistance arrives   oxyCODONE IR (ROXICODONE) 10 MG tablet 8/23/2020 at 2100  No Yes   Sig: Take 1 tablet (10 mg) by mouth every 4 hours as needed for severe pain Max 5 daily, fill on/after 6/4/20   oxyCODONE IR (ROXICODONE) 10 MG tablet Unknown at Unknown time  No No   Sig: Take 1 tablet (10 mg) by mouth every 4 hours as needed for severe pain Fill on/after 5/5/2020.  Max of 5 daily   oxyCODONE IR (ROXICODONE) 10 MG tablet Unknown  at Unknown time  No No   Sig: Take 1 tablet (10 mg) by mouth every 4 hours as needed for moderate to severe pain Max 5 daily. Fill on 08/21/2020   sertraline (ZOLOFT) 100 MG tablet Past Month at Unknown time  No Yes   Sig: TAKE 1 TABLET (100 MG) BY MOUTH DAILY   traMADol (ULTRAM) 50 MG tablet 8/24/2020 at 0600  No Yes   Sig: TAKE 1 TABLET (50 MG) BY MOUTH EVERY 6 HOURS AS NEEDED FOR SEVERE PAIN      Facility-Administered Medications: None     Allergies   Allergies   Allergen Reactions     Betadine [Povidone Iodine] Rash and Dermatitis     Severe blistering      Liquid Adhesive Dermatitis       Social History   I have reviewed this patient's social history and updated it with pertinent information if needed. Iker Halllizbeth  reports that he has never smoked. He has never used smokeless tobacco. He reports current alcohol use. He reports that he does not use drugs.    Family History   I have reviewed this patient's family history and updated it with pertinent information if needed.   Family History   Problem Relation Age of Onset     Family History Negative Daughter         Good Health       Review of Systems     REVIEW OF SYSTEMS:    Constitutional: normal energy and appetite, no recent sick contacts  Eyes: no changes in vision  Ears, nose, mouth, throat, and face: no mouth sores, dysphagia, or odynophagia  Respiratory: no shortness of breath, cough, or wheezing. No aspiration symptoms.   Cardiovascular: no chest pain, palpitations, orthopnea, increased lower extremity edema, or syncope.   Gastrointestinal: no constipation, diarrhea, nausea, vomiting or abdominal pain.  Genitourinary: no dysuria, hematuria, urgency or frequency.   Hematologic/lymphatic: no unintentional weight loss or night sweats.  Musculoskeletal: no pain to extremities or falls.   Neurological: no new weakness, tingling, numbness.   Psychiatric: no hallucinations ordelusions.  Endocrine:  not a known diabetic.     Additions to the above include:  see HPI.    Physical Exam   Temp: 96.9  F (36.1  C) Temp src: Tympanic BP: 104/67 Pulse: 54   Resp: 15 SpO2: 93 % O2 Device: Nasal cannula Oxygen Delivery: 1 LPM  Vital Signs with Ranges  Temp:  [96.9  F (36.1  C)-98.7  F (37.1  C)] 96.9  F (36.1  C)  Pulse:  [50-66] 54  Resp:  [15-20] 15  BP: ()/(61-91) 104/67  FiO2 (%):  [3 %] 3 %  SpO2:  [89 %-96 %] 93 %  286 lbs 0 oz    Constitutional: AAO, NAD. Obese.   Eyes: EOMI. Lids normal.   HEENT: MMM. NC, AT.   Respiratory: rales at R base otherwise clear.   Cardiovascular: RR -murmur -edema  Skin: warm, dry intact  Musculoskeletal: R arm in sling. Finger sensation and strength baeline.   Psychiatric: appropriate affect and insight.     Data   Data reviewed today:  I personally reviewed the chest x-ray image(s) showing atelectasis R base. .  No lab results found in last 7 days.    Recent Results (from the past 24 hour(s))   XR Chest Port 1 View    Narrative    PROCEDURE: XR CHEST PORT 1 VW 8/24/2020 2:45 PM    HISTORY: post procedure     COMPARISONS: 11/29/2016.    TECHNIQUE: Single view.    FINDINGS: Heart is enlarged with some elongation of the thoracic  aorta. There is mild linear change at the right lung base, probably  some atelectasis. There is some elevation of the right hemidiaphragm  which was also seen previously. Left lung and pleural space are  relatively clear.         Impression    IMPRESSION: Probable atelectasis at the right lung base. Cardiomegaly  accentuated by portable technique.    SHASHANK SHERMAN MD

## 2020-08-24 NOTE — PROGRESS NOTES
Patient is alert, oriented and pleasant.  Is independent in room and on the unit with transfers and mobility.  Right arm in sling, dressing CDI, able to move fingers/hand, reports hand still feels heavy/numb, CMS/pulse intact.  Oxygen saturation between 89-93% on room air since transfer to the unit.  Has been using incentive spirometer independently, lungs clear/equal.  Denies pain.  All discharge instructions reviewed with patient by surgical nurse and patient verbalized understanding with no further questions of this writer.   Kristie Chávez RN on 8/24/2020 at 6:27 PM     Able to maintain oxygen saturation on room air at rest and up ambulating in the hallway (91-95%).  Reviewed how to put shirt and sling on with patient- patient verbalized understanding.  Ice to right shoulder, denies pain.  Patients wife will be here in an hour to  patient.  Kristie Chávez RN on 8/24/2020 at 6:53 PM

## 2020-08-24 NOTE — OR NURSING
Having difficulty weaning pt from oxygen, despite repositioning and deep breathing/coughing. Pt was 92-94% preop. Pt sats 90-91% on 1L NC, drops to 88% on RA. BERT Valdivia contacted for further direction, she states pt's RLL is diminished. Pt then given IS instruct by RN. Pt goal is approx. 2850. Pt achieving approx 2618-4224 upon first initial breaths. Instructed to attempt about 10 times at 1405, 1410 and 1415. Pt remains on 1L NC and sats are 94% when pt is doing IS.

## 2020-08-24 NOTE — OR NURSING
PCXR done. BERT Valdivia spoke with Dr Flynn who will be admitting pt overnight. Pt remains on 1L NC with sats 92-94%. [facility] charge nurse called and notified of pt's admission. Paper prescription sent home with wife. Pt remains in arm sling. Now elevated up on 1 pillow and ice pack in place to R shoulder. Pt's arm continues to be numb, has some movement and full movement of fingers.

## 2020-08-24 NOTE — PROGRESS NOTES
"ADMISSION NOTE    Patient admitted to room 315 at approximately 1600 via wheel chair from surgery. Patient was accompanied by nurse.     Verbal SBAR report received from CARRILLO Carroll prior to patient arrival.     Patient ambulated to bed independently. Patient alert and oriented X 3. The patient is not having any pain. 0-10 Pain Scale: 5. Admission vital signs: Blood pressure 106/64, pulse 63, temperature 96.9  F (36.1  C), temperature source Tympanic, resp. rate 16, height 1.715 m (5' 7.5\"), weight 129.7 kg (286 lb), SpO2 92 %. Patient was oriented to plan of care, call light, bed controls, tv, telephone, bathroom and visiting hours.     Risk Assessment    The following safety risks were identified during admission: none. Yellow risk band applied: YES.     Education    Patient has a [place] to Observation order: Yes  Observation education completed and documented: Yes      Kristie Chávez RN    "

## 2020-08-25 ENCOUNTER — PATIENT OUTREACH (OUTPATIENT)
Dept: CARE COORDINATION | Facility: CLINIC | Age: 60
End: 2020-08-25

## 2020-08-25 NOTE — PROGRESS NOTES
Clinic Care Coordination Contact    Transitional Care Management Phone Call    Patient states he had a great experience with the staff here at [facility].   Questioned if he could begin takign his ibuprofen again along with any specific dressing changes.     Writer reached out to ortho nurse, she states, yes patient can resume ibuprofen and dressing can be removed after 72 hours, cleaned and covered with band aid.     Information relayed to patient.       Summary of hospitalization:  [facility] discharge summary reviewed    DISCHARGE DIAGNOSIS: Rotator cuff tear    DATE OF DISCHARGE: 08/24/2020    Diagnostic Tests/Treatments reviewed.  Follow up needed: F/u Ortho    Post Discharge Medication Reconciliation: discharge medications reconciled, continue medications without change.    Medications reviewed by: by myself    Problems taking medications regularly:  None    Problems adhering to non-medication therapy:  None    Other Healthcare Providers Involved in Patient s Care:         None     Update since discharge: improved.     Plan of care communicated with patient    Just a friendly reminder that you appointment is   Next 5 appointments (look out 90 days)    Sep 03, 2020 11:00 AM CDT  SHORT with Sohail Harris MD  [facility] ([facility]) [address]  [place] [address]  [phone]   Sep 09, 2020 12:15 PM CDT  Return Visit with David Ibarra MD  [facility] ([facility]) [address]  [place] [address]  [phone]      .  We encourage you to keep this appointment.    Please remember to bring all of your pills in their bottles (including any vitamins or over the counter pills) with you to your appointment.     The patient indicates understanding of these issues and agrees with the plan of care.   Yes    Was the patient contacted within the 2 business days or other approved  timeframe?  Yes    Was the Medication reconciliation and management done since the patient was discharged? Yes    LACHELLE Richmond  8/25/2020 1:24 PM                  Clinic Care Coordination Contact    Writer reached out to patient on this date to f/u after recent hospitalization.     Writer placed TCM call this morning. Left  requesting return call.     LACHELLE Richmond on 8/25/2020 at 8:51 AM

## 2020-08-25 NOTE — PHARMACY - DISCHARGE MEDICATION RECONCILIATION AND EDUCATION
Pharmacy:  Discharge Counseling and Medication Reconciliation    Iker Young  [address] [facility] [address]  [phone] (home)   60 year old male  PCP: Sohail Harris    Allergies: Betadine [povidone iodine] and Liquid adhesive    Discharge Counseling:    Patient discharged after pharmacy hours.  No new medications.  No changed to medications.  Pharmacist did NOT met with patient today to review the medication portion of the After Visit Summary.    It has been determined that the patient has an adequate supply of medications available or which can be obtained from the patient's preferred pharmacy, which he has confirmed as: [email or web address].    Thank you for the consult.    Debbie Lindsey [facility]........August 25, 2020 7:24 AM

## 2020-08-25 NOTE — DISCHARGE SUMMARY
[facility]    Discharge Summary  Hospitalist    Date of Admission:  8/24/2020  Date of Discharge:  8/24/2020  Discharging Provider: Sheri Flynn  Date of Service (when I saw the patient): 08/24/20    Discharge Diagnoses   Principal Problem:    Hypoxia (8/24/2020), POA resolved  Active Problems:    Obesity (4/24/2007)    HTN (hypertension) (6/18/2015)    Atelectasis (8/24/2020), POA      History of Present Illness   Iker Young is an 60 year old male who presented with hypoxia.    Hospital Course   Iker Young was admitted on 8/24/2020.  The following problems were addressed during his hospitalization:    Hypoxia due to atelectasis from anesthesia.  Patient had brief extended postoperative stay due to hypoxia.  X-ray showed atelectasis.  He was able to ambulate and use incentive spirometer with improvement of his oxygen saturation to greater than 90% on room air.  No other further postoperative complications.  He discharged home with pain medication and weightbearing instructions from orthopedic surgery.    Sheri Flynn, DO    Significant Results and Procedures   R shoulder arthroscpy    Pending Results   These results will be followed up by n/a  Unresulted Labs Ordered in the Past 30 Days of this Admission     No orders found from 7/25/2020 to 8/25/2020.          Code Status   Full Code       Primary Care Physician   Sohail Harris    Physical Exam   Temp: 96.9  F (36.1  C) Temp src: Tympanic BP: 106/64 Pulse: 63   Resp: 16 SpO2: 95 % O2 Device: None (Room air) Oxygen Delivery: 1 LPM  Vitals:    08/24/20 0830   Weight: 129.7 kg (286 lb)     Vital Signs with Ranges  Temp:  [96.9  F (36.1  C)-98.7  F (37.1  C)] 96.9  F (36.1  C)  Pulse:  [50-66] 63  Resp:  [15-20] 16  BP: ()/(61-91) 106/64  FiO2 (%):  [3 %] 3 %  SpO2:  [89 %-96 %] 95 %  I/O last 3 completed shifts:  In: 1500 [I.V.:1500]  Out: -     See H and P    Discharge Disposition   Discharged to home  Condition at discharge:  Stable    Consultations This Hospital Stay   None    Time Spent on this Encounter   I, Sheri Flynn, DO, discharged this patient today but I did not personally see the patient today and will not be billing for the patient's discharge.    Discharge Orders      Discharge Instructions    Review outpatient procedure discharge instructions with patient as directed by Provider     Ice to affected area    Ice pack to surgical site every 15 minutes per hour for 24 hours     Remove dressing - at 72 hours       Diet as Tolerated    Return to diet before surgery, unless instructed otherwise.     Weight bearing - As tolerated     Return to clinic    Return to clinic in 2 weeks     Reason for your hospital stay    Shoulder arthroscopy     Follow-up and recommended labs and tests     Follow up with Dr. Dean, as scheduled     Activity    Your activity upon discharge: activity as tolerated     Discharge Instructions     Full Code     Diet    Follow this diet upon discharge: None     Discharge Medications   Current Discharge Medication List      START taking these medications    Details   !! oxyCODONE (ROXICODONE) 5 MG tablet Take 1-2 tablets (5-10 mg) by mouth every 4 hours as needed for moderate to severe pain  Qty: 20 tablet, Refills: 0    Associated Diagnoses: Rotator cuff tear arthropathy of right shoulder       !! - Potential duplicate medications found. Please discuss with provider.      CONTINUE these medications which have NOT CHANGED    Details   acetaminophen (TYLENOL) 500 MG tablet Take 500 mg by mouth every 4 hours as needed       amitriptyline (ELAVIL) 50 MG tablet TAKE 2 TABLETS (100 MG) BY MOUTH AT BEDTIME  Qty: 60 tablet, Refills: 10    Comments: Patient enrolled in our Ready Refill service to improveadherence. We are requesting a refill authorization inadvance to ensure an active prescription is on file.  Associated Diagnoses: Chronic right-sided low back pain without sciatica      atenolol (TENORMIN) 100 MG  tablet TAKE 1 TABLET BY MOUTH EVERY DAY  Qty: 90 tablet, Refills: 3    Associated Diagnoses: Essential hypertension      gabapentin (NEURONTIN) 300 MG capsule Take 1 capsule (300 mg) by mouth 3 times daily  Qty: 270 capsule, Refills: 3    Associated Diagnoses: Chronic right-sided low back pain without sciatica      lisinopril (ZESTRIL) 20 MG tablet Take 1 tablet (20 mg) by mouth daily  Qty: 90 tablet, Refills: 3    Associated Diagnoses: Essential hypertension      !! oxyCODONE IR (ROXICODONE) 10 MG tablet Take 1 tablet (10 mg) by mouth every 4 hours as needed for severe pain Max 5 daily, fill on/after 6/4/20  Qty: 150 tablet, Refills: 0    Associated Diagnoses: Chronic right-sided low back pain without sciatica      sertraline (ZOLOFT) 100 MG tablet TAKE 1 TABLET (100 MG) BY MOUTH DAILY  Qty: 90 tablet, Refills: 3    Associated Diagnoses: Grief reaction with prolonged bereavement      traMADol (ULTRAM) 50 MG tablet TAKE 1 TABLET (50 MG) BY MOUTH EVERY 6 HOURS AS NEEDED FOR SEVERE PAIN  Qty: 120 tablet, Refills: 1    Associated Diagnoses: Degeneration of cervical intervertebral disc      naloxone (NARCAN) 4 MG/0.1ML nasal spray Spray 1 spray (4 mg) into one nostril alternating nostrils once as needed for opioid reversal every 2-3 minutes until assistance arrives  Qty: 0.2 mL, Refills: 3    Associated Diagnoses: Chronic right-sided low back pain without sciatica       !! - Potential duplicate medications found. Please discuss with provider.      STOP taking these medications       cetirizine (ZYRTEC) 10 MG tablet Comments:   Reason for Stopping:         ibuprofen (ADVIL/MOTRIN) 600 MG tablet Comments:   Reason for Stopping:         loratadine (CLARITIN) 10 MG tablet Comments:   Reason for Stopping:             Allergies   Allergies   Allergen Reactions     Betadine [Povidone Iodine] Rash and Dermatitis     Severe blistering      Liquid Adhesive Dermatitis     Data   Most Recent 3 CBC's:  Recent Labs   Lab Test  08/17/20  1317 07/15/19  1610   WBC  --  11.1*   HGB 12.3* 13.7   MCV  --  91   PLT  --  188      Most Recent 3 BMP's:  Recent Labs   Lab Test 07/15/19  1610      POTASSIUM 3.7   CHLORIDE 100   CO2 31   BUN 14   CR 0.69*   ANIONGAP 6   ALYSA 9.4   GLC 98     Most Recent 2 LFT's:  Recent Labs   Lab Test 07/15/19  1610   AST 18   ALT 28   ALKPHOS 84   BILITOTAL 0.4     Most Recent INR's and Anticoagulation Dosing History:  Anticoagulation Dose History     There is no flowsheet data to display.        Most Recent 3 Troponin's:No lab results found.  Most Recent Cholesterol Panel:  Recent Labs   Lab Test 06/18/15  1625   *   HDL 38   TRIG 172*     Most Recent 6 Bacteria Isolates From Any Culture (See EPIC Reports for Culture Details):No lab results found.  Most Recent TSH, T4 and A1c Labs:No lab results found.  Results for orders placed or performed during the hospital encounter of 08/24/20   XR Chest Port 1 View    Narrative    PROCEDURE: XR CHEST PORT 1 VW 8/24/2020 2:45 PM    HISTORY: post procedure     COMPARISONS: 11/29/2016.    TECHNIQUE: Single view.    FINDINGS: Heart is enlarged with some elongation of the thoracic  aorta. There is mild linear change at the right lung base, probably  some atelectasis. There is some elevation of the right hemidiaphragm  which was also seen previously. Left lung and pleural space are  relatively clear.         Impression    IMPRESSION: Probable atelectasis at the right lung base. Cardiomegaly  accentuated by portable technique.    SHASHANK SHERMAN MD

## 2020-09-17 DIAGNOSIS — G89.29 CHRONIC RIGHT-SIDED LOW BACK PAIN WITHOUT SCIATICA: ICD-10-CM

## 2020-09-17 DIAGNOSIS — M50.30 DEGENERATION OF CERVICAL INTERVERTEBRAL DISC: ICD-10-CM

## 2020-09-17 DIAGNOSIS — M54.50 CHRONIC RIGHT-SIDED LOW BACK PAIN WITHOUT SCIATICA: ICD-10-CM

## 2020-09-18 RX ORDER — OXYCODONE HYDROCHLORIDE 10 MG/1
10 TABLET ORAL EVERY 4 HOURS PRN
Qty: 150 TABLET | OUTPATIENT
Start: 2020-09-18

## 2020-09-18 RX ORDER — TRAMADOL HYDROCHLORIDE 50 MG/1
50 TABLET ORAL EVERY 6 HOURS PRN
Qty: 120 TABLET | Refills: 1 | Status: SHIPPED | OUTPATIENT
Start: 2020-09-18 | End: 2020-12-28

## 2020-09-18 NOTE — TELEPHONE ENCOUNTER
Routing refill request to provider for review/approval because:  Drug not on the [facility] refill protocol     LOV: 8/17/2020    Cass Augustin RN on 9/18/2020 at 8:53 AM

## 2020-09-18 NOTE — TELEPHONE ENCOUNTER
[facility] sent Rx request for the following:   oxyCODONE (ROXICODONE) 10 MG tablet   Sig: Take 1 tablet (10 mg) by mouth every 4 hours as needed for severe pain Max 5 daily, fill on/after 6/4/20 - Oral   Last Prescription Date:   05/01/2020  Last Fill Qty/Refills:         150, R-0    Last Office Visit:              08/17/2020   Future Office visit:           none  Routing refill request to provider for review/approval because:  Drug not on the [facility], [facility] or [facility] refill protocol or controlled substance      Unable to reach pt via phone, LMTCB. Pt needs appot for further Oxycodone refills.    Nicole Christina RN  ....................  9/18/2020   8:57 AM

## 2020-09-28 ENCOUNTER — OFFICE VISIT (OUTPATIENT)
Dept: ORTHOPEDICS | Facility: OTHER | Age: 60
End: 2020-09-28
Attending: ORTHOPAEDIC SURGERY
Payer: MEDICARE

## 2020-09-28 ENCOUNTER — OFFICE VISIT (OUTPATIENT)
Dept: FAMILY MEDICINE | Facility: OTHER | Age: 60
End: 2020-09-28
Attending: FAMILY MEDICINE
Payer: MEDICARE

## 2020-09-28 VITALS
TEMPERATURE: 96.9 F | DIASTOLIC BLOOD PRESSURE: 82 MMHG | WEIGHT: 279 LBS | HEART RATE: 67 BPM | OXYGEN SATURATION: 92 % | RESPIRATION RATE: 16 BRPM | BODY MASS INDEX: 43.05 KG/M2 | SYSTOLIC BLOOD PRESSURE: 130 MMHG

## 2020-09-28 DIAGNOSIS — G89.29 CHRONIC RIGHT-SIDED LOW BACK PAIN WITHOUT SCIATICA: Primary | ICD-10-CM

## 2020-09-28 DIAGNOSIS — M75.121 NONTRAUMATIC COMPLETE TEAR OF RIGHT ROTATOR CUFF: Primary | ICD-10-CM

## 2020-09-28 DIAGNOSIS — M54.50 CHRONIC RIGHT-SIDED LOW BACK PAIN WITHOUT SCIATICA: Primary | ICD-10-CM

## 2020-09-28 PROBLEM — R09.02 HYPOXIA: Status: RESOLVED | Noted: 2020-08-24 | Resolved: 2020-09-28

## 2020-09-28 PROCEDURE — 99495 TRANSJ CARE MGMT MOD F2F 14D: CPT | Performed by: ORTHOPAEDIC SURGERY

## 2020-09-28 PROCEDURE — 99213 OFFICE O/P EST LOW 20 MIN: CPT | Performed by: FAMILY MEDICINE

## 2020-09-28 PROCEDURE — 80307 DRUG TEST PRSMV CHEM ANLYZR: CPT | Mod: ZL | Performed by: FAMILY MEDICINE

## 2020-09-28 PROCEDURE — 99024 POSTOP FOLLOW-UP VISIT: CPT | Performed by: ORTHOPAEDIC SURGERY

## 2020-09-28 PROCEDURE — G0463 HOSPITAL OUTPT CLINIC VISIT: HCPCS | Performed by: FAMILY MEDICINE

## 2020-09-28 PROCEDURE — G0463 HOSPITAL OUTPT CLINIC VISIT: HCPCS

## 2020-09-28 RX ORDER — OXYCODONE HYDROCHLORIDE 10 MG/1
10 TABLET ORAL EVERY 4 HOURS PRN
Qty: 150 TABLET | Refills: 0 | Status: SHIPPED | OUTPATIENT
Start: 2020-09-28 | End: 2020-12-28

## 2020-09-28 ASSESSMENT — PAIN SCALES - GENERAL: PAINLEVEL: EXTREME PAIN (8)

## 2020-09-28 ASSESSMENT — ANXIETY QUESTIONNAIRES
GAD7 TOTAL SCORE: 0
1. FEELING NERVOUS, ANXIOUS, OR ON EDGE: NOT AT ALL
6. BECOMING EASILY ANNOYED OR IRRITABLE: NOT AT ALL
7. FEELING AFRAID AS IF SOMETHING AWFUL MIGHT HAPPEN: NOT AT ALL
5. BEING SO RESTLESS THAT IT IS HARD TO SIT STILL: NOT AT ALL
2. NOT BEING ABLE TO STOP OR CONTROL WORRYING: NOT AT ALL
3. WORRYING TOO MUCH ABOUT DIFFERENT THINGS: NOT AT ALL
IF YOU CHECKED OFF ANY PROBLEMS ON THIS QUESTIONNAIRE, HOW DIFFICULT HAVE THESE PROBLEMS MADE IT FOR YOU TO DO YOUR WORK, TAKE CARE OF THINGS AT HOME, OR GET ALONG WITH OTHER PEOPLE: NOT DIFFICULT AT ALL

## 2020-09-28 ASSESSMENT — PATIENT HEALTH QUESTIONNAIRE - PHQ9: 5. POOR APPETITE OR OVEREATING: NOT AT ALL

## 2020-09-28 ASSESSMENT — ENCOUNTER SYMPTOMS
BACK PAIN: 1
FEVER: 0
WEAKNESS: 0
FATIGUE: 0

## 2020-09-28 NOTE — PROGRESS NOTES
SUBJECTIVE:   Iker Young is a 60 year old male who presents to clinic today for the following health issues:    HPI  Follow up chronic back pain, work related. He has had more pain over the past 3 days, left lower side.  At times has been using a shovel as a crutch.  Rates it at 8/10.  No new injuries.  Is on both oxycodone and tramadol as well as neurontin.  No weakness noted. Using heating pads alternating with ice, heat helps a little.  Last tox screen was 2/7/20, and showed only the prescription meds.      Last MRI was 6/18:    IMPRESSION:     Diffusely progressive mild degenerative changes above a chronic fusion  of L4-S1, when compared to 2007. Findings are most pronounced at L3-4,  where there is mild to moderate spinal stenosis and moderate foraminal  stenoses with possible impingement of the far lateral exiting L3 nerve  roots due to components of a disc bulge.     CHESTER BRYANT MD    Past Medical History:   Diagnosis Date     Chest pain     12/12/2016     Other specified postprocedural states     Status post colonoscopy     Personal history of other medical treatment (CODE)     04/2009,MRI cervical spine        Social History     Tobacco Use     Smoking status: Never Smoker     Smokeless tobacco: Never Used   Substance Use Topics     Alcohol use: Yes     Comment: rare     Current Outpatient Medications   Medication Sig Dispense Refill     acetaminophen (TYLENOL) 500 MG tablet Take 500 mg by mouth every 4 hours as needed        amitriptyline (ELAVIL) 50 MG tablet TAKE 2 TABLETS (100 MG) BY MOUTH AT BEDTIME 60 tablet 10     atenolol (TENORMIN) 100 MG tablet TAKE 1 TABLET BY MOUTH EVERY DAY 90 tablet 3     gabapentin (NEURONTIN) 300 MG capsule Take 1 capsule (300 mg) by mouth 3 times daily 270 capsule 3     lisinopril (ZESTRIL) 20 MG tablet Take 1 tablet (20 mg) by mouth daily 90 tablet 3     naloxone (NARCAN) 4 MG/0.1ML nasal spray Spray 1 spray (4 mg) into one nostril alternating nostrils once  as needed for opioid reversal every 2-3 minutes until assistance arrives 0.2 mL 3     oxyCODONE (ROXICODONE) 5 MG tablet Take 1-2 tablets (5-10 mg) by mouth every 4 hours as needed for moderate to severe pain 20 tablet 0     oxyCODONE IR (ROXICODONE) 10 MG tablet Take 1 tablet (10 mg) by mouth every 4 hours as needed for severe pain Max 5 daily, fill on/after 6/4/20 150 tablet 0     sertraline (ZOLOFT) 100 MG tablet TAKE 1 TABLET (100 MG) BY MOUTH DAILY 90 tablet 3     traMADol (ULTRAM) 50 MG tablet TAKE 1 TABLET (50 MG) BY MOUTH EVERY 6 HOURS AS NEEDED FOR SEVERE PAIN 120 tablet 1     Allergies   Allergen Reactions     Betadine [Povidone Iodine] Rash and Dermatitis     Severe blistering      Liquid Adhesive Dermatitis       Review of Systems   Constitutional: Negative for fatigue and fever.   Musculoskeletal: Positive for back pain.   Neurological: Negative for weakness.        OBJECTIVE:     /82   Pulse 67   Temp 96.9  F (36.1  C)   Resp 16   Wt 126.6 kg (279 lb)   SpO2 92%   BMI 43.05 kg/m    Body mass index is 43.05 kg/m .  Physical Exam  Constitutional:       Appearance: Normal appearance.   Musculoskeletal:      Comments: Slow to walk, but no lower extremity weakness noted.  Mild pain over left L3/4 facet area. Negative straight leg raise.    Neurological:      General: No focal deficit present.      Mental Status: He is alert and oriented to person, place, and time.         Diagnostic Test Results:  pending    ASSESSMENT/PLAN:         (M54.5,  G89.29) Chronic right-sided low back pain without sciatica  (primary encounter diagnosis)  Comment: is on max dose narcotics. Discussed with him how these can actually add a point onto the pain scale rating.  We also talked about weight loss for improved back functioning.  Offered physical therapy and will try this. He has not done this since the fusion several years.  Tox screen ordered.  Plan: oxyCODONE IR (ROXICODONE) 10 MG tablet,         oxyCODONE IR  (ROXICODONE) 10 MG tablet,         oxyCODONE IR (ROXICODONE) 10 MG tablet                 Sohail Harris MD  [facility] AND [facility]

## 2020-09-28 NOTE — PROGRESS NOTES
Visit Date:   2020      He is now 5 weeks status post right shoulder arthroscopy, extensive debridement, distal clavicle excision and a corticosteroid injection.  He has rotator cuff tear arthropathy of his right shoulder.      PHYSICAL EXAMINATION:     EXTREMITIES:  His wounds are completely healed.  He is neuro and vascularly intact.        We spent most of the time today discussing the future plans for his shoulder.  He is going to need a reverse total shoulder at some point in the future, but he is still a little young for that.  He understands all this.  He is going to hold off on doing anything with the shoulder for the time being.  All this was discussed with him.  We will see him back in approximately 6-7 weeks, at which point we will go ahead and give him an injection in the shoulder again.         ELLA SOFIA MD             D: 2020   T: 2020   MT: JUDE      Name:     GARRICK ROWELL   MRN:      [id number]        Account:      [id number]   :      1960           Visit Date:   2020      Document: [id number]

## 2020-09-28 NOTE — PROGRESS NOTES
Patient is here for follow up on his right shoulder.   Marla Cassidy LPN .....................9/28/2020 12:16 PM

## 2020-09-29 ASSESSMENT — ANXIETY QUESTIONNAIRES: GAD7 TOTAL SCORE: 0

## 2020-10-01 LAB — PAIN DRUG SCR UR W RPTD MEDS: NORMAL

## 2020-11-02 ENCOUNTER — OFFICE VISIT (OUTPATIENT)
Dept: ORTHOPEDICS | Facility: OTHER | Age: 60
End: 2020-11-02
Attending: ORTHOPAEDIC SURGERY
Payer: MEDICARE

## 2020-11-02 DIAGNOSIS — M25.511 RIGHT SHOULDER PAIN, UNSPECIFIED CHRONICITY: Primary | ICD-10-CM

## 2020-11-02 PROCEDURE — 99024 POSTOP FOLLOW-UP VISIT: CPT | Performed by: PHYSICIAN ASSISTANT

## 2020-11-02 PROCEDURE — 20610 DRAIN/INJ JOINT/BURSA W/O US: CPT | Mod: RT | Performed by: PHYSICIAN ASSISTANT

## 2020-11-02 PROCEDURE — 20610 DRAIN/INJ JOINT/BURSA W/O US: CPT | Mod: RT,78

## 2020-11-02 PROCEDURE — 250N000011 HC RX IP 250 OP 636: Performed by: ORTHOPAEDIC SURGERY

## 2020-11-02 PROCEDURE — G0463 HOSPITAL OUTPT CLINIC VISIT: HCPCS

## 2020-11-02 PROCEDURE — 250N000009 HC RX 250: Performed by: ORTHOPAEDIC SURGERY

## 2020-11-02 RX ORDER — TRIAMCINOLONE ACETONIDE 40 MG/ML
40 INJECTION, SUSPENSION INTRA-ARTICULAR; INTRAMUSCULAR ONCE
Status: COMPLETED | OUTPATIENT
Start: 2020-11-02 | End: 2020-11-02

## 2020-11-02 RX ORDER — LIDOCAINE HYDROCHLORIDE 10 MG/ML
4 INJECTION, SOLUTION EPIDURAL; INFILTRATION; INTRACAUDAL; PERINEURAL ONCE
Status: COMPLETED | OUTPATIENT
Start: 2020-11-02 | End: 2020-11-02

## 2020-11-02 RX ADMIN — LIDOCAINE HYDROCHLORIDE 4 ML: 10 INJECTION, SOLUTION EPIDURAL; INFILTRATION; INTRACAUDAL; PERINEURAL at 12:34

## 2020-11-02 RX ADMIN — TRIAMCINOLONE ACETONIDE 40 MG: 40 INJECTION, SUSPENSION INTRA-ARTICULAR; INTRAMUSCULAR at 12:34

## 2020-11-02 NOTE — PROGRESS NOTES
Chief Complaint   Patient presents with     Surgical Followup     s/p 10 weeks - right shoulder scope     Odessa Loyd LPN

## 2020-11-02 NOTE — PROGRESS NOTES
Visit Date:   2020      Garrick returns for followup status post right shoulder arthroscopy, extensive debridement, distal clavicle excision and corticosteroid injection.  He is 10 weeks out.  He is back today with a significant amount of pain.  He has a rotator cuff tear arthropathy of his right shoulder.  Physical exam reveals well-healed incisions.  No evidence of infection.  He has pain with all planes of motion.      ASSESSMENT:  Status post right shoulder arthroscopy, extensive debridement, distal clavicle excision and corticosteroid injection.      PLAN:  Our plan today is to repeat injection into the subacromial space.  He is having trouble sleeping at night and is pretty miserable.  We will follow up with him on an as-needed basis.  He will eventually need a reverse total shoulder at some point.  One mL of Kenalog and 4 mL of 0.5% Marcaine were drawn up and injected into the right subacromial space under sterile conditions.         ELLA SOFIA MD       As dictated by CHERYL HUTCHINS PA-C            D: 2020   T: 2020   MT: PRIYA      Name:     GARRICK ROWELL   MRN:      [id number]        Account:      [id number]   :      1960           Visit Date:   2020      Document: [id number]

## 2020-11-16 ENCOUNTER — HEALTH MAINTENANCE LETTER (OUTPATIENT)
Age: 60
End: 2020-11-16

## 2020-12-04 DIAGNOSIS — M54.50 CHRONIC RIGHT-SIDED LOW BACK PAIN WITHOUT SCIATICA: ICD-10-CM

## 2020-12-04 DIAGNOSIS — G89.29 CHRONIC RIGHT-SIDED LOW BACK PAIN WITHOUT SCIATICA: ICD-10-CM

## 2020-12-04 RX ORDER — GABAPENTIN 300 MG/1
300 CAPSULE ORAL 3 TIMES DAILY
Qty: 270 CAPSULE | Refills: 3 | Status: SHIPPED | OUTPATIENT
Start: 2020-12-04 | End: 2021-12-01

## 2020-12-04 NOTE — TELEPHONE ENCOUNTER
[facility] sent Rx request for the following:   gabapentin (NEURONTIN) 300 MG capsule   Summary: Take 1 capsule (300 mg) by mouth 3 times daily,  Last Prescription Date:   12/6/2019  Last Fill Qty/Refills:         270, R-3    Last Office Visit:              09/28/2020  Future Office visit:           12/28/2020    Routing refill request to provider for review/approval because:  Patient will not have enough medication to last until next appointment.     Loren Gutierrez RN on 12/4/2020 at 8:42 AM

## 2020-12-28 ENCOUNTER — OFFICE VISIT (OUTPATIENT)
Dept: FAMILY MEDICINE | Facility: OTHER | Age: 60
End: 2020-12-28
Attending: FAMILY MEDICINE
Payer: OTHER MISCELLANEOUS

## 2020-12-28 VITALS
DIASTOLIC BLOOD PRESSURE: 76 MMHG | OXYGEN SATURATION: 96 % | BODY MASS INDEX: 43.64 KG/M2 | HEART RATE: 71 BPM | WEIGHT: 282.8 LBS | TEMPERATURE: 98 F | SYSTOLIC BLOOD PRESSURE: 138 MMHG | RESPIRATION RATE: 16 BRPM

## 2020-12-28 DIAGNOSIS — M54.50 CHRONIC RIGHT-SIDED LOW BACK PAIN WITHOUT SCIATICA: Primary | ICD-10-CM

## 2020-12-28 DIAGNOSIS — R20.1 HYPOESTHESIA OF SKIN: ICD-10-CM

## 2020-12-28 DIAGNOSIS — G62.9 PERIPHERAL POLYNEUROPATHY: Primary | ICD-10-CM

## 2020-12-28 DIAGNOSIS — G89.29 CHRONIC RIGHT-SIDED LOW BACK PAIN WITHOUT SCIATICA: Primary | ICD-10-CM

## 2020-12-28 DIAGNOSIS — G89.29 CHRONIC RIGHT-SIDED LOW BACK PAIN WITHOUT SCIATICA: ICD-10-CM

## 2020-12-28 DIAGNOSIS — G62.9 PERIPHERAL POLYNEUROPATHY: ICD-10-CM

## 2020-12-28 DIAGNOSIS — M96.1 FAILED BACK SURGICAL SYNDROME: ICD-10-CM

## 2020-12-28 DIAGNOSIS — M54.50 CHRONIC RIGHT-SIDED LOW BACK PAIN WITHOUT SCIATICA: ICD-10-CM

## 2020-12-28 LAB
ALBUMIN SERPL-MCNC: 4.6 G/DL (ref 3.5–5.7)
ALP SERPL-CCNC: 88 U/L (ref 34–104)
ALT SERPL W P-5'-P-CCNC: 34 U/L (ref 7–52)
ANION GAP SERPL CALCULATED.3IONS-SCNC: 6 MMOL/L (ref 3–14)
AST SERPL W P-5'-P-CCNC: 22 U/L (ref 13–39)
BILIRUB SERPL-MCNC: 0.5 MG/DL (ref 0.3–1)
BUN SERPL-MCNC: 17 MG/DL (ref 7–25)
CALCIUM SERPL-MCNC: 9.8 MG/DL (ref 8.6–10.3)
CHLORIDE SERPL-SCNC: 95 MMOL/L (ref 98–107)
CO2 SERPL-SCNC: 31 MMOL/L (ref 21–31)
CREAT SERPL-MCNC: 0.81 MG/DL (ref 0.7–1.3)
ERYTHROCYTE [DISTWIDTH] IN BLOOD BY AUTOMATED COUNT: 13.3 % (ref 10–15)
GFR SERPL CREATININE-BSD FRML MDRD: >90 ML/MIN/{1.73_M2}
GLUCOSE SERPL-MCNC: 288 MG/DL (ref 70–105)
HCT VFR BLD AUTO: 43.5 % (ref 40–53)
HGB BLD-MCNC: 14.4 G/DL (ref 13.3–17.7)
MCH RBC QN AUTO: 29.8 PG (ref 26.5–33)
MCHC RBC AUTO-ENTMCNC: 33.1 G/DL (ref 31.5–36.5)
MCV RBC AUTO: 90 FL (ref 78–100)
PLATELET # BLD AUTO: 217 10E9/L (ref 150–450)
POTASSIUM SERPL-SCNC: 4.8 MMOL/L (ref 3.5–5.1)
PROT SERPL-MCNC: 8.1 G/DL (ref 6.4–8.9)
RBC # BLD AUTO: 4.83 10E12/L (ref 4.4–5.9)
SODIUM SERPL-SCNC: 132 MMOL/L (ref 134–144)
TSH SERPL DL<=0.05 MIU/L-ACNC: 0.59 IU/ML (ref 0.34–5.6)
VIT B12 SERPL-MCNC: 463 PG/ML (ref 180–914)
WBC # BLD AUTO: 11.8 10E9/L (ref 4–11)

## 2020-12-28 PROCEDURE — 85027 COMPLETE CBC AUTOMATED: CPT | Mod: ZL | Performed by: FAMILY MEDICINE

## 2020-12-28 PROCEDURE — 80307 DRUG TEST PRSMV CHEM ANLYZR: CPT | Mod: ZL | Performed by: FAMILY MEDICINE

## 2020-12-28 PROCEDURE — 82607 VITAMIN B-12: CPT | Mod: ZL | Performed by: FAMILY MEDICINE

## 2020-12-28 PROCEDURE — 84443 ASSAY THYROID STIM HORMONE: CPT | Mod: ZL,GZ | Performed by: FAMILY MEDICINE

## 2020-12-28 PROCEDURE — 99213 OFFICE O/P EST LOW 20 MIN: CPT | Performed by: FAMILY MEDICINE

## 2020-12-28 PROCEDURE — 80053 COMPREHEN METABOLIC PANEL: CPT | Mod: ZL | Performed by: FAMILY MEDICINE

## 2020-12-28 PROCEDURE — 36415 COLL VENOUS BLD VENIPUNCTURE: CPT | Mod: ZL | Performed by: FAMILY MEDICINE

## 2020-12-28 RX ORDER — OXYCODONE HYDROCHLORIDE 10 MG/1
10 TABLET ORAL EVERY 4 HOURS PRN
Qty: 150 TABLET | Refills: 0 | Status: SHIPPED | OUTPATIENT
Start: 2020-12-28 | End: 2021-01-20

## 2020-12-28 RX ORDER — OXYCODONE HYDROCHLORIDE 10 MG/1
10 TABLET ORAL EVERY 4 HOURS PRN
Qty: 150 TABLET | Refills: 0 | Status: SHIPPED | OUTPATIENT
Start: 2020-12-28 | End: 2021-03-29

## 2020-12-28 RX ORDER — TRAMADOL HYDROCHLORIDE 50 MG/1
50 TABLET ORAL EVERY 6 HOURS PRN
Qty: 120 TABLET | Refills: 5 | Status: SHIPPED | OUTPATIENT
Start: 2020-12-28 | End: 2021-06-14

## 2020-12-28 ASSESSMENT — ENCOUNTER SYMPTOMS
BACK PAIN: 1
FATIGUE: 0
WEAKNESS: 1

## 2020-12-28 ASSESSMENT — ANXIETY QUESTIONNAIRES
IF YOU CHECKED OFF ANY PROBLEMS ON THIS QUESTIONNAIRE, HOW DIFFICULT HAVE THESE PROBLEMS MADE IT FOR YOU TO DO YOUR WORK, TAKE CARE OF THINGS AT HOME, OR GET ALONG WITH OTHER PEOPLE: NOT DIFFICULT AT ALL
6. BECOMING EASILY ANNOYED OR IRRITABLE: NOT AT ALL
5. BEING SO RESTLESS THAT IT IS HARD TO SIT STILL: NOT AT ALL
1. FEELING NERVOUS, ANXIOUS, OR ON EDGE: NOT AT ALL
7. FEELING AFRAID AS IF SOMETHING AWFUL MIGHT HAPPEN: NOT AT ALL
GAD7 TOTAL SCORE: 0
3. WORRYING TOO MUCH ABOUT DIFFERENT THINGS: NOT AT ALL
2. NOT BEING ABLE TO STOP OR CONTROL WORRYING: NOT AT ALL

## 2020-12-28 ASSESSMENT — PAIN SCALES - GENERAL: PAINLEVEL: WORST PAIN (10)

## 2020-12-28 ASSESSMENT — PATIENT HEALTH QUESTIONNAIRE - PHQ9: 5. POOR APPETITE OR OVEREATING: NOT AT ALL

## 2020-12-28 NOTE — PROGRESS NOTES
"  SUBJECTIVE:   Iker Young is a 60 year old male who presents to clinic today for the following health issues:    HPI  Follow up on  meds.  Has been falling more, 2 times last night.  He cannot sleep due to pain in his feet, has researched this and feels he has neuropathy.  Hot iron like pain when he lays down. Needle like pains too, like \"ants crawling on them\".  He is not sure if this is work comp related. I had ordered PT on his low back, but was not able to get WC to reply so he canceled it.   Last tox screen was 2/7/20.  He feels the opiates are adequate.      Past Surgical History:   Procedure Laterality Date     ARTHROSCOPY SHOULDER ROTATOR CUFF REPAIR, SUBACROMIAL DECOMP, DIST CLAVICLE RESECTN, BICEP TENOTOMY Right 8/24/2020    Procedure: Diagnostic Right Shulder Scope w/ extensive debridement, and distal clavicle excision.;  Surgeon: Jaime Dean MD;  Location: [facility] OR     COLONOSCOPY      No Comments Provided     FUSION LUMBAR ANTERIOR ONE LEVEL      2004,Back surgery x 5 (fusion, hardware removal, stimulator and removal)     OTHER SURGICAL HISTORY      2005,205736,PERIPHERAL NERVE STIMULATOR,Lumbar nerve stimulator and subsequent removal     OTHER SURGICAL HISTORY      2005,205448,REMOVAL OF FOREIGN BODY, subsequent removal     OTHER SURGICAL HISTORY      208566,INCISION AND DRAINAGE,from infection from nerve stimulator     OTHER SURGICAL HISTORY      04/2009,207388,SCAN-MRI INTERPRETATION,MRI cervical spine.     Current Outpatient Medications   Medication Sig Dispense Refill     acetaminophen (TYLENOL) 500 MG tablet Take 500 mg by mouth every 4 hours as needed        amitriptyline (ELAVIL) 50 MG tablet TAKE 2 TABLETS (100 MG) BY MOUTH AT BEDTIME 60 tablet 10     atenolol (TENORMIN) 100 MG tablet TAKE 1 TABLET BY MOUTH EVERY DAY 90 tablet 3     gabapentin (NEURONTIN) 300 MG capsule TAKE 1 CAPSULE (300 MG) BY MOUTH 3 TIMES DAILY 270 capsule 3     lisinopril (ZESTRIL) 20 MG tablet Take 1 tablet (20 " mg) by mouth daily 90 tablet 3     naloxone (NARCAN) 4 MG/0.1ML nasal spray Spray 1 spray (4 mg) into one nostril alternating nostrils once as needed for opioid reversal every 2-3 minutes until assistance arrives 0.2 mL 3     oxyCODONE IR (ROXICODONE) 10 MG tablet Take 1 tablet (10 mg) by mouth every 4 hours as needed for severe pain Max 5 daily, fill on/after 10/27/20 150 tablet 0     oxyCODONE IR (ROXICODONE) 10 MG tablet Take 1 tablet (10 mg) by mouth every 4 hours as needed for severe pain Fill on/after 9/28/2020 150 tablet 0     oxyCODONE IR (ROXICODONE) 10 MG tablet Take 1 tablet (10 mg) by mouth every 4 hours as needed for severe pain Fill on/aftr 11/26/20 150 tablet 0     sertraline (ZOLOFT) 100 MG tablet TAKE 1 TABLET (100 MG) BY MOUTH DAILY 90 tablet 3     traMADol (ULTRAM) 50 MG tablet TAKE 1 TABLET (50 MG) BY MOUTH EVERY 6 HOURS AS NEEDED FOR SEVERE PAIN 120 tablet 1     Allergies   Allergen Reactions     Betadine [Povidone Iodine] Rash and Dermatitis     Severe blistering      Liquid Adhesive Dermatitis       Review of Systems   Constitutional: Negative for fatigue.   Musculoskeletal: Positive for back pain.   Neurological: Positive for weakness.        OBJECTIVE:     /76   Pulse 71   Temp 98  F (36.7  C)   Resp 16   Wt 128.3 kg (282 lb 12.8 oz)   SpO2 96%   BMI 43.64 kg/m    Body mass index is 43.64 kg/m .  Physical Exam  Constitutional:       Appearance: Normal appearance.   Musculoskeletal:      Comments: Negative straight leg raise.  Stands slowly, walks with moderate limp.     Neurological:      General: No focal deficit present.      Mental Status: He is alert and oriented to person, place, and time.   Psychiatric:         Mood and Affect: Mood normal.         Behavior: Behavior normal.         Thought Content: Thought content normal.       Sensory exam of the foot is abnormal, tested with the monofilament. No sensation to heels.  Good pulses, no lesions or ulcers, good peripheral  pulses.        ASSESSMENT/PLAN:         (M54.5,  G89.29) Chronic right-sided low back pain without sciatica  (primary encounter diagnosis)  Comment: he is falling down and has symptoms consistent with neuropathy in his feet.  It is possible this is residual from the lumbar surgery, but also possible this is a peripheral neuropathy.  Will proceed with an EMG first.  Oxycodone refilled and urine tox screen ordered.   Plan: traMADol (ULTRAM) 50 MG tablet            (M96.1) Failed back surgical syndrome  Comment:  reviewed, stable, meds refilled for 3 months.  Plan:        Sohail Harris MD  [facility] AND [facility]

## 2020-12-28 NOTE — NURSING NOTE
"Coming in for a f/u on work comp. Increase in back pain    Chief Complaint   Patient presents with     Work Comp     has fallen alot, not sleeping with increase pain, need a referral for foot doctor       Initial /76   Pulse 71   Temp 98  F (36.7  C)   Resp 16   Wt 128.3 kg (282 lb 12.8 oz)   SpO2 96%   BMI 43.64 kg/m   Estimated body mass index is 43.64 kg/m  as calculated from the following:    Height as of 8/24/20: 1.715 m (5' 7.5\").    Weight as of this encounter: 128.3 kg (282 lb 12.8 oz).  Medication Reconciliation: complete    Sravani Robles LPN  "

## 2020-12-29 ASSESSMENT — ANXIETY QUESTIONNAIRES: GAD7 TOTAL SCORE: 0

## 2020-12-30 LAB — PAIN DRUG SCR UR W RPTD MEDS: NORMAL

## 2021-01-20 DIAGNOSIS — M54.50 CHRONIC RIGHT-SIDED LOW BACK PAIN WITHOUT SCIATICA: ICD-10-CM

## 2021-01-20 DIAGNOSIS — G89.29 CHRONIC RIGHT-SIDED LOW BACK PAIN WITHOUT SCIATICA: ICD-10-CM

## 2021-01-20 NOTE — TELEPHONE ENCOUNTER
Note from Pharmacy: NEED RXS WITH EFFECTIVE DIFFERENT DATES TO KEEP ON FILE.THANKS    [id number] sent Rx request for the following:   oxyCODONE IR (ROXICODONE) 10 MG tablet  Sig: TAKE 1 TABLET (10 MG) BY MOUTH EVERY 4 HOURS AS NEEDED FOR SEVERE PAIN    Last Prescription Date:   12/28/2020 (Three Rx sent in total)  Last Fill Qty/Refills:         150, R-0    Last Office Visit:              12/28/2020 ([facility])   Future Office visit:           03/29/2021 ([facility])    Routing refill request to provider for review/approval because:  Drug not on the [facility], [facility] or [facility] refill protocol or controlled substance    Needing revision per Pharmacy request  Unable to complete prescription refill per RN Medication Refill Policy.................... Elizabeth Chávez RN ....................  1/20/2021   2:22 PM

## 2021-01-21 RX ORDER — OXYCODONE HYDROCHLORIDE 10 MG/1
TABLET ORAL
Qty: 150 TABLET | Refills: 0 | Status: SHIPPED | OUTPATIENT
Start: 2021-01-21 | End: 2021-03-29

## 2021-02-01 ENCOUNTER — TELEPHONE (OUTPATIENT)
Dept: FAMILY MEDICINE | Facility: OTHER | Age: 61
End: 2021-02-01

## 2021-02-04 ENCOUNTER — OFFICE VISIT (OUTPATIENT)
Dept: FAMILY MEDICINE | Facility: OTHER | Age: 61
End: 2021-02-04
Attending: FAMILY MEDICINE
Payer: OTHER MISCELLANEOUS

## 2021-02-04 ENCOUNTER — TELEPHONE (OUTPATIENT)
Dept: FAMILY MEDICINE | Facility: OTHER | Age: 61
End: 2021-02-04

## 2021-02-04 VITALS
DIASTOLIC BLOOD PRESSURE: 76 MMHG | RESPIRATION RATE: 14 BRPM | HEART RATE: 57 BPM | OXYGEN SATURATION: 96 % | TEMPERATURE: 96.4 F | WEIGHT: 293 LBS | BODY MASS INDEX: 45.21 KG/M2 | SYSTOLIC BLOOD PRESSURE: 136 MMHG

## 2021-02-04 DIAGNOSIS — M96.1 FAILED BACK SURGICAL SYNDROME: Primary | ICD-10-CM

## 2021-02-04 PROCEDURE — 99213 OFFICE O/P EST LOW 20 MIN: CPT | Performed by: FAMILY MEDICINE

## 2021-02-04 RX ORDER — LORAZEPAM 1 MG/1
TABLET ORAL
Qty: 1 TABLET | Refills: 0 | Status: SHIPPED | OUTPATIENT
Start: 2021-02-04 | End: 2021-03-29

## 2021-02-04 RX ORDER — IBUPROFEN 600 MG/1
TABLET, FILM COATED ORAL
COMMUNITY
Start: 2021-01-25 | End: 2021-07-15

## 2021-02-04 ASSESSMENT — ANXIETY QUESTIONNAIRES
5. BEING SO RESTLESS THAT IT IS HARD TO SIT STILL: NOT AT ALL
3. WORRYING TOO MUCH ABOUT DIFFERENT THINGS: NOT AT ALL
GAD7 TOTAL SCORE: 0
IF YOU CHECKED OFF ANY PROBLEMS ON THIS QUESTIONNAIRE, HOW DIFFICULT HAVE THESE PROBLEMS MADE IT FOR YOU TO DO YOUR WORK, TAKE CARE OF THINGS AT HOME, OR GET ALONG WITH OTHER PEOPLE: NOT DIFFICULT AT ALL
1. FEELING NERVOUS, ANXIOUS, OR ON EDGE: NOT AT ALL
7. FEELING AFRAID AS IF SOMETHING AWFUL MIGHT HAPPEN: NOT AT ALL
6. BECOMING EASILY ANNOYED OR IRRITABLE: NOT AT ALL
2. NOT BEING ABLE TO STOP OR CONTROL WORRYING: NOT AT ALL

## 2021-02-04 ASSESSMENT — PATIENT HEALTH QUESTIONNAIRE - PHQ9: 5. POOR APPETITE OR OVEREATING: NOT AT ALL

## 2021-02-04 ASSESSMENT — PAIN SCALES - GENERAL: PAINLEVEL: EXTREME PAIN (8)

## 2021-02-04 NOTE — TELEPHONE ENCOUNTER
ROB-pt is looking to have ref sent to [place] for MRI and also looking for a medication that will help with getting that done    Please call and advise     Thank You    Raeann Zavala on 2/4/2021 at 2:56 PM

## 2021-02-04 NOTE — PROGRESS NOTES
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  reporting lower extremity weakness now, consistent with progression of this issue.  EMG is not getting done due to insurance coverage issues.   Plan: discussed with him options, such as doing pt, consult with surgery, repeating the MRI (which would be required by the surgeon).  Handicapped parking permit given to him.   reviewed.  Stable.  Discussed with him weight loss for improved pain, as well as limitations on opiates.  Will repeat his MRI given progression of symptoms, and arrange consult with spine surgery.  Follow up with me in 2-4 weeks for opiate refill.        Lui Dong is a 60 year old who presents to clinic today for the following health issues     HPI   Worse low back pain. He has gained about 10# in the last 2 months or so.  Feels lower extremity swelling.  No shortness of breath, no PND.  Up lots at night due to back pain, no nocturia.  Is now using a cane due to lumbar pain.  Getting right lower extremity weakness, dragging it at times. Had this in the past.  I have ordered an EMG, but this is not cleared by  so it has not been completed.  Has no fever. No urinary retention.  Simply cleaning dog poop yeterday triggered significant pain for many hours.  Last MRI was 6/18:    IMPRESSION:     Diffusely progressive mild degenerative changes above a chronic fusion  of L4-S1, when compared to 2007. Findings are most pronounced at L3-4,  where there is mild to moderate spinal stenosis and moderate foraminal  stenoses with possible impingement of the far lateral exiting L3 nerve  roots due to components of a disc bulge.     CHESTER BRYANT MD          Review of Systems         Objective    /76   Pulse 57   Temp 96.4  F (35.8  C)   Resp 14   Wt 132.9 kg (293 lb)   SpO2 96%   BMI 45.21 kg/m    Body mass index is 45.21 kg/m .  Physical  Exam  Constitutional:       Appearance: Normal appearance.   Musculoskeletal:      Comments:  Slow to get on the table.  Pain on palpation over right L3/4 facet area.  Negative straight leg raise, lower extremity strength is normal today. No right foot drop.     Neurological:      Mental Status: He is alert.   Psychiatric:         Mood and Affect: Mood normal.         Behavior: Behavior normal.         Thought Content: Thought content normal.

## 2021-02-04 NOTE — TELEPHONE ENCOUNTER
Let him know I changed to MRI to the open scanner and I sent in a tablet of ativan for it.  Sohail Harris MD on 2/4/2021 at 4:21 PM

## 2021-02-04 NOTE — NURSING NOTE
"coming in for a f/u on work comp     Chief Complaint   Patient presents with     Work Comp       Initial /76   Pulse 57   Temp 96.4  F (35.8  C)   Resp 14   Wt 132.9 kg (293 lb)   SpO2 96%   BMI 45.21 kg/m   Estimated body mass index is 45.21 kg/m  as calculated from the following:    Height as of 8/24/20: 1.715 m (5' 7.5\").    Weight as of this encounter: 132.9 kg (293 lb).  Medication Reconciliation: complete    Sravani Robles LPN  "

## 2021-02-05 ASSESSMENT — ANXIETY QUESTIONNAIRES: GAD7 TOTAL SCORE: 0

## 2021-02-19 DIAGNOSIS — M54.50 CHRONIC RIGHT-SIDED LOW BACK PAIN WITHOUT SCIATICA: ICD-10-CM

## 2021-02-19 DIAGNOSIS — G89.29 CHRONIC RIGHT-SIDED LOW BACK PAIN WITHOUT SCIATICA: ICD-10-CM

## 2021-02-22 RX ORDER — OXYCODONE HYDROCHLORIDE 10 MG/1
TABLET ORAL
Qty: 150 TABLET | OUTPATIENT
Start: 2021-02-22

## 2021-02-22 NOTE — TELEPHONE ENCOUNTER
Per LOV 02/04/2021:  Plan: discussed with him options, such as doing pt, consult with surgery, repeating the MRI (which would be required by the surgeon).  Handicapped parking permit given to him.   reviewed.  Stable.  Discussed with him weight loss for improved pain, as well as limitations on opiates.  Will repeat his MRI given progression of symptoms, and arrange consult with spine surgery.  Follow up with me in 2-4 weeks for opiate refill.    Patient has upcoming appointment scheduled 03/29/2021 with PCP  Unable to complete prescription refill per RN Medication Refill Policy.................... Elizabeth Chávez RN ....................  2/22/2021   8:15 AM

## 2021-02-28 NOTE — TELEPHONE ENCOUNTER
[facility] Quality Flow/Interdisciplinary Rounds Progress Note        Quality Flow Rounds held on February 28, 2021    Disciplines Attending:  Bedside Nurse and Nursing Unit Leadership    Deangelo Amado was admitted on 2/27/2021  7:32 AM    Anticipated Discharge Date:  Expected Discharge Date: 03/03/21    Disposition:    Junior Score:  Junior Scale Score: 21    Readmission Risk              Risk of Unplanned Readmission:        23           Discussed patient goal for the day, patient clinical progression, and barriers to discharge.   The following Goal(s) of the Day/Commitment(s) have been identified:  to see Dr. Elizabeth Prior  February 28, 2021 Routing refill request to provider for review/approval because:  High drug interaction warning with Amiltriptyline     Last OV 5-15-18 for pain. Haleigh Ortiz RN on 7/2/2018 at 11:44 AM

## 2021-03-12 ENCOUNTER — TRANSFERRED RECORDS (OUTPATIENT)
Dept: HEALTH INFORMATION MANAGEMENT | Facility: OTHER | Age: 61
End: 2021-03-12

## 2021-03-15 DIAGNOSIS — M48.061 SPINAL STENOSIS OF LUMBAR REGION, UNSPECIFIED WHETHER NEUROGENIC CLAUDICATION PRESENT: Primary | ICD-10-CM

## 2021-03-16 ENCOUNTER — OFFICE VISIT (OUTPATIENT)
Dept: ORTHOPEDICS | Facility: OTHER | Age: 61
End: 2021-03-16
Attending: STUDENT IN AN ORGANIZED HEALTH CARE EDUCATION/TRAINING PROGRAM
Payer: OTHER MISCELLANEOUS

## 2021-03-16 ENCOUNTER — HOSPITAL ENCOUNTER (OUTPATIENT)
Dept: GENERAL RADIOLOGY | Facility: OTHER | Age: 61
Discharge: HOME | End: 2021-03-16
Attending: STUDENT IN AN ORGANIZED HEALTH CARE EDUCATION/TRAINING PROGRAM | Admitting: STUDENT IN AN ORGANIZED HEALTH CARE EDUCATION/TRAINING PROGRAM
Payer: MEDICARE

## 2021-03-16 VITALS — SYSTOLIC BLOOD PRESSURE: 126 MMHG | HEART RATE: 64 BPM | DIASTOLIC BLOOD PRESSURE: 78 MMHG

## 2021-03-16 DIAGNOSIS — M53.3 SI (SACROILIAC) JOINT DYSFUNCTION: Primary | ICD-10-CM

## 2021-03-16 DIAGNOSIS — M48.061 SPINAL STENOSIS OF LUMBAR REGION, UNSPECIFIED WHETHER NEUROGENIC CLAUDICATION PRESENT: ICD-10-CM

## 2021-03-16 PROCEDURE — 72100 X-RAY EXAM L-S SPINE 2/3 VWS: CPT

## 2021-03-16 PROCEDURE — 99214 OFFICE O/P EST MOD 30 MIN: CPT | Performed by: STUDENT IN AN ORGANIZED HEALTH CARE EDUCATION/TRAINING PROGRAM

## 2021-03-16 NOTE — PROGRESS NOTES
Visit Date:   03/16/2021      NEW PATIENT CLINIC VISIT      Iker is a 60-year-old male who presents for evaluation of low back pain.  He had a work-related injury back in early 2000s resulting in multiple spine surgeries.  Most recently, he has been left with an L4 to L5 and L5 to S1 anterior lumbar interbody fusion.  No evidence of posterior hardware.  This has been removed.  He has tried a trial of spinal cord stimulation for the same before with no significant improvement.  He is able to localize his pain to the right posterior superior iliac spine via the Joycelyn finger test, with a much lesser degree of pain on the left side.  He has tried extensive physical therapy over the last 15 years with no significant improvement in his pain.      PHYSICAL EXAMINATION:   GENERAL:  Well appearing, well nourished.   MUSCULOSKELETAL NEUROLOGIC:  He has 5/5 strength in bilateral upper and lower extremities, including deltoid, biceps, triceps, wrist extension, flexion, hand , hand intrinsics, hip flexion, knee extension, flexion, ankle dorsiflexion, plantar flexion, EHL.  He has normal sensation to light touch throughout bilateral upper and lower extremities.  His right posterior superior iliac spine is exquisitely tender to palpation and he is positive for 5/5 provocative SI joint testing procedures, including distraction, DENITA, Gaenslen's, thigh thrust and compression.        Imaging available for today includes an MRI of the lumbar spine, which shows multilevel degenerative changes with some mild to moderate stenosis at the level of L3 to L4 above his prior fusion and evidence of right greater than left SI joint degeneration changes.  He also has flexion, extension films of the lumbar spine, which shows no significant abnormal motion in flexed or extended position.      ASSESSMENT AND PLAN:  Iker is a 60-year-old male with multiple lumbar surgeries with right-sided sacroiliac joint dysfunction.  He will be referred to  Marc Hagan at [facility] for a series of 2 right-sided SI joint injections, which if he gets greater than 80% pain relief through the anesthetic window he will return to clinic for evaluation of minimally invasive SI joint fusion surgery.        This clinic visit took approximately 30 minutes.         MARCELO HAGAN MD             D: 2021   T: 2021   MT: JUDE      Name:     GARRICK ROWELL   MRN:      [id number]        Account:      [id number]   :      1960           Visit Date:   2021      Document: [id number]

## 2021-03-16 NOTE — PROGRESS NOTES
Patient is here for consult on his back injury.   Marla Cassidy LPN .....................3/16/2021 2:29 PM

## 2021-03-29 ENCOUNTER — OFFICE VISIT (OUTPATIENT)
Dept: FAMILY MEDICINE | Facility: OTHER | Age: 61
End: 2021-03-29
Attending: FAMILY MEDICINE
Payer: OTHER MISCELLANEOUS

## 2021-03-29 VITALS
TEMPERATURE: 97.6 F | HEART RATE: 71 BPM | DIASTOLIC BLOOD PRESSURE: 76 MMHG | SYSTOLIC BLOOD PRESSURE: 126 MMHG | OXYGEN SATURATION: 96 % | WEIGHT: 285 LBS | RESPIRATION RATE: 14 BRPM | BODY MASS INDEX: 43.98 KG/M2

## 2021-03-29 DIAGNOSIS — G89.29 CHRONIC RIGHT-SIDED LOW BACK PAIN WITHOUT SCIATICA: ICD-10-CM

## 2021-03-29 DIAGNOSIS — M54.50 CHRONIC RIGHT-SIDED LOW BACK PAIN WITHOUT SCIATICA: ICD-10-CM

## 2021-03-29 PROCEDURE — 99213 OFFICE O/P EST LOW 20 MIN: CPT | Performed by: FAMILY MEDICINE

## 2021-03-29 RX ORDER — OXYCODONE HYDROCHLORIDE 10 MG/1
10 TABLET ORAL EVERY 4 HOURS PRN
Qty: 150 TABLET | Refills: 0 | Status: SHIPPED | OUTPATIENT
Start: 2021-03-29 | End: 2021-06-29

## 2021-03-29 ASSESSMENT — ANXIETY QUESTIONNAIRES
5. BEING SO RESTLESS THAT IT IS HARD TO SIT STILL: NOT AT ALL
1. FEELING NERVOUS, ANXIOUS, OR ON EDGE: NOT AT ALL
3. WORRYING TOO MUCH ABOUT DIFFERENT THINGS: NOT AT ALL
6. BECOMING EASILY ANNOYED OR IRRITABLE: NOT AT ALL
IF YOU CHECKED OFF ANY PROBLEMS ON THIS QUESTIONNAIRE, HOW DIFFICULT HAVE THESE PROBLEMS MADE IT FOR YOU TO DO YOUR WORK, TAKE CARE OF THINGS AT HOME, OR GET ALONG WITH OTHER PEOPLE: NOT DIFFICULT AT ALL
GAD7 TOTAL SCORE: 0
2. NOT BEING ABLE TO STOP OR CONTROL WORRYING: NOT AT ALL
7. FEELING AFRAID AS IF SOMETHING AWFUL MIGHT HAPPEN: NOT AT ALL

## 2021-03-29 ASSESSMENT — ENCOUNTER SYMPTOMS
BACK PAIN: 1
PALPITATIONS: 0
WHEEZING: 0
SHORTNESS OF BREATH: 0
FEVER: 0
HEARTBURN: 1
SLEEP DISTURBANCE: 1
DIARRHEA: 0
CONSTIPATION: 1
ABDOMINAL PAIN: 0
APPETITE CHANGE: 1
PARESTHESIAS: 1

## 2021-03-29 ASSESSMENT — PATIENT HEALTH QUESTIONNAIRE - PHQ9: 5. POOR APPETITE OR OVEREATING: NOT AT ALL

## 2021-03-29 ASSESSMENT — PAIN SCALES - GENERAL: PAINLEVEL: MODERATE PAIN (4)

## 2021-03-29 NOTE — NURSING NOTE
"Coming in for work comp an injury to the low back    Chief Complaint   Patient presents with     Work Comp       Initial /76   Pulse 71   Temp 97.6  F (36.4  C)   Resp 14   Wt 129.3 kg (285 lb)   SpO2 96%   BMI 43.98 kg/m   Estimated body mass index is 43.98 kg/m  as calculated from the following:    Height as of 8/24/20: 1.715 m (5' 7.5\").    Weight as of this encounter: 129.3 kg (285 lb).  Medication Reconciliation: complete    Sravani Robles, LPN  "

## 2021-03-29 NOTE — PROGRESS NOTES
Problem List Items Addressed This Visit        Nervous and Auditory    Back pain, chronic    Relevant Medications    oxyCODONE IR (ROXICODONE) 10 MG tablet    oxyCODONE IR (ROXICODONE) 10 MG tablet    oxyCODONE IR (ROXICODONE) 10 MG tablet        (M54.5,  G89.29) Chronic right-sided low back pain without sciatica  Comment: Work comp issue. New pain management contract signed. EMG was previously ordered but not covered by insurance. Patient interested in pursuing this testing again even if it means using personal insurance. Patient pursuing SI joint injections in [place] for right sided sacroiliac joint dysfunction per Dr. Silver Rust. Weight loss discussed again as component of pain improvement.   Plan: oxyCODONE IR (ROXICODONE) 10 MG tablet,         oxyCODONE IR (ROXICODONE) 10 MG tablet,         oxyCODONE IR (ROXICODONE) 10 MG tablet         Patient not in acute distress or in need of immediate medical intervention. Patient's additional concerns will be addressed at a separate appointment.      Yesica Anne, MS3  [employer]     was reviewed and is stable.    Sohail Harris MD on 3/31/2021 at 7:21 AM      Lui Dong is a 60 year old who presents for the following health issues:    HPI   Iker's pain has gotten worse in the last several months. His activity is decreasing and it is becoming more difficult to walk. He reports that standing is better than sitting or lying down, and he has significant disruption of sleep. He reports the oxycodone works well to help manage pain. He recently saw Dr. Rust who found sacroiliac joint dysfunction on MRI. Iker is pursuing SI joint injections and is considering another surgery if necessary.     Iker also has some complaints of burning and swelling in his feet that also make it difficult to walk. He has an appointment scheduled later this week to address these issues.      Review of Systems   Constitutional: Positive for appetite  change. Negative for fever.   Respiratory: Negative for shortness of breath and wheezing.    Cardiovascular: Negative for chest pain and palpitations.   Gastrointestinal: Positive for constipation and heartburn. Negative for abdominal pain and diarrhea.        Reflux managed with OTC antiacids    Musculoskeletal: Positive for back pain.   Neurological: Positive for paresthesias.   Psychiatric/Behavioral: Positive for sleep disturbance.          Objective    /76   Pulse 71   Temp 97.6  F (36.4  C)   Resp 14   Wt 129.3 kg (285 lb)   SpO2 96%   BMI 43.98 kg/m    Body mass index is 43.98 kg/m .  Physical Exam  Constitutional:       Appearance: Normal appearance. He is obese.   HENT:      Head: Normocephalic and atraumatic.   Eyes:      Extraocular Movements: Extraocular movements intact.      Conjunctiva/sclera: Conjunctivae normal.      Pupils: Pupils are equal, round, and reactive to light.   Neck:      Musculoskeletal: Normal range of motion.   Cardiovascular:      Rate and Rhythm: Normal rate and regular rhythm.      Heart sounds: Normal heart sounds.      Comments: Bilateral LLE pitting edema   Pulmonary:      Effort: Pulmonary effort is normal.      Breath sounds: Normal breath sounds.   Musculoskeletal:      Comments: Slow to get onto the table. Normal lower extremity strength.    Skin:     General: Skin is warm.   Neurological:      General: No focal deficit present.      Mental Status: He is alert and oriented to person, place, and time.   Psychiatric:         Mood and Affect: Mood normal.         Behavior: Behavior normal.         Thought Content: Thought content normal.         Judgment: Judgment normal.         MRI 3/12/21  Impression:  1. Surgical changes at L4-5 and L5-S1. There is mild neural foraminal stenosis identified at these levels but no significant nerve root compression or canal narrowing.  2. Moderate disc disease at L3-L4 with moderate facet arthrosis and a moderate canal narrowing.  There is mild-to-moderate narrowing of the neural foramen without nerve root compression.  3. Mild disc disease at L1-2 and L2-3.  Sacroiliac joints: mild degenerative changes without evidence of ankylosis or erosions.

## 2021-03-29 NOTE — LETTER
Opioid / Opioid Plus Controlled Substance Agreement    This is an agreement between you and your provider about the safe and appropriate use of controlled substance/opioids prescribed by your care team. Controlled substances are medicines that can cause physical and mental dependence (abuse).    There are strict laws about having and using these medicines. We here at [facility] are committing to working with you in your efforts to get better. To support you in this work, we ll help you schedule regular office appointments for medicine refills. If we must cancel or change your appointment for any reason, we ll make sure you have enough medicine to last until your next appointment.     As a Provider, I will:    Listen carefully to your concerns and treat you with respect.     Recommend a treatment plan that I believe is in your best interest. This plan may involve therapies other than opioid pain medication.     Talk with you often about the possible benefits, and the risk of harm of any medicine that we prescribe for you.     Provide a plan on how to taper (discontinue or go off) using this medicine if the decision is made to stop its use.    As a Patient, I understand that opioid(s):     Are a controlled substance prescribed by my care team to help me function or work and manage my condition(s).     Are strong medicines and can cause serious side effects such as:    Drowsiness, which can seriously affect my driving ability    A lower breathing rate, enough to cause death    Harm to my thinking ability     Depression     Abuse of and addiction to this medicine    Need to be taken exactly as prescribed. Combining opioids with certain medicines or chemicals (such as illegal drugs, sedatives, sleeping pills, and benzodiazepines) can be dangerous or even fatal. If I stop opioids suddenly, I may have severe withdrawal symptoms.    Do not work for all types of pain nor for all patients. If they re not helpful, I may  be asked to stop them.        The risks, benefits and side effects of these medicine(s) were explained to me. I agree that:  1. I will take part in other treatments as advised by my care team. This may be psychiatry or counseling, physical therapy, behavioral therapy, group treatment or a referral to a specialist.     2. I will keep all my appointments. I understand that this is part of the monitoring of opioids. My care team may require an office visit for EVERY opioid/controlled substance refill. If I miss appointments or don t follow instructions, my care team may stop my medicine.    3. I will take my medicines as prescribed. I will not change the dose or schedule unless my care team tells me to. There will be no refills if I run out early.     4. I may be asked to come to the clinic and complete a urine drug test or complete a pill count at any time. If I don t give a urine sample or participate in a pill count, the care team may stop my medicine.    5. I will only receive prescriptions from this clinic for chronic pain. If I am treated by another provider for acute pain issues, I will tell them that I am taking opioid pain medication for chronic pain and that I have a treatment agreement with this provider. I will inform my [facility] care team within one business day if I am given a prescription for any pain medication by another healthcare provider. My [facility] care team can contact other providers and pharmacists about my use of any medicines.    6. It is up to me to make sure that I don t run out of my medicines on weekends or holidays. If my care team is willing to refill my opioid prescription without a visit, I must request refills only during office hours. Refills may take up to 3 business days to process. I will use one pharmacy to fill all my opioid and other controlled substance prescriptions. I will notify the clinic about any changes to my insurance or medication  availability.    7. I am responsible for my prescriptions. If the medicine/prescription is lost, stolen or destroyed, it will not be replaced. I also agree not to share controlled substance medicines with anyone.    8. I am aware I should not use any illegal or recreational drugs. I agree not to drink alcohol unless my care team says I can.       9. If I enroll in the [place] [facility] Cann[facility] program, I will tell my care team prior to my next refill.     10. I will tell my care team right away if I become pregnant, have a new medical problem treated outside of my regular clinic, or have a change in my medications.    11. I understand that this medicine can affect my thinking, judgment and reaction time. Alcohol and drugs affect the brain and body, which can affect the safety of my driving. Being under the influence of alcohol or drugs can affect my decision-making, behaviors, personal safety, and the safety of others. Driving while impaired (DWI) can occur if a person is driving, operating, or in physical control of a car, motorcycle, boat, snowmobile, ATV, motorbike, off-road vehicle, or any other motor vehicle ([place] Statute 169A.20). I understand the risk if I choose to drive or operate any vehicle or machinery.    I understand that if I do not follow any of the conditions above, my prescriptions or treatment may be stopped or changed.          Opioids  What You Need to Know    What are opioids?   Opioids are pain medicines that must be prescribed by a doctor. They are also known as narcotics.     Examples are:   1. morphine (MS Contin, Zaina)  2. oxycodone (Oxycontin)  3. oxycodone and acetaminophen (Percocet)  4. hydrocodone and acetaminophen (Vicodin, Norco)   5. fentanyl patch (Duragesic)   6. hydromorphone (Dilaudid)   7. methadone  8. codeine (Tylenol #3)     What do opioids do well?   Opioids are best for severe short-term pain such as after a surgery or injury. They may work well for cancer pain. They may  help some people with long-lasting (chronic) pain.     What do opioids NOT do well?   Opioids never get rid of pain entirely, and they don t work well for most patients with chronic pain. Opioids don t reduce swelling, one of the causes of pain.                                    Other ways to manage chronic pain and improve function include:       Treat the health problem that may be causing pain    Anti-inflammation medicines, which reduce swelling and tenderness, such as ibuprofen (Advil, Motrin) or naproxen (Aleve)    Acetaminophen (Tylenol)    Antidepressants and anti-seizure medicines, especially for nerve pain    Topical treatments such as patches or creams    Injections or nerve blocks    Chiropractic or osteopathic treatment    Acupuncture, massage, deep breathing, meditation, visual imagery, aromatherapy    Use heat or ice at the pain site    Physical therapy     Exercise    Stop smoking    Take part in therapy       Risks and side effects     Talk to your doctor before you start or decide to keep taking opioids. Possible side effects include:      Lowering your breathing rate enough to cause death    Overdose, including death, especially if taking higher than prescribed doses    Worse depression symptoms; less pleasure in things you usually enjoy    Feeling tired or sluggish    Slower thoughts or cloudy thinking    Being more sensitive to pain over time; pain is harder to control    Trouble sleeping or restless sleep    Changes in hormone levels (for example, less testosterone)    Changes in sex drive or ability to have sex    Constipation    Unsafe driving    Itching and sweating    Dizziness    Nausea, throwing up and dry mouth    What else should I know about opioids?    Opioids may lead to dependence, tolerance, or addiction.      Dependence means that if you stop or reduce the medicine too quickly, you will have withdrawal symptoms. These include loose poop (diarrhea), jitters, flu-like symptoms,  nervousness and tremors. Dependence is not the same as addiction.                       Tolerance means needing higher doses over time to get the same effect. This may increase the chance of serious side effects.      Addiction is when people improperly use a substance that harms their body, their mind or their relations with others. Use of opiates can cause a relapse of addiction if you have a history of drug or alcohol abuse.      People who have used opioids for a long time may have a lower quality of life, worse depression, higher levels of pain and more visits to doctors.    You can overdose on opioids. Take these steps to lower your risk of overdose:    1. Recognize the signs:  Signs of overdose include decrease or loss of consciousness (blackout), slowed breathing, trouble waking up and blue lips. If someone is worried about overdose, they should call 911.    2. Talk to your doctor about Narcan (naloxone).   If you are at risk for overdose, you may be given a prescription for Narcan. This medicine very quickly reverses the effects of opioids.   If you overdose, a friend or family member can give you Narcan while waiting for the ambulance. They need to know the signs of overdose and how to give Narcan.     3. Don't use alcohol or street drugs.   Taking them with opioids can cause death.    4. Do not take any of these medicines unless your doctor says it s OK. Taking these with opioids can cause death:    Benzodiazepines, such as lorazepam (Ativan), alprazolam (Xanax) or diazepam (Valium)    Muscle relaxers, such as cyclobenzaprine (Flexeril)    Sleeping pills like zolpidem (Ambien)     Other opioids      How to keep you and other people safe while taking opioids:    1. Never share your opioids with others.  Opioid medicines are regulated by the Drug Enforcement Agency (KATH[place]EN). Selling or sharing medications is a criminal act.    2. Be sure to store opioids in a secure place, locked up if possible. Young children  can easily swallow them and overdose.    3. When you are traveling with your medicines, keep them in the original bottles. If you use a pill box, be sure you also carry a copy of your medicine list from your clinic or pharmacy.    4. Safe disposal of opioids    Most pharmacies have places to get rid of medicine, called disposal kiosks. Medicine disposal options are also available in every [facility]. Search your county and  medication disposal  to find more options. You can find more details at:  [email or web address]-green/managing-unwanted-medications     I agree that my provider, clinic care team, and pharmacy may work with any city, state or federal law enforcement agency that investigates the misuse, sale, or other diversion of my controlled medicine. I will allow my provider to discuss my care with, or share a copy of, this agreement with any other treating provider, pharmacy or emergency room where I receive care.    I have read this agreement and have asked questions about anything I did not understand.    _______________________________________________________  Patient Signature - Iker Young _____________________                   Date     _______________________________________________________  Provider Signature - Sohail Harris MD   _____________________                   Date     _______________________________________________________  Witness Signature (required if provider not present while patient signing)   _____________________                   Date

## 2021-03-30 ASSESSMENT — ANXIETY QUESTIONNAIRES: GAD7 TOTAL SCORE: 0

## 2021-04-01 ENCOUNTER — OFFICE VISIT (OUTPATIENT)
Dept: FAMILY MEDICINE | Facility: OTHER | Age: 61
End: 2021-04-01
Attending: FAMILY MEDICINE
Payer: MEDICARE

## 2021-04-01 VITALS
TEMPERATURE: 97.1 F | RESPIRATION RATE: 14 BRPM | WEIGHT: 281.8 LBS | SYSTOLIC BLOOD PRESSURE: 126 MMHG | OXYGEN SATURATION: 95 % | BODY MASS INDEX: 43.48 KG/M2 | HEART RATE: 68 BPM | DIASTOLIC BLOOD PRESSURE: 76 MMHG

## 2021-04-01 DIAGNOSIS — E66.01 MORBID OBESITY (H): ICD-10-CM

## 2021-04-01 DIAGNOSIS — R73.09 ELEVATED GLUCOSE: Primary | ICD-10-CM

## 2021-04-01 DIAGNOSIS — R60.0 BILATERAL LEG EDEMA: ICD-10-CM

## 2021-04-01 LAB
ALBUMIN SERPL-MCNC: 4.6 G/DL (ref 3.5–5.7)
ALP SERPL-CCNC: 88 U/L (ref 34–104)
ALT SERPL W P-5'-P-CCNC: 35 U/L (ref 7–52)
ANION GAP SERPL CALCULATED.3IONS-SCNC: 4 MMOL/L (ref 3–14)
AST SERPL W P-5'-P-CCNC: 21 U/L (ref 13–39)
BILIRUB SERPL-MCNC: 0.5 MG/DL (ref 0.3–1)
BUN SERPL-MCNC: 16 MG/DL (ref 7–25)
CALCIUM SERPL-MCNC: 9.6 MG/DL (ref 8.6–10.3)
CHLORIDE SERPL-SCNC: 98 MMOL/L (ref 98–107)
CO2 SERPL-SCNC: 31 MMOL/L (ref 21–31)
CREAT SERPL-MCNC: 0.73 MG/DL (ref 0.7–1.3)
GFR SERPL CREATININE-BSD FRML MDRD: >90 ML/MIN/{1.73_M2}
GLUCOSE SERPL-MCNC: 194 MG/DL (ref 70–105)
HBA1C MFR BLD: 9.9 % (ref 4–6)
POTASSIUM SERPL-SCNC: 4.7 MMOL/L (ref 3.5–5.1)
PROT SERPL-MCNC: 7.6 G/DL (ref 6.4–8.9)
SODIUM SERPL-SCNC: 133 MMOL/L (ref 134–144)

## 2021-04-01 PROCEDURE — 83036 HEMOGLOBIN GLYCOSYLATED A1C: CPT | Mod: ZL | Performed by: FAMILY MEDICINE

## 2021-04-01 PROCEDURE — 99213 OFFICE O/P EST LOW 20 MIN: CPT | Performed by: FAMILY MEDICINE

## 2021-04-01 PROCEDURE — 36415 COLL VENOUS BLD VENIPUNCTURE: CPT | Mod: ZL | Performed by: FAMILY MEDICINE

## 2021-04-01 PROCEDURE — G0463 HOSPITAL OUTPT CLINIC VISIT: HCPCS

## 2021-04-01 PROCEDURE — 80053 COMPREHEN METABOLIC PANEL: CPT | Mod: ZL | Performed by: FAMILY MEDICINE

## 2021-04-01 RX ORDER — LORAZEPAM 1 MG/1
TABLET ORAL
COMMUNITY
Start: 2021-03-10 | End: 2021-07-29

## 2021-04-01 ASSESSMENT — ANXIETY QUESTIONNAIRES
7. FEELING AFRAID AS IF SOMETHING AWFUL MIGHT HAPPEN: NOT AT ALL
IF YOU CHECKED OFF ANY PROBLEMS ON THIS QUESTIONNAIRE, HOW DIFFICULT HAVE THESE PROBLEMS MADE IT FOR YOU TO DO YOUR WORK, TAKE CARE OF THINGS AT HOME, OR GET ALONG WITH OTHER PEOPLE: NOT DIFFICULT AT ALL
1. FEELING NERVOUS, ANXIOUS, OR ON EDGE: NOT AT ALL
GAD7 TOTAL SCORE: 0
5. BEING SO RESTLESS THAT IT IS HARD TO SIT STILL: NOT AT ALL
3. WORRYING TOO MUCH ABOUT DIFFERENT THINGS: NOT AT ALL
2. NOT BEING ABLE TO STOP OR CONTROL WORRYING: NOT AT ALL
6. BECOMING EASILY ANNOYED OR IRRITABLE: NOT AT ALL

## 2021-04-01 ASSESSMENT — PAIN SCALES - GENERAL: PAINLEVEL: MODERATE PAIN (5)

## 2021-04-01 ASSESSMENT — PATIENT HEALTH QUESTIONNAIRE - PHQ9: 5. POOR APPETITE OR OVEREATING: NOT AT ALL

## 2021-04-01 NOTE — PROGRESS NOTES
(R73.09) Elevated glucose  (primary encounter diagnosis)  Comment: Labs today are consistent with new diagnosis of diabetes.  He ws offered a consult with our DIABETES education team, but declines  Is already pursuing diet changes.  Will add on metformin and follow up in 3 months.  Plan: Hemoglobin A1c, Basic Metabolic Panel,         metFORMIN (GLUCOPHAGE) 1000 MG tablet             (E66.01) Morbid obesity (H)  Comment: actively pursuing a change in diet  Plan:      (R60.0) Bilateral leg edema  Comment: mild on exam.  Presumed to be due to above, no signs of CHF, renal failure, etc.  Plan: Comprehensive Metabolic Panel                 Subjective   Iker is a 60 year old who presents for the following health issues     HPI lower extremity edema.  A few days ago he stopped eating bread, due to the sodium content and has had some improvement in the edema.  Gets pain at the end of the day. Has had this for about 1 year now.  No shortness of breath or chest pain.  He had labs last December, with a non fasting glucose then of 288. Son has diabetes, on a single med.  No polyuria, no nocturia.  No chest pain.  At times he has significant burning pains in both feet too.            Review of Systems         Objective    /76   Pulse 68   Temp 97.1  F (36.2  C)   Resp 14   Wt 127.8 kg (281 lb 12.8 oz)   SpO2 95%   BMI 43.48 kg/m    Body mass index is 43.48 kg/m .  Physical Exam  Constitutional:       Appearance: Normal appearance.   Cardiovascular:      Rate and Rhythm: Normal rate and regular rhythm.      Heart sounds: No murmur. No friction rub. No gallop.    Pulmonary:      Effort: Pulmonary effort is normal. No respiratory distress.      Breath sounds: No stridor.   Skin:     Comments: Trace bilateral lower extremity edema   Neurological:      Mental Status: He is alert.   Psychiatric:         Mood and Affect: Mood normal.         Behavior: Behavior normal.         Thought Content: Thought content normal.             Results for orders placed or performed in visit on 04/01/21 (from the past 24 hour(s))   Comprehensive Metabolic Panel   Result Value Ref Range    Sodium 133 (L) 134 - 144 mmol/L    Potassium 4.7 3.5 - 5.1 mmol/L    Chloride 98 98 - 107 mmol/L    Carbon Dioxide 31 21 - 31 mmol/L    Anion Gap 4 3 - 14 mmol/L    Glucose 194 (H) 70 - 105 mg/dL    Urea Nitrogen 16 7 - 25 mg/dL    Creatinine 0.73 0.70 - 1.30 mg/dL    GFR Estimate >90 >60 mL/min/[1.73_m2]    GFR Estimate If Black >90 >60 mL/min/[1.73_m2]    Calcium 9.6 8.6 - 10.3 mg/dL    Bilirubin Total 0.5 0.3 - 1.0 mg/dL    Albumin 4.6 3.5 - 5.7 g/dL    Protein Total 7.6 6.4 - 8.9 g/dL    Alkaline Phosphatase 88 34 - 104 U/L    ALT 35 7 - 52 U/L    AST 21 13 - 39 U/L   Hemoglobin A1c   Result Value Ref Range    Hemoglobin A1C 9.9 (H) 4.0 - 6.0 %

## 2021-04-01 NOTE — NURSING NOTE
"Coming in for having burning feet    Chief Complaint   Patient presents with     Patient Request     check feet       Initial /76   Pulse 68   Temp 97.1  F (36.2  C)   Resp 14   Wt 127.8 kg (281 lb 12.8 oz)   SpO2 95%   BMI 43.48 kg/m   Estimated body mass index is 43.48 kg/m  as calculated from the following:    Height as of 8/24/20: 1.715 m (5' 7.5\").    Weight as of this encounter: 127.8 kg (281 lb 12.8 oz).  Medication Reconciliation: complete    Sravani Robles LPN  "

## 2021-04-02 ASSESSMENT — ANXIETY QUESTIONNAIRES: GAD7 TOTAL SCORE: 0

## 2021-04-09 ENCOUNTER — TELEPHONE (OUTPATIENT)
Dept: FAMILY MEDICINE | Facility: OTHER | Age: 61
End: 2021-04-09

## 2021-04-09 NOTE — TELEPHONE ENCOUNTER
The nausea might be form metformin, other symptoms more likely from diet.  If nausea is severe, can cut the dose in half.  It usually goes away after a few weeks.  Sohail Harris MD on 4/9/2021 at 12:52 PM

## 2021-04-09 NOTE — TELEPHONE ENCOUNTER
After birth date and last name were verified, states he is nauseated all the time and has had some episodes of diarrhea over the last 5 days.  Has not vomited.  Also is very dizzy when he changes positions quickly.  He started the metformin 1 week ago.    He has changed his diet recently as well.  No bread or pasta type foods and has cut the amount he eats to about a quarter of what he was eating.  Figures he gets 8145-5049 calories per day.    Do you think this is from the Metformin?  Any suggestions on what he should do?

## 2021-04-09 NOTE — TELEPHONE ENCOUNTER
Notified patient of providers note.  Misti Blackman LPN ....................  4/9/2021   1:09 PM

## 2021-04-15 ENCOUNTER — IMMUNIZATION (OUTPATIENT)
Dept: FAMILY MEDICINE | Facility: OTHER | Age: 61
End: 2021-04-15
Attending: FAMILY MEDICINE
Payer: MEDICARE

## 2021-04-15 PROCEDURE — 91300 PR COVID VAC PFIZER DIL RECON 30 MCG/0.3 ML IM: CPT

## 2021-05-06 ENCOUNTER — IMMUNIZATION (OUTPATIENT)
Dept: FAMILY MEDICINE | Facility: OTHER | Age: 61
End: 2021-05-06
Attending: FAMILY MEDICINE
Payer: MEDICARE

## 2021-05-06 PROCEDURE — 0002A PR COVID VAC PFIZER DIL RECON 30 MCG/0.3 ML IM: CPT

## 2021-05-28 ENCOUNTER — RECORDS - HEALTHEAST (OUTPATIENT)
Dept: ADMINISTRATIVE | Facility: CLINIC | Age: 61
End: 2021-05-28

## 2021-05-29 ENCOUNTER — RECORDS - HEALTHEAST (OUTPATIENT)
Dept: ADMINISTRATIVE | Facility: CLINIC | Age: 61
End: 2021-05-29

## 2021-06-11 DIAGNOSIS — I10 ESSENTIAL HYPERTENSION: ICD-10-CM

## 2021-06-11 DIAGNOSIS — M54.50 CHRONIC RIGHT-SIDED LOW BACK PAIN WITHOUT SCIATICA: ICD-10-CM

## 2021-06-11 DIAGNOSIS — G89.29 CHRONIC RIGHT-SIDED LOW BACK PAIN WITHOUT SCIATICA: ICD-10-CM

## 2021-06-11 RX ORDER — LISINOPRIL 20 MG/1
20 TABLET ORAL DAILY
Qty: 90 TABLET | Refills: 2 | Status: SHIPPED | OUTPATIENT
Start: 2021-06-11 | End: 2021-07-15

## 2021-06-11 RX ORDER — AMITRIPTYLINE HYDROCHLORIDE 50 MG/1
100 TABLET ORAL AT BEDTIME
Qty: 60 TABLET | Refills: 10 | Status: SHIPPED | OUTPATIENT
Start: 2021-06-11 | End: 2022-02-28

## 2021-06-14 RX ORDER — TRAMADOL HYDROCHLORIDE 50 MG/1
50 TABLET ORAL EVERY 6 HOURS PRN
Qty: 120 TABLET | Refills: 5 | Status: SHIPPED | OUTPATIENT
Start: 2021-06-14 | End: 2021-07-15

## 2021-06-29 ENCOUNTER — OFFICE VISIT (OUTPATIENT)
Dept: FAMILY MEDICINE | Facility: OTHER | Age: 61
End: 2021-06-29
Attending: FAMILY MEDICINE
Payer: OTHER MISCELLANEOUS

## 2021-06-29 VITALS
SYSTOLIC BLOOD PRESSURE: 130 MMHG | OXYGEN SATURATION: 96 % | TEMPERATURE: 96 F | WEIGHT: 263 LBS | DIASTOLIC BLOOD PRESSURE: 74 MMHG | RESPIRATION RATE: 14 BRPM | HEART RATE: 54 BPM | BODY MASS INDEX: 40.58 KG/M2

## 2021-06-29 DIAGNOSIS — G89.29 CHRONIC RIGHT-SIDED LOW BACK PAIN WITHOUT SCIATICA: Primary | ICD-10-CM

## 2021-06-29 DIAGNOSIS — M54.50 CHRONIC RIGHT-SIDED LOW BACK PAIN WITHOUT SCIATICA: ICD-10-CM

## 2021-06-29 DIAGNOSIS — G89.29 CHRONIC RIGHT-SIDED LOW BACK PAIN WITHOUT SCIATICA: ICD-10-CM

## 2021-06-29 DIAGNOSIS — M54.50 CHRONIC RIGHT-SIDED LOW BACK PAIN WITHOUT SCIATICA: Primary | ICD-10-CM

## 2021-06-29 PROCEDURE — 80307 DRUG TEST PRSMV CHEM ANLYZR: CPT | Mod: ZL | Performed by: FAMILY MEDICINE

## 2021-06-29 PROCEDURE — 99213 OFFICE O/P EST LOW 20 MIN: CPT | Performed by: FAMILY MEDICINE

## 2021-06-29 RX ORDER — OXYCODONE HYDROCHLORIDE 10 MG/1
10 TABLET ORAL EVERY 4 HOURS PRN
Qty: 150 TABLET | Refills: 0 | Status: SHIPPED | OUTPATIENT
Start: 2021-07-29 | End: 2021-08-30

## 2021-06-29 RX ORDER — OXYCODONE HYDROCHLORIDE 10 MG/1
10 TABLET ORAL EVERY 4 HOURS PRN
Qty: 150 TABLET | Refills: 0 | Status: SHIPPED | OUTPATIENT
Start: 2021-06-29 | End: 2021-08-30

## 2021-06-29 RX ORDER — OXYCODONE HYDROCHLORIDE 10 MG/1
10 TABLET ORAL EVERY 4 HOURS PRN
Qty: 150 TABLET | Refills: 0 | Status: SHIPPED | OUTPATIENT
Start: 2021-08-26 | End: 2021-08-30

## 2021-06-29 RX ORDER — OXYCODONE HYDROCHLORIDE 10 MG/1
10 TABLET ORAL EVERY 4 HOURS PRN
Qty: 150 TABLET | Refills: 0 | Status: SHIPPED | OUTPATIENT
Start: 2021-06-29 | End: 2021-06-29

## 2021-06-29 ASSESSMENT — ANXIETY QUESTIONNAIRES
7. FEELING AFRAID AS IF SOMETHING AWFUL MIGHT HAPPEN: NOT AT ALL
2. NOT BEING ABLE TO STOP OR CONTROL WORRYING: NOT AT ALL
IF YOU CHECKED OFF ANY PROBLEMS ON THIS QUESTIONNAIRE, HOW DIFFICULT HAVE THESE PROBLEMS MADE IT FOR YOU TO DO YOUR WORK, TAKE CARE OF THINGS AT HOME, OR GET ALONG WITH OTHER PEOPLE: NOT DIFFICULT AT ALL
3. WORRYING TOO MUCH ABOUT DIFFERENT THINGS: NOT AT ALL
5. BEING SO RESTLESS THAT IT IS HARD TO SIT STILL: NOT AT ALL
GAD7 TOTAL SCORE: 1
6. BECOMING EASILY ANNOYED OR IRRITABLE: SEVERAL DAYS
1. FEELING NERVOUS, ANXIOUS, OR ON EDGE: NOT AT ALL

## 2021-06-29 ASSESSMENT — PATIENT HEALTH QUESTIONNAIRE - PHQ9
SUM OF ALL RESPONSES TO PHQ QUESTIONS 1-9: 3
5. POOR APPETITE OR OVEREATING: NOT AT ALL

## 2021-06-29 ASSESSMENT — PAIN SCALES - GENERAL: PAINLEVEL: MODERATE PAIN (4)

## 2021-06-29 NOTE — TELEPHONE ENCOUNTER
Note from pharmacy: we got 3 Rx's today with 06/29/2021 start date- we can only use 1 of them- we had to void out 2- please send 2 more 1 for July and 1 for August- thank you!

## 2021-06-29 NOTE — PROGRESS NOTES
"    Assessment & Plan   Problem List Items Addressed This Visit        Nervous and Auditory    Back pain, chronic - Primary    Relevant Medications    oxyCODONE IR (ROXICODONE) 10 MG tablet    oxyCODONE IR (ROXICODONE) 10 MG tablet    oxyCODONE IR (ROXICODONE) 10 MG tablet    Other Relevant Orders    Drug Confirmation Panel Urine with Creat         tox screen pending.  Is on tramadol and oxycodone, total MEQ is 95, just slightly over the target of 90 but he is remaining active and reports adequate results.  He feels the weight loss has helped with his pain, is actively participating in his care.  Follow up phone visit in 3 months.             BMI:   Estimated body mass index is 40.58 kg/m  as calculated from the following:    Height as of 8/24/20: 1.715 m (5' 7.5\").    Weight as of this encounter: 119.3 kg (263 lb).           No follow-ups on file.    Sohail Harris MD  [facility] AND [facility]   Iker is a 60 year old who presents for the following health issues     HPI   Follow up WC back pain. He had injections 2 and 3 weeks ago.  Says these helped but not a lot.  The day after he felt better than he had in years, lasted perhaps one day.  The 2nd one took 3 days to start working, and lasted several days but is nearly back to normal.  Is now going to follow up with the spine surgeon for possible SI fusion.  Uses a topical FastFreeze with some relief.  He remains active, he can mow his lawn, but takes him several days. lasts perhaps 1 hour at the most.  Limited by his pain. No new lower extremity weakness.  Has been losing weight intentionally now with diet changes.       is stable.  Last tox screen was 12/28/20, as expected.      PHQ 8/19/2016 11/16/2016 6/29/2021   PHQ-9 Total Score 0 8 3   Q9: Thoughts of better off dead/self-harm past 2 weeks Not at all Not at all Not at all     VISHNU-7 SCORE 3/29/2021 4/1/2021 6/29/2021   Total Score 0 0 1             Current Outpatient Medications "   Medication Instructions     acetaminophen (TYLENOL) 500 mg, Oral, EVERY 4 HOURS PRN     amitriptyline (ELAVIL) 100 mg, Oral, AT BEDTIME     atenolol (TENORMIN) 100 MG tablet TAKE 1 TABLET BY MOUTH EVERY DAY     gabapentin (NEURONTIN) 300 mg, Oral, 3 TIMES DAILY     ibuprofen (ADVIL/MOTRIN) 600 MG tablet TAKE 1 TABLET (600 MG) BY MOUTH EVERY 6 HOURS AS NEEDED FOR MODERATEPAIN     lisinopril (ZESTRIL) 20 mg, Oral, DAILY     LORazepam (ATIVAN) 1 MG tablet TAKE 1 TABLET BY MOUTH 1 HOUR PRIOR TO MRI     metFORMIN (GLUCOPHAGE) 1,000 mg, Oral, 2 TIMES DAILY WITH MEALS     naloxone (NARCAN) 4 mg, Alternating Nostrils, ONCE PRN, every 2-3 minutes until assistance arrives     oxyCODONE IR (ROXICODONE) 10 mg, Oral, EVERY 4 HOURS PRN, Max 5 daily, fill on/after 5/26/2021     oxyCODONE IR (ROXICODONE) 10 mg, Oral, EVERY 4 HOURS PRN, Fill on/after 4/27/2021     oxyCODONE IR (ROXICODONE) 10 mg, Oral, EVERY 4 HOURS PRN, Fill on/after 3/29/2021     sertraline (ZOLOFT) 100 mg, Oral, DAILY     traMADol (ULTRAM) 50 mg, Oral, EVERY 6 HOURS PRN               Review of Systems         Objective    /74   Pulse 54   Temp 96  F (35.6  C)   Resp 14   Wt 119.3 kg (263 lb)   SpO2 96%   BMI 40.58 kg/m    Body mass index is 40.58 kg/m .  Physical Exam  Constitutional:       Appearance: Normal appearance.   Musculoskeletal:      Comments: No lumbar pain on palpation, with negative straight leg raise.  Normal lower extremity strength.    Neurological:      General: No focal deficit present.      Mental Status: He is alert and oriented to person, place, and time.   Psychiatric:         Mood and Affect: Mood normal.         Behavior: Behavior normal.         Thought Content: Thought content normal.

## 2021-06-29 NOTE — NURSING NOTE
"Coming in for a work comp medication check up    Chief Complaint   Patient presents with     Work Comp     medication       Initial /74   Pulse 54   Temp 96  F (35.6  C)   Resp 14   Wt 119.3 kg (263 lb)   SpO2 96%   BMI 40.58 kg/m   Estimated body mass index is 40.58 kg/m  as calculated from the following:    Height as of 8/24/20: 1.715 m (5' 7.5\").    Weight as of this encounter: 119.3 kg (263 lb).  Medication Reconciliation: complete    Sravani Robles LPN  "

## 2021-06-30 ASSESSMENT — ANXIETY QUESTIONNAIRES: GAD7 TOTAL SCORE: 1

## 2021-07-02 LAB
CREAT UR-MCNC: 78 MG/DL
GABAPENTIN UR QL CFM: PRESENT
N-NORTRAMADOL/CREAT UR CFM: ABNORMAL NG/MG{CREAT}
O-NORTRAMADOL UR CFM-MCNC: ABNORMAL NG/ML
OXYCODONE UR CFM-MCNC: 1620 NG/ML
OXYCODONE/CREAT UR: 2077 NG/MG{CREAT}
OXYMORPHONE UR CFM-MCNC: 1880 NG/ML
OXYMORPHONE/CREAT UR: 2410 NG/MG{CREAT}
RPT COMMENT: ABNORMAL
TRAMADOL CTO UR CFM-MCNC: ABNORMAL NG/ML
TRAMADOL/CREAT UR: ABNORMAL NG/MG{CREAT}

## 2021-07-06 ENCOUNTER — TRANSFERRED RECORDS (OUTPATIENT)
Dept: HEALTH INFORMATION MANAGEMENT | Facility: OTHER | Age: 61
End: 2021-07-06

## 2021-07-06 ENCOUNTER — NURSE TRIAGE (OUTPATIENT)
Dept: FAMILY MEDICINE | Facility: OTHER | Age: 61
End: 2021-07-06

## 2021-07-06 NOTE — TELEPHONE ENCOUNTER
"S-(situation): Wife called stating Pt has been slurring his words, can hardly walk, eyes are bloodshot and blurry. Wife states they made it home from [facility], but she can't drive him in, because the truck is hooked to the camper, and she doesn't know how to detach it.    B-(background): >60 years old, obesity, hyperlipidemia, essential hypertension, aortic valve stenosis. Wife states Pt was recently diagnosed with diabetes and started on Metformin.    A-(assessment): Wife states Pt wasn't feeling good last week and was sick to his stomach. He started to feel better on Saturday, so they decided to go [facility]. Other sx started Sunday and persisted Monday and today: overall weakness (unsteady on feet, difficulty holding coffee cup), very dizzy (requiring support to ambulate- hanging onto walls etc), blood shot eyes, blurred vision, \"doesn't know what he is talking about\", slurred speech.    R-(recommendations): Protocol recommends Pt/wife contact 911, due to Pt acting confused, slurred speech, new/present neurologic deficit, severe dizziness (hard time standing, requiring support to walk), double vision. Wife verbalized understanding and agreement with plan. Wife noted they live 1 hour from [place] and thinks [place] may be the closest ED. She was informed that 911 would dispatch the nearest ambulance service and they would likely bring Pt to nearest ED. No hand-off report given to [facility] ED at this time, for this reason.     Haleigh Barfield RN .............. 7/6/2021  10:39 AM      Reason for Disposition    Difficult to awaken or acting confused (e.g., disoriented, slurred speech)    New neurologic deficit that is present NOW, sudden onset of ANY of the following: * Weakness of the face, arm, or leg on one side of the body* Numbness of the face, arm, or leg on one side of the body* Loss of speech or garbled speech    Dizziness is the main symptom    Difficult to awaken or acting confused (e.g., disoriented, " slurred speech)    New neurologic deficit that is present now: * Weakness of the face, arm, or leg on one side of the body * Numbness of the face, arm, or leg on one side of the body * Loss of speech or garbled speech    SEVERE dizziness (e.g., unable to stand, requires support to walk, feels like passing out now)    Loss of vision or double vision    Protocols used: NEUROLOGIC DEFICIT-A-OH, DIZZINESS-A-OH

## 2021-07-07 ENCOUNTER — TRANSFERRED RECORDS (OUTPATIENT)
Dept: HEALTH INFORMATION MANAGEMENT | Facility: OTHER | Age: 61
End: 2021-07-07

## 2021-07-08 ENCOUNTER — TRANSFERRED RECORDS (OUTPATIENT)
Dept: HEALTH INFORMATION MANAGEMENT | Facility: OTHER | Age: 61
End: 2021-07-08

## 2021-07-15 ENCOUNTER — OFFICE VISIT (OUTPATIENT)
Dept: FAMILY MEDICINE | Facility: OTHER | Age: 61
End: 2021-07-15
Attending: FAMILY MEDICINE
Payer: MEDICARE

## 2021-07-15 VITALS
BODY MASS INDEX: 38.19 KG/M2 | HEART RATE: 59 BPM | TEMPERATURE: 98 F | RESPIRATION RATE: 16 BRPM | DIASTOLIC BLOOD PRESSURE: 80 MMHG | SYSTOLIC BLOOD PRESSURE: 120 MMHG | HEIGHT: 68 IN | WEIGHT: 252 LBS | OXYGEN SATURATION: 97 %

## 2021-07-15 DIAGNOSIS — E11.9 TYPE 2 DIABETES MELLITUS WITHOUT COMPLICATION, WITHOUT LONG-TERM CURRENT USE OF INSULIN (H): ICD-10-CM

## 2021-07-15 DIAGNOSIS — E03.9 HYPOTHYROIDISM, UNSPECIFIED TYPE: ICD-10-CM

## 2021-07-15 DIAGNOSIS — N17.9 ACUTE RENAL FAILURE, UNSPECIFIED ACUTE RENAL FAILURE TYPE (H): Primary | ICD-10-CM

## 2021-07-15 DIAGNOSIS — F11.93 OPIOID WITHDRAWAL (H): ICD-10-CM

## 2021-07-15 DIAGNOSIS — I35.8 AORTIC VALVE SCLEROSIS: ICD-10-CM

## 2021-07-15 LAB
ANION GAP SERPL CALCULATED.3IONS-SCNC: 7 MMOL/L (ref 3–14)
BUN SERPL-MCNC: 17 MG/DL (ref 7–25)
CALCIUM SERPL-MCNC: 9.4 MG/DL (ref 8.6–10.3)
CHLORIDE BLD-SCNC: 102 MMOL/L (ref 98–107)
CO2 SERPL-SCNC: 30 MMOL/L (ref 21–31)
CREAT SERPL-MCNC: 0.93 MG/DL (ref 0.7–1.3)
GFR SERPL CREATININE-BSD FRML MDRD: 87 ML/MIN/1.73M2
GLUCOSE BLD-MCNC: 109 MG/DL (ref 70–105)
HBA1C MFR BLD: 6.7 % (ref 4–6.2)
POTASSIUM BLD-SCNC: 3.9 MMOL/L (ref 3.5–5.1)
SODIUM SERPL-SCNC: 139 MMOL/L (ref 134–144)

## 2021-07-15 PROCEDURE — 83036 HEMOGLOBIN GLYCOSYLATED A1C: CPT | Mod: ZL | Performed by: FAMILY MEDICINE

## 2021-07-15 PROCEDURE — 36415 COLL VENOUS BLD VENIPUNCTURE: CPT | Mod: ZL | Performed by: FAMILY MEDICINE

## 2021-07-15 PROCEDURE — 80048 BASIC METABOLIC PNL TOTAL CA: CPT | Mod: ZL | Performed by: FAMILY MEDICINE

## 2021-07-15 PROCEDURE — G0463 HOSPITAL OUTPT CLINIC VISIT: HCPCS

## 2021-07-15 PROCEDURE — 99214 OFFICE O/P EST MOD 30 MIN: CPT | Performed by: FAMILY MEDICINE

## 2021-07-15 RX ORDER — GLIMEPIRIDE 2 MG/1
2 TABLET ORAL
Qty: 90 TABLET | Refills: 4 | Status: SHIPPED | OUTPATIENT
Start: 2021-07-15 | End: 2021-07-15

## 2021-07-15 RX ORDER — LEVOTHYROXINE SODIUM 50 UG/1
50 TABLET ORAL DAILY
COMMUNITY
Start: 2021-07-15 | End: 2021-07-29

## 2021-07-15 RX ORDER — LEVOTHYROXINE SODIUM 50 UG/1
1 TABLET ORAL DAILY
COMMUNITY
Start: 2021-07-09 | End: 2021-09-23

## 2021-07-15 ASSESSMENT — PAIN SCALES - GENERAL: PAINLEVEL: MODERATE PAIN (4)

## 2021-07-15 ASSESSMENT — MIFFLIN-ST. JEOR: SCORE: 1904.62

## 2021-07-15 NOTE — PATIENT INSTRUCTIONS
Do not start amaryl.    Results for orders placed or performed in visit on 07/15/21   Hemoglobin A1c     Status: Abnormal   Result Value Ref Range    Hemoglobin A1C 6.7 (H) 4.0 - 6.2 %   Basic Metabolic Panel     Status: Abnormal   Result Value Ref Range    Sodium 139 134 - 144 mmol/L    Potassium 3.9 3.5 - 5.1 mmol/L    Chloride 102 98 - 107 mmol/L    Carbon Dioxide (CO2) 30 21 - 31 mmol/L    Anion Gap 7 3 - 14 mmol/L    Urea Nitrogen 17 7 - 25 mg/dL    Creatinine 0.93 0.70 - 1.30 mg/dL    Calcium 9.4 8.6 - 10.3 mg/dL    Glucose 109 (H) 70 - 105 mg/dL    GFR Estimate 87 >60 mL/min/1.73m2

## 2021-07-15 NOTE — PROGRESS NOTES
"    Assessment & Plan   Problem List Items Addressed This Visit        Endocrine    Diabetes mellitus, type 2 (H)    Relevant Medications    levothyroxine (SYNTHROID/LEVOTHROID) 50 MCG tablet    levothyroxine (SYNTHROID/LEVOTHROID) 50 MCG tablet    blood glucose monitoring (NO BRAND SPECIFIED) meter device kit    blood glucose (NO BRAND SPECIFIED) test strip    Other Relevant Orders    Hemoglobin A1c (Completed)       Circulatory    Aortic valve sclerosis    Relevant Orders    Echocardiogram Complete      Other Visit Diagnoses     Acute renal failure, unspecified acute renal failure type (H)    -  Primary    Relevant Orders    Basic Metabolic Panel (Completed)    Hypothyroidism, unspecified type        Relevant Medications    levothyroxine (SYNTHROID/LEVOTHROID) 50 MCG tablet    levothyroxine (SYNTHROID/LEVOTHROID) 50 MCG tablet    Opioid withdrawal (H)             The ARF appears to be from many factors, including meds, NSAIDS, ACE-I, tramadol to some extent.  Perhaps pre renal component.  He is off these meds currently. I really want to work on keeping him on metformin  Still some vision changes, perhaps partly form high sugars.  Glucometer prescribed and follow up in 2 weeks or so.    Murmur is about the same.  Last echo was 5 years ago.  Will repeat it since SBE can cause  ARF as well.             BMI:   Estimated body mass index is 38.89 kg/m  as calculated from the following:    Height as of this encounter: 1.715 m (5' 7.5\").    Weight as of this encounter: 114.3 kg (252 lb).           Return in about 2 weeks (around 7/29/2021).    Sohail Harris MD  [facility]    Lui Dong is a 61 year old who presents for the following health issues  accompanied by his spouse:    [facility] hospital follow up.  He had acute renal failure, combination of NSAIDS, lisinopril, metformin.  Had 4 beers that day too.  Was in acute delirium, ultimately with a Cr over 8.  Notes reviewed.  He has now been off the " "tramadol for about 5 days.  On oxycodone now.  Has had some GI upset and diarrhea he feels is withdrawal.  No longer on any NSAID  Was also started on synthroid at 50 microgram.  Ongoing vision changes. He is not yet safe to drive per his report.  Has no chest pain, shortness of breath, lower extremity edema.      He had a TBI about 20 years ago, brain MRI showed some residual of this with recent hospitalization. It was otherwise unremarkable.      Is following more of a diabetes diet.  Nearly no bread at all now.  Does not have a glucometer.      Known cardiac murmur. Last echo here was 2016.  Mild to moderate aortic sclerosis.      Recent Labs   Lab Test 07/15/21  1437 07/15/21  1432 04/01/21  1431 12/28/20  1217 07/15/19  1610 06/18/15  1625   A1C 6.7*  --  9.9*  --   --   --    LDL  --   --   --   --   --  139*   HDL  --   --   --   --   --  38   TRIG  --   --   --   --   --  172*   ALT  --   --  35 34 28  --    CR  --  0.93 0.73 0.81 0.69*  --    GFRESTIMATED  --  87 >90 >90 >90  --    GFRESTBLACK  --   --  >90 >90 >90  --    POTASSIUM  --  3.9 4.7 4.8 3.7  --                     Review of Systems         Objective    /80   Pulse 59   Temp 98  F (36.7  C) (Tympanic)   Resp 16   Ht 1.715 m (5' 7.5\")   Wt 114.3 kg (252 lb)   SpO2 97%   BMI 38.89 kg/m    Body mass index is 38.89 kg/m .  Physical Exam  Constitutional:       Appearance: Normal appearance.   Cardiovascular:      Rate and Rhythm: Normal rate and regular rhythm.      Heart sounds: Murmur heard.        Comments: 2/6 systolic ejection murmur   Pulmonary:      Effort: Pulmonary effort is normal. No respiratory distress.      Breath sounds: Normal breath sounds. No stridor.   Neurological:      General: No focal deficit present.      Mental Status: He is alert and oriented to person, place, and time.   Psychiatric:         Mood and Affect: Mood normal.         Behavior: Behavior normal.         Thought Content: Thought content normal.      "   Sensory exam of the foot is normal, tested with the monofilament. Good pulses, no lesions or ulcers, good peripheral pulses.        Results for orders placed or performed in visit on 07/15/21   Hemoglobin A1c     Status: Abnormal   Result Value Ref Range    Hemoglobin A1C 6.7 (H) 4.0 - 6.2 %   Basic Metabolic Panel     Status: Abnormal   Result Value Ref Range    Sodium 139 134 - 144 mmol/L    Potassium 3.9 3.5 - 5.1 mmol/L    Chloride 102 98 - 107 mmol/L    Carbon Dioxide (CO2) 30 21 - 31 mmol/L    Anion Gap 7 3 - 14 mmol/L    Urea Nitrogen 17 7 - 25 mg/dL    Creatinine 0.93 0.70 - 1.30 mg/dL    Calcium 9.4 8.6 - 10.3 mg/dL    Glucose 109 (H) 70 - 105 mg/dL    GFR Estimate 87 >60 mL/min/1.73m2

## 2021-07-19 DIAGNOSIS — E11.9 TYPE 2 DIABETES MELLITUS WITHOUT COMPLICATION, WITHOUT LONG-TERM CURRENT USE OF INSULIN (H): Primary | ICD-10-CM

## 2021-07-20 NOTE — TELEPHONE ENCOUNTER
Not on medication list. Routing for PCP review and consideration. LOV: 07/15/2021. Medication not on RN medication refillpolicy. Unable to complete prescription refill per RN Medication Refill Policy.................... Elizabeth Son RN ....................  7/20/2021   4:15 PM

## 2021-07-22 RX ORDER — GLUCOSAM/CHON-MSM1/C/MANG/BOSW 500-416.6
1 TABLET ORAL DAILY
Qty: 100 EACH | Refills: 4 | Status: SHIPPED | OUTPATIENT
Start: 2021-07-22

## 2021-07-27 RX ORDER — GLIMEPIRIDE 2 MG/1
TABLET ORAL
COMMUNITY
Start: 2021-07-15 | End: 2021-07-29

## 2021-07-29 ENCOUNTER — OFFICE VISIT (OUTPATIENT)
Dept: FAMILY MEDICINE | Facility: OTHER | Age: 61
End: 2021-07-29
Attending: FAMILY MEDICINE
Payer: MEDICARE

## 2021-07-29 VITALS
TEMPERATURE: 98.7 F | SYSTOLIC BLOOD PRESSURE: 120 MMHG | RESPIRATION RATE: 14 BRPM | DIASTOLIC BLOOD PRESSURE: 78 MMHG | HEART RATE: 75 BPM | WEIGHT: 252.4 LBS | OXYGEN SATURATION: 96 % | BODY MASS INDEX: 38.95 KG/M2

## 2021-07-29 DIAGNOSIS — F43.29 GRIEF REACTION WITH PROLONGED BEREAVEMENT: ICD-10-CM

## 2021-07-29 DIAGNOSIS — N17.9 ACUTE RENAL FAILURE, UNSPECIFIED ACUTE RENAL FAILURE TYPE (H): Primary | ICD-10-CM

## 2021-07-29 DIAGNOSIS — E11.9 TYPE 2 DIABETES MELLITUS WITHOUT COMPLICATION, WITHOUT LONG-TERM CURRENT USE OF INSULIN (H): ICD-10-CM

## 2021-07-29 PROCEDURE — G0463 HOSPITAL OUTPT CLINIC VISIT: HCPCS

## 2021-07-29 PROCEDURE — 99214 OFFICE O/P EST MOD 30 MIN: CPT | Performed by: FAMILY MEDICINE

## 2021-07-29 RX ORDER — ATORVASTATIN CALCIUM 40 MG/1
40 TABLET, FILM COATED ORAL DAILY
Qty: 90 TABLET | Refills: 3 | Status: SHIPPED | OUTPATIENT
Start: 2021-07-29 | End: 2022-02-28

## 2021-07-29 RX ORDER — SERTRALINE HYDROCHLORIDE 100 MG/1
100 TABLET, FILM COATED ORAL DAILY
Qty: 90 TABLET | Refills: 3 | Status: SHIPPED | OUTPATIENT
Start: 2021-07-29 | End: 2022-02-28

## 2021-07-29 ASSESSMENT — ANXIETY QUESTIONNAIRES
6. BECOMING EASILY ANNOYED OR IRRITABLE: NOT AT ALL
5. BEING SO RESTLESS THAT IT IS HARD TO SIT STILL: NOT AT ALL
1. FEELING NERVOUS, ANXIOUS, OR ON EDGE: NOT AT ALL
2. NOT BEING ABLE TO STOP OR CONTROL WORRYING: NOT AT ALL
IF YOU CHECKED OFF ANY PROBLEMS ON THIS QUESTIONNAIRE, HOW DIFFICULT HAVE THESE PROBLEMS MADE IT FOR YOU TO DO YOUR WORK, TAKE CARE OF THINGS AT HOME, OR GET ALONG WITH OTHER PEOPLE: NOT DIFFICULT AT ALL
7. FEELING AFRAID AS IF SOMETHING AWFUL MIGHT HAPPEN: NOT AT ALL
GAD7 TOTAL SCORE: 0
3. WORRYING TOO MUCH ABOUT DIFFERENT THINGS: NOT AT ALL

## 2021-07-29 ASSESSMENT — PATIENT HEALTH QUESTIONNAIRE - PHQ9
SUM OF ALL RESPONSES TO PHQ QUESTIONS 1-9: 0
5. POOR APPETITE OR OVEREATING: NOT AT ALL

## 2021-07-29 ASSESSMENT — PAIN SCALES - GENERAL: PAINLEVEL: MODERATE PAIN (4)

## 2021-07-29 NOTE — TELEPHONE ENCOUNTER
"[id number] sent Rx request for the following:   sertraline (ZOLOFT) 100 MG tablet  SigTAKE 1 TABLET (100 MG) BY MOUTH DAILY    Last Prescription Date:   07/17/2020  Last Fill Qty/Refills:         90, R-3    Last Office Visit:              07/15/2021 (Kimberly)   Future Office visit:           08/30/2021 (Kimberly)   SSRIs Protocol Passed - 7/29/2021  1:00 AM        Passed - Recent (12 mo) or future (30 days) visit within the authorizing provider's specialty     Patient has had an office visit with the authorizing provider or a provider within the authorizing providers department within the previous 12 mos or has a future within next 30 days. See \"Patient Info\" tab in inbasket, or \"Choose Columns\" in Meds & Orders section of the refill encounter.              Passed - Medication is active on med list        Passed - Patient is age 18 or older         Prescription approved per [facility] Refill Protocol.  Elizabeth Son RN ....................  7/29/2021   8:13 AM    Overridden by Sohail Harris MD on Jun 14, 2021 7:06 AM   Drug-Drug   1. TRAMADOL / SELECTIVE SEROTONIN REUPTAKE INHIBITORS [Level: Major] [Reason: Benefit outweighs risk]   Other Orders: traMADol (ULTRAM) 50 MG tablet          "

## 2021-07-29 NOTE — PROGRESS NOTES
"    Assessment & Plan   Problem List Items Addressed This Visit        Endocrine    Diabetes mellitus, type 2 (H)    Relevant Medications    atorvastatin (LIPITOR) 40 MG tablet      Other Visit Diagnoses     Acute renal failure, unspecified acute renal failure type (H)    -  Primary    Relevant Orders    AMB Adult Diabetes Educator Referral         Renal function is normal now.  Assume this was the NSAIDS, ACE-I, dehydration all combined.  Follow up now with me in about 2 months.      DIABETES is well controlled with diet and metformin. Start lipitor.               BMI:   Estimated body mass index is 38.95 kg/m  as calculated from the following:    Height as of 7/15/21: 1.715 m (5' 7.5\").    Weight as of this encounter: 114.5 kg (252 lb 6.4 oz).           No follow-ups on file.    Sohail Harris MD  [facility]    Lui Dong is a 61 year old who presents for the following health issues     HPI follow up on his kidney disease.  Has very mild nausea, he feels is withdrawl symptoms still from being off the tramadol and ibuprofen.  No chest pain or shortness of breath, no lower extremity edema at all.  He has been following a diabetes diet really closely. Not yet checking his sugars, got a meter. No weight loss. Echo is pending in about 3 weeks or so.  Vision is back to normal now. He was put on synthroid in the hospital too. No side effects from it.  He cannot really notice a difference with taking it.        Had labs 2 weeks ago:    Recent Results (from the past 720 hour(s))   INFLUENZA A/B & COVID-19 PCR - EXTERNAL RESULT    Collection Time: 07/06/21  1:20 PM   Result Value Ref Range    COVID-19 Virus by PCR (External Result) Not Detected Not Detected    Influenza A PCR External Not Detected Not Detected    Influenza B PCR External Not Detected Not Detected    Respiratory Syncytial Virus PCR External Not Detected Not Detected   Basic Metabolic Panel    Collection Time: 07/15/21  2:32 PM "   Result Value Ref Range    Sodium 139 134 - 144 mmol/L    Potassium 3.9 3.5 - 5.1 mmol/L    Chloride 102 98 - 107 mmol/L    Carbon Dioxide (CO2) 30 21 - 31 mmol/L    Anion Gap 7 3 - 14 mmol/L    Urea Nitrogen 17 7 - 25 mg/dL    Creatinine 0.93 0.70 - 1.30 mg/dL    Calcium 9.4 8.6 - 10.3 mg/dL    Glucose 109 (H) 70 - 105 mg/dL    GFR Estimate 87 >60 mL/min/1.73m2   Hemoglobin A1c    Collection Time: 07/15/21  2:37 PM   Result Value Ref Range    Hemoglobin A1C 6.7 (H) 4.0 - 6.2 %         Current Outpatient Medications:      acetaminophen (TYLENOL) 500 MG tablet, Take 500 mg by mouth every 4 hours as needed , Disp: , Rfl:      amitriptyline (ELAVIL) 50 MG tablet, Take 2 tablets (100 mg) by mouth At Bedtime, Disp: 60 tablet, Rfl: 10     atorvastatin (LIPITOR) 40 MG tablet, Take 1 tablet (40 mg) by mouth daily, Disp: 90 tablet, Rfl: 3     blood glucose (NO BRAND SPECIFIED) test strip, Use to test blood sugar 1 times daily or as directed., Disp: 90 strip, Rfl: 4     blood glucose monitoring (NO BRAND SPECIFIED) meter device kit, Use to test blood sugar 1 times daily or as directed., Disp: 1 kit, Rfl: 0     gabapentin (NEURONTIN) 300 MG capsule, TAKE 1 CAPSULE (300 MG) BY MOUTH 3 TIMES DAILY, Disp: 270 capsule, Rfl: 3     levothyroxine (SYNTHROID/LEVOTHROID) 50 MCG tablet, Take 1 tablet by mouth daily, Disp: , Rfl:      metFORMIN (GLUCOPHAGE) 1000 MG tablet, Take 1 tablet (1,000 mg) by mouth 2 times daily (with meals), Disp: 180 tablet, Rfl: 3     naloxone (NARCAN) 4 MG/0.1ML nasal spray, Spray 1 spray (4 mg) into one nostril alternating nostrils once as needed for opioid reversal every 2-3 minutes until assistance arrives, Disp: 0.2 mL, Rfl: 3     oxyCODONE IR (ROXICODONE) 10 MG tablet, Take 1 tablet (10 mg) by mouth every 4 hours as needed for severe pain Fill on/after 3/29/2021, Disp: 150 tablet, Rfl: 0     oxyCODONE IR (ROXICODONE) 10 MG tablet, Take 1 tablet (10 mg) by mouth every 4 hours as needed for severe pain  Max 5 daily, fill on/after 07/28/2021, Disp: 150 tablet, Rfl: 0     [START ON 8/26/2021] oxyCODONE IR (ROXICODONE) 10 MG tablet, Take 1 tablet (10 mg) by mouth every 4 hours as needed for severe pain Fill on/after 08/26/2021, Disp: 150 tablet, Rfl: 0     sertraline (ZOLOFT) 100 MG tablet, TAKE 1 TABLET (100 MG) BY MOUTH DAILY, Disp: 90 tablet, Rfl: 3     TRUEplus Lancets 28G MISC, Inject 1 lancet Subcutaneous daily, Disp: 100 each, Rfl: 4     atenolol (TENORMIN) 100 MG tablet, TAKE 1 TABLET BY MOUTH EVERY DAY (Patient not taking: Reported on 7/29/2021), Disp: 90 tablet, Rfl: 3          Review of Systems         Objective    /78   Pulse 75   Temp 98.7  F (37.1  C)   Resp 14   Wt 114.5 kg (252 lb 6.4 oz)   SpO2 96%   BMI 38.95 kg/m    Body mass index is 38.95 kg/m .  Physical Exam  Constitutional:       Appearance: Normal appearance.   Cardiovascular:      Rate and Rhythm: Normal rate and regular rhythm.      Heart sounds: Murmur heard.     Abdominal:      General: Abdomen is flat. There is no distension.      Palpations: There is no mass.   Neurological:      General: No focal deficit present.      Mental Status: He is alert and oriented to person, place, and time.   Psychiatric:         Mood and Affect: Mood normal.         Behavior: Behavior normal.         Thought Content: Thought content normal.      Sensory exam of the foot is normal, tested with the monofilament. Good pulses, no lesions or ulcers, good peripheral pulses.

## 2021-07-29 NOTE — NURSING NOTE
"Coming in for a f/u Kidneys    Chief Complaint   Patient presents with     RECHECK     Kidneys       Initial /78   Pulse 75   Temp 98.7  F (37.1  C)   Resp 14   Wt 114.5 kg (252 lb 6.4 oz)   SpO2 96%   BMI 38.95 kg/m   Estimated body mass index is 38.95 kg/m  as calculated from the following:    Height as of 7/15/21: 1.715 m (5' 7.5\").    Weight as of this encounter: 114.5 kg (252 lb 6.4 oz).  Medication Reconciliation: complete.  FOOD SECURITY SCREENING QUESTIONS  Hunger Vital Signs:  Within the past 12 months we worried whether our food would run out before we got money to buy more. Never  Within the past 12 months the food we bought just didn't last and we didn't have money to get more. Never  Sravani Robles LPN 7/29/2021 3:47 PM      Sravani Robles LPN     Lab Results   Component Value Date    A1C 6.7 07/15/2021    A1C 9.9 04/01/2021    ALBUMIN 4.6 04/01/2021     Previous A1C is at goal of <8  Date of last Foot exam due  Eye exam No  Patient is not a Tobacco User  Patient is not on a daily aspirin  Patient is not on a Statin.  Blood pressure today of:     BP Readings from Last 1 Encounters:   07/29/21 120/78      is at the goal of <139/89 for diabetics.    Sravani Robles LPN on 7/29/2021 at 3:47 PM      "

## 2021-07-30 ASSESSMENT — ANXIETY QUESTIONNAIRES: GAD7 TOTAL SCORE: 0

## 2021-08-16 ENCOUNTER — OFFICE VISIT (OUTPATIENT)
Dept: ORTHOPEDICS | Facility: OTHER | Age: 61
End: 2021-08-16
Attending: ORTHOPAEDIC SURGERY
Payer: MEDICARE

## 2021-08-16 DIAGNOSIS — M25.511 RIGHT SHOULDER PAIN, UNSPECIFIED CHRONICITY: Primary | ICD-10-CM

## 2021-08-16 PROCEDURE — G0463 HOSPITAL OUTPT CLINIC VISIT: HCPCS | Mod: 25

## 2021-08-16 PROCEDURE — 20610 DRAIN/INJ JOINT/BURSA W/O US: CPT | Mod: RT | Performed by: ORTHOPAEDIC SURGERY

## 2021-08-16 PROCEDURE — 20610 DRAIN/INJ JOINT/BURSA W/O US: CPT | Performed by: ORTHOPAEDIC SURGERY

## 2021-08-16 PROCEDURE — 250N000009 HC RX 250: Performed by: ORTHOPAEDIC SURGERY

## 2021-08-16 PROCEDURE — G0463 HOSPITAL OUTPT CLINIC VISIT: HCPCS

## 2021-08-16 PROCEDURE — 99213 OFFICE O/P EST LOW 20 MIN: CPT | Mod: 25 | Performed by: ORTHOPAEDIC SURGERY

## 2021-08-16 PROCEDURE — 250N000011 HC RX IP 250 OP 636: Performed by: ORTHOPAEDIC SURGERY

## 2021-08-16 RX ORDER — LIDOCAINE HYDROCHLORIDE 10 MG/ML
4 INJECTION, SOLUTION EPIDURAL; INFILTRATION; INTRACAUDAL; PERINEURAL ONCE
Status: COMPLETED | OUTPATIENT
Start: 2021-08-16 | End: 2021-08-16

## 2021-08-16 RX ORDER — TRIAMCINOLONE ACETONIDE 40 MG/ML
40 INJECTION, SUSPENSION INTRA-ARTICULAR; INTRAMUSCULAR ONCE
Status: COMPLETED | OUTPATIENT
Start: 2021-08-16 | End: 2021-08-16

## 2021-08-16 RX ADMIN — LIDOCAINE HYDROCHLORIDE 4 ML: 10 INJECTION, SOLUTION EPIDURAL; INFILTRATION; INTRACAUDAL; PERINEURAL at 12:20

## 2021-08-16 RX ADMIN — TRIAMCINOLONE ACETONIDE 40 MG: 40 INJECTION, SUSPENSION INTRA-ARTICULAR; INTRAMUSCULAR at 12:20

## 2021-08-16 NOTE — PROGRESS NOTES
Pt given subacromial injection to the R shoulder under sterile technique to include 40mg kenalog and 4 ml Lidocaine.

## 2021-08-16 NOTE — PROGRESS NOTES
Visit Date: 2021    He has rotator cuff tear arthropathy of his right shoulder discovered during an arthroscopy.  His rotator cuff was irreparable and he had severe arthritis present in his shoulder at that time.  He did have an extensive debridement of the shoulder, distal clavicle excision and a corticosteroid at the time of the injection, which lasted about a year for him and now he is having significant pain in the shoulder again, difficulty with overhead motion and he has severe weakness with supraspinatus and infraspinatus testing.    IMPRESSION AND PLAN:  This is a 61-year-old gentleman with rotator cuff tear arthropathy of his right shoulder.  We gave him an injection in the subacromial space today; he tolerated it well.  I will see him back on an as-needed basis.      Jaime Dean MD        D: 2021   T: 2021   MT: [id number]    Name:     GARRICK ROWELL  MRN:      [id number]        Account:    [id number]   :      1960           Visit Date: 2021     Document: [id number]

## 2021-08-16 NOTE — PROGRESS NOTES
Patient is here for follow up on his right shoulder.   Marla Cassidy LPN .....................8/16/2021 12:15 PM

## 2021-08-22 DIAGNOSIS — I10 ESSENTIAL HYPERTENSION: ICD-10-CM

## 2021-08-23 NOTE — TELEPHONE ENCOUNTER
Requested Prescriptions   Pending Prescriptions Disp Refills     atenolol (TENORMIN) 100 MG tablet [Pharmacy Med Name: ATENOLOL 100MG TABLET] 90 tablet 2     Sig: TAKE 1 TABLET BY MOUTH EVERY DAY       Last Written Prescription Date:  7/17/21  Last Fill Quantity: 90,   # refills: 3  Last Office Visit: 7/29/21 Kimberly  Future Office visit:    Next 5 appointments (look out 90 days)    Aug 30, 2021 11:00 AM  SHORT with Sohail Harris MD  [facility] [address]  [place] [address]  [phone]   Aug 30, 2021 11:20 AM  SHORT with Sohail Harris MD  [facility] [address]  [place] [address]  [phone]         Routing refill request to provider for review/approval because:  Unable to fill prescription refill per RN Medication Refill Policy.  Brenda J. Goodell, RN on 8/23/2021 at 1:52 PM

## 2021-08-24 RX ORDER — ATENOLOL 100 MG/1
TABLET ORAL
Qty: 90 TABLET | Refills: 3 | Status: SHIPPED | OUTPATIENT
Start: 2021-08-24 | End: 2022-02-28

## 2021-08-30 ENCOUNTER — OFFICE VISIT (OUTPATIENT)
Dept: FAMILY MEDICINE | Facility: OTHER | Age: 61
End: 2021-08-30
Attending: FAMILY MEDICINE
Payer: OTHER MISCELLANEOUS

## 2021-08-30 ENCOUNTER — OFFICE VISIT (OUTPATIENT)
Dept: FAMILY MEDICINE | Facility: OTHER | Age: 61
End: 2021-08-30
Attending: FAMILY MEDICINE
Payer: MEDICARE

## 2021-08-30 ENCOUNTER — ALLIED HEALTH/NURSE VISIT (OUTPATIENT)
Dept: EDUCATION SERVICES | Facility: OTHER | Age: 61
End: 2021-08-30
Attending: FAMILY MEDICINE
Payer: MEDICARE

## 2021-08-30 VITALS
RESPIRATION RATE: 16 BRPM | WEIGHT: 249.6 LBS | TEMPERATURE: 97.1 F | OXYGEN SATURATION: 97 % | HEART RATE: 59 BPM | SYSTOLIC BLOOD PRESSURE: 134 MMHG | HEIGHT: 68 IN | BODY MASS INDEX: 37.83 KG/M2 | DIASTOLIC BLOOD PRESSURE: 78 MMHG

## 2021-08-30 VITALS
WEIGHT: 249.6 LBS | BODY MASS INDEX: 37.83 KG/M2 | RESPIRATION RATE: 16 BRPM | HEIGHT: 68 IN | DIASTOLIC BLOOD PRESSURE: 78 MMHG | SYSTOLIC BLOOD PRESSURE: 134 MMHG | TEMPERATURE: 97.1 F | OXYGEN SATURATION: 97 % | HEART RATE: 59 BPM

## 2021-08-30 DIAGNOSIS — G62.9 PERIPHERAL POLYNEUROPATHY: ICD-10-CM

## 2021-08-30 DIAGNOSIS — G89.29 CHRONIC RIGHT-SIDED LOW BACK PAIN WITHOUT SCIATICA: ICD-10-CM

## 2021-08-30 DIAGNOSIS — E11.9 TYPE 2 DIABETES MELLITUS WITHOUT COMPLICATION, WITHOUT LONG-TERM CURRENT USE OF INSULIN (H): Primary | ICD-10-CM

## 2021-08-30 DIAGNOSIS — M96.1 FAILED BACK SURGICAL SYNDROME: Primary | ICD-10-CM

## 2021-08-30 DIAGNOSIS — M54.50 CHRONIC RIGHT-SIDED LOW BACK PAIN WITHOUT SCIATICA: ICD-10-CM

## 2021-08-30 DIAGNOSIS — N17.9 ACUTE RENAL FAILURE, UNSPECIFIED ACUTE RENAL FAILURE TYPE (H): ICD-10-CM

## 2021-08-30 PROCEDURE — G0108 DIAB MANAGE TRN  PER INDIV: HCPCS

## 2021-08-30 PROCEDURE — 99213 OFFICE O/P EST LOW 20 MIN: CPT | Performed by: FAMILY MEDICINE

## 2021-08-30 PROCEDURE — G0463 HOSPITAL OUTPT CLINIC VISIT: HCPCS

## 2021-08-30 RX ORDER — OXYCODONE HYDROCHLORIDE 10 MG/1
10 TABLET ORAL EVERY 4 HOURS PRN
Qty: 150 TABLET | Refills: 0 | Status: SHIPPED | OUTPATIENT
Start: 2021-08-30 | End: 2021-12-01

## 2021-08-30 RX ORDER — OXYCODONE HYDROCHLORIDE 10 MG/1
10 TABLET ORAL EVERY 4 HOURS PRN
Qty: 150 TABLET | Refills: 0 | Status: SHIPPED | OUTPATIENT
Start: 2021-08-30 | End: 2021-11-19

## 2021-08-30 ASSESSMENT — PAIN SCALES - GENERAL
PAINLEVEL: MODERATE PAIN (5)
PAINLEVEL: MODERATE PAIN (5)

## 2021-08-30 ASSESSMENT — MIFFLIN-ST. JEOR
SCORE: 1893.74
SCORE: 1893.74

## 2021-08-30 NOTE — NURSING NOTE
Patient here for follow up kidneys and concerns with his feet. He I having a hard time walking. Medication Reconciliation: complete.    Portia Ramírez LPN  8/30/2021 11:16 AM

## 2021-08-30 NOTE — PROGRESS NOTES
"Diabetes Self-Management Education & Support    Presents for: Initial Assessment for new diagnosis    SUBJECTIVE/OBJECTIVE:  Presents for: Initial Assessment for new diagnosis  Accompanied by: Self  Diabetes education in the past 24mo: No  Focus of Visit: Monitoring, Diabetes Pathophysiology, Healthy Eating, Being Active  Diabetes type: Type 2  Date of diagnosis: 4/1/2021  Disease course: Improving  How confident are you filling out medical forms by yourself:: Extremely  Transportation concerns: No  Difficulty affording diabetes medication?: No  Difficulty affording diabetes testing supplies?: No  Cultural Influences/Ethnic Background:  American      Diabetes Symptoms & Complications:  Fatigue: Sometimes  Neuropathy: Yes (\"In my feet, bad!\")  Polydipsia: No  Polyphagia: No  Polyuria: No  Visual change: No  Slow healing wounds: No  Weight trend: Decreasing (\"I've lost about 50 lbs since January.\")  Complications assessed today?: No    Patient Problem List and Family Medical History reviewed for relevant medical history, current medical status, and diabetes risk factors.    Vitals:  There were no vitals taken for this visit.  Estimated body mass index is 38.95 kg/m  as calculated from the following:    Height as of 7/15/21: 1.715 m (5' 7.5\").    Weight as of 7/29/21: 114.5 kg (252 lb 6.4 oz).   Last 3 BP:   BP Readings from Last 3 Encounters:   07/29/21 120/78   07/15/21 120/80   06/29/21 130/74       History   Smoking Status     Never Smoker   Smokeless Tobacco     Never Used       Labs:  Lab Results   Component Value Date    A1C 6.7 07/15/2021    A1C 9.9 04/01/2021     Lab Results   Component Value Date     07/15/2021     04/01/2021     Lab Results   Component Value Date     06/18/2015     HDL Cholesterol   Date Value Ref Range Status   06/18/2015 38 23 - 92 mg/dL    ]  GFR Estimate   Date Value Ref Range Status   07/15/2021 87 >60 mL/min/1.73m2 Final     Comment:     As of July 11, 2021, eGFR " "is calculated by the CKD-EPI creatinine equation, without race adjustment. eGFR can be influenced by muscle mass, exercise, and diet. The reported eGFR is an estimation only and is only applicable if the renal function is stable.   04/01/2021 >90 >60 mL/min/[1.73_m2] Final     GFR Estimate If Black   Date Value Ref Range Status   04/01/2021 >90 >60 mL/min/[1.73_m2] Final     Lab Results   Component Value Date    CR 0.93 07/15/2021    CR 0.73 04/01/2021     No results found for: MICROALBUMIN    Healthy Eating:  Healthy Eating Assessed Today: Yes  Cultural/Congregation diet restrictions?: No  Meal planning/habits: Avoiding sweets, Low carb, Smaller portions  How many times a week on average do you eat food made away from home (restaurant/take-out)?: 0  Meals include: Other (Eats meals various times during the day, no set meals or times.)  Beverages: Water  Has patient met with a dietitian in the past?: No    Being Active:  Being Active Assessed Today: Yes  Exercise:: Currently not exercising  Barrier to exercise: Physical limitation (\"My feet and my back make it difficult.\")    Monitoring:  Monitoring Assessed Today: No  Did patient bring glucose meter to appointment? : Yes  Blood Glucose Meter: Other (\"TrueMetrix\")  Times checking blood sugar at home (number): Never (Will begin to SMBG once daily.)      Taking Medications:  Diabetes Medication(s)     Biguanides       metFORMIN (GLUCOPHAGE) 1000 MG tablet    Take 1 tablet (1,000 mg) by mouth 2 times daily (with meals)          Taking Medication Assessed Today: Yes  Current Treatments: Oral Medication (taken by mouth)  Problems taking diabetes medications regularly?: No  Diabetes medication side effects?: No    Problem Solving:  Problem Solving Assessed Today: Yes  Is the patient at risk for hypoglycemia?: No  Is the patient at risk for DKA?: No    Reducing Risks:  Reducing Risks Assessed Today: Yes  Diabetes Risks: Age over 45 years, Hyperlipidemia, Family History  CAD " Risks: Diabetes Mellitus, Family history, Male sex, Obesity, Tobacco exposure, Stress (Patient has never used tobacco, but wife smokes outside.)  Has dilated eye exam at least once a year?: No  Sees dentist every 6 months?: No  Feet checked by healthcare provider in the last year?: Yes    Healthy Coping:  Healthy Coping Assessed Today: Yes  Emotional response to diabetes: Acceptance, Confidence diabetes can be controlled  Informal Support system:: Spouse, Children, Family, Aminah based, Friends, Neighbors  Stage of change: ACTION (Actively working towards change)  Support resources: Offerings in Clinic Communities  Patient Activation Measure Survey Score:  No flowsheet data found.    Diabetes knowledge and skills assessment:   Patient is knowledgeable in diabetes management concepts related to: Healthy Eating and Taking Medication    Patient needs further education on the following diabetes management concepts: Healthy Eating, Being Active, Monitoring, Problem Solving, Reducing Risks and Healthy Coping    Based on learning assessment above, most appropriate setting for further diabetes education would be: Group class or Individual setting.      INTERVENTIONS:    Education provided today on:  AADE Self-Care Behaviors:  Diabetes Pathophysiology  Healthy Eating: carbohydrate counting, weight reduction and label reading  Being Active: relationship to blood glucose  Monitoring: purpose, proper technique, log and interpret results, individual blood glucose targets, frequency of monitoring, use of glucose control solution and proper sharps disposal  Taking Medication: action of prescribed medication, side effects of prescribed medications and when to take medications  Problem Solving: high blood glucose - causes, signs/symptoms, treatment and prevention and low blood glucose - causes, signs/symptoms, treatment and prevention  Reducing Risks: major complications of diabetes, prevention, early diagnostic measures and  treatment of complications, annual eye exam and A1C - goals, relating to blood glucose levels, how often to check  Healthy Coping: benefits of making appropriate lifestyle changes  Patient was instructed on True Metrix meter and was able to provide an accurate return demonstration. Patient's pre-meal blood glucose reading today was 111 mg/dL.    Opportunities for ongoing education and support in diabetes-self management were discussed.    Pt verbalized understanding of concepts discussed and recommendations provided today.       Education Materials Provided:  My Food Plan, ADA 2019 Nutrition Consensus Report Review, Activity Pyramid, A1c flier, Tips on SMBG, Diabetes Success Plan, DSMS sheet and New T2DM folder.    ASSESSMENT:  Patient newly diagnosed DM2.  Discussed pathophysiology with interpretation and meaning of HgA1c.  Stressed importance of testing BG daily to help keep tight control of DM2, helping to minimize risk for possible complications of uncontrolled diabetes.  Discussed benefits of keeping A1c under 7% and encouraged patient to begin testing BG daily.    Discussed the low carb plan to help decrease weight, improve blood pressure, improve A1c, decrease triglycerides and increase good HDL cholesterol.  These benefits were summarized from the ADA Consensus report in 2019.      Begin carbohydrate counting, low carb plan:  Up to ~ 15 grams with breakfast, 30 grams with lunch and 30 grams with supper.       A key strategy in this plan is, along with medication need, to participate in low to moderate physical activity, such as walking, working up to a minimum of 30 minutes most days, as tolerated.        Patient's most recent   Lab Results   Component Value Date    A1C 6.7 07/15/2021    A1C 9.9 04/01/2021    is meeting goal of <7.0    PLAN  See Patient Instructions for co-developed, patient-stated behavior change goals.  AVS printed and provided to patient today. See Follow-Up section for recommended  follow-up.    Aarti Stewart RN, BSN, [facility]  8/31/2021 8:53 AM   Time Spent: 60 minutes  Encounter Type: Individual    Any diabetes medication dose changes were made via the CDE Protocol and Collaborative Practice Agreement with the patient's primary care provider. A copy of this encounter was shared with the provider.

## 2021-08-30 NOTE — PROGRESS NOTES
"    Assessment & Plan   Problem List Items Addressed This Visit        Endocrine    Diabetes mellitus, type 2 (H) - Primary      Other Visit Diagnoses     Peripheral polyneuropathy        Relevant Orders    Adult Neurology Referral         The diabetes control is really improving nicely, with the weight loss helping to a large extent.  The nerve pains are most likely form the diabetes, however also has lumbar spine disease, will get an EMG to fully evaluate this.  Follow up in 2-3 months.  Is on neurontin and elavil already for his back, which can also help the feet.               BMI:   Estimated body mass index is 38.52 kg/m  as calculated from the following:    Height as of this encounter: 1.715 m (5' 7.5\").    Weight as of this encounter: 113.2 kg (249 lb 9.6 oz).           No follow-ups on file.    Sohail Harris MD  [facility]    Lui Dong is a 61 year old who presents for the following health issues     HPI     Follow up on kidney disease. On his labs 4 weeks ago the Cr was back to normal.  He had a follow up with our diabetes RN this morning, got meter teaching, was 111 today. He is continuing to lose weight, bought a new belt and getting new jeans too.  Down about 50# now.  Nearly no bread now and no potatoes.  He has numbness in his feet.  Burning and tingling bilateral, diffusely. Has had normal B 12 and TSH labs, as of 12/20.  Symptoms progressing, with pain walking on them.  No EMG.  Uses no EtOH at all since he was hospitalized. Prior to this would have just a few beers per month.      Recent Labs   Lab Test 07/15/21  1437 07/15/21  1432 04/01/21  1431 12/28/20  1217 07/15/19  1610 06/18/15  1625   A1C 6.7*  --  9.9*  --   --   --    LDL  --   --   --   --   --  139*   HDL  --   --   --   --   --  38   TRIG  --   --   --   --   --  172*   ALT  --   --  35 34 28  --    CR  --  0.93 0.73 0.81 0.69*  --    GFRESTIMATED  --  87 >90 >90 >90  --    GFRESTBLACK  --   --  >90 >90 " ">90  --    POTASSIUM  --  3.9 4.7 4.8 3.7  --       Current Outpatient Medications   Medication Instructions     acetaminophen (TYLENOL) 500 mg, Oral, EVERY 4 HOURS PRN     amitriptyline (ELAVIL) 100 mg, Oral, AT BEDTIME     atenolol (TENORMIN) 100 MG tablet TAKE 1 TABLET BY MOUTH EVERY DAY     atorvastatin (LIPITOR) 40 mg, Oral, DAILY     blood glucose (NO BRAND SPECIFIED) test strip Use to test blood sugar 1 times daily or as directed.     blood glucose monitoring (NO BRAND SPECIFIED) meter device kit Use to test blood sugar 1 times daily or as directed.     gabapentin (NEURONTIN) 300 mg, Oral, 3 TIMES DAILY     levothyroxine (SYNTHROID/LEVOTHROID) 50 MCG tablet 1 tablet, Oral, DAILY     metFORMIN (GLUCOPHAGE) 1,000 mg, Oral, 2 TIMES DAILY WITH MEALS     naloxone (NARCAN) 4 mg, Alternating Nostrils, ONCE PRN, every 2-3 minutes until assistance arrives     oxyCODONE IR (ROXICODONE) 10 mg, Oral, EVERY 4 HOURS PRN, Fill on/after 11/22/21     oxyCODONE IR (ROXICODONE) 10 mg, Oral, EVERY 4 HOURS PRN, Fill on/after 10/23/21     oxyCODONE IR (ROXICODONE) 10 mg, Oral, EVERY 4 HOURS PRN, Max 5 daily, fill on/after 9/24/21     sertraline (ZOLOFT) 100 mg, Oral, DAILY     TRUEplus Lancets 28G MISC 1 lancet, Subcutaneous, DAILY                         Review of Systems         Objective    /78   Pulse 59   Temp 97.1  F (36.2  C)   Resp 16   Ht 1.715 m (5' 7.5\")   Wt 113.2 kg (249 lb 9.6 oz)   SpO2 97%   BMI 38.52 kg/m    Body mass index is 38.52 kg/m .  Physical Exam  Constitutional:       Appearance: Normal appearance.   Cardiovascular:      Rate and Rhythm: Normal rate and regular rhythm.      Heart sounds: No gallop.    Neurological:      General: No focal deficit present.      Mental Status: He is alert and oriented to person, place, and time.   Psychiatric:         Mood and Affect: Mood normal.         Behavior: Behavior normal.         Thought Content: Thought content normal.      Sensory exam of the foot is " abnormal to heels, tested with the monofilament. Good pulses, no lesions or ulcers, good peripheral pulses.

## 2021-08-30 NOTE — NURSING NOTE
Patient here for Work Comp follow up with back. Pain is worse today. Medication Reconciliation: complete.    Portia Ramírez LPN  8/30/2021 11:08 AM

## 2021-08-30 NOTE — PROGRESS NOTES
"    Assessment & Plan   Problem List Items Addressed This Visit        Nervous and Auditory    Back pain, chronic    Relevant Medications    oxyCODONE IR (ROXICODONE) 10 MG tablet    oxyCODONE IR (ROXICODONE) 10 MG tablet    oxyCODONE IR (ROXICODONE) 10 MG tablet    Failed back surgical syndrome - Primary    Relevant Medications    oxyCODONE IR (ROXICODONE) 10 MG tablet    oxyCODONE IR (ROXICODONE) 10 MG tablet    oxyCODONE IR (ROXICODONE) 10 MG tablet         This is a stable condition.  Overall pain roughly unchanged. Discussed with him ongoing goals of care, continued mobility especially.  Refilled oxycodone with a follow up in 3 months.             BMI:   Estimated body mass index is 38.52 kg/m  as calculated from the following:    Height as of this encounter: 1.715 m (5' 7.5\").    Weight as of this encounter: 113.2 kg (249 lb 9.6 oz).           No follow-ups on file.    Sohail Harris MD  [facility]   Iker is a 61 year old who presents for the following health issues     HPI follow up on WC back injury. Back is a little more painful lately.  Has stopped the tramadol and NSAIDS due to kidney disease, which he feels is causing the worsening pain.  Pain is in lower lumbar area only.  He had injections with nearly no relief, last spring.  Waiting to hear back from the surgeon now for a likely lumbar fusion.pain is lessened with standing, worse with laying flat.     Last tox screen was as expected on 6/29/21.   reviewed and stable.    Current Outpatient Medications:      acetaminophen (TYLENOL) 500 MG tablet, Take 500 mg by mouth every 4 hours as needed , Disp: , Rfl:      amitriptyline (ELAVIL) 50 MG tablet, Take 2 tablets (100 mg) by mouth At Bedtime, Disp: 60 tablet, Rfl: 10     atenolol (TENORMIN) 100 MG tablet, TAKE 1 TABLET BY MOUTH EVERY DAY, Disp: 90 tablet, Rfl: 3     blood glucose (NO BRAND SPECIFIED) test strip, Use to test blood sugar 1 times daily or as directed., " "Disp: 90 strip, Rfl: 4     blood glucose monitoring (NO BRAND SPECIFIED) meter device kit, Use to test blood sugar 1 times daily or as directed., Disp: 1 kit, Rfl: 0     gabapentin (NEURONTIN) 300 MG capsule, TAKE 1 CAPSULE (300 MG) BY MOUTH 3 TIMES DAILY, Disp: 270 capsule, Rfl: 3     levothyroxine (SYNTHROID/LEVOTHROID) 50 MCG tablet, Take 1 tablet by mouth daily, Disp: , Rfl:      metFORMIN (GLUCOPHAGE) 1000 MG tablet, Take 1 tablet (1,000 mg) by mouth 2 times daily (with meals), Disp: 180 tablet, Rfl: 3     naloxone (NARCAN) 4 MG/0.1ML nasal spray, Spray 1 spray (4 mg) into one nostril alternating nostrils once as needed for opioid reversal every 2-3 minutes until assistance arrives, Disp: 0.2 mL, Rfl: 3     oxyCODONE IR (ROXICODONE) 10 MG tablet, Take 1 tablet (10 mg) by mouth every 4 hours as needed for severe pain Fill on/after 11/22/21, Disp: 150 tablet, Rfl: 0     oxyCODONE IR (ROXICODONE) 10 MG tablet, Take 1 tablet (10 mg) by mouth every 4 hours as needed for severe pain Fill on/after 10/23/21, Disp: 150 tablet, Rfl: 0     oxyCODONE IR (ROXICODONE) 10 MG tablet, Take 1 tablet (10 mg) by mouth every 4 hours as needed for severe pain Max 5 daily, fill on/after 9/24/21, Disp: 150 tablet, Rfl: 0     sertraline (ZOLOFT) 100 MG tablet, TAKE 1 TABLET (100 MG) BY MOUTH DAILY, Disp: 90 tablet, Rfl: 3     TRUEplus Lancets 28G MISC, Inject 1 lancet Subcutaneous daily, Disp: 100 each, Rfl: 4     atorvastatin (LIPITOR) 40 MG tablet, Take 1 tablet (40 mg) by mouth daily (Patient not taking: Reported on 8/30/2021), Disp: 90 tablet, Rfl: 3            Review of Systems         Objective    /78   Pulse 59   Temp 97.1  F (36.2  C)   Resp 16   Ht 1.715 m (5' 7.5\")   Wt 113.2 kg (249 lb 9.6 oz)   SpO2 97%   BMI 38.52 kg/m    Body mass index is 38.52 kg/m .  Physical Exam  Constitutional:       Appearance: Normal appearance.   Musculoskeletal:      Comments: some lumbar pain on palpation now worse in the right " L4/5 facet area, negative straight leg raise.    Neurological:      Mental Status: He is alert.   Psychiatric:         Mood and Affect: Mood normal.         Behavior: Behavior normal.

## 2021-09-18 ENCOUNTER — HEALTH MAINTENANCE LETTER (OUTPATIENT)
Age: 61
End: 2021-09-18

## 2021-09-23 DIAGNOSIS — E03.9 HYPOTHYROIDISM, UNSPECIFIED TYPE: Primary | ICD-10-CM

## 2021-09-23 NOTE — TELEPHONE ENCOUNTER
"[id number] sent Rx request for the following:   levothyroxine (SYNTHROID/LEVOTHROID) 50 MCG tablet  SigTake 1 tablet (50 mcg) by mouth daily    Last Prescription Date:   07/09/2021 (Historical)  Last Fill Qty/Refills:         0, R-0    Last Office Visit:              08/30/2021 ([facility])   Future Office visit:           11/30/2021 ([facility])   Thyroid Protocol Passed - 9/23/2021  3:56 PM        Passed - Patient is 12 years or older        Passed - Recent (12 mo) or future (30 days) visit within the authorizing provider's specialty     Patient has had an office visit with the authorizing provider or a provider within the authorizing providers department within the previous 12 mos or has a future within next 30 days. See \"Patient Info\" tab in inbasket, or \"Choose Columns\" in Meds & Orders section of the refill encounter.              Passed - Medication is active on med list        Passed - Normal TSH on file in past 12 months     No lab results found.              Unable to complete prescription refill per RN Medication Refill Policy.................... Elizabeth Son RN ....................  9/23/2021   4:00 PM        "

## 2021-09-27 RX ORDER — LEVOTHYROXINE SODIUM 50 UG/1
50 TABLET ORAL DAILY
Qty: 90 TABLET | Refills: 3 | Status: SHIPPED | OUTPATIENT
Start: 2021-09-27 | End: 2022-02-28

## 2021-11-13 ENCOUNTER — HEALTH MAINTENANCE LETTER (OUTPATIENT)
Age: 61
End: 2021-11-13

## 2021-11-19 DIAGNOSIS — G89.29 CHRONIC RIGHT-SIDED LOW BACK PAIN WITHOUT SCIATICA: ICD-10-CM

## 2021-11-19 DIAGNOSIS — M54.50 CHRONIC RIGHT-SIDED LOW BACK PAIN WITHOUT SCIATICA: ICD-10-CM

## 2021-11-19 RX ORDER — OXYCODONE HYDROCHLORIDE 10 MG/1
10 TABLET ORAL EVERY 4 HOURS PRN
Qty: 10 TABLET | Refills: 0 | Status: SHIPPED | OUTPATIENT
Start: 2021-11-19 | End: 2021-12-01

## 2021-11-19 NOTE — TELEPHONE ENCOUNTER
Reason for call: Medication or medication refill    Name of medication requested: Oxicodone       Are you out of the medication? Yes out yesterday     What pharmacy do you use? [facility]     Preferred method for responding to this message: call    Phone number patient can be reached at: [phone]    If we cannot reach you directly, may we leave a detailed response at the number you provided? Yes       Eliza Benites on 11/19/2021 at 10:42 AM

## 2021-11-19 NOTE — TELEPHONE ENCOUNTER
Call returned to patient. Verification of name and . Discussed notation as written from provider below. Patient verbalized understanding and has no further questions or concerns at this time. Elizabeth Son RN ....................  2021   11:25 AM

## 2021-11-19 NOTE — TELEPHONE ENCOUNTER
Call returned to patient, verification of name and . Patient states has run out of Oxycodone as of 2021. States is taking more than prescribed due to increased pain in back since 2021, taking 6 per day. Noted Rx is for 1 tablet is written for every four hours, or 6 per day. Patient states is only taking up to 6 per day, no more than 6 and has request for 10 tablet total Rx to get through weekend until next prescription can be filled 2021. Discussed that PCP is out of office. Patient requests covering provider review and consider request at this time. Routing for review and consideration. Unable to complete prescription refill per RN Medication Refill Policy.................... Elizabeth Son RN ....................  2021   11:15 AM

## 2021-11-19 NOTE — TELEPHONE ENCOUNTER
Will fill small amount to get through the weekend.  Any further refills need to be addressed with the patient and his primary physician on Monday.

## 2021-11-22 ENCOUNTER — OFFICE VISIT (OUTPATIENT)
Dept: ORTHOPEDICS | Facility: OTHER | Age: 61
End: 2021-11-22
Attending: ORTHOPAEDIC SURGERY
Payer: MEDICARE

## 2021-11-22 DIAGNOSIS — M25.511 RIGHT SHOULDER PAIN, UNSPECIFIED CHRONICITY: Primary | ICD-10-CM

## 2021-11-22 PROCEDURE — G0463 HOSPITAL OUTPT CLINIC VISIT: HCPCS | Mod: 25

## 2021-11-22 PROCEDURE — G0463 HOSPITAL OUTPT CLINIC VISIT: HCPCS

## 2021-11-22 PROCEDURE — 250N000009 HC RX 250: Performed by: ORTHOPAEDIC SURGERY

## 2021-11-22 PROCEDURE — 20610 DRAIN/INJ JOINT/BURSA W/O US: CPT | Mod: RT | Performed by: ORTHOPAEDIC SURGERY

## 2021-11-22 PROCEDURE — 250N000011 HC RX IP 250 OP 636: Performed by: ORTHOPAEDIC SURGERY

## 2021-11-22 PROCEDURE — 99213 OFFICE O/P EST LOW 20 MIN: CPT | Mod: 25 | Performed by: ORTHOPAEDIC SURGERY

## 2021-11-22 RX ORDER — TRIAMCINOLONE ACETONIDE 40 MG/ML
40 INJECTION, SUSPENSION INTRA-ARTICULAR; INTRAMUSCULAR ONCE
Status: COMPLETED | OUTPATIENT
Start: 2021-11-22 | End: 2021-11-22

## 2021-11-22 RX ORDER — LIDOCAINE HYDROCHLORIDE 10 MG/ML
4 INJECTION, SOLUTION EPIDURAL; INFILTRATION; INTRACAUDAL; PERINEURAL ONCE
Status: COMPLETED | OUTPATIENT
Start: 2021-11-22 | End: 2021-11-22

## 2021-11-22 RX ADMIN — TRIAMCINOLONE ACETONIDE 40 MG: 40 INJECTION, SUSPENSION INTRA-ARTICULAR; INTRAMUSCULAR at 12:27

## 2021-11-22 RX ADMIN — LIDOCAINE HYDROCHLORIDE 4 ML: 10 INJECTION, SOLUTION EPIDURAL; INFILTRATION; INTRACAUDAL; PERINEURAL at 12:27

## 2021-11-22 NOTE — PROGRESS NOTES
Patient is here for follow up on his right shoulder.   Marla Cassidy LPN .....................11/22/2021 12:17 PM

## 2021-11-22 NOTE — PROGRESS NOTES
Visit Date: 2021    CHIEF COMPLAINT:  Right shoulder pain.    HISTORY OF PRESENT ILLNESS:  Garrick Rowell is a 61-year-old gentleman with left shoulder rotator cuff tear arthropathy.  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  He went about 2 years after his fall though, just did not get his shoulder fixed and that has probably led to the rotator cuff tear arthropathy that he had as he probably suffered a very large rotator cuff tear at the time of the fall and then just did not have it fixed.    Regardless, we scoped the shoulder, found that he had rotator cuff tear arthropathy and at this point, we have just been managing him with injections into his shoulder until it is time to do a reverse total shoulder.    PHYSICAL EXAMINATION:  On examination today, he still has near full range of motion of the shoulder, but his shoulder will catch and give him a significant amount of pain, and he says this is his biggest issue.  Otherwise, his shoulder is completely benign.  He has severe weakness with supraspinatus and infraspinatus testing.    IMPRESSION AND PLAN:  A 61-year-old gentleman with right shoulder rotator cuff tear arthropathy.  We have given him a repeat injection today.  He tolerated it well and had good relief of his pain.  We will see him back on an as-needed basis.    Jaime Dean MD        D: 2021   T: 2021   MT: SARA    Name:     GRARICK ROWELL  MRN:      [id number]        Account:    [id number]   :      1960           Visit Date: 2021     Document: [id number]

## 2021-11-22 NOTE — PROGRESS NOTES
A steroid injection was performed at R shoulder using 1% plain Lidocaine and 40 mg of Kenalog. This was well tolerated.

## 2021-12-01 ENCOUNTER — OFFICE VISIT (OUTPATIENT)
Dept: FAMILY MEDICINE | Facility: OTHER | Age: 61
End: 2021-12-01
Attending: FAMILY MEDICINE
Payer: OTHER MISCELLANEOUS

## 2021-12-01 VITALS
BODY MASS INDEX: 40.86 KG/M2 | WEIGHT: 264.8 LBS | SYSTOLIC BLOOD PRESSURE: 132 MMHG | RESPIRATION RATE: 16 BRPM | DIASTOLIC BLOOD PRESSURE: 72 MMHG | HEART RATE: 60 BPM | OXYGEN SATURATION: 97 % | TEMPERATURE: 98 F

## 2021-12-01 DIAGNOSIS — M54.50 CHRONIC RIGHT-SIDED LOW BACK PAIN WITHOUT SCIATICA: Primary | ICD-10-CM

## 2021-12-01 DIAGNOSIS — G89.29 CHRONIC RIGHT-SIDED LOW BACK PAIN WITHOUT SCIATICA: Primary | ICD-10-CM

## 2021-12-01 DIAGNOSIS — G62.9 PERIPHERAL POLYNEUROPATHY: ICD-10-CM

## 2021-12-01 DIAGNOSIS — F11.90 CHRONIC, CONTINUOUS USE OF OPIOIDS: ICD-10-CM

## 2021-12-01 LAB — CREAT UR-MCNC: 208 MG/DL

## 2021-12-01 PROCEDURE — 80307 DRUG TEST PRSMV CHEM ANLYZR: CPT | Mod: ZL | Performed by: FAMILY MEDICINE

## 2021-12-01 PROCEDURE — 99213 OFFICE O/P EST LOW 20 MIN: CPT | Performed by: FAMILY MEDICINE

## 2021-12-01 RX ORDER — OXYCODONE HYDROCHLORIDE 10 MG/1
10 TABLET ORAL EVERY 4 HOURS PRN
Qty: 10 TABLET | Refills: 0 | Status: SHIPPED | OUTPATIENT
Start: 2021-12-01 | End: 2021-12-16

## 2021-12-01 RX ORDER — OXYCODONE HYDROCHLORIDE 10 MG/1
10 TABLET ORAL EVERY 4 HOURS PRN
Qty: 150 TABLET | Refills: 0 | Status: SHIPPED | OUTPATIENT
Start: 2022-01-28 | End: 2022-02-28

## 2021-12-01 RX ORDER — GABAPENTIN 400 MG/1
400 CAPSULE ORAL 3 TIMES DAILY
Qty: 270 CAPSULE | Refills: 4 | Status: SHIPPED | OUTPATIENT
Start: 2021-12-01 | End: 2022-08-26

## 2021-12-01 RX ORDER — OXYCODONE HYDROCHLORIDE 10 MG/1
10 TABLET ORAL EVERY 4 HOURS PRN
Qty: 150 TABLET | Refills: 0 | Status: SHIPPED | OUTPATIENT
Start: 2021-12-30 | End: 2022-02-28

## 2021-12-01 RX ORDER — LISINOPRIL 20 MG/1
20 TABLET ORAL DAILY
COMMUNITY
Start: 2021-11-26 | End: 2022-02-28

## 2021-12-01 ASSESSMENT — ANXIETY QUESTIONNAIRES

## 2021-12-01 ASSESSMENT — PATIENT HEALTH QUESTIONNAIRE - PHQ9: 5. POOR APPETITE OR OVEREATING: NOT AT ALL

## 2021-12-01 ASSESSMENT — PAIN SCALES - GENERAL: PAINLEVEL: MODERATE PAIN (5)

## 2021-12-01 NOTE — NURSING NOTE
"Coming in for a f/u on work comp    Chief Complaint   Patient presents with     Work Comp     f/u       Initial /72   Pulse 60   Temp 98  F (36.7  C)   Resp 16   Wt 120.1 kg (264 lb 12.8 oz)   SpO2 97%   BMI 40.86 kg/m   Estimated body mass index is 40.86 kg/m  as calculated from the following:    Height as of 8/30/21: 1.715 m (5' 7.5\").    Weight as of this encounter: 120.1 kg (264 lb 12.8 oz).  Medication Reconciliation: complete.  FOOD SECURITY SCREENING QUESTIONS  Hunger Vital Signs:  Within the past 12 months we worried whether our food would run out before we got money to buy more. Never  Within the past 12 months the food we bought just didn't last and we didn't have money to get more. Never  Sravani Robles LPN 12/1/2021 9:08 AM      Sravani Robles LPN  "

## 2021-12-01 NOTE — PROGRESS NOTES
"  Assessment & Plan   Problem List Items Addressed This Visit        Nervous and Auditory    Back pain, chronic - Primary    Relevant Medications    oxyCODONE IR (ROXICODONE) 10 MG tablet (Start on 1/28/2022)    oxyCODONE IR (ROXICODONE) 10 MG tablet (Start on 12/30/2021)    oxyCODONE IR (ROXICODONE) 10 MG tablet    gabapentin (NEURONTIN) 400 MG capsule    Other Relevant Orders    Drug Confirmation Panel Urine with Creat    Chronic, continuous use of opioids      Other Visit Diagnoses     Peripheral polyneuropathy        Relevant Medications    gabapentin (NEURONTIN) 400 MG capsule    Other Relevant Orders    Adult Neurology Referral         The neuropathy is presumably from the back injury, EMG will help determine this further. Has had a medical work up, addressed in on WC note.  Increased the neurontin to 400 milligram three times a day from 300 milligram three times a day.             BMI:   Estimated body mass index is 40.86 kg/m  as calculated from the following:    Height as of 8/30/21: 1.715 m (5' 7.5\").    Weight as of this encounter: 120.1 kg (264 lb 12.8 oz).           Return in about 3 months (around 3/1/2022).    Sohail Harris MD  [facility]   Iker is a 61 year old who presents for the following health issues     HPI     Pain History:  When did you first notice your pain? - More than 6 weeks   Have you seen this provider for your pain in the past?   Yes   Where in your body do you have pain? Low back and right shoulder  Are you seeing anyone else for your pain? No    PHQ-9 SCORE 6/29/2021 7/29/2021 12/1/2021   PHQ-9 Total Score 3 0 0       VISHNU-7 SCORE 6/29/2021 7/29/2021 12/1/2021   Total Score 1 0 0         PEG Score 12/1/2021   PEG Total Score 6       Chronic Pain Follow Up:    Location of pain: back  Analgesia/pain control:    - Recent changes:  Cut the tramadol and ibuprofen    - Overall control: Tolerable with discomfort    - Current treatments: awaiting an EMG, " seeing ortho for the shoulder, likely replacement soon   Adherence:     - Do you ever take more pain medicine than prescribed? Yes: has been using 6 daily this month, with stopping ibuprofen ant tramadol    - When did you take your last dose of pain medicine?  6:00   Adverse effects: No   PDMP Review       Value Time User    State PDMP site checked  Yes 12/1/2021  9:18 AM Sohail Harris MD        Last CSA Agreement:   CSA -- Patient Level:    Controlled Substance Agreement - Opioid - Scan on 5/6/2021 10:06 AM: CONTROLLED SUBSTANCE AGREEMENT OPIOID       Last UDS: 7/2/2021            Review of Systems         He is mostly bothered now buy pain in both feet, nerve type. EMG had been ordered, but for unclear reasons this has been hung up.  This is progressing, without much relief from the oxycodone, even when he has taken an additional dose.      Objective    /72   Pulse 60   Temp 98  F (36.7  C)   Resp 16   Wt 120.1 kg (264 lb 12.8 oz)   SpO2 97%   BMI 40.86 kg/m    Body mass index is 40.86 kg/m .  Physical Exam  Constitutional:       Appearance: Normal appearance.   Musculoskeletal:      Comments: Negative straight leg raise today, no significant back pain on palpation.     Neurological:      Mental Status: He is alert.   Psychiatric:         Mood and Affect: Mood normal.         Behavior: Behavior normal.         Thought Content: Thought content normal.

## 2021-12-02 ASSESSMENT — ANXIETY QUESTIONNAIRES: GAD7 TOTAL SCORE: 0

## 2021-12-02 ASSESSMENT — PATIENT HEALTH QUESTIONNAIRE - PHQ9: SUM OF ALL RESPONSES TO PHQ QUESTIONS 1-9: 0

## 2021-12-06 LAB
GABAPENTIN UR QL CFM: PRESENT
OXYCODONE UR CFM-MCNC: 4520 NG/ML
OXYCODONE/CREAT UR: 2173 NG/MG {CREAT}
OXYMORPHONE UR CFM-MCNC: >7000 NG/ML
OXYMORPHONE/CREAT UR: ABNORMAL

## 2021-12-16 ENCOUNTER — TELEPHONE (OUTPATIENT)
Dept: FAMILY MEDICINE | Facility: OTHER | Age: 61
End: 2021-12-16
Payer: MEDICARE

## 2021-12-16 ENCOUNTER — OFFICE VISIT (OUTPATIENT)
Dept: NEUROLOGY | Facility: OTHER | Age: 61
End: 2021-12-16
Attending: PSYCHIATRY & NEUROLOGY
Payer: OTHER MISCELLANEOUS

## 2021-12-16 VITALS
BODY MASS INDEX: 40.83 KG/M2 | OXYGEN SATURATION: 96 % | HEART RATE: 73 BPM | WEIGHT: 269.4 LBS | TEMPERATURE: 97.5 F | HEIGHT: 68 IN | SYSTOLIC BLOOD PRESSURE: 142 MMHG | RESPIRATION RATE: 20 BRPM | DIASTOLIC BLOOD PRESSURE: 80 MMHG

## 2021-12-16 DIAGNOSIS — M54.50 CHRONIC RIGHT-SIDED LOW BACK PAIN WITHOUT SCIATICA: ICD-10-CM

## 2021-12-16 DIAGNOSIS — G89.29 CHRONIC RIGHT-SIDED LOW BACK PAIN WITHOUT SCIATICA: ICD-10-CM

## 2021-12-16 DIAGNOSIS — E11.42 DIABETIC POLYNEUROPATHY ASSOCIATED WITH TYPE 2 DIABETES MELLITUS (H): Primary | ICD-10-CM

## 2021-12-16 PROCEDURE — 95886 MUSC TEST DONE W/N TEST COMP: CPT | Performed by: PSYCHIATRY & NEUROLOGY

## 2021-12-16 PROCEDURE — 99204 OFFICE O/P NEW MOD 45 MIN: CPT | Mod: 25 | Performed by: PSYCHIATRY & NEUROLOGY

## 2021-12-16 PROCEDURE — 95913 NRV CNDJ TEST 13/> STUDIES: CPT | Performed by: PSYCHIATRY & NEUROLOGY

## 2021-12-16 ASSESSMENT — PAIN SCALES - GENERAL: PAINLEVEL: SEVERE PAIN (6)

## 2021-12-16 ASSESSMENT — MIFFLIN-ST. JEOR: SCORE: 1983.55

## 2021-12-16 NOTE — NURSING NOTE
"Chief Complaint   Patient presents with     NEUROPATHY     JULIAN lower periph polyneuropathy       Initial BP (!) 142/80 (BP Location: Right arm, Patient Position: Sitting, Cuff Size: Adult Large)   Pulse 73   Temp 97.5  F (36.4  C) (Tympanic)   Resp 20   Ht 1.715 m (5' 7.5\")   Wt 122.2 kg (269 lb 6.4 oz)   SpO2 96%   BMI 41.57 kg/m   Estimated body mass index is 41.57 kg/m  as calculated from the following:    Height as of this encounter: 1.715 m (5' 7.5\").    Weight as of this encounter: 122.2 kg (269 lb 6.4 oz).  Medication Reconciliation: complete    Cherelle Villa LPN    Advance Care Directive reviewed    "

## 2021-12-16 NOTE — LETTER
12/16/2021         RE: kIer Young  [id number] [facility] [address]        Dear Colleague,    Thank you for referring your patient, Iker Young, to the [facility]. Please see a copy of my visit note below.    Visit Date: 12/16/2021    NEURODIAGNOSTICS CONSULTATION    REFERRING PHYSICIAN:  Sohail Harris MD    HISTORY OF PRESENT ILLNESS:  The patient is a 61-year-old who relates a history of chronic low back pain.  For about a year, he has had slowly increasing symptoms of dysesthesia in the toes of both feet.  Symptoms are symmetric and not clearly affected by position or activity.    The past medical history is significant for diabetes, diagnosed about 6 months ago.  The patient has had multiple surgical procedures on the lumbar spine, but none within the last decade, he relates.  Details are not available.    A 10-system review of systems other than the above is unremarkable.    FAMILY HISTORY:  Positive for diabetes diagnosed for the patient's son and mother.    SOCIAL HISTORY:  The patient has been on disability since about age 42 years, this apparently relates to a fall off a scaffolding and ongoing complaints of back pain.  He is on daily narcotic medication.    PHYSICAL EXAMINATION:  CONSTITUTIONAL:  The patient is 67 inches tall and weighs 270 pounds.  He is morbidly obese.  Blood pressure is 142/80.  NEUROLOGIC:  Reflexes are absent at the ankles and trace at the knees.  Vibratory extinction is 7-8 seconds at each ankle.  Cold sensation is reduced in a stocking distribution bilaterally.  Strength is preserved in the lower extremities.  Gait is consistent with body habitus.    NERVE CONDUCTION STUDIES:  Motor nerve conduction testing was performed bilaterally for the peroneal and tibial nerves.  Distal latencies, amplitudes and conduction velocities were normal.  H reflexes were absent throughout, but this appeared to be the result of excessive adipose at the  recording sites.    Antidromic sensory nerve conduction studies were performed bilaterally for the sural, peroneal and saphenous nerves.  Waveforms were absent throughout.    Sympathetic skin response testing was performed stimulating at the right knee and recording from the plantar aspect of the foot.  No waveform was produced.    MONOPOLAR EMG NEEDLE EXAMINATION:  Monopolar EMG needle examination was performed for the bilateral vastus lateralis, tibialis anterior, gastrocnemius, extensor hallucis longus, and abductor hallucis.  All of the tested muscles showed normal insertional activity.  Motor units were normal in size with normal recruitment and interference.    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  Though the patient was only diagnosed with diabetes about 6 months ago and he relates foot symptoms for about a year, it is most likely that the neuropathy is secondary to diabetes.  This particular pattern is usually associated with either diabetes or chronic alcohol abuse, a condition that does not apply to this patient.    Importantly, there is no evidence on physical exam or EMG to indicate the presence of superimposed radiculopathy at any level from L3 through S1 bilaterally.    Alexander De La Torre MD        D: 2021   T: 2021   MT: [id number]    Name:     GARRICK ROWELL  MRN:      [id number]        Account:    [id number]   :      1960           Visit Date: 2021     Document: [id number]      Again, thank you for allowing me to participate in the care of your patient.        Sincerely,        Alexander De La Torre MD

## 2021-12-16 NOTE — TELEPHONE ENCOUNTER
Iker stopped by the clinic today to ask about his oxycodone prescription. He said each refill is only 10 pills, and has been taking 5 per day. Wants to know if the prescription could be updated for a higher amount of pills.      Eliana Augustin on 12/16/2021 at 2:10 PM

## 2021-12-16 NOTE — PROGRESS NOTES
Visit Date: 12/16/2021    NEURODIAGNOSTICS CONSULTATION    REFERRING PHYSICIAN:  Sohail Harris MD    HISTORY OF PRESENT ILLNESS:  The patient is a 61-year-old who relates a history of chronic low back pain.  For about a year, he has had slowly increasing symptoms of dysesthesia in the toes of both feet.  Symptoms are symmetric and not clearly affected by position or activity.    The past medical history is significant for diabetes, diagnosed about 6 months ago.  The patient has had multiple surgical procedures on the lumbar spine, but none within the last decade, he relates.  Details are not available.    A 10-system review of systems other than the above is unremarkable.    FAMILY HISTORY:  Positive for diabetes diagnosed for the patient's son and mother.    SOCIAL HISTORY:  The patient has been on disability since about age 42 years, this apparently relates to a fall off a scaffolding and ongoing complaints of back pain.  He is on daily narcotic medication.    PHYSICAL EXAMINATION:  CONSTITUTIONAL:  The patient is 67 inches tall and weighs 270 pounds.  He is morbidly obese.  Blood pressure is 142/80.  NEUROLOGIC:  Reflexes are absent at the ankles and trace at the knees.  Vibratory extinction is 7-8 seconds at each ankle.  Cold sensation is reduced in a stocking distribution bilaterally.  Strength is preserved in the lower extremities.  Gait is consistent with body habitus.    NERVE CONDUCTION STUDIES:  Motor nerve conduction testing was performed bilaterally for the peroneal and tibial nerves.  Distal latencies, amplitudes and conduction velocities were normal.  H reflexes were absent throughout, but this appeared to be the result of excessive adipose at the recording sites.    Antidromic sensory nerve conduction studies were performed bilaterally for the sural, peroneal and saphenous nerves.  Waveforms were absent throughout.    Sympathetic skin response testing was performed stimulating at the right knee and  recording from the plantar aspect of the foot.  No waveform was produced.    MONOPOLAR EMG NEEDLE EXAMINATION:  Monopolar EMG needle examination was performed for the bilateral vastus lateralis, tibialis anterior, gastrocnemius, extensor hallucis longus, and abductor hallucis.  All of the tested muscles showed normal insertional activity.  Motor units were normal in size with normal recruitment and interference.    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  Though the patient was only diagnosed with diabetes about 6 months ago and he relates foot symptoms for about a year, it is most likely that the neuropathy is secondary to diabetes.  This particular pattern is usually associated with either diabetes or chronic alcohol abuse, a condition that does not apply to this patient.    Importantly, there is no evidence on physical exam or EMG to indicate the presence of superimposed radiculopathy at any level from L3 through S1 bilaterally.    Alexander De La Torre MD        D: 2021   T: 2021   MT: [id number]    Name:     GARRICK ROWELL  MRN:      [id number]        Account:    [id number]   :      1960           Visit Date: 2021     Document: [id number]

## 2021-12-16 NOTE — TELEPHONE ENCOUNTER
Iker stopped by the clinic today to ask about his oxycodone prescription. He said each refill is only 10 pills, and has been taking 5 per day. Wants to know if the prescription could be updated for a higher amount of pills.        Eliana Augustin on 12/16/2021 at 2:10 PM     (I apologize for the multiple notes. It kept signing when I attempted to route the message.)

## 2021-12-17 RX ORDER — OXYCODONE HYDROCHLORIDE 10 MG/1
10 TABLET ORAL EVERY 4 HOURS PRN
Qty: 150 TABLET | Refills: 0 | Status: SHIPPED | OUTPATIENT
Start: 2021-12-17 | End: 2022-02-28

## 2021-12-20 RX ORDER — OXYCODONE HYDROCHLORIDE 10 MG/1
TABLET ORAL
Qty: 150 TABLET | Refills: 0 | OUTPATIENT
Start: 2021-12-20

## 2021-12-20 NOTE — TELEPHONE ENCOUNTER
Note from pharmacy: NEED AN RX FOR QTY , DO NOT HAVE ONE TO USE UNTIL 12/30/21    oxyCODONE IR (ROXICODONE) 10 MG tablet 150 tablet 0 12/17/2021  No   Sig - Route: Take 1 tablet (10 mg) by mouth every 4 hours as needed for severe pain - Oral   Sent to pharmacy as: oxyCODONE HCl 10 MG Oral Tablet (ROXICODONE)   Class: E-Prescribe   Earliest Fill Date: 12/17/2021   Order: [id number]   E-Prescribing Status: Receipt confirmed by pharmacy (12/17/2021  7:49 AM CST)     Redundant refill request refused: Too soon:    Unable to complete prescription refill per RN Medication Refill Policy.................... Elizabeth Son RN ....................  12/20/2021   11:17 AM

## 2022-01-02 ENCOUNTER — HEALTH MAINTENANCE LETTER (OUTPATIENT)
Age: 62
End: 2022-01-02

## 2022-02-27 ENCOUNTER — HEALTH MAINTENANCE LETTER (OUTPATIENT)
Age: 62
End: 2022-02-27

## 2022-02-28 ENCOUNTER — OFFICE VISIT (OUTPATIENT)
Dept: FAMILY MEDICINE | Facility: OTHER | Age: 62
End: 2022-02-28
Attending: FAMILY MEDICINE
Payer: MEDICARE

## 2022-02-28 ENCOUNTER — OFFICE VISIT (OUTPATIENT)
Dept: ORTHOPEDICS | Facility: OTHER | Age: 62
End: 2022-02-28
Attending: ORTHOPAEDIC SURGERY
Payer: MEDICARE

## 2022-02-28 VITALS
HEART RATE: 67 BPM | BODY MASS INDEX: 42.4 KG/M2 | TEMPERATURE: 97.9 F | RESPIRATION RATE: 14 BRPM | WEIGHT: 274.8 LBS | SYSTOLIC BLOOD PRESSURE: 134 MMHG | DIASTOLIC BLOOD PRESSURE: 78 MMHG | OXYGEN SATURATION: 97 %

## 2022-02-28 VITALS
BODY MASS INDEX: 42.4 KG/M2 | SYSTOLIC BLOOD PRESSURE: 134 MMHG | TEMPERATURE: 97.9 F | HEART RATE: 67 BPM | DIASTOLIC BLOOD PRESSURE: 78 MMHG | RESPIRATION RATE: 14 BRPM | WEIGHT: 274.8 LBS | OXYGEN SATURATION: 97 %

## 2022-02-28 DIAGNOSIS — E11.9 TYPE 2 DIABETES MELLITUS WITHOUT COMPLICATION, WITHOUT LONG-TERM CURRENT USE OF INSULIN (H): ICD-10-CM

## 2022-02-28 DIAGNOSIS — E03.9 HYPOTHYROIDISM, UNSPECIFIED TYPE: ICD-10-CM

## 2022-02-28 DIAGNOSIS — E11.9 TYPE 2 DIABETES MELLITUS WITHOUT COMPLICATION, WITHOUT LONG-TERM CURRENT USE OF INSULIN (H): Primary | ICD-10-CM

## 2022-02-28 DIAGNOSIS — G89.29 CHRONIC RIGHT-SIDED LOW BACK PAIN WITHOUT SCIATICA: ICD-10-CM

## 2022-02-28 DIAGNOSIS — I10 ESSENTIAL HYPERTENSION: ICD-10-CM

## 2022-02-28 DIAGNOSIS — M54.50 CHRONIC RIGHT-SIDED LOW BACK PAIN WITHOUT SCIATICA: ICD-10-CM

## 2022-02-28 DIAGNOSIS — F43.29 GRIEF REACTION WITH PROLONGED BEREAVEMENT: ICD-10-CM

## 2022-02-28 DIAGNOSIS — M25.511 RIGHT SHOULDER PAIN, UNSPECIFIED CHRONICITY: Primary | ICD-10-CM

## 2022-02-28 LAB
CREAT UR-MCNC: 115 MG/DL
HBA1C MFR BLD: 7 % (ref 4–6.2)
MICROALBUMIN UR-MCNC: 70 MG/L
MICROALBUMIN/CREAT UR: 60.87 MG/G CR (ref 0–17)

## 2022-02-28 PROCEDURE — 99214 OFFICE O/P EST MOD 30 MIN: CPT | Performed by: FAMILY MEDICINE

## 2022-02-28 PROCEDURE — 99213 OFFICE O/P EST LOW 20 MIN: CPT | Performed by: FAMILY MEDICINE

## 2022-02-28 PROCEDURE — 20610 DRAIN/INJ JOINT/BURSA W/O US: CPT | Mod: RT

## 2022-02-28 PROCEDURE — 20610 DRAIN/INJ JOINT/BURSA W/O US: CPT | Mod: RT | Performed by: ORTHOPAEDIC SURGERY

## 2022-02-28 PROCEDURE — 83036 HEMOGLOBIN GLYCOSYLATED A1C: CPT | Mod: ZL | Performed by: FAMILY MEDICINE

## 2022-02-28 PROCEDURE — 250N000011 HC RX IP 250 OP 636: Performed by: ORTHOPAEDIC SURGERY

## 2022-02-28 PROCEDURE — G0463 HOSPITAL OUTPT CLINIC VISIT: HCPCS

## 2022-02-28 PROCEDURE — 36415 COLL VENOUS BLD VENIPUNCTURE: CPT | Mod: ZL | Performed by: FAMILY MEDICINE

## 2022-02-28 PROCEDURE — G0463 HOSPITAL OUTPT CLINIC VISIT: HCPCS | Mod: 25

## 2022-02-28 PROCEDURE — 82043 UR ALBUMIN QUANTITATIVE: CPT | Mod: ZL | Performed by: FAMILY MEDICINE

## 2022-02-28 PROCEDURE — 96372 THER/PROPH/DIAG INJ SC/IM: CPT | Performed by: ORTHOPAEDIC SURGERY

## 2022-02-28 PROCEDURE — 250N000009 HC RX 250: Performed by: ORTHOPAEDIC SURGERY

## 2022-02-28 RX ORDER — OXYCODONE HYDROCHLORIDE 10 MG/1
10 TABLET ORAL EVERY 4 HOURS PRN
Qty: 150 TABLET | Refills: 0 | Status: SHIPPED | OUTPATIENT
Start: 2022-03-28 | End: 2022-05-27

## 2022-02-28 RX ORDER — ATENOLOL 100 MG/1
100 TABLET ORAL DAILY
Qty: 90 TABLET | Refills: 4 | Status: SHIPPED | OUTPATIENT
Start: 2022-02-28 | End: 2023-01-12

## 2022-02-28 RX ORDER — GABAPENTIN 600 MG/1
600 TABLET ORAL 3 TIMES DAILY
Qty: 270 TABLET | Refills: 4 | Status: SHIPPED | OUTPATIENT
Start: 2022-02-28 | End: 2022-06-24

## 2022-02-28 RX ORDER — AMITRIPTYLINE HYDROCHLORIDE 50 MG/1
100 TABLET ORAL AT BEDTIME
Qty: 60 TABLET | Refills: 10 | Status: SHIPPED | OUTPATIENT
Start: 2022-02-28 | End: 2022-11-23

## 2022-02-28 RX ORDER — TRIAMCINOLONE ACETONIDE 40 MG/ML
40 INJECTION, SUSPENSION INTRA-ARTICULAR; INTRAMUSCULAR ONCE
Status: COMPLETED | OUTPATIENT
Start: 2022-02-28 | End: 2022-02-28

## 2022-02-28 RX ORDER — ATORVASTATIN CALCIUM 40 MG/1
40 TABLET, FILM COATED ORAL DAILY
Qty: 90 TABLET | Refills: 3 | Status: SHIPPED | OUTPATIENT
Start: 2022-02-28 | End: 2023-01-11

## 2022-02-28 RX ORDER — LIDOCAINE HYDROCHLORIDE 10 MG/ML
4 INJECTION, SOLUTION EPIDURAL; INFILTRATION; INTRACAUDAL; PERINEURAL ONCE
Status: COMPLETED | OUTPATIENT
Start: 2022-02-28 | End: 2022-02-28

## 2022-02-28 RX ORDER — LEVOTHYROXINE SODIUM 50 UG/1
50 TABLET ORAL DAILY
Qty: 90 TABLET | Refills: 3 | Status: SHIPPED | OUTPATIENT
Start: 2022-02-28 | End: 2023-01-11

## 2022-02-28 RX ORDER — GABAPENTIN 400 MG/1
400 CAPSULE ORAL 3 TIMES DAILY
Qty: 270 CAPSULE | Refills: 4 | Status: CANCELLED | OUTPATIENT
Start: 2022-02-28

## 2022-02-28 RX ORDER — OXYCODONE HYDROCHLORIDE 10 MG/1
10 TABLET ORAL EVERY 4 HOURS PRN
Qty: 150 TABLET | Refills: 0 | Status: SHIPPED | OUTPATIENT
Start: 2022-04-25 | End: 2022-05-27

## 2022-02-28 RX ORDER — OXYCODONE HYDROCHLORIDE 10 MG/1
10 TABLET ORAL EVERY 4 HOURS PRN
Qty: 150 TABLET | Refills: 0 | Status: SHIPPED | OUTPATIENT
Start: 2022-02-28 | End: 2022-05-27

## 2022-02-28 RX ORDER — LISINOPRIL 20 MG/1
20 TABLET ORAL DAILY
Qty: 90 TABLET | Refills: 4 | Status: SHIPPED | OUTPATIENT
Start: 2022-02-28 | End: 2023-01-12

## 2022-02-28 RX ORDER — SERTRALINE HYDROCHLORIDE 100 MG/1
100 TABLET, FILM COATED ORAL DAILY
Qty: 90 TABLET | Refills: 3 | Status: SHIPPED | OUTPATIENT
Start: 2022-02-28 | End: 2023-01-11

## 2022-02-28 RX ADMIN — LIDOCAINE HYDROCHLORIDE 4 ML: 10 INJECTION, SOLUTION EPIDURAL; INFILTRATION; INTRACAUDAL; PERINEURAL at 12:36

## 2022-02-28 RX ADMIN — TRIAMCINOLONE ACETONIDE 40 MG: 40 INJECTION, SUSPENSION INTRA-ARTICULAR; INTRAMUSCULAR at 12:36

## 2022-02-28 ASSESSMENT — PATIENT HEALTH QUESTIONNAIRE - PHQ9
SUM OF ALL RESPONSES TO PHQ QUESTIONS 1-9: 6
10. IF YOU CHECKED OFF ANY PROBLEMS, HOW DIFFICULT HAVE THESE PROBLEMS MADE IT FOR YOU TO DO YOUR WORK, TAKE CARE OF THINGS AT HOME, OR GET ALONG WITH OTHER PEOPLE: NOT DIFFICULT AT ALL
10. IF YOU CHECKED OFF ANY PROBLEMS, HOW DIFFICULT HAVE THESE PROBLEMS MADE IT FOR YOU TO DO YOUR WORK, TAKE CARE OF THINGS AT HOME, OR GET ALONG WITH OTHER PEOPLE: NOT DIFFICULT AT ALL

## 2022-02-28 ASSESSMENT — PAIN SCALES - GENERAL
PAINLEVEL: MODERATE PAIN (5)
PAINLEVEL: SEVERE PAIN (6)

## 2022-02-28 NOTE — PROGRESS NOTES
Visit Date: 2022    He is a 61-year-old gentleman with a left shoulder rotator cuff tear arthropathy.  Basically, he is several years out from tearing of his rotator cuff.  We are treating him nonoperatively because his rotator cuff is not salvageable at this point and, being 61 years old, he is a little young for a reverse total shoulder arthroplasty.  He is also an extremely active 61-year-old gentleman.  We are going to give him an injection into the right shoulder today.  He tolerated it well, good relief of his pain.  Significant weakness with rotator cuff testing.  I am going to see him back on an as-needed basis.    Jaime Dean MD        D: 2022   T: 2022   MT: [id number]    Name:     GARRICK ROWELL  MRN:      [id number]        Account:    [id number]   :      1960           Visit Date: 2022     Document: [id number]

## 2022-02-28 NOTE — PROGRESS NOTES
"  Assessment & Plan     (E11.9) Type 2 diabetes mellitus without complication, without long-term current use of insulin (H)  (primary encounter diagnosis)  Comment: more neuropathy. Talked about glucose control for this, but also increased the dose of the neurontin.   Is well controlled.  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    Weight as of this encounter: 124.6 kg (274 lb 12.8 oz).           No follow-ups on file.    Sohail Harris MD  [facility]    Lui Dong is a 61 year old who presents for the following health issues     History of Present Illness       Diabetes:   He presents for follow up of diabetes.  He is not checking blood glucose. He has no concerns regarding his diabetes at this time.  He is having burning in feet. The patient has not had a diabetic eye exam in the last 12 months.         He eats 0-1 servings of fruits and vegetables daily.He consumes 0 sweetened beverage(s) daily.He exercises with enough effort to increase his heart rate 9 or less minutes per day.  He exercises with enough " effort to increase his heart rate 3 or less days per week.   He is taking medications regularly.       Diabetes Follow-up      How often are you checking your blood sugar? Not at all    What concerns do you have today about your diabetes? None and Other: sore feet     Do you have any of these symptoms? (Select all that apply)  Burning in feet    Have you had a diabetic eye exam in the last 12 months? No      He has gained about 20#.  Had lost 40# at first with a lower carb diet, but resumed eating more bread recently.  Lots of foot burning bilateral. Hard time walking now too. EMG showed diabetic neuropathy more than spinal radiculopathy.  With the increased gabapentin to 400 milligram noticed some loose stools, but also has increased the metformin.  Stools are about twice daily.      Wants refills of all of his meds. No chest pain or shortness of breath.      BP Readings from Last 2 Encounters:   02/28/22 134/78   02/28/22 134/78     Hemoglobin A1C POCT (%)   Date Value   04/01/2021 9.9 (H)     Hemoglobin A1C (%)   Date Value   02/28/2022 7.0 (H)   07/15/2021 6.7 (H)     LDL Cholesterol Calculated (mg/dL)   Date Value   06/18/2015 139 (H)                   Review of Systems         Objective    /78   Pulse 67   Temp 97.9  F (36.6  C)   Resp 14   Wt 124.6 kg (274 lb 12.8 oz)   SpO2 97%   BMI 42.40 kg/m    Body mass index is 42.4 kg/m .  Physical Exam  Constitutional:       Appearance: Normal appearance.   Cardiovascular:      Rate and Rhythm: Normal rate and regular rhythm.      Heart sounds: No murmur heard.    No gallop.   Pulmonary:      Effort: Pulmonary effort is normal. No respiratory distress.      Breath sounds: No stridor.   Neurological:      Mental Status: He is alert.   Psychiatric:         Mood and Affect: Mood normal.         Behavior: Behavior normal.         Thought Content: Thought content normal.      Sensory exam of the foot is abnormal, tested with the monofilament to ankle in  stocking like distribution. Good pulses, no lesions or ulcers, good peripheral pulses.        Results for orders placed or performed in visit on 02/28/22   Hemoglobin A1c     Status: Abnormal   Result Value Ref Range    Hemoglobin A1C 7.0 (H) 4.0 - 6.2 %   Albumin Random Urine Quantitative with Creat Ratio     Status: Abnormal   Result Value Ref Range    Creatinine Urine mg/dL 115 mg/dL    Albumin Urine mg/L 70 mg/L    Albumin Urine mg/g Cr 60.87 (H) 0.00 - 17.00 mg/g Cr                 Answers for HPI/ROS submitted by the patient on 2/28/2022  If you checked off any problems, how difficult have these problems made it for you to do your work, take care of things at home, or get along with other people?: Not difficult at all  PHQ9 TOTAL SCORE: 6

## 2022-02-28 NOTE — NURSING NOTE
"Chief Complaint   Patient presents with     Diabetes     CHECK UP       Initial /78   Pulse 67   Temp 97.9  F (36.6  C)   Resp 14   Wt 124.6 kg (274 lb 12.8 oz)   SpO2 97%   BMI 42.40 kg/m   Estimated body mass index is 42.4 kg/m  as calculated from the following:    Height as of 12/16/21: 1.715 m (5' 7.5\").    Weight as of this encounter: 124.6 kg (274 lb 12.8 oz).  Medication Reconciliation: complete.  FOOD SECURITY SCREENING QUESTIONS  Hunger Vital Signs:  Within the past 12 months we worried whether our food would run out before we got money to buy more. Never  Within the past 12 months the food we bought just didn't last and we didn't have money to get more. Never  Sravani Robles LPN 2/28/2022 9:15 AM      Sravani Robles LPN  "

## 2022-02-28 NOTE — PROGRESS NOTES
"  Assessment & Plan     (M54.50,  G89.29) Chronic right-sided low back pain without sciatica  Comment: stable. No evidence of abbuse, mis use or diversion. Refilled for 3 months   Plan: oxyCODONE IR (ROXICODONE) 10 MG tablet,         oxyCODONE IR (ROXICODONE) 10 MG tablet,         oxyCODONE IR (ROXICODONE) 10 MG tablet                        BMI:   Estimated body mass index is 42.4 kg/m  as calculated from the following:    Height as of 12/16/21: 1.715 m (5' 7.5\").    Weight as of this encounter: 124.6 kg (274 lb 12.8 oz).           No follow-ups on file.    Sohail Harris MD  [facility] AND [facility]    Lui Dong is a 61 year old who presents for the following health issues     History of Present Illness       Diabetes:   He presents for follow up of diabetes.  He is not checking blood glucose. He has no concerns regarding his diabetes at this time.  He is having burning in feet. The patient has not had a diabetic eye exam in the last 12 months.         He eats 0-1 servings of fruits and vegetables daily.He consumes 0 sweetened beverage(s) daily.He exercises with enough effort to increase his heart rate 9 or less minutes per day.  He exercises with enough effort to increase his heart rate 3 or less days per week.   He is taking medications regularly.       Work comp lower back pain  Pain History:  When did you first notice your pain? - More than 6 weeks   Have you seen this provider for your pain in the past?   Yes   Where in your body do you have pain? Feet mostly, but also low back  Are you seeing anyone else for your pain? No    PHQ-9 SCORE 12/1/2021 2/28/2022 2/28/2022   PHQ-9 Total Score MyChart - - 6 (Mild depression)   PHQ-9 Total Score 0 6 6       VISHNU-7 SCORE 6/29/2021 7/29/2021 12/1/2021   Total Score 1 0 0               Chronic Pain Follow Up:    Location of pain: back, right shoulder and feet.  Analgesia/pain control:    - Recent changes:  no    - Overall control: Tolerable with discomfort "    - Current treatments: ice, opiates   Adherence:     - Do you ever take more pain medicine than prescribed? No    - When did you take your last dose of pain medicine?  This morning   Adverse effects: No   PDMP Review       Value Time User    State PDMP site checked  Yes 2/28/2022  9:44 AM Sohail Harris MD        Last CSA Agreement:   CSA -- Patient Level:    Controlled Substance Agreement - Opioid - Scan on 5/6/2021 10:06 AM: CONTROLLED SUBSTANCE AGREEMENT OPIOID       Last UDS: 12/6/2021          Review of Systems         Objective    /78   Pulse 67   Temp 97.9  F (36.6  C)   Resp 14   Wt 124.6 kg (274 lb 12.8 oz)   SpO2 97%   BMI 42.40 kg/m    Body mass index is 42.4 kg/m .  Physical Exam  Constitutional:       Appearance: Normal appearance.   Musculoskeletal:      Comments: Pain on palpation in left L3/4 facet area.  Negative straight leg raise bilateral    Neurological:      General: No focal deficit present.      Mental Status: He is alert and oriented to person, place, and time.   Psychiatric:         Mood and Affect: Mood normal.         Behavior: Behavior normal.         Thought Content: Thought content normal.                        Answers for HPI/ROS submitted by the patient on 2/28/2022  If you checked off any problems, how difficult have these problems made it for you to do your work, take care of things at home, or get along with other people?: Not difficult at all  PHQ9 TOTAL SCORE: 6

## 2022-02-28 NOTE — NURSING NOTE
"Chief Complaint   Patient presents with     Work Comp     lower back       Initial /78   Pulse 67   Temp 97.9  F (36.6  C)   Resp 14   Wt 124.6 kg (274 lb 12.8 oz)   SpO2 97%   BMI 42.40 kg/m   Estimated body mass index is 42.4 kg/m  as calculated from the following:    Height as of 12/16/21: 1.715 m (5' 7.5\").    Weight as of this encounter: 124.6 kg (274 lb 12.8 oz).  Medication Reconciliation: complete.  FOOD SECURITY SCREENING QUESTIONS  Hunger Vital Signs:  Within the past 12 months we worried whether our food would run out before we got money to buy more. Never  Within the past 12 months the food we bought just didn't last and we didn't have money to get more. Never  Sravani Robles LPN 2/28/2022 9:20 AM      Sravani Robles LPN  "

## 2022-03-01 ASSESSMENT — PATIENT HEALTH QUESTIONNAIRE - PHQ9
SUM OF ALL RESPONSES TO PHQ QUESTIONS 1-9: 6
SUM OF ALL RESPONSES TO PHQ QUESTIONS 1-9: 6

## 2022-05-27 ENCOUNTER — OFFICE VISIT (OUTPATIENT)
Dept: FAMILY MEDICINE | Facility: OTHER | Age: 62
End: 2022-05-27
Attending: FAMILY MEDICINE
Payer: OTHER MISCELLANEOUS

## 2022-05-27 VITALS
BODY MASS INDEX: 40.89 KG/M2 | RESPIRATION RATE: 16 BRPM | WEIGHT: 265 LBS | TEMPERATURE: 98 F | DIASTOLIC BLOOD PRESSURE: 76 MMHG | SYSTOLIC BLOOD PRESSURE: 134 MMHG | OXYGEN SATURATION: 97 % | HEART RATE: 66 BPM

## 2022-05-27 DIAGNOSIS — M54.50 CHRONIC RIGHT-SIDED LOW BACK PAIN WITHOUT SCIATICA: ICD-10-CM

## 2022-05-27 DIAGNOSIS — G89.29 CHRONIC RIGHT-SIDED LOW BACK PAIN WITHOUT SCIATICA: ICD-10-CM

## 2022-05-27 PROCEDURE — 99213 OFFICE O/P EST LOW 20 MIN: CPT | Performed by: FAMILY MEDICINE

## 2022-05-27 RX ORDER — OXYCODONE HYDROCHLORIDE 10 MG/1
10 TABLET ORAL EVERY 4 HOURS PRN
Qty: 150 TABLET | Refills: 0 | Status: SHIPPED | OUTPATIENT
Start: 2022-06-24 | End: 2022-08-26

## 2022-05-27 RX ORDER — OXYCODONE HYDROCHLORIDE 10 MG/1
10 TABLET ORAL EVERY 4 HOURS PRN
Qty: 150 TABLET | Refills: 0 | Status: SHIPPED | OUTPATIENT
Start: 2022-05-27 | End: 2022-08-26

## 2022-05-27 RX ORDER — OXYCODONE HYDROCHLORIDE 10 MG/1
10 TABLET ORAL EVERY 4 HOURS PRN
Qty: 150 TABLET | Refills: 0 | Status: SHIPPED | OUTPATIENT
Start: 2022-07-22 | End: 2022-08-19

## 2022-05-27 ASSESSMENT — ANXIETY QUESTIONNAIRES
1. FEELING NERVOUS, ANXIOUS, OR ON EDGE: NOT AT ALL
3. WORRYING TOO MUCH ABOUT DIFFERENT THINGS: NOT AT ALL
IF YOU CHECKED OFF ANY PROBLEMS ON THIS QUESTIONNAIRE, HOW DIFFICULT HAVE THESE PROBLEMS MADE IT FOR YOU TO DO YOUR WORK, TAKE CARE OF THINGS AT HOME, OR GET ALONG WITH OTHER PEOPLE: NOT DIFFICULT AT ALL
5. BEING SO RESTLESS THAT IT IS HARD TO SIT STILL: NOT AT ALL
7. FEELING AFRAID AS IF SOMETHING AWFUL MIGHT HAPPEN: NOT AT ALL
6. BECOMING EASILY ANNOYED OR IRRITABLE: NOT AT ALL

## 2022-05-27 ASSESSMENT — PAIN SCALES - GENERAL: PAINLEVEL: MODERATE PAIN (5)

## 2022-05-27 ASSESSMENT — PATIENT HEALTH QUESTIONNAIRE - PHQ9
5. POOR APPETITE OR OVEREATING: SEVERAL DAYS
SUM OF ALL RESPONSES TO PHQ QUESTIONS 1-9: 3

## 2022-05-27 NOTE — NURSING NOTE
"Chief Complaint   Patient presents with     Work Comp       Initial /76   Pulse 66   Temp 98  F (36.7  C)   Resp 16   Wt 120.2 kg (265 lb)   SpO2 97%   BMI 40.89 kg/m   Estimated body mass index is 40.89 kg/m  as calculated from the following:    Height as of 12/16/21: 1.715 m (5' 7.5\").    Weight as of this encounter: 120.2 kg (265 lb).  Medication Reconciliation: complete.  FOOD SECURITY SCREENING QUESTIONS  Hunger Vital Signs:  Within the past 12 months we worried whether our food would run out before we got money to buy more. Never  Within the past 12 months the food we bought just didn't last and we didn't have money to get more. Never  Sravani Robles LPN 5/27/2022 10:20 AM      Sravani Robles LPN  "

## 2022-05-27 NOTE — LETTER
Opioid / Opioid Plus Controlled Substance Agreement    This is an agreement between you and your provider about the safe and appropriate use of controlled substance/opioids prescribed by your care team. Controlled substances are medicines that can cause physical and mental dependence (abuse).    There are strict laws about having and using these medicines. We here at [facility] are committing to working with you in your efforts to get better. To support you in this work, we ll help you schedule regular office appointments for medicine refills. If we must cancel or change your appointment for any reason, we ll make sure you have enough medicine to last until your next appointment.     As a Provider, I will:    Listen carefully to your concerns and treat you with respect.     Recommend a treatment plan that I believe is in your best interest. This plan may involve therapies other than opioid pain medication.     Talk with you often about the possible benefits, and the risk of harm of any medicine that we prescribe for you.     Provide a plan on how to taper (discontinue or go off) using this medicine if the decision is made to stop its use.    As a Patient, I understand that opioid(s):     Are a controlled substance prescribed by my care team to help me function or work and manage my condition(s).     Are strong medicines and can cause serious side effects such as:    Drowsiness, which can seriously affect my driving ability    A lower breathing rate, enough to cause death    Harm to my thinking ability     Depression     Abuse of and addiction to this medicine    Need to be taken exactly as prescribed. Combining opioids with certain medicines or chemicals (such as illegal drugs, sedatives, sleeping pills, and benzodiazepines) can be dangerous or even fatal. If I stop opioids suddenly, I may have severe withdrawal symptoms.    Do not work for all types of pain nor for all patients. If they re not helpful, I may  be asked to stop them.        The risks, benefits and side effects of these medicine(s) were explained to me. I agree that:  1. I will take part in other treatments as advised by my care team. This may be psychiatry or counseling, physical therapy, behavioral therapy, group treatment or a referral to a specialist.     2. I will keep all my appointments. I understand that this is part of the monitoring of opioids. My care team may require an office visit for EVERY opioid/controlled substance refill. If I miss appointments or don t follow instructions, my care team may stop my medicine.    3. I will take my medicines as prescribed. I will not change the dose or schedule unless my care team tells me to. There will be no refills if I run out early.     4. I may be asked to come to the clinic and complete a urine drug test or complete a pill count at any time. If I don t give a urine sample or participate in a pill count, the care team may stop my medicine.    5. I will only receive prescriptions from this clinic for chronic pain. If I am treated by another provider for acute pain issues, I will tell them that I am taking opioid pain medication for chronic pain and that I have a treatment agreement with this provider. I will inform my [facility] care team within one business day if I am given a prescription for any pain medication by another healthcare provider. My [facility] care team can contact other providers and pharmacists about my use of any medicines.    6. It is up to me to make sure that I don t run out of my medicines on weekends or holidays. If my care team is willing to refill my opioid prescription without a visit, I must request refills only during office hours. Refills may take up to 3 business days to process. I will use one pharmacy to fill all my opioid and other controlled substance prescriptions. I will notify the clinic about any changes to my insurance or medication  availability.    7. I am responsible for my prescriptions. If the medicine/prescription is lost, stolen or destroyed, it will not be replaced. I also agree not to share controlled substance medicines with anyone.    8. I am aware I should not use any illegal or recreational drugs. I agree not to drink alcohol unless my care team says I can.       9. If I enroll in the [place] [facility] Cannabis program, I will tell my care team prior to my next refill.     10. I will tell my care team right away if I become pregnant, have a new medical problem treated outside of my regular clinic, or have a change in my medications.    11. I understand that this medicine can affect my thinking, judgment and reaction time. Alcohol and drugs affect the brain and body, which can affect the safety of my driving. Being under the influence of alcohol or drugs can affect my decision-making, behaviors, personal safety, and the safety of others. Driving while impaired (DWI) can occur if a person is driving, operating, or in physical control of a car, motorcycle, boat, snowmobile, ATV, motorbike, off-road vehicle, or any other motor vehicle ([place] Statute 169A.20). I understand the risk if I choose to drive or operate any vehicle or machinery.    I understand that if I do not follow any of the conditions above, my prescriptions or treatment may be stopped or changed.          Opioids  What You Need to Know    What are opioids?   Opioids are pain medicines that must be prescribed by a doctor. They are also known as narcotics.     Examples are:   1. morphine (MS Contin, Zaina)  2. oxycodone (Oxycontin)  3. oxycodone and acetaminophen (Percocet)  4. hydrocodone and acetaminophen (Vicodin, Norco)   5. fentanyl patch (Duragesic)   6. hydromorphone (Dilaudid)   7. methadone  8. codeine (Tylenol #3)     What do opioids do well?   Opioids are best for severe short-term pain such as after a surgery or injury. They may work well for cancer pain. They may  help some people with long-lasting (chronic) pain.     What do opioids NOT do well?   Opioids never get rid of pain entirely, and they don t work well for most patients with chronic pain. Opioids don t reduce swelling, one of the causes of pain.                                    Other ways to manage chronic pain and improve function include:       Treat the health problem that may be causing pain    Anti-inflammation medicines, which reduce swelling and tenderness, such as ibuprofen (Advil, Motrin) or naproxen (Aleve)    Acetaminophen (Tylenol)    Antidepressants and anti-seizure medicines, especially for nerve pain    Topical treatments such as patches or creams    Injections or nerve blocks    Chiropractic or osteopathic treatment    Acupuncture, massage, deep breathing, meditation, visual imagery, aromatherapy    Use heat or ice at the pain site    Physical therapy     Exercise    Stop smoking    Take part in therapy       Risks and side effects     Talk to your doctor before you start or decide to keep taking opioids. Possible side effects include:      Lowering your breathing rate enough to cause death    Overdose, including death, especially if taking higher than prescribed doses    Worse depression symptoms; less pleasure in things you usually enjoy    Feeling tired or sluggish    Slower thoughts or cloudy thinking    Being more sensitive to pain over time; pain is harder to control    Trouble sleeping or restless sleep    Changes in hormone levels (for example, less testosterone)    Changes in sex drive or ability to have sex    Constipation    Unsafe driving    Itching and sweating    Dizziness    Nausea, throwing up and dry mouth    What else should I know about opioids?    Opioids may lead to dependence, tolerance, or addiction.      Dependence means that if you stop or reduce the medicine too quickly, you will have withdrawal symptoms. These include loose poop (diarrhea), jitters, flu-like symptoms,  nervousness and tremors. Dependence is not the same as addiction.                       Tolerance means needing higher doses over time to get the same effect. This may increase the chance of serious side effects.      Addiction is when people improperly use a substance that harms their body, their mind or their relations with others. Use of opiates can cause a relapse of addiction if you have a history of drug or alcohol abuse.      People who have used opioids for a long time may have a lower quality of life, worse depression, higher levels of pain and more visits to doctors.    You can overdose on opioids. Take these steps to lower your risk of overdose:    1. Recognize the signs:  Signs of overdose include decrease or loss of consciousness (blackout), slowed breathing, trouble waking up and blue lips. If someone is worried about overdose, they should call 911.    2. Talk to your doctor about Narcan (naloxone).   If you are at risk for overdose, you may be given a prescription for Narcan. This medicine very quickly reverses the effects of opioids.   If you overdose, a friend or family member can give you Narcan while waiting for the ambulance. They need to know the signs of overdose and how to give Narcan.     3. Don't use alcohol or street drugs.   Taking them with opioids can cause death.    4. Do not take any of these medicines unless your doctor says it s OK. Taking these with opioids can cause death:    Benzodiazepines, such as lorazepam (Ativan), alprazolam (Xanax) or diazepam (Valium)    Muscle relaxers, such as cyclobenzaprine (Flexeril)    Sleeping pills like zolpidem (Ambien)     Other opioids      How to keep you and other people safe while taking opioids:    1. Never share your opioids with others.  Opioid medicines are regulated by the Drug Enforcement Agency ([place]ATH[place]). Selling or sharing medications is a criminal act.    2. Be sure to store opioids in a secure place, locked up if possible. Young children  can easily swallow them and overdose.    3. When you are traveling with your medicines, keep them in the original bottles. If you use a pill box, be sure you also carry a copy of your medicine list from your clinic or pharmacy.    4. Safe disposal of opioids    Most pharmacies have places to get rid of medicine, called disposal kiosks. Medicine disposal options are also available in every [facility]. Search your county and  medication disposal  to find more options. You can find more details at:  [email or web address]     I agree that my provider, clinic care team, and pharmacy may work with any city, state or federal law enforcement agency that investigates the misuse, sale, or other diversion of my controlled medicine. I will allow my provider to discuss my care with, or share a copy of, this agreement with any other treating provider, pharmacy or emergency room where I receive care.    I have read this agreement and have asked questions about anything I did not understand.    _______________________________________________________  Patient Signature - Iker Young _____________________                   Date     _______________________________________________________  Provider Signature - Sohail Harris MD   _____________________                   Date     _______________________________________________________  Witness Signature (required if provider not present while patient signing)   _____________________                   Date

## 2022-05-27 NOTE — PROGRESS NOTES
"  Assessment & Plan     (M54.50,  G89.29) Chronic right-sided low back pain without sciatica  Comment: stable, low risk for diversion, abuse.  stable.  Plan: oxyCODONE IR (ROXICODONE) 10 MG tablet,         oxyCODONE IR (ROXICODONE) 10 MG tablet,         oxyCODONE IR (ROXICODONE) 10 MG tablet                        BMI:   Estimated body mass index is 40.89 kg/m  as calculated from the following:    Height as of 12/16/21: 1.715 m (5' 7.5\").    Weight as of this encounter: 120.2 kg (265 lb).           Return in about 3 months (around 8/27/2022).    Sohail Harris MD  [facility] AND [facility]    Lui Dong is a 61 year old who presents for the following health issues     History of Present Illness       Back Pain:  He presents for follow up of back pain. Patient's back pain is a chronic problem.  Location of back pain:  Right lower back and left lower back  Description of back pain: dull ache  Back pain spreads: right buttocks and left buttocks    Since patient first noticed back pain, pain is: gradually worsening  Does back pain interfere with his job:  Not applicable      He eats 0-1 servings of fruits and vegetables daily.He consumes 0 sweetened beverage(s) daily.He exercises with enough effort to increase his heart rate 9 or less minutes per day.  He exercises with enough effort to increase his heart rate 3 or less days per week.   He is taking medications regularly.       Work comp    Pain History:  When did you first notice your pain? - Chronic Pain   Have you seen this provider for your pain in the past?   Yes   Where in your body do you have pain?     Are you seeing anyone else for your pain? No    PHQ-9 SCORE 2/28/2022 2/28/2022 5/27/2022   PHQ-9 Total Score MyChart - 6 (Mild depression) -   PHQ-9 Total Score 6 6 3       VISHNU-7 SCORE 6/29/2021 7/29/2021 12/1/2021   Total Score 1 0 0               Chronic Pain Follow Up:    Location of pain: low back  Analgesia/pain control:    - Recent changes:  " no    - Overall control: Tolerable with discomfort    - Current treatments: neurontin, oxycodone    Adherence:     - Do you ever take more pain medicine than prescribed? No    - When did you take your last dose of pain medicine?  today   Adverse effects: No   PDMP Review       Value Time User    State PDMP site checked  Yes 2/28/2022  9:44 AM Sohail Harris MD        Last CSA Agreement:   CSA -- Patient Level:    Controlled Substance Agreement - Opioid - Scan on 5/6/2021 10:06 AM: CONTROLLED SUBSTANCE AGREEMENT OPIOID       Last UDS: 12/6/2021    He dropped to 3 oxycodone dose to 3 daily for a while. Got lower extremity aching.        Review of Systems         Objective    /76   Pulse 66   Temp 98  F (36.7  C)   Resp 16   Wt 120.2 kg (265 lb)   SpO2 97%   BMI 40.89 kg/m    Body mass index is 40.89 kg/m .  Physical Exam  Constitutional:       Appearance: Normal appearance.   Musculoskeletal:      Comments: Gait and station normal, can get on the table slowly.  No current lumbar pain on palpation, positive bilateral straight leg raise    Neurological:      Mental Status: He is alert.   Psychiatric:         Mood and Affect: Mood normal.         Behavior: Behavior normal.         Thought Content: Thought content normal.

## 2022-06-19 ENCOUNTER — HEALTH MAINTENANCE LETTER (OUTPATIENT)
Age: 62
End: 2022-06-19

## 2022-06-23 DIAGNOSIS — M54.50 CHRONIC RIGHT-SIDED LOW BACK PAIN WITHOUT SCIATICA: ICD-10-CM

## 2022-06-23 DIAGNOSIS — G89.29 CHRONIC RIGHT-SIDED LOW BACK PAIN WITHOUT SCIATICA: ICD-10-CM

## 2022-06-24 RX ORDER — GABAPENTIN 600 MG/1
600 TABLET ORAL 2 TIMES DAILY
Qty: 180 TABLET | Refills: 4 | Status: SHIPPED | OUTPATIENT
Start: 2022-06-24 | End: 2023-01-12

## 2022-06-24 NOTE — TELEPHONE ENCOUNTER
[id number] sent Rx request for the following:    From pharmacy: HE SAYS HE ONLY TAKES 1T BID - CAN WE GET NEW RX - THANKS     Requested Prescriptions   Pending Prescriptions Disp Refills     gabapentin (NEURONTIN) 600 MG tablet [Pharmacy Med Name: GABAPENTIN 600MG TABLET] 270 tablet 4     Sig: TAKE 1 TABLET (600 MG) BY MOUTH 3 TIMES DAILY   Last Prescription Date:     gabapentin (NEURONTIN) 600 MG tablet 270 tablet 4 2/28/2022  --   Sig - Route: Take 1 tablet (600 mg) by mouth 3 times daily - Oral     Haleigh Barfield RN .............. 6/24/2022  9:45 AM

## 2022-07-18 ENCOUNTER — OFFICE VISIT (OUTPATIENT)
Dept: ORTHOPEDICS | Facility: OTHER | Age: 62
End: 2022-07-18
Attending: ORTHOPAEDIC SURGERY
Payer: MEDICARE

## 2022-07-18 DIAGNOSIS — M25.511 RIGHT SHOULDER PAIN, UNSPECIFIED CHRONICITY: Primary | ICD-10-CM

## 2022-07-18 PROCEDURE — G0463 HOSPITAL OUTPT CLINIC VISIT: HCPCS

## 2022-07-18 PROCEDURE — 20610 DRAIN/INJ JOINT/BURSA W/O US: CPT | Mod: RT | Performed by: ORTHOPAEDIC SURGERY

## 2022-07-18 PROCEDURE — 250N000011 HC RX IP 250 OP 636: Performed by: ORTHOPAEDIC SURGERY

## 2022-07-18 PROCEDURE — 250N000009 HC RX 250: Performed by: ORTHOPAEDIC SURGERY

## 2022-07-18 PROCEDURE — 20610 DRAIN/INJ JOINT/BURSA W/O US: CPT

## 2022-07-18 RX ORDER — LIDOCAINE HYDROCHLORIDE 10 MG/ML
4 INJECTION, SOLUTION EPIDURAL; INFILTRATION; INTRACAUDAL; PERINEURAL ONCE
Status: COMPLETED | OUTPATIENT
Start: 2022-07-18 | End: 2022-07-18

## 2022-07-18 RX ORDER — TRIAMCINOLONE ACETONIDE 40 MG/ML
40 INJECTION, SUSPENSION INTRA-ARTICULAR; INTRAMUSCULAR ONCE
Status: COMPLETED | OUTPATIENT
Start: 2022-07-18 | End: 2022-07-18

## 2022-07-18 RX ADMIN — LIDOCAINE HYDROCHLORIDE 4 ML: 10 INJECTION, SOLUTION EPIDURAL; INFILTRATION; INTRACAUDAL; PERINEURAL at 11:21

## 2022-07-18 RX ADMIN — TRIAMCINOLONE ACETONIDE 40 MG: 40 INJECTION, SUSPENSION INTRA-ARTICULAR; INTRAMUSCULAR at 11:22

## 2022-07-18 NOTE — PROGRESS NOTES
Visit Date: 2022    He has right rotator cuff tear arthropathy.  This has been ongoing for quite some time in his right shoulder.  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    On examination, he has severe weakness with supraspinatus and infraspinatus testing.  No significant crepitus.    We repeated his injection today.  He tolerated it well and had good relief of his pain.  We are going to see him back on an as-needed basis for that.    Jaime Dean MD        D: 2022   T: 2022   MT: GABE    Name:     GARRICK ROWELL  MRN:      [id number]        Account:    [id number]   :      1960           Visit Date: 2022     Document: [id number]

## 2022-07-18 NOTE — PROGRESS NOTES
"Chief Complaint   Patient presents with     RECHECK     Right shoulder pain       Initial There were no vitals taken for this visit. Estimated body mass index is 40.89 kg/m  as calculated from the following:    Height as of 12/16/21: 1.715 m (5' 7.5\").    Weight as of 5/27/22: 120.2 kg (265 lb).  Medication Reconciliation: complete    Odessa Loyd LPN    "

## 2022-08-19 DIAGNOSIS — G89.29 CHRONIC RIGHT-SIDED LOW BACK PAIN WITHOUT SCIATICA: ICD-10-CM

## 2022-08-19 DIAGNOSIS — M54.50 CHRONIC RIGHT-SIDED LOW BACK PAIN WITHOUT SCIATICA: ICD-10-CM

## 2022-08-19 RX ORDER — OXYCODONE HYDROCHLORIDE 10 MG/1
TABLET ORAL
Qty: 150 TABLET | Refills: 0 | Status: SHIPPED | OUTPATIENT
Start: 2022-08-19 | End: 2022-08-26

## 2022-08-19 NOTE — TELEPHONE ENCOUNTER
[id number] sent Rx request for the following:      OXYCODONE 10MG TABLET      Last Prescription Date:   7/22/2022  Last Fill Qty/Refills:         150, R-0    Last Office Visit:              5/27/2022   Future Office visit:           8/26/2022    Rubén Valladares RN, BSN  ....................  8/19/2022   2:03 PM

## 2022-08-26 ENCOUNTER — OFFICE VISIT (OUTPATIENT)
Dept: FAMILY MEDICINE | Facility: OTHER | Age: 62
End: 2022-08-26
Attending: FAMILY MEDICINE
Payer: OTHER MISCELLANEOUS

## 2022-08-26 VITALS
BODY MASS INDEX: 41.11 KG/M2 | SYSTOLIC BLOOD PRESSURE: 130 MMHG | OXYGEN SATURATION: 95 % | WEIGHT: 266.4 LBS | TEMPERATURE: 97.7 F | DIASTOLIC BLOOD PRESSURE: 76 MMHG | RESPIRATION RATE: 17 BRPM | HEART RATE: 59 BPM

## 2022-08-26 DIAGNOSIS — G89.29 CHRONIC RIGHT-SIDED LOW BACK PAIN WITHOUT SCIATICA: ICD-10-CM

## 2022-08-26 DIAGNOSIS — M96.1 FAILED BACK SURGICAL SYNDROME: Primary | ICD-10-CM

## 2022-08-26 DIAGNOSIS — E66.01 MORBID OBESITY (H): ICD-10-CM

## 2022-08-26 DIAGNOSIS — M54.50 CHRONIC RIGHT-SIDED LOW BACK PAIN WITHOUT SCIATICA: ICD-10-CM

## 2022-08-26 LAB — CREAT UR-MCNC: 115 MG/DL

## 2022-08-26 PROCEDURE — 99213 OFFICE O/P EST LOW 20 MIN: CPT | Performed by: FAMILY MEDICINE

## 2022-08-26 PROCEDURE — 80307 DRUG TEST PRSMV CHEM ANLYZR: CPT | Mod: ZL | Performed by: FAMILY MEDICINE

## 2022-08-26 RX ORDER — OXYCODONE HYDROCHLORIDE 10 MG/1
10 TABLET ORAL EVERY 4 HOURS PRN
Qty: 150 TABLET | Refills: 0 | Status: SHIPPED | OUTPATIENT
Start: 2022-10-21 | End: 2022-11-23

## 2022-08-26 RX ORDER — OXYCODONE HYDROCHLORIDE 10 MG/1
10 TABLET ORAL EVERY 4 HOURS PRN
Qty: 150 TABLET | Refills: 0 | Status: SHIPPED | OUTPATIENT
Start: 2022-08-26 | End: 2022-11-23

## 2022-08-26 RX ORDER — OXYCODONE HYDROCHLORIDE 10 MG/1
10 TABLET ORAL EVERY 4 HOURS PRN
Qty: 150 TABLET | Refills: 0 | Status: SHIPPED | OUTPATIENT
Start: 2022-09-23 | End: 2022-11-23

## 2022-08-26 ASSESSMENT — PAIN SCALES - GENERAL: PAINLEVEL: MILD PAIN (3)

## 2022-08-26 NOTE — PROGRESS NOTES
"  Assessment & Plan     (M96.1) Failed back surgical syndrome  (primary encounter diagnosis)  Comment: has ongoing pains in his feet.  Discussed with him hyperalgesia syndrome and we talked about opiate reduction/ cessation as being helpful. He does not want to pursue this yet but willing to think it over  Plan: Drug Confirmation Panel Urine with Creat             (M54.50,  G89.29) Chronic right-sided low back pain without sciatica  Comment:    Plan: oxyCODONE IR (ROXICODONE) 10 MG tablet,         oxyCODONE IR (ROXICODONE) 10 MG tablet,         oxyCODONE IR (ROXICODONE) 10 MG tablet             (E66.01) Morbid obesity (H)  Comment:  Advise weight loss  Plan:               BMI:   Estimated body mass index is 41.11 kg/m  as calculated from the following:    Height as of 12/16/21: 1.715 m (5' 7.5\").    Weight as of this encounter: 120.8 kg (266 lb 6.4 oz).   Weight management plan: Discussed healthy diet and exercise guidelines        No follow-ups on file.    Sohail Harris MD  [facility] [place]    Lui Dong is a 62 year old, presenting for the following health issues:  RECHECK (WC back injury)      History of Present Illness       Reason for visit:  Follow up work comp    He eats 0-1 servings of fruits and vegetables daily.He consumes 0 sweetened beverage(s) daily.He exercises with enough effort to increase his heart rate 9 or less minutes per day.  He exercises with enough effort to increase his heart rate 3 or less days per week.   He is taking medications regularly.       Pain History:  When did you first notice your pain? - Chronic Pain   Have you seen this provider for your pain in the past?   Yes   Where in your body do you have pain?     Are you seeing anyone else for your pain? No    PHQ-9 SCORE 2/28/2022 2/28/2022 5/27/2022   PHQ-9 Total Score MyChart - 6 (Mild depression) -   PHQ-9 Total Score 6 6 3       VISHNU-7 SCORE 6/29/2021 7/29/2021 12/1/2021   Total Score 1 0 0 "               Chronic Pain Follow Up:    Location of pain: back, nerve pain in feet  Analgesia/pain control:    - Recent changes:  no    - Overall control: Tolerable with discomfort    - Current treatments: opiates, tylenol  Adherence:     - Do you ever take more pain medicine than prescribed? No    - When did you take your last dose of pain medicine?  today   Adverse effects: No   PDMP Review       Value Time User    State PDMP site checked  Yes 8/19/2022  2:09 PM Sohail Harris MD        Last CSA Agreement:   CSA -- Patient Level:    Controlled Substance Agreement - Opioid - Scan on 5/27/2022 11:03 AM: Opioid  Controlled Substance Agreement - Opioid - Scan on 5/6/2021 10:06 AM: CONTROLLED SUBSTANCE AGREEMENT OPIOID       Last UDS: 12/6/2021            Review of Systems         Objective    /76   Pulse 59   Temp 97.7  F (36.5  C) (Temporal)   Resp 17   Wt 120.8 kg (266 lb 6.4 oz)   SpO2 95%   BMI 41.11 kg/m    Body mass index is 41.11 kg/m .  Physical Exam  Constitutional:       Appearance: Normal appearance.   Musculoskeletal:      Comments: No lumbar pain on palpation and negative straight leg raise.    Neurological:      Mental Status: He is alert.   Psychiatric:         Mood and Affect: Mood normal.         Behavior: Behavior normal.         Thought Content: Thought content normal.                            .  ..

## 2022-08-26 NOTE — NURSING NOTE
"Chief Complaint   Patient presents with     RECHECK     WC back injury       Initial /76   Pulse 59   Temp 97.7  F (36.5  C) (Temporal)   Resp 17   Wt 120.8 kg (266 lb 6.4 oz)   SpO2 95%   BMI 41.11 kg/m   Estimated body mass index is 41.11 kg/m  as calculated from the following:    Height as of 12/16/21: 1.715 m (5' 7.5\").    Weight as of this encounter: 120.8 kg (266 lb 6.4 oz).  Medication Reconciliation: complete    FOOD SECURITY SCREENING QUESTIONS  Hunger Vital Signs:  Within the past 12 months we worried whether our food would run out before we got money to buy more. Never  Within the past 12 months the food we bought just didn't last and we didn't have money to get more. Never  Eliza Chávez LPN 8/26/2022 10:31 AM    Do you have a healthcare directive? No        "

## 2022-09-01 LAB
GABAPENTIN UR QL CFM: PRESENT
OXYCODONE UR CFM-MCNC: 1560 NG/ML
OXYCODONE/CREAT UR: 1357 NG/MG {CREAT}
OXYMORPHONE UR CFM-MCNC: 3840 NG/ML
OXYMORPHONE/CREAT UR: 3339 NG/MG {CREAT}

## 2022-10-03 ENCOUNTER — OFFICE VISIT (OUTPATIENT)
Dept: ORTHOPEDICS | Facility: OTHER | Age: 62
End: 2022-10-03
Attending: ORTHOPAEDIC SURGERY
Payer: MEDICARE

## 2022-10-03 ENCOUNTER — OFFICE VISIT (OUTPATIENT)
Dept: FAMILY MEDICINE | Facility: OTHER | Age: 62
End: 2022-10-03
Attending: FAMILY MEDICINE
Payer: MEDICARE

## 2022-10-03 VITALS
HEART RATE: 80 BPM | BODY MASS INDEX: 41.63 KG/M2 | TEMPERATURE: 97.1 F | SYSTOLIC BLOOD PRESSURE: 132 MMHG | RESPIRATION RATE: 18 BRPM | DIASTOLIC BLOOD PRESSURE: 70 MMHG | WEIGHT: 269.8 LBS | OXYGEN SATURATION: 97 %

## 2022-10-03 DIAGNOSIS — M25.511 RIGHT SHOULDER PAIN, UNSPECIFIED CHRONICITY: Primary | ICD-10-CM

## 2022-10-03 DIAGNOSIS — E11.42 TYPE 2 DIABETES MELLITUS WITH DIABETIC POLYNEUROPATHY, WITHOUT LONG-TERM CURRENT USE OF INSULIN (H): Primary | ICD-10-CM

## 2022-10-03 DIAGNOSIS — Z12.11 COLON CANCER SCREENING: ICD-10-CM

## 2022-10-03 LAB
ANION GAP SERPL CALCULATED.3IONS-SCNC: 5 MMOL/L (ref 3–14)
BUN SERPL-MCNC: 23 MG/DL (ref 7–25)
CALCIUM SERPL-MCNC: 9.8 MG/DL (ref 8.6–10.3)
CHLORIDE BLD-SCNC: 102 MMOL/L (ref 98–107)
CHOLEST SERPL-MCNC: 210 MG/DL
CO2 SERPL-SCNC: 29 MMOL/L (ref 21–31)
CREAT SERPL-MCNC: 0.98 MG/DL (ref 0.7–1.3)
FASTING STATUS PATIENT QL REPORTED: ABNORMAL
GFR SERPL CREATININE-BSD FRML MDRD: 86 ML/MIN/1.73M2
GLUCOSE BLD-MCNC: 119 MG/DL (ref 70–105)
HBA1C MFR BLD: 6.8 % (ref 4–6.2)
HDLC SERPL-MCNC: 49 MG/DL (ref 23–92)
LDLC SERPL CALC-MCNC: 137 MG/DL
NONHDLC SERPL-MCNC: 161 MG/DL
POTASSIUM BLD-SCNC: 5.5 MMOL/L (ref 3.5–5.1)
SODIUM SERPL-SCNC: 136 MMOL/L (ref 134–144)
TRIGL SERPL-MCNC: 118 MG/DL

## 2022-10-03 PROCEDURE — 82310 ASSAY OF CALCIUM: CPT | Mod: ZL | Performed by: FAMILY MEDICINE

## 2022-10-03 PROCEDURE — 36415 COLL VENOUS BLD VENIPUNCTURE: CPT | Mod: ZL | Performed by: FAMILY MEDICINE

## 2022-10-03 PROCEDURE — G0463 HOSPITAL OUTPT CLINIC VISIT: HCPCS | Mod: 25

## 2022-10-03 PROCEDURE — G0008 ADMIN INFLUENZA VIRUS VAC: HCPCS

## 2022-10-03 PROCEDURE — 99213 OFFICE O/P EST LOW 20 MIN: CPT | Performed by: ORTHOPAEDIC SURGERY

## 2022-10-03 PROCEDURE — G0463 HOSPITAL OUTPT CLINIC VISIT: HCPCS | Mod: 27

## 2022-10-03 PROCEDURE — 99213 OFFICE O/P EST LOW 20 MIN: CPT | Performed by: FAMILY MEDICINE

## 2022-10-03 PROCEDURE — G0463 HOSPITAL OUTPT CLINIC VISIT: HCPCS | Mod: 25,27

## 2022-10-03 PROCEDURE — 83036 HEMOGLOBIN GLYCOSYLATED A1C: CPT | Mod: ZL | Performed by: FAMILY MEDICINE

## 2022-10-03 PROCEDURE — G0463 HOSPITAL OUTPT CLINIC VISIT: HCPCS

## 2022-10-03 PROCEDURE — 80061 LIPID PANEL: CPT | Mod: ZL | Performed by: FAMILY MEDICINE

## 2022-10-03 RX ORDER — LIDOCAINE HYDROCHLORIDE 10 MG/ML
4 INJECTION, SOLUTION EPIDURAL; INFILTRATION; INTRACAUDAL; PERINEURAL ONCE
Status: ACTIVE | OUTPATIENT
Start: 2022-10-03

## 2022-10-03 RX ORDER — TRIAMCINOLONE ACETONIDE 40 MG/ML
40 INJECTION, SUSPENSION INTRA-ARTICULAR; INTRAMUSCULAR ONCE
Status: ACTIVE | OUTPATIENT
Start: 2022-10-03

## 2022-10-03 ASSESSMENT — PAIN SCALES - GENERAL: PAINLEVEL: NO PAIN (0)

## 2022-10-03 NOTE — PROGRESS NOTES
Chief Complaint   Patient presents with     RECHECK     Right shoulder pain       Odessa Loyd LPN

## 2022-10-03 NOTE — PROGRESS NOTES
Assessment & Plan     (E11.42) Type 2 diabetes mellitus with diabetic polyneuropathy, without long-term current use of insulin (H)  (primary encounter diagnosis)  Comment: great control, but still with significant neuropathy.  No changes in meds.   Plan: Miscellaneous Order for DME - ONLY FOR DME,         Lipid Panel, Hemoglobin A1c, Basic Metabolic         Panel, FOOT EXAM        Consult and orders for diabetes shoes and inserts placed.      (Z12.11) Colon cancer screening  Comment:    Plan: Colonoscopy Screening  Referral                            Return in about 6 months (around 4/3/2023).    Sohail Harris MD  [facility]    Lui Dong is a 62 year old, presenting for the following health issues:  Diabetes      History of Present Illness       Diabetes:   He presents for follow up of diabetes.  He is not checking blood glucose. He has no concerns regarding his diabetes at this time.  He is having numbness in feet, burning in feet and redness, sores, or blisters on feet. The patient has not had a diabetic eye exam in the last 12 months.         He eats 0-1 servings of fruits and vegetables daily.He consumes 0 sweetened beverage(s) daily.He exercises with enough effort to increase his heart rate 9 or less minutes per day.  He exercises with enough effort to increase his heart rate 3 or less days per week.   He is taking medications regularly.     He is having more foot pains, cannot walk often now.  Would like to get diabetes shoes.    Has not seen an eye doctor.      Current Outpatient Medications   Medication     acetaminophen (TYLENOL) 500 MG tablet     amitriptyline (ELAVIL) 50 MG tablet     atenolol (TENORMIN) 100 MG tablet     atorvastatin (LIPITOR) 40 MG tablet     blood glucose (NO BRAND SPECIFIED) test strip     blood glucose monitoring (NO BRAND SPECIFIED) meter device kit     gabapentin (NEURONTIN) 600 MG tablet     levothyroxine (SYNTHROID/LEVOTHROID) 50 MCG  tablet     lisinopril (ZESTRIL) 20 MG tablet     metFORMIN (GLUCOPHAGE) 1000 MG tablet     naloxone (NARCAN) 4 MG/0.1ML nasal spray     [START ON 10/21/2022] oxyCODONE IR (ROXICODONE) 10 MG tablet     oxyCODONE IR (ROXICODONE) 10 MG tablet     oxyCODONE IR (ROXICODONE) 10 MG tablet     sertraline (ZOLOFT) 100 MG tablet     TRUEplus Lancets 28G MISC     Current Facility-Administered Medications   Medication     lidocaine (PF) (XYLOCAINE) 1 % injection 4 mL     triamcinolone (KENALOG-40) injection 40 mg     Recent Labs   Lab Test 10/03/22  1220 02/28/22  0956 07/15/21  1437 07/15/21  1432 04/01/21  1431 12/28/20  1217 07/15/19  1610 07/15/19  1610 06/18/15  1625   A1C 6.8* 7.0* 6.7*  --  9.9*  --   --   --   --    *  --   --   --   --   --   --   --  139*   HDL 49  --   --   --   --   --   --   --  38   TRIG 118  --   --   --   --   --   --   --  172*   ALT  --   --   --   --  35 34  --  28  --    CR 0.98  --   --  0.93 0.73 0.81   < > 0.69*  --    GFRESTIMATED 86  --   --  87 >90 >90   < > >90  --    GFRESTBLACK  --   --   --   --  >90 >90   < > >90  --    POTASSIUM 5.5*  --   --  3.9 4.7 4.8   < > 3.7  --     < > = values in this interval not displayed.                  Review of Systems         Objective    /70   Pulse 80   Temp 97.1  F (36.2  C) (Tympanic)   Resp 18   Wt 122.4 kg (269 lb 12.8 oz)   SpO2 97%   BMI 41.63 kg/m    Body mass index is 41.63 kg/m .  Physical Exam  Constitutional:       Appearance: Normal appearance.   Cardiovascular:      Rate and Rhythm: Normal rate and regular rhythm.   Pulmonary:      Effort: Pulmonary effort is normal. No respiratory distress.      Breath sounds: No stridor.   Neurological:      General: No focal deficit present.      Mental Status: He is alert and oriented to person, place, and time.      Sensory exam of the foot is abnormal to ankles, tested with the monofilament. Good pulses, no lesions or ulcers, good peripheral pulses. Has preulcerative  callous on 1st toes.        Results for orders placed or performed in visit on 10/03/22   Basic Metabolic Panel     Status: Abnormal   Result Value Ref Range    Sodium 136 134 - 144 mmol/L    Potassium 5.5 (H) 3.5 - 5.1 mmol/L    Chloride 102 98 - 107 mmol/L    Carbon Dioxide (CO2) 29 21 - 31 mmol/L    Anion Gap 5 3 - 14 mmol/L    Urea Nitrogen 23 7 - 25 mg/dL    Creatinine 0.98 0.70 - 1.30 mg/dL    Calcium 9.8 8.6 - 10.3 mg/dL    Glucose 119 (H) 70 - 105 mg/dL    GFR Estimate 86 >60 mL/min/1.73m2   Hemoglobin A1c     Status: Abnormal   Result Value Ref Range    Hemoglobin A1C 6.8 (H) 4.0 - 6.2 %   Lipid Panel     Status: Abnormal   Result Value Ref Range    Cholesterol 210 (H) <200 mg/dL    Triglycerides 118 <150 mg/dL    Direct Measure HDL 49 23 - 92 mg/dL    LDL Cholesterol Calculated 137 (H) <=100 mg/dL    Non HDL Cholesterol 161 (H) <130 mg/dL    Patient Fasting > 8hrs? Unknown     Narrative    Cholesterol  Desirable:  <200 mg/dL    Triglycerides  Normal:  Less than 150 mg/dL  Borderline High:  150-199 mg/dL  High:  200-499 mg/dL  Very High:  Greater than or equal to 500 mg/dL    Direct Measure HDL  Female:  Greater than or equal to 50 mg/dL   Male:  Greater than or equal to 40 mg/dL    LDL Cholesterol  Desirable:  <100mg/dL  Above Desirable:  100-129 mg/dL   Borderline High:  130-159 mg/dL   High:  160-189 mg/dL   Very High:  >= 190 mg/dL    Non HDL Cholesterol  Desirable:  130 mg/dL  Above Desirable:  130-159 mg/dL  Borderline High:  160-189 mg/dL  High:  190-219 mg/dL  Very High:  Greater than or equal to 220 mg/dL

## 2022-10-03 NOTE — NURSING NOTE
"Chief Complaint   Patient presents with     Diabetes       Initial /70   Pulse 80   Temp 97.1  F (36.2  C) (Tympanic)   Resp 18   Wt 122.4 kg (269 lb 12.8 oz)   SpO2 97%   BMI 41.63 kg/m   Estimated body mass index is 41.63 kg/m  as calculated from the following:    Height as of 12/16/21: 1.715 m (5' 7.5\").    Weight as of this encounter: 122.4 kg (269 lb 12.8 oz).  Medication Reconciliation: complete    FOOD SECURITY SCREENING QUESTIONS  Hunger Vital Signs:  Within the past 12 months we worried whether our food would run out before we got money to buy more. Never  Within the past 12 months the food we bought just didn't last and we didn't have money to get more. Never  Eliza Chávez LPN 10/3/2022 11:22 AM    Do you have a healthcare directive? No        "

## 2022-10-04 NOTE — PROGRESS NOTES
Visit Date: 10/03/2022    Garrick comes in for his right rotator cuff tear arthropathy.  He has had severe arthritis in the shoulder.  We gave him an injection back on 2022 so he is a couple of weeks prior to needing another injection.  Comes in today and states that his pain is not all that bad and he does not really want an injection today, which worked out real well because he was a few weeks too early for it anyway, and this was expressed to him.    On examination, he still has full range of motion of his shoulder; it is just a little more painful on the right.    IMPRESSION AND PLAN:  This is a 62-year-old gentleman with rotator cuff tear arthropathy of his right shoulder.  At some point here, he is probably going to need to have a reverse total shoulder arthroplasty done, but right now he does not want to do that.  He is still quite active and busy and would not tolerate a reverse total shoulder at this point.  We are going to see him back on an as-needed basis.    Jaime Dean MD        D: 10/03/2022   T: 10/03/2022   MT: BEULAH    Name:     GARRICK ROWELL  MRN:      [id number]        Account:    [id number]   :      1960           Visit Date: 10/03/2022     Document: [id number]

## 2022-10-05 ENCOUNTER — TELEPHONE (OUTPATIENT)
Dept: FAMILY MEDICINE | Facility: OTHER | Age: 62
End: 2022-10-05

## 2022-10-05 NOTE — TELEPHONE ENCOUNTER
LMTCB. Trying to reach patient to inform him that I was unable to find fax number for [facility] that he requested for DME diabetic shoes. Need to offer [facility] or if he can provide a fax number.  Eliza Chávez LPN

## 2022-10-07 NOTE — TELEPHONE ENCOUNTER
Patient called back. Does not have fax, but is fine with [facility].     Size: 10 1/2    Gi Yi on 10/7/2022 at 10:36 AM

## 2022-11-23 ENCOUNTER — OFFICE VISIT (OUTPATIENT)
Dept: FAMILY MEDICINE | Facility: OTHER | Age: 62
End: 2022-11-23
Attending: FAMILY MEDICINE
Payer: OTHER MISCELLANEOUS

## 2022-11-23 VITALS
WEIGHT: 271.2 LBS | TEMPERATURE: 97.2 F | OXYGEN SATURATION: 97 % | SYSTOLIC BLOOD PRESSURE: 126 MMHG | HEART RATE: 64 BPM | DIASTOLIC BLOOD PRESSURE: 74 MMHG | BODY MASS INDEX: 41.85 KG/M2

## 2022-11-23 DIAGNOSIS — G89.29 CHRONIC RIGHT-SIDED LOW BACK PAIN WITHOUT SCIATICA: ICD-10-CM

## 2022-11-23 DIAGNOSIS — M54.50 CHRONIC RIGHT-SIDED LOW BACK PAIN WITHOUT SCIATICA: ICD-10-CM

## 2022-11-23 PROCEDURE — 99213 OFFICE O/P EST LOW 20 MIN: CPT | Performed by: FAMILY MEDICINE

## 2022-11-23 RX ORDER — OXYCODONE HYDROCHLORIDE 10 MG/1
10 TABLET ORAL EVERY 4 HOURS PRN
Qty: 150 TABLET | Refills: 0 | Status: SHIPPED | OUTPATIENT
Start: 2023-01-18 | End: 2023-02-13

## 2022-11-23 RX ORDER — OXYCODONE HYDROCHLORIDE 10 MG/1
10 TABLET ORAL EVERY 4 HOURS PRN
Qty: 150 TABLET | Refills: 0 | Status: SHIPPED | OUTPATIENT
Start: 2022-12-21 | End: 2023-02-24

## 2022-11-23 RX ORDER — AMITRIPTYLINE HYDROCHLORIDE 50 MG/1
100 TABLET ORAL AT BEDTIME
Qty: 180 TABLET | Refills: 4 | Status: SHIPPED | OUTPATIENT
Start: 2022-11-23 | End: 2023-08-28

## 2022-11-23 RX ORDER — OXYCODONE HYDROCHLORIDE 10 MG/1
10 TABLET ORAL EVERY 4 HOURS PRN
Qty: 150 TABLET | Refills: 0 | Status: SHIPPED | OUTPATIENT
Start: 2022-11-23 | End: 2023-02-24

## 2022-11-23 ASSESSMENT — PAIN SCALES - GENERAL: PAINLEVEL: NO PAIN (0)

## 2022-11-23 NOTE — NURSING NOTE
"Chief Complaint   Patient presents with     RECHECK     3 month follow up       Initial /74   Pulse 64   Temp 97.2  F (36.2  C) (Tympanic)   Wt 123 kg (271 lb 3.2 oz)   SpO2 97%   BMI 41.85 kg/m   Estimated body mass index is 41.85 kg/m  as calculated from the following:    Height as of 12/16/21: 1.715 m (5' 7.5\").    Weight as of this encounter: 123 kg (271 lb 3.2 oz).  Medication Reconciliation: complete    FOOD SECURITY SCREENING QUESTIONS  Hunger Vital Signs:  Within the past 12 months we worried whether our food would run out before we got money to buy more. Never  Within the past 12 months the food we bought just didn't last and we didn't have money to get more. Never  Eliza Chávez LPN 11/23/2022 10:09 AM      "

## 2022-11-23 NOTE — PROGRESS NOTES
Assessment & Plan     (M54.50,  G89.29) Chronic right-sided low back pain without sciatica  Comment: essentially stable. Has some peripheral neuropathy as well, and on neurontin for this.   Plan: amitriptyline (ELAVIL) 50 MG tablet, oxyCODONE         IR (ROXICODONE) 10 MG tablet, oxyCODONE IR         (ROXICODONE) 10 MG tablet, oxyCODONE IR         (ROXICODONE) 10 MG tablet        Follow up in 3 months.                     No follow-ups on file.    Sohail Harris MD  [facility] AND [facility]    Lui Dong is a 62 year old, presenting for the following health issues:  RECHECK (3 month follow up)      History of Present Illness       Reason for visit:  Follow up    He eats 2-3 servings of fruits and vegetables daily.He consumes 0 sweetened beverage(s) daily.He exercises with enough effort to increase his heart rate 9 or less minutes per day.  He exercises with enough effort to increase his heart rate 3 or less days per week.   He is taking medications regularly.       Pain History:  When did you first notice your pain? - Chronic Pain   Have you seen this provider for your pain in the past?   Yes   Where in your body do you have pain?   Back and feet, nerve pain , right shoulder  Are you seeing anyone else for your pain? No    PHQ-9 SCORE 2/28/2022 2/28/2022 5/27/2022   PHQ-9 Total Score MyChart - 6 (Mild depression) -   PHQ-9 Total Score 6 6 3       VISHNU-7 SCORE 6/29/2021 7/29/2021 12/1/2021   Total Score 1 0 0               Chronic Pain Follow Up:    Location of pain: back. feet  Analgesia/pain control:    - Recent changes:  Feet are progressing    - Overall control: Tolerable with discomfort    - Current treatments: opiates, movement.  Elevating his feet.  Adherence:     - Do you ever take more pain medicine than prescribed? No    - When did you take your last dose of pain medicine?  today   Adverse effects: No   PDMP Review       Value Time User    State PDMP site checked  Yes 8/26/2022 10:49 AM Kimberly  MD Sohail        Last CSA Agreement:   CSA -- Patient Level:     [Media Unavailable] Controlled Substance Agreement - Opioid - Scan on 5/27/2022 11:03 AM: Opioid   [Media Unavailable] Controlled Substance Agreement - Opioid - Scan on 5/6/2021 10:06 AM: CONTROLLED SUBSTANCE AGREEMENT OPIOID       Last UDS: 9/1/2022            Review of Systems         Objective    /74   Pulse 64   Temp 97.2  F (36.2  C) (Tympanic)   Wt 123 kg (271 lb 3.2 oz)   SpO2 97%   BMI 41.85 kg/m    Body mass index is 41.85 kg/m .  Physical Exam  Constitutional:       Appearance: Normal appearance.   Musculoskeletal:      Comments: Loss of lumbar lordotic curve.  No pain on palpation. Negative straight leg raise bilateral.    Neurological:      General: No focal deficit present.      Mental Status: He is alert.   Psychiatric:         Mood and Affect: Mood normal.         Behavior: Behavior normal.         Thought Content: Thought content normal.

## 2022-12-21 ENCOUNTER — HOSPITAL ENCOUNTER (OUTPATIENT)
Facility: OTHER | Age: 62
End: 2022-12-21
Attending: SURGERY | Admitting: SURGERY
Payer: MEDICARE

## 2022-12-21 DIAGNOSIS — Z12.11 SCREENING FOR COLON CANCER: Primary | ICD-10-CM

## 2022-12-21 NOTE — TELEPHONE ENCOUNTER
Screening Questions for the Scheduling of Screening Colonoscopies   (If Colonoscopy is diagnostic, Provider should review the chart before scheduling.)  Are you younger than 50 or older than 80?  NO  Do you take aspirin or fish oil?  NO (if yes, tell patient to stop 1 week prior to Colonoscopy)  Do you take warfarin (Coumadin), clopidogrel (Plavix), apixaban (Eliquis), dabigatram (Pradaxa), rivaroxaban (Xarelto) or any blood thinner? NO  Do you use oxygen at home?  NO  Do you have kidney disease? NO  Are you on dialysis? NO  Have you had a stroke or heart attack in the last year? NO  Have you had a stent in your heart or any blood vessel in the last year? NO  Have you had a transplant of any organ? NO  Have you had a colonoscopy or upper endoscopy (EGD) before? YES         When?  DATE UNKNOWN 20 YEARS AGO  Date of scheduled Colonoscopy. 01/24/2023  Provider [facility]armacy MONICA Merritt on 12/21/2022 at 11:35 AM

## 2023-01-02 RX ORDER — BISACODYL 5 MG
TABLET, DELAYED RELEASE (ENTERIC COATED) ORAL
Qty: 2 TABLET | Refills: 0 | Status: SHIPPED | OUTPATIENT
Start: 2023-01-02 | End: 2024-02-05

## 2023-01-02 RX ORDER — POLYETHYLENE GLYCOL 3350, SODIUM CHLORIDE, SODIUM BICARBONATE, POTASSIUM CHLORIDE 420; 11.2; 5.72; 1.48 G/4L; G/4L; G/4L; G/4L
4000 POWDER, FOR SOLUTION ORAL ONCE
Qty: 4000 ML | Refills: 0 | Status: SHIPPED | OUTPATIENT
Start: 2023-01-02 | End: 2023-01-02

## 2023-01-11 NOTE — PROGRESS NOTES
Pre-Visit Planning   Next 5 appointments (look out 90 days)    Jan 12, 2023 11:00 AM  PHYSICAL with Sohail Harris MD  [facility] ) [address]  [place] [address]  [phone]   Jan 16, 2023 12:45 PM  Return Visit with Jaime Dean MD  [facility] ) [address]  [place] [address]  [phone]   Feb 24, 2023 11:00 AM  SHORT with Sohail Harris MD  [facility] ) [address]  [place] [address]  [phone]        Appointment Notes for this encounter:   Wellness Check     Questionnaires Reviewed/Assigned  Additional questionnaires assigned: oren 7, phq9    Patient preferred phone number: [phone]      Contacted patient via phone/[employer]. Are there any additional questions or concerns you'd like to review with your provider during your visit? Yes:   -patient reports he has not been taking his atorvastatin as he thought he did not need it  -patient states he has not been checking blood sugars    Visit is preventive. Reviewed purpose of preventive visit with patient.    Meds  Home Meds reviewed and updated  Refills pended    Entered patient-preferred pharmacy.     Current Outpatient Medications   Medication     acetaminophen (TYLENOL) 500 MG tablet     amitriptyline (ELAVIL) 50 MG tablet     atenolol (TENORMIN) 100 MG tablet     atorvastatin (LIPITOR) 40 MG tablet     bisacodyl (DULCOLAX) 5 MG EC tablet     blood glucose (NO BRAND SPECIFIED) test strip     blood glucose monitoring (NO BRAND SPECIFIED) meter device kit     gabapentin (NEURONTIN) 600 MG tablet     levothyroxine (SYNTHROID/LEVOTHROID) 50 MCG tablet     lisinopril (ZESTRIL) 20 MG tablet     metFORMIN (GLUCOPHAGE) 1000 MG tablet     naloxone (NARCAN) 4 MG/0.1ML nasal spray     oxyCODONE  IR (ROXICODONE) 10 MG tablet     [START ON 1/18/2023] oxyCODONE IR (ROXICODONE) 10 MG tablet     oxyCODONE IR (ROXICODONE) 10 MG tablet     sertraline (ZOLOFT) 100 MG tablet     TRUEplus Lancets 28G MISC     Current Facility-Administered Medications   Medication     lidocaine (PF) (XYLOCAINE) 1 % injection 4 mL     triamcinolone (KENALOG-40) injection 40 mg     MyChart  Patient is active on [employer].    Questionnaire Review   Offered information on completing questionnaires via [employer].  Advised patient to arrive early in order to complete questionnaires.    Call Summary  Advised patient to call back at [phone] if needed.     Corinne R Thayer, RN on 1/11/2023 at 11:14 AM

## 2023-01-12 ENCOUNTER — OFFICE VISIT (OUTPATIENT)
Dept: FAMILY MEDICINE | Facility: OTHER | Age: 63
End: 2023-01-12
Attending: FAMILY MEDICINE
Payer: MEDICARE

## 2023-01-12 VITALS
RESPIRATION RATE: 17 BRPM | HEIGHT: 67 IN | DIASTOLIC BLOOD PRESSURE: 70 MMHG | WEIGHT: 269 LBS | TEMPERATURE: 97.6 F | HEART RATE: 63 BPM | BODY MASS INDEX: 42.22 KG/M2 | OXYGEN SATURATION: 95 % | SYSTOLIC BLOOD PRESSURE: 118 MMHG

## 2023-01-12 DIAGNOSIS — Z12.5 SCREENING FOR PROSTATE CANCER: ICD-10-CM

## 2023-01-12 DIAGNOSIS — E03.9 HYPOTHYROIDISM, UNSPECIFIED TYPE: ICD-10-CM

## 2023-01-12 DIAGNOSIS — I10 ESSENTIAL HYPERTENSION: ICD-10-CM

## 2023-01-12 DIAGNOSIS — F43.29 GRIEF REACTION WITH PROLONGED BEREAVEMENT: ICD-10-CM

## 2023-01-12 DIAGNOSIS — G89.29 CHRONIC RIGHT-SIDED LOW BACK PAIN WITHOUT SCIATICA: ICD-10-CM

## 2023-01-12 DIAGNOSIS — Z11.59 NEED FOR HEPATITIS C SCREENING TEST: ICD-10-CM

## 2023-01-12 DIAGNOSIS — E66.01 MORBID OBESITY (H): ICD-10-CM

## 2023-01-12 DIAGNOSIS — M54.50 CHRONIC RIGHT-SIDED LOW BACK PAIN WITHOUT SCIATICA: ICD-10-CM

## 2023-01-12 DIAGNOSIS — Z00.00 ENCOUNTER FOR MEDICARE ANNUAL WELLNESS EXAM: Primary | ICD-10-CM

## 2023-01-12 DIAGNOSIS — Z23 NEED FOR COVID-19 VACCINE: ICD-10-CM

## 2023-01-12 DIAGNOSIS — E11.42 TYPE 2 DIABETES MELLITUS WITH DIABETIC POLYNEUROPATHY, WITHOUT LONG-TERM CURRENT USE OF INSULIN (H): ICD-10-CM

## 2023-01-12 LAB
CREAT UR-MCNC: 126.2 MG/DL
HBA1C MFR BLD: 7.1 % (ref 4–6.2)
MICROALBUMIN UR-MCNC: <12 MG/L
MICROALBUMIN/CREAT UR: NORMAL MG/G{CREAT}
PSA SERPL-MCNC: 0.57 NG/ML (ref 0–4.5)
TSH SERPL DL<=0.005 MIU/L-ACNC: 0.53 UIU/ML (ref 0.3–4.2)

## 2023-01-12 PROCEDURE — 91312 COVID-19 VACCINE BIVALENT BOOSTER 12+ (PFIZER): CPT

## 2023-01-12 PROCEDURE — 83036 HEMOGLOBIN GLYCOSYLATED A1C: CPT | Mod: ZL | Performed by: FAMILY MEDICINE

## 2023-01-12 PROCEDURE — G0103 PSA SCREENING: HCPCS | Mod: ZL | Performed by: FAMILY MEDICINE

## 2023-01-12 PROCEDURE — G0439 PPPS, SUBSEQ VISIT: HCPCS | Performed by: FAMILY MEDICINE

## 2023-01-12 PROCEDURE — G0463 HOSPITAL OUTPT CLINIC VISIT: HCPCS | Mod: 25

## 2023-01-12 PROCEDURE — 90677 PCV20 VACCINE IM: CPT

## 2023-01-12 PROCEDURE — 86803 HEPATITIS C AB TEST: CPT | Mod: ZL | Performed by: FAMILY MEDICINE

## 2023-01-12 PROCEDURE — 99213 OFFICE O/P EST LOW 20 MIN: CPT | Mod: 25 | Performed by: FAMILY MEDICINE

## 2023-01-12 PROCEDURE — 82570 ASSAY OF URINE CREATININE: CPT | Mod: ZL | Performed by: FAMILY MEDICINE

## 2023-01-12 PROCEDURE — 84443 ASSAY THYROID STIM HORMONE: CPT | Mod: ZL | Performed by: FAMILY MEDICINE

## 2023-01-12 PROCEDURE — 36415 COLL VENOUS BLD VENIPUNCTURE: CPT | Mod: ZL | Performed by: FAMILY MEDICINE

## 2023-01-12 RX ORDER — GABAPENTIN 600 MG/1
600 TABLET ORAL 3 TIMES DAILY
Qty: 270 TABLET | Refills: 4 | Status: SHIPPED | OUTPATIENT
Start: 2023-01-12 | End: 2023-02-24

## 2023-01-12 RX ORDER — ATENOLOL 100 MG/1
100 TABLET ORAL DAILY
Qty: 90 TABLET | Refills: 4 | Status: SHIPPED | OUTPATIENT
Start: 2023-01-12 | End: 2024-04-02

## 2023-01-12 RX ORDER — SERTRALINE HYDROCHLORIDE 100 MG/1
100 TABLET, FILM COATED ORAL DAILY
Qty: 90 TABLET | Refills: 4 | Status: SHIPPED | OUTPATIENT
Start: 2023-01-12 | End: 2024-04-02

## 2023-01-12 RX ORDER — LISINOPRIL 20 MG/1
20 TABLET ORAL DAILY
Qty: 90 TABLET | Refills: 4 | Status: SHIPPED | OUTPATIENT
Start: 2023-01-12 | End: 2024-03-19

## 2023-01-12 RX ORDER — POLYETHYLENE GLYCOL 3350, SODIUM CHLORIDE, SODIUM BICARBONATE, POTASSIUM CHLORIDE 420; 11.2; 5.72; 1.48 G/4L; G/4L; G/4L; G/4L
4000 POWDER, FOR SOLUTION ORAL ONCE
Status: ON HOLD | COMMUNITY
Start: 2023-01-02 | End: 2024-04-04

## 2023-01-12 RX ORDER — LEVOTHYROXINE SODIUM 50 UG/1
50 TABLET ORAL DAILY
Qty: 90 TABLET | Refills: 4 | Status: SHIPPED | OUTPATIENT
Start: 2023-01-12 | End: 2024-04-02

## 2023-01-12 RX ORDER — ATORVASTATIN CALCIUM 40 MG/1
40 TABLET, FILM COATED ORAL DAILY
Qty: 90 TABLET | Refills: 4 | Status: SHIPPED | OUTPATIENT
Start: 2023-01-12 | End: 2024-04-02

## 2023-01-12 ASSESSMENT — ENCOUNTER SYMPTOMS
DYSURIA: 1
WEAKNESS: 1
CHILLS: 0
SORE THROAT: 0
PARESTHESIAS: 0
PALPITATIONS: 0
NERVOUS/ANXIOUS: 0
FREQUENCY: 0
DIZZINESS: 0
CONSTIPATION: 1
DIARRHEA: 1
FEVER: 0
HEARTBURN: 1
HEMATOCHEZIA: 1
JOINT SWELLING: 0
COUGH: 1
ARTHRALGIAS: 1
HEMATURIA: 0
SHORTNESS OF BREATH: 0
EYE PAIN: 0
MYALGIAS: 1
NAUSEA: 0
ABDOMINAL PAIN: 0
HEADACHES: 0

## 2023-01-12 ASSESSMENT — ANXIETY QUESTIONNAIRES
4. TROUBLE RELAXING: SEVERAL DAYS
6. BECOMING EASILY ANNOYED OR IRRITABLE: SEVERAL DAYS
7. FEELING AFRAID AS IF SOMETHING AWFUL MIGHT HAPPEN: NOT AT ALL
1. FEELING NERVOUS, ANXIOUS, OR ON EDGE: NOT AT ALL
2. NOT BEING ABLE TO STOP OR CONTROL WORRYING: NOT AT ALL
7. FEELING AFRAID AS IF SOMETHING AWFUL MIGHT HAPPEN: NOT AT ALL
GAD7 TOTAL SCORE: 3
3. WORRYING TOO MUCH ABOUT DIFFERENT THINGS: NOT AT ALL
GAD7 TOTAL SCORE: 3
GAD7 TOTAL SCORE: 3
5. BEING SO RESTLESS THAT IT IS HARD TO SIT STILL: SEVERAL DAYS
8. IF YOU CHECKED OFF ANY PROBLEMS, HOW DIFFICULT HAVE THESE MADE IT FOR YOU TO DO YOUR WORK, TAKE CARE OF THINGS AT HOME, OR GET ALONG WITH OTHER PEOPLE?: NOT DIFFICULT AT ALL
IF YOU CHECKED OFF ANY PROBLEMS ON THIS QUESTIONNAIRE, HOW DIFFICULT HAVE THESE PROBLEMS MADE IT FOR YOU TO DO YOUR WORK, TAKE CARE OF THINGS AT HOME, OR GET ALONG WITH OTHER PEOPLE: NOT DIFFICULT AT ALL

## 2023-01-12 ASSESSMENT — ACTIVITIES OF DAILY LIVING (ADL): CURRENT_FUNCTION: NO ASSISTANCE NEEDED

## 2023-01-12 ASSESSMENT — PATIENT HEALTH QUESTIONNAIRE - PHQ9
10. IF YOU CHECKED OFF ANY PROBLEMS, HOW DIFFICULT HAVE THESE PROBLEMS MADE IT FOR YOU TO DO YOUR WORK, TAKE CARE OF THINGS AT HOME, OR GET ALONG WITH OTHER PEOPLE: NOT DIFFICULT AT ALL
SUM OF ALL RESPONSES TO PHQ QUESTIONS 1-9: 6
SUM OF ALL RESPONSES TO PHQ QUESTIONS 1-9: 6

## 2023-01-12 ASSESSMENT — PAIN SCALES - GENERAL: PAINLEVEL: MODERATE PAIN (5)

## 2023-01-12 NOTE — NURSING NOTE
"Chief Complaint   Patient presents with     Medicare Visit       Initial /70   Pulse 63   Temp 97.6  F (36.4  C) (Tympanic)   Resp 17   Ht 1.689 m (5' 6.5\")   Wt 122 kg (269 lb)   SpO2 95%   BMI 42.77 kg/m   Estimated body mass index is 42.77 kg/m  as calculated from the following:    Height as of this encounter: 1.689 m (5' 6.5\").    Weight as of this encounter: 122 kg (269 lb).  Medication Reconciliation: complete    FOOD SECURITY SCREENING QUESTIONS  Hunger Vital Signs:  Within the past 12 months we worried whether our food would run out before we got money to buy more. Never  Within the past 12 months the food we bought just didn't last and we didn't have money to get more. Never  Eliza Chávez LPN 1/12/2023 11:19 AM        "

## 2023-01-12 NOTE — PATIENT INSTRUCTIONS
Patient Education   Personalized Prevention Plan  You are due for the preventive services outlined below.  Your care team is available to assist you in scheduling these services.  If you have already completed any of these items, please share that information with your care team to update in your medical record.  Health Maintenance Due   Topic Date Due     Eye Exam  Never done     Pneumococcal Vaccine (1 - PCV) Never done     HIV Screening  Never done     Hepatitis C Screening  Never done     Diptheria Tetanus Pertussis (DTAP/TDAP/TD) Vaccine (1 - Tdap) Never done     Zoster (Shingles) Vaccine (1 of 2) Never done     Colorectal Cancer Screening  12/02/2013     COVID-19 Vaccine (3 - Booster for Pfizer series) 07/01/2021     A1[facility] Lab  01/03/2023     Your Health Risk Assessment indicates you feel you are not in good health    A healthy lifestyle helps keep the body fit and the mind alert. It helps protect you from disease, helps you fight disease, and helps prevent chronic disease (disease that doesn't go away) from getting worse. This is important as you get older and begin to notice twinges in muscles and joints and a decline in the strength and stamina you once took for granted. A healthy lifestyle includes good healthcare, good nutrition, weight control, recreation, and regular exercise. Avoid harmful substances and do what you can to keep safe. Another part of a healthy lifestyle is stay mentally active and socially involved.    Good healthcare     Have a wellness visit every year.     If you have new symptoms, let us know right away. Don't wait until the next checkup.     Take medicines exactly as prescribed and keep your medicines in a safe place. Tell us if your medicine causes problems.   Healthy diet and weight control     Eat 3 or 4 small, nutritious, low-fat, high-fiber meals a day. Include a variety of fruits, vegetables, and whole-grain foods.     Make sure you get enough calcium in your diet. Calcium,  vitamin D, and exercise help prevent osteoporosis (bone thinning).     If you live alone, try eating with others when you can. That way you get a good meal and have company while you eat it.     Try to keep a healthy weight. If you eat more calories than your body uses for energy, it will be stored as fat and you will gain weight.     Recreation   Recreation is not limited to sports and team events. It includes any activity that provides relaxation, interest, enjoyment, and exercise. Recreation provides an outlet for physical, mental, and social energy. It can give a sense of worth and achievement. It can help you stay healthy.    Mental Exercise and Social Involvement  Mental and emotional health is as important as physical health. Keep in touch with friends and family. Stay as active as possible. Continue to learn and challenge yourself.   Things you can do to stay mentally active are:    Learn something new, like a foreign language or musical instrument.     Play SCRABBLE or do crossword puzzles. If you cannot find people to play these games with you at home, you can play them with others on your computer through the Internet.     Join a games club--anything from card games to chess or checkers or lawn bowling.     Start a new hobby.     Go back to school.     Volunteer.     Read.   Keep up with world events.    Exercise for a Healthier Heart  You may wonder how you can improve the health of your heart. If you re thinking about exercise, you re on the right track. You don t need to become an athlete. But you do need a certain amount of brisk exercise to help strengthen your heart. If you have been diagnosed with a heart condition, your healthcare provider may advise exercise to help stabilize your condition. To help make exercise a habit, choose safe, fun activities.      Exercise with a friend. When activity is fun, you're more likely to stick with it.   Before you start  Check with your healthcare provider  before starting an exercise program. This is especially important if you have not been active for a while. It's also important if you have a long-term (chronic) health problem such as heart disease, diabetes, or obesity. Or if you are at high risk for having these problems.   Why exercise?  Exercising regularly offers many healthy rewards. It can help you do all of the following:     Improve your blood cholesterol level to help prevent further heart trouble    Lower your blood pressure to help prevent a stroke or heart attack    Control diabetes, or reduce your risk of getting this disease    Improve your heart and lung function    Reach and stay at a healthy weight    Make your muscles stronger so you can stay active    Prevent falls and fractures by slowing the loss of bone mass (osteoporosis)    Manage stress better    Reduce your blood pressure    Improve your sense of self and your body image  Exercise tips      Ease into your routine. Set small goals. Then build on them. If you are not sure what your activity level should be, talk with your healthcare provider first before starting an exercise routine.    Exercise on most days. Aim for a total of 150 minutes (2 hours and 30 minutes) or more of moderate-intensity aerobic activity each week. Or 75 minutes (1 hour and 15 minutes) or more of vigorous-intensity aerobic activity each week. Or try for a combination of both. Moderate activity means that you breathe heavier and your heart rate increases but you can still talk. Think about doing 40 minutes of moderate exercise, 3 to 4 times a week. For best results, activity should last for about 40 minutes to lower blood pressure and cholesterol. It's OK to work up to the 40-minute period over time. Examples of moderate-intensity activity are walking 1 mile in 15 minutes. Or doing 30 to 45 minutes of yard work.    Step up your daily activity level.  Along with your exercise program, try being more active the whole day.  Walk instead of drive. Or park further away so that you take more steps each day. Do more household tasks or yard work. You may not be able to meet the advised mount of physical activity. But doing some moderate- or vigorous-intensity aerobic activity can help reduce your risk for heart disease. Your healthcare provider can help you figure out what is best for you.    Choose 1 or more activities you enjoy.  Walking is one of the easiest things you can do. You can also try swimming, riding a bike, dancing, or taking an exercise class.    When to call your healthcare provider  Call your healthcare provider if you have any of these:     Chest pain or feel dizzy or lightheaded    Burning, tightness, pressure, or heaviness in your chest, neck, shoulders, back, or arms    Abnormal shortness of breath    More joint or muscle pain    A very fast or irregular heartbeat (palpitations)  Rosa last reviewed this educational content on 7/1/2019 2000-2021 [employer]. All rights reserved. This information is not intended as a substitute for professional medical care. Always follow your healthcare professional's instructions.          Signs of Hearing Loss      Hearing much better with one ear can be a sign of hearing loss.   Hearing loss is a problem shared by many people. In fact, it is one of the most common health problems, particularly as people age. Most people age 65 and older have some hearing loss. By age 80, almost everyone does. Hearing loss often occurs slowly over the years. So you may not realize your hearing has gotten worse.  Have your hearing checked  Call your healthcare provider if you:    Have to strain to hear normal conversation    Have to watch other people s faces very carefully to follow what they re saying    Need to ask people to repeat what they ve said    Often misunderstand what people are saying    Turn the volume of the television or radio up so high that others complain    Feel  that people are mumbling when they re talking to you    Find that the effort to hear leaves you feeling tired and irritated    Notice, when using the phone, that you hear better with one ear than the other  [place] last reviewed this educational content on 1/1/2020 2000-2021 [employer]. All rights reserved. This information is not intended as a substitute for professional medical care. Always follow your healthcare professional's instructions.        Your Health Risk Assessment indicates you feel you are not in good emotional health.    Recreation   Recreation is not limited to sports and team events. It includes any activity that provides relaxation, interest, enjoyment, and exercise. Recreation provides an outlet for physical, mental, and social energy. It can give a sense of worth and achievement. It can help you stay healthy.    Mental Exercise and Social Involvement  Mental and emotional health is as important as physical health. Keep in touch with friends and family. Stay as active as possible. Continue to learn and challenge yourself.   Things you can do to stay mentally active are:    Learn something new, like a foreign language or musical instrument.     Play SCRABBLE or do crossword puzzles. If you cannot find people to play these games with you at home, you can play them with others on your computer through the Internet.     Join a games club--anything from card games to chess or checkers or lawn bowling.     Start a new hobby.     Go back to school.     Volunteer.     Read.   Keep up with world events.    Depression and Suicide in Older Adults    Nearly 2 million older Americans have some type of depression. Some of them even take their own lives. Yet depression among older adults is often ignored. Learn the warning signs. You may help spare a loved one needless pain. You may also save a life.   What is depression?  Depression is a common and serious illness that affects the way you  "think and feel. It is not a normal part of aging, nor is it a sign of weakness, a character flaw, or something you can snap out of. Most people with depression need treatment to get better. The most common symptom is a feeling of deep sadness. People who are depressed also may seem tired and listless. And nothing seems to give them pleasure. It s normal to grieve or be sad sometimes. But sadness lessens or passes with time. Depression rarely goes away or improves on its own. A person with clinical depression can't \"snap out of it.\" Other symptoms of depression are:     Sleeping more or less than normal    Eating more or less than normal    Having headaches, stomachaches, or other pains that don t go away    Feeling nervous,  empty,  or worthless    Crying a great deal    Thinking or talking about suicide or death    Loss of interest in activities previously enjoyed    Social isolation    Feeling confused or forgetful  What causes it?  The causes of depression aren t fully known. But it is thought to result from a complex blend of these factors:     Biochemistry. Certain chemicals in the brain play a role.    Genes. Depression does run in families.    Life stress. Life stresses can also trigger depression in some people. Older adults often face many stressors, such as death of friends or a spouse, health problems, and financial concerns.    Chronic conditions. This includes conditions such as diabetes, heart disease, or cancer. These can cause symptoms of depression. Medicine side effects can cause changes in thoughts and behaviors.  How you can help  Often, depressed people may not want to ask for help. When they do, they may be ignored. Or, they may receive the wrong treatment. You can help by showing parents and older friends love and support. If they seem depressed, don t lecture the person, ignore the symptoms, or discount the symptoms as a  normal  part of aging -which they are not. Get involved, listen, and " show interest and support.   Help them understand that depression is a treatable illness. Tell them you can help them find the right treatment. Offer to go to their healthcare provider's appointment with them for support when the symptoms are discussed. With their approval, contact a local mental health center, social service agency, or hospital about services.   You can be an advocate for him or her at healthcare appointments. Many older adults have chronic illnesses that can cause symptoms of depression. Medicine side effects can change thoughts and behaviors. You can help make sure that the healthcare provider looks at all of these factors. He or she should refer your family member or friend to a mental healthcare provider when needed. in some cases, untreated depression can lead to a misdiagnosis. A person may be diagnosed with a brain disorder such as dementia. If the healthcare provider does not take the issue of depression seriously, help your family member or friend to find another provider.   Don't be afraid to ask  If you think an older person you care about could be suicidal, ask,  Have you thought about suicide?  Most people will tell you the truth. If they say  yes,  they may already have a plan for how and when they will attempt it. Find out as much as you can. The more detailed the plan, and the easier it is to carry out, the more danger the person is in right now. Tell the person you are there for them and do not want them to harm him or herself. Don't wait to get help for the person. Call the person's healthcare provider, local hospital, or emergency services.   To learn more    National Suicide Prevention Lifeline (crisis hotline) 488-349-SCRV (610-923-3518)    National [employer] of Mental Lgsxyc800-267-8701yqo.nimh.nih.gov    National [place] [employer] on Mental Bqeymnk291-898-0522amx.osiris.org    Mental Health Qiviezg105-406-6284zie.nmha.org    National Suicide Cqbqwws210-LLBPAIX (519-526-9654)    Call  911  Never leave the person alone. A person who is actively suicidal needs psychiatric care right away. They will need constant supervision. Never leave the person out of sight. Call 911 or the national 24-hour suicide crisis hotline at 605-829-JFHX (035-337-5156). You can also take the person to the closest emergency room.   Rosa last reviewed this educational content on 5/1/2020 2000-2021 [employer]. All rights reserved. This information is not intended as a substitute for professional medical care. Always follow your healthcare professional's instructions.

## 2023-01-12 NOTE — PROGRESS NOTES
"    The patient was provided with suggestions to help him develop a healthy physical lifestyle.  He is at risk for lack of exercise and has been provided with information to increase physical activity for the benefit of his well-being.  The patient was provided with written information regarding signs of hearing loss.  The patient was provided with suggestions to help him develop a healthy emotional lifestyle.  The patient's PHQ-9 score is consistent with mild depression. He was provided with information regarding depression and was advised to schedule a follow up appointment in 12 weeks to further address this issue.  Answers for HPI/ROS submitted by the patient on 1/12/2023  If you checked off any problems, how difficult have these problems made it for you to do your work, take care of things at home, or get along with other people?: Not difficult at all  PHQ9 TOTAL SCORE: 6  VISHNU 7 TOTAL SCORE: 3  In general, how would you rate your overall physical health?: poor  Frequency of exercise:: None  Do you usually eat at least 4 servings of fruit and vegetables a day, include whole grains & fiber, and avoid regularly eating high fat or \"junk\" foods? : Yes  Taking medications regularly:: Yes  Medication side effects:: None  Activities of Daily Living: no assistance needed  Home safety: no safety concerns identified  Hearing Impairment:: difficulty following a conversation in a noisy restaurant or crowded room, feel that people are mumbling or not speaking clearly, difficulty following dialogue in the theater, difficult to understand a speaker at a public meeting or [employer] service, need to ask people to speak up or repeat themselves, difficulty understanding soft or whispered speech, difficulty understanding speech on the telephone  In the past 6 months, have you been bothered by leaking of urine?: No  abdominal pain: No  Blood in stool: Yes  Blood in urine: No  chest pain: No  chills: No  congestion: No  constipation: " Yes  cough: Yes  diarrhea: Yes  dizziness: No  ear pain: No  eye pain: No  nervous/anxious: No  fever: No  frequency: No  genital sores: No  headaches: No  hearing loss: Yes  heartburn: Yes  arthralgias: Yes  joint swelling: No  peripheral edema: No  mood changes: Yes  myalgias: Yes  nausea: No  dysuria: Yes  palpitations: No  Skin sensation changes: No  sore throat: No  urgency: No  rash: No  shortness of breath: No  visual disturbance: No  weakness: Yes  impotence: Yes  penile discharge: No  In general, how would you rate your overall mental or emotional health?: fair  Additional concerns today:: Yes

## 2023-01-12 NOTE — PROGRESS NOTES
"SUBJECTIVE:   CC: Iker is an 62 year old who presents for preventative health visit.     Patient has been advised of split billing requirements and indicates understanding: Yes  Healthy Habits:     In general, how would you rate your overall health?  Poor    Frequency of exercise:  None    Do you usually eat at least 4 servings of fruit and vegetables a day, include whole grains    & fiber and avoid regularly eating high fat or \"junk\" foods?  Yes    Taking medications regularly:  Yes    Medication side effects:  None    Ability to successfully perform activities of daily living:  No assistance needed    Home Safety:  No safety concerns identified    Hearing Impairment:  Difficulty following a conversation in a noisy restaurant or crowded room, feel that people are mumbling or not speaking clearly, difficulty following dialogue in the theater, difficult to understand a speaker at a public meeting or Islam service, need to ask people to speak up or repeat themselves, difficulty understanding soft or whispered speech and difficulty understanding speech on the telephone    In the past 6 months, have you been bothered by leaking of urine?  No    In general, how would you rate your overall mental or emotional health?  Fair      PHQ-2 Total Score: 1    Additional concerns today:  Yes    Ability to successfully perform activities of daily living: Yes, no assistance needed  Home safety:  none identified   Hearing impairment: Yes, Difficulty following a conversation in a noisy restaurant or crowded room.  Feel that people are mumbling or not speaking clearly.  Difficulty following dialogue in the theater.  Difficult to understand a speaker at a public meeting or Islam service.  Need to ask people to speak up or repeat themselves.  Difficulty understanding soft or whispered speech.  Difficulty understanding speech on the telephone.            Today's PHQ-2 Score:   PHQ-2 ( 1999 Pfizer) 1/12/2023   Q1: Little interest " or pleasure in doing things 0   Q2: Feeling down, depressed or hopeless 1   PHQ-2 Score 1   PHQ-2 Total Score (12-17 Years)- Positive if 3 or more points; Administer PHQ-A if positive -   Q1: Little interest or pleasure in doing things Not at all   Q2: Feeling down, depressed or hopeless Several days   PHQ-2 Score 1           Social History     Tobacco Use     Smoking status: Never     Smokeless tobacco: Never   Substance Use Topics     Alcohol use: Not Currently     Comment: rare     If you drink alcohol do you typically have >3 drinks per day or >7 drinks per week? No    Alcohol Use 1/12/2023   Prescreen: >3 drinks/day or >7 drinks/week? No       Last PSA: No results found for: PSA    Reviewed orders with patient. Reviewed health maintenance and updated orders accordingly - Yes  Labs reviewed in [facility]  Current Outpatient Medications   Medication Sig Dispense Refill     acetaminophen (TYLENOL) 500 MG tablet Take 500 mg by mouth every 4 hours as needed        amitriptyline (ELAVIL) 50 MG tablet Take 2 tablets (100 mg) by mouth At Bedtime 180 tablet 4     atenolol (TENORMIN) 100 MG tablet Take 1 tablet (100 mg) by mouth daily 90 tablet 4     atorvastatin (LIPITOR) 40 MG tablet Take 1 tablet (40 mg) by mouth daily 90 tablet 4     bisacodyl (DULCOLAX) 5 MG EC tablet Use as directed per colonoscopy prep. 2 tablet 0     blood glucose (NO BRAND SPECIFIED) test strip Use to test blood sugar 1 times daily or as directed. 90 strip 4     blood glucose monitoring (NO BRAND SPECIFIED) meter device kit Use to test blood sugar 1 times daily or as directed. 1 kit 0     gabapentin (NEURONTIN) 600 MG tablet Take 1 tablet (600 mg) by mouth 2 times daily 180 tablet 4     levothyroxine (SYNTHROID/LEVOTHROID) 50 MCG tablet Take 1 tablet (50 mcg) by mouth daily 90 tablet 4     lisinopril (ZESTRIL) 20 MG tablet Take 1 tablet (20 mg) by mouth daily 90 tablet 4     metFORMIN (GLUCOPHAGE) 1000 MG tablet Take 1 tablet (1,000 mg) by mouth 2  times daily (with meals) 180 tablet 4     naloxone (NARCAN) 4 MG/0.1ML nasal spray Spray 1 spray (4 mg) into one nostril alternating nostrils once as needed for opioid reversal every 2-3 minutes until assistance arrives 0.2 mL 3     oxyCODONE IR (ROXICODONE) 10 MG tablet Take 1 tablet (10 mg) by mouth every 4 hours as needed for severe pain (7-10) 150 tablet 0     [START ON 1/18/2023] oxyCODONE IR (ROXICODONE) 10 MG tablet Take 1 tablet (10 mg) by mouth every 4 hours as needed for severe pain (7-10) 150 tablet 0     oxyCODONE IR (ROXICODONE) 10 MG tablet Take 1 tablet (10 mg) by mouth every 4 hours as needed for severe pain (7-10) 150 tablet 0     sertraline (ZOLOFT) 100 MG tablet Take 1 tablet (100 mg) by mouth daily 90 tablet 4     TRUEplus Lancets 28G MISC Inject 1 lancet Subcutaneous daily 100 each 4     polyethylene glycol-electrolytes (NULYTELY) 420 g solution Take 4,000 mLs by mouth once (Patient not taking: Reported on 1/12/2023)       Allergies   Allergen Reactions     Betadine [Povidone Iodine] Rash and Dermatitis     Severe blistering      Liquid Adhesive Dermatitis       Reviewed and updated as needed this visit by clinical staff   Tobacco  Allergies  Meds   Med Hx  Surg Hx  Fam Hx  Soc Hx        Reviewed and updated as needed this visit by Provider                 Past Medical History:   Diagnosis Date     Chest pain     12/12/2016     Other specified postprocedural states     Status post colonoscopy     Personal history of other medical treatment (CODE)     04/2009,MRI cervical spine      Past Surgical History:   Procedure Laterality Date     ARTHROSCOPY SHOULDER ROTATOR CUFF REPAIR, SUBACROMIAL DECOMP, DIST CLAVICLE RESECTN, BICEP TENOTOMY Right 8/24/2020    Procedure: Diagnostic Right Shulder Scope w/ extensive debridement, and distal clavicle excision.;  Surgeon: Jaime Dean MD;  Location: [facility] OR     COLONOSCOPY      No Comments Provided     FUSION LUMBAR ANTERIOR ONE LEVEL      2004,Back  "surgery x 5 (fusion, hardware removal, stimulator and removal)     OTHER SURGICAL HISTORY      2005,205736,PERIPHERAL NERVE STIMULATOR,Lumbar nerve stimulator and subsequent removal     OTHER SURGICAL HISTORY      2005,205448,REMOVAL OF FOREIGN BODY, subsequent removal     OTHER SURGICAL HISTORY      208566,INCISION AND DRAINAGE,from infection from nerve stimulator     OTHER SURGICAL HISTORY      04/2009,207388,SCAN-MRI INTERPRETATION,MRI cervical spine.       Review of Systems   Constitutional: Negative for chills and fever.   HENT: Positive for hearing loss. Negative for congestion, ear pain and sore throat.    Eyes: Negative for pain and visual disturbance.   Respiratory: Positive for cough. Negative for shortness of breath.    Cardiovascular: Negative for chest pain, palpitations and peripheral edema.   Gastrointestinal: Positive for constipation, diarrhea, heartburn and hematochezia. Negative for abdominal pain and nausea.   Genitourinary: Positive for dysuria and impotence. Negative for frequency, genital sores, hematuria, penile discharge and urgency.   Musculoskeletal: Positive for arthralgias and myalgias. Negative for joint swelling.   Skin: Negative for rash.   Neurological: Positive for weakness. Negative for dizziness, headaches and paresthesias.   Psychiatric/Behavioral: Positive for mood changes. The patient is not nervous/anxious.        PHQ 2/28/2022 5/27/2022 1/12/2023   PHQ-9 Total Score 6 3 6   Q9: Thoughts of better off dead/self-harm past 2 weeks Not at all Not at all Not at all     Recent death in a friend, has flared some memories of his daughter's death 9 years ago.    Severe foot pains, cannot wear socks most days.  Tried neurontin 300 milligram three times a day with significant side effects initially, back to twice daily but wants to try three times a day again.        OBJECTIVE:   /70   Pulse 63   Temp 97.6  F (36.4  C) (Tympanic)   Resp 17   Ht 1.689 m (5' 6.5\")   Wt 122 kg " (269 lb)   SpO2 95%   BMI 42.77 kg/m      Physical Exam  GENERAL: healthy, alert and no distress  EYES: Eyes grossly normal to inspection, PERRL and conjunctivae and sclerae normal  HENT: ear canals and TM's normal, nose and mouth without ulcers or lesions  NECK: no adenopathy, no asymmetry, masses, or scars and thyroid normal to palpation  RESP: lungs clear to auscultation - no rales, rhonchi or wheezes  CV: regular rate and rhythm, normal S1 S2, no S3 or S4, no murmur, click or rub, no peripheral edema and peripheral pulses strong  ABDOMEN: soft, nontender, no hepatosplenomegaly, no masses and bowel sounds normal  MS: no gross musculoskeletal defects noted, no edema  SKIN: no suspicious lesions or rashes  NEURO: Normal strength and tone, mentation intact and speech normal  PSYCH: mentation appears normal, affect normal/bright    Sensory exam of the foot is abnormal, tested with the monofilament. Good pulses, no lesions or ulcers, good peripheral pulses.    Diagnostic Test Results:  Labs reviewed in Epic  Results for orders placed or performed in visit on 01/12/23   PSA Screen [facility]     Status: Normal   Result Value Ref Range    Prostate Specific Antigen Screen 0.57 0.00 - 4.50 ng/mL    Narrative    This result is obtained using the Roche Elecsys total PSA method on the demetrius e411 immunoassay analyzer. Results obtained with different assay methods or kits cannot be used interchangeably.   TSH     Status: Normal   Result Value Ref Range    TSH 0.53 0.30 - 4.20 uIU/mL   Hemoglobin A1c     Status: Abnormal   Result Value Ref Range    Hemoglobin A1C 7.1 (H) 4.0 - 6.2 %   Albumin Random Urine Quantitative with Creat Ratio     Status: None   Result Value Ref Range    Albumin Urine mg/L <12.0 mg/L    Albumin Urine mg/g Cr      Creatinine Urine mg/dL 126.2 mg/dL         ASSESSMENT/PLAN:       ICD-10-CM    1. Encounter for Medicare annual wellness exam  Z00.00       2. Type 2 diabetes mellitus with diabetic polyneuropathy,  without long-term current use of insulin (H)  E11.42 Hemoglobin A1c     Albumin Random Urine Quantitative with Creat Ratio     Adult Eye  Referral      3. Essential hypertension  I10 atenolol (TENORMIN) 100 MG tablet      4. Hypothyroidism, unspecified type  E03.9 levothyroxine (SYNTHROID/LEVOTHROID) 50 MCG tablet     TSH      5. Grief reaction with prolonged bereavement  F43.81 sertraline (ZOLOFT) 100 MG tablet      6. Chronic right-sided low back pain without sciatica  M54.50 gabapentin (NEURONTIN) 600 MG tablet    G89.29       7. Need for hepatitis C screening test  Z11.59 Hepatitis C Screen Reflex to HCV RNA Quant and Genotype      8. Need for COVID-19 vaccine  Z23 COVID-19,PF,PFIZER BOOSTER BIVALENT (12+YRS)      9. Morbid obesity (H)  E66.01       10. Screening for prostate cancer  Z12.5 PSA Screen [facility]        Increased the Neurontin to 300 milligram three times a day.  Other meds filled without changes.    Above medical conditions are well controlled otherwise.  A1c is at target.  Refilled meds without changes for these conditions.         COUNSELING:   Reviewed preventive health counseling, as reflected in patient instructions       Regular exercise       Healthy diet/nutrition        He reports that he has never smoked. He has never used smokeless tobacco.            Sohail Harris MD  [facility]  Answers for HPI/ROS submitted by the patient on 1/12/2023  If you checked off any problems, how difficult have these problems made it for you to do your work, take care of things at home, or get along with other people?: Not difficult at all  PHQ9 TOTAL SCORE: 6  VISHNU 7 TOTAL SCORE: 3

## 2023-01-13 LAB — HCV AB SERPL QL IA: NONREACTIVE

## 2023-01-16 ENCOUNTER — OFFICE VISIT (OUTPATIENT)
Dept: ORTHOPEDICS | Facility: OTHER | Age: 63
End: 2023-01-16
Attending: ORTHOPAEDIC SURGERY
Payer: MEDICARE

## 2023-01-16 DIAGNOSIS — M25.511 RIGHT SHOULDER PAIN, UNSPECIFIED CHRONICITY: Primary | ICD-10-CM

## 2023-01-16 PROCEDURE — 20610 DRAIN/INJ JOINT/BURSA W/O US: CPT | Mod: RT | Performed by: ORTHOPAEDIC SURGERY

## 2023-01-16 PROCEDURE — 250N000011 HC RX IP 250 OP 636: Performed by: ORTHOPAEDIC SURGERY

## 2023-01-16 PROCEDURE — 250N000009 HC RX 250: Performed by: ORTHOPAEDIC SURGERY

## 2023-01-16 PROCEDURE — G0463 HOSPITAL OUTPT CLINIC VISIT: HCPCS

## 2023-01-16 PROCEDURE — G0463 HOSPITAL OUTPT CLINIC VISIT: HCPCS | Mod: 25

## 2023-01-16 RX ORDER — LIDOCAINE HYDROCHLORIDE 10 MG/ML
4 INJECTION, SOLUTION EPIDURAL; INFILTRATION; INTRACAUDAL; PERINEURAL ONCE
Status: COMPLETED | OUTPATIENT
Start: 2023-01-16 | End: 2023-01-16

## 2023-01-16 RX ORDER — TRIAMCINOLONE ACETONIDE 40 MG/ML
40 INJECTION, SUSPENSION INTRA-ARTICULAR; INTRAMUSCULAR ONCE
Status: COMPLETED | OUTPATIENT
Start: 2023-01-16 | End: 2023-01-16

## 2023-01-16 RX ADMIN — LIDOCAINE HYDROCHLORIDE 4 ML: 10 INJECTION, SOLUTION INFILTRATION; PERINEURAL at 13:07

## 2023-01-16 RX ADMIN — TRIAMCINOLONE ACETONIDE 40 MG: 40 INJECTION, SUSPENSION INTRA-ARTICULAR; INTRAMUSCULAR at 13:07

## 2023-01-16 NOTE — PROGRESS NOTES
Patient is here for follow up on his right shoulder.  Marla Cassidy LPN .....................1/16/2023 12:58 PM

## 2023-01-17 NOTE — PROGRESS NOTES
Visit Date: 2023    HISTORY OF PRESENTING ILLNESS:  He is well known to me for his right rotator cuff tear arthropathy, which we basically diagnosed him with informed that there was not much that we could do with that at this point because he was too young to really have surgery at 62-years-old and he states that the last injection we gave him back in 2022 lasted a really long time and at this point, his shoulder pain is starting to come back.  He would like another injection if at all possible.    PHYSICAL EXAMINATION:  He has severe weakness in supraspinatus, infraspinatus testing.  He is able to raise his arm all of the way up to 180 degrees of forward flexion and approximately 90 degrees of abduction, but it is really, really weak.  He is otherwise neuro and vascularly intact.    IMPRESSION AND PLAN:  We gave him an injection into the right shoulder in the subacromial approach today.  He tolerated it well and had good relief of his pain.  We are going to see him back on an as-needed basis.    Jaime Dean MD        D: 2023   T: 2023   MT: JOLENE    Name:     GARRICK ROWELL  MRN:      [id number]        Account:    [id number]   :      1960           Visit Date: 2023     Document: [id number]

## 2023-01-18 PROBLEM — Z00.00 HEALTHCARE MAINTENANCE: Status: ACTIVE | Noted: 2023-01-18

## 2023-01-18 RX ORDER — NALOXONE HYDROCHLORIDE 0.4 MG/ML
0.2 INJECTION, SOLUTION INTRAMUSCULAR; INTRAVENOUS; SUBCUTANEOUS
Status: CANCELLED | OUTPATIENT
Start: 2023-01-18

## 2023-01-18 RX ORDER — FLUMAZENIL 0.1 MG/ML
0.2 INJECTION, SOLUTION INTRAVENOUS
Status: CANCELLED | OUTPATIENT
Start: 2023-01-18 | End: 2023-01-18

## 2023-01-18 RX ORDER — NALOXONE HYDROCHLORIDE 0.4 MG/ML
0.4 INJECTION, SOLUTION INTRAMUSCULAR; INTRAVENOUS; SUBCUTANEOUS
Status: CANCELLED | OUTPATIENT
Start: 2023-01-18

## 2023-01-18 RX ORDER — SODIUM CHLORIDE, SODIUM LACTATE, POTASSIUM CHLORIDE, CALCIUM CHLORIDE 600; 310; 30; 20 MG/100ML; MG/100ML; MG/100ML; MG/100ML
INJECTION, SOLUTION INTRAVENOUS CONTINUOUS
Status: CANCELLED | OUTPATIENT
Start: 2023-01-18

## 2023-01-18 RX ORDER — LIDOCAINE 40 MG/G
CREAM TOPICAL
Status: CANCELLED | OUTPATIENT
Start: 2023-01-18

## 2023-01-18 RX ORDER — ONDANSETRON 2 MG/ML
4 INJECTION INTRAMUSCULAR; INTRAVENOUS
Status: CANCELLED | OUTPATIENT
Start: 2023-01-18

## 2023-02-13 ENCOUNTER — TELEPHONE (OUTPATIENT)
Dept: FAMILY MEDICINE | Facility: OTHER | Age: 63
End: 2023-02-13

## 2023-02-13 DIAGNOSIS — G89.29 CHRONIC RIGHT-SIDED LOW BACK PAIN WITHOUT SCIATICA: ICD-10-CM

## 2023-02-13 DIAGNOSIS — M54.50 CHRONIC RIGHT-SIDED LOW BACK PAIN WITHOUT SCIATICA: ICD-10-CM

## 2023-02-13 RX ORDER — OXYCODONE HYDROCHLORIDE 10 MG/1
10 TABLET ORAL EVERY 4 HOURS PRN
Qty: 84 TABLET | Refills: 0 | Status: SHIPPED | OUTPATIENT
Start: 2023-02-13 | End: 2023-02-24

## 2023-02-13 NOTE — TELEPHONE ENCOUNTER
Spoke with pharmacy and they will allow a two week script of Oxycodone until PCP returns.  Eliza Chávez LPN

## 2023-02-13 NOTE — TELEPHONE ENCOUNTER
See refill encounter. Reviewed script length of 150 pills and refill date accurate for tomorrow. 2 week refill sent through to pharmacy in absence of PCP.     Patient has follow up with PCP on 2/24/23.     Ana Stiles PA-C  2/13/2023  2:51 PM

## 2023-02-13 NOTE — TELEPHONE ENCOUNTER
Reason for call: Medication or medication refill    Name of medication requested: oxycodone    Are you out of the medication? Not yet but will be tomorrow evening.  Appoint is scheduled for med refill.      What pharmacy do you use? [facility]    Preferred method for responding to this message: Telephone Call    Phone number patient can be reached at: Work number on file:  There is no work phone number on file.    If we cannot reach you directly, may we leave a detailed response at the number you provided? Yes

## 2023-02-23 ENCOUNTER — TRANSFERRED RECORDS (OUTPATIENT)
Dept: HEALTH INFORMATION MANAGEMENT | Facility: OTHER | Age: 63
End: 2023-02-23

## 2023-02-23 LAB — RETINOPATHY: POSITIVE

## 2023-02-24 ENCOUNTER — MEDICAL CORRESPONDENCE (OUTPATIENT)
Dept: HEALTH INFORMATION MANAGEMENT | Facility: OTHER | Age: 63
End: 2023-02-24

## 2023-02-24 ENCOUNTER — OFFICE VISIT (OUTPATIENT)
Dept: FAMILY MEDICINE | Facility: OTHER | Age: 63
End: 2023-02-24
Attending: FAMILY MEDICINE
Payer: OTHER MISCELLANEOUS

## 2023-02-24 VITALS
DIASTOLIC BLOOD PRESSURE: 78 MMHG | TEMPERATURE: 97.2 F | SYSTOLIC BLOOD PRESSURE: 116 MMHG | OXYGEN SATURATION: 97 % | WEIGHT: 275.4 LBS | HEART RATE: 69 BPM | BODY MASS INDEX: 43.78 KG/M2 | RESPIRATION RATE: 18 BRPM

## 2023-02-24 DIAGNOSIS — G89.29 CHRONIC RIGHT-SIDED LOW BACK PAIN WITHOUT SCIATICA: ICD-10-CM

## 2023-02-24 DIAGNOSIS — M54.50 CHRONIC RIGHT-SIDED LOW BACK PAIN WITHOUT SCIATICA: ICD-10-CM

## 2023-02-24 PROCEDURE — 99213 OFFICE O/P EST LOW 20 MIN: CPT | Performed by: FAMILY MEDICINE

## 2023-02-24 RX ORDER — OXYCODONE HYDROCHLORIDE 10 MG/1
10 TABLET ORAL EVERY 4 HOURS PRN
Qty: 150 TABLET | Refills: 0 | Status: SHIPPED | OUTPATIENT
Start: 2023-04-21 | End: 2023-05-19

## 2023-02-24 RX ORDER — OXYCODONE HYDROCHLORIDE 10 MG/1
10 TABLET ORAL EVERY 4 HOURS PRN
Qty: 150 TABLET | Refills: 0 | Status: SHIPPED | OUTPATIENT
Start: 2023-03-24 | End: 2023-05-19

## 2023-02-24 RX ORDER — NORTRIPTYLINE HYDROCHLORIDE 50 MG/1
50 CAPSULE ORAL AT BEDTIME
Qty: 90 CAPSULE | Refills: 4 | Status: SHIPPED | OUTPATIENT
Start: 2023-02-24 | End: 2024-02-05

## 2023-02-24 RX ORDER — OXYCODONE HYDROCHLORIDE 10 MG/1
10 TABLET ORAL EVERY 4 HOURS PRN
Qty: 150 TABLET | Refills: 0 | Status: SHIPPED | OUTPATIENT
Start: 2023-02-24 | End: 2023-05-19

## 2023-02-24 RX ORDER — AMITRIPTYLINE HYDROCHLORIDE 50 MG/1
100 TABLET ORAL AT BEDTIME
Qty: 180 TABLET | Refills: 4 | Status: CANCELLED | OUTPATIENT
Start: 2023-02-24

## 2023-02-24 RX ORDER — GABAPENTIN 600 MG/1
600 TABLET ORAL 3 TIMES DAILY
Qty: 270 TABLET | Refills: 4 | Status: SHIPPED | OUTPATIENT
Start: 2023-02-24 | End: 2023-04-07

## 2023-02-24 ASSESSMENT — PAIN SCALES - GENERAL: PAINLEVEL: MODERATE PAIN (5)

## 2023-02-24 NOTE — PROGRESS NOTES
"  Assessment & Plan     Chronic right-sided low back pain without sciatica  This is a failed back syndrome. Is dealing with peripheral neuropthy on top of this. Refilled below meds, changed to Pamelor, given at age 63 needs to come off Elavil.  checked and is stable. He is now motivated to cut back on the opiates, so we talked about a taper, taking 4 daily for a month, then 3 tabs daily until he sees me next.   - gabapentin (NEURONTIN) 600 MG tablet; Take 1 tablet (600 mg) by mouth 3 times daily  - oxyCODONE IR (ROXICODONE) 10 MG tablet; Take 1 tablet (10 mg) by mouth every 4 hours as needed for severe pain (7-10)  - oxyCODONE IR (ROXICODONE) 10 MG tablet; Take 1 tablet (10 mg) by mouth every 4 hours as needed for severe pain (7-10)  - oxyCODONE IR (ROXICODONE) 10 MG tablet; Take 1 tablet (10 mg) by mouth every 4 hours as needed for severe pain (7-10)  - nortriptyline (PAMELOR) 50 MG capsule; Take 1 capsule (50 mg) by mouth At Bedtime             BMI:   Estimated body mass index is 43.78 kg/m  as calculated from the following:    Height as of 1/12/23: 1.689 m (5' 6.5\").    Weight as of this encounter: 124.9 kg (275 lb 6.4 oz).           No follow-ups on file.    Sohail Harris MD  [facility]   Iker is a 62 year old, presenting for the following health issues:  RECHECK (Work comp)      History of Present Illness       Back Pain:  He presents for follow up of back pain. Patient's back pain is a chronic problem.  Location of back pain:  Right middle of back  Description of back pain: fullness  Back pain spreads: right buttocks    Since patient first noticed back pain, pain is: always present, but gets better and worse  Does back pain interfere with his job:  Yes      He eats 0-1 servings of fruits and vegetables daily.He exercises with enough effort to increase his heart rate 9 or less minutes per day.    He is taking medications regularly.       Pain History:  When did you first " notice your pain? - Chronic Pain   Have you seen this provider for your pain in the past?   Yes   Where in your body do you have pain? back  Are you seeing anyone else for your pain? No    PHQ-9 SCORE 2/28/2022 5/27/2022 1/12/2023   PHQ-9 Total Score MyChart 6 (Mild depression) - 6 (Mild depression)   PHQ-9 Total Score 6 3 6       VISHNU-7 SCORE 7/29/2021 12/1/2021 1/12/2023   Total Score - - 3 (minimal anxiety)   Total Score 0 0 3               Chronic Pain Follow Up:    Location of pain: back, to right buttock  Analgesia/pain control:    - Recent changes:  no    - Overall control: getting worse, with foot neuroopathy which is being treated outside of work comp. 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 No    - When did you take your last dose of pain medicine?  today   Adverse effects: No   PDMP Review       Value Time User    State PDMP site checked  Yes 2/13/2023  2:51 PM Ana Stiles PA-C        Last CSA Agreement:   CSA -- Patient Level:     [Media Unavailable] Controlled Substance Agreement - Opioid - Scan on 5/27/2022 11:03 AM: Opioid   [Media Unavailable] Controlled Substance Agreement - Opioid - Scan on 5/6/2021 10:06 AM: CONTROLLED SUBSTANCE AGREEMENT OPIOID       Last UDS: 9/1/2022            Review of Systems         Objective    /78   Pulse 69   Temp 97.2  F (36.2  C) (Tympanic)   Resp 18   Wt 124.9 kg (275 lb 6.4 oz)   SpO2 97%   BMI 43.78 kg/m    Body mass index is 43.78 kg/m .  Physical Exam  Constitutional:       Appearance: Normal appearance.   Musculoskeletal:      Comments: No current lumbar pain on palpation with a negative straight leg raise bilateral    Neurological:      General: No focal deficit present.      Mental Status: He is alert and oriented to person, place, and time.   Psychiatric:         Mood and Affect: Mood normal.          Behavior: Behavior normal.

## 2023-02-24 NOTE — NURSING NOTE
"Chief Complaint   Patient presents with     RECHECK     Work comp       Initial /78   Pulse 69   Temp 97.2  F (36.2  C) (Tympanic)   Resp 18   Wt 124.9 kg (275 lb 6.4 oz)   SpO2 97%   BMI 43.78 kg/m   Estimated body mass index is 43.78 kg/m  as calculated from the following:    Height as of 1/12/23: 1.689 m (5' 6.5\").    Weight as of this encounter: 124.9 kg (275 lb 6.4 oz).  Medication Reconciliation: complete    FOOD SECURITY SCREENING QUESTIONS  Hunger Vital Signs:  Within the past 12 months we worried whether our food would run out before we got money to buy more. Never  Within the past 12 months the food we bought just didn't last and we didn't have money to get more. Never  Eliza Chávez LPN 2/24/2023 11:14 AM      "

## 2023-02-28 DIAGNOSIS — R51.9 TEMPORAL HEADACHE: Primary | ICD-10-CM

## 2023-02-28 NOTE — PROGRESS NOTES
Patient called and transferred to appointment line to setup lab only appointment. States he also had appointment setup at [facility]    Corinne R Thayer, RN on 2/28/2023 at 12:08 PM

## 2023-03-03 ENCOUNTER — LAB (OUTPATIENT)
Dept: LAB | Facility: OTHER | Age: 63
End: 2023-03-03
Attending: FAMILY MEDICINE
Payer: MEDICARE

## 2023-03-03 DIAGNOSIS — R51.9 TEMPORAL HEADACHE: ICD-10-CM

## 2023-03-03 LAB
ANION GAP SERPL CALCULATED.3IONS-SCNC: 7 MMOL/L (ref 7–15)
BUN SERPL-MCNC: 17.3 MG/DL (ref 8–23)
CALCIUM SERPL-MCNC: 9.4 MG/DL (ref 8.8–10.2)
CHLORIDE SERPL-SCNC: 102 MMOL/L (ref 98–107)
CREAT SERPL-MCNC: 0.94 MG/DL (ref 0.67–1.17)
DEPRECATED HCO3 PLAS-SCNC: 28 MMOL/L (ref 22–29)
ERYTHROCYTE [DISTWIDTH] IN BLOOD BY AUTOMATED COUNT: 13.7 % (ref 10–15)
ERYTHROCYTE [SEDIMENTATION RATE] IN BLOOD BY WESTERGREN METHOD: 14 MM/HR (ref 0–20)
GFR SERPL CREATININE-BSD FRML MDRD: >90 ML/MIN/1.73M2
GLUCOSE SERPL-MCNC: 166 MG/DL (ref 70–99)
HCT VFR BLD AUTO: 38.3 % (ref 40–53)
HGB BLD-MCNC: 12.3 G/DL (ref 13.3–17.7)
MCH RBC QN AUTO: 29.8 PG (ref 26.5–33)
MCHC RBC AUTO-ENTMCNC: 32.1 G/DL (ref 31.5–36.5)
MCV RBC AUTO: 93 FL (ref 78–100)
PLATELET # BLD AUTO: 180 10E3/UL (ref 150–450)
POTASSIUM SERPL-SCNC: 6 MMOL/L (ref 3.4–5.3)
RBC # BLD AUTO: 4.13 10E6/UL (ref 4.4–5.9)
SODIUM SERPL-SCNC: 137 MMOL/L (ref 136–145)
WBC # BLD AUTO: 10.8 10E3/UL (ref 4–11)

## 2023-03-03 PROCEDURE — 36415 COLL VENOUS BLD VENIPUNCTURE: CPT | Mod: ZL

## 2023-03-03 PROCEDURE — 85652 RBC SED RATE AUTOMATED: CPT | Mod: ZL

## 2023-03-03 PROCEDURE — 82310 ASSAY OF CALCIUM: CPT | Mod: ZL

## 2023-03-03 PROCEDURE — 85027 COMPLETE CBC AUTOMATED: CPT | Mod: ZL

## 2023-04-06 ENCOUNTER — MYC MEDICAL ADVICE (OUTPATIENT)
Dept: FAMILY MEDICINE | Facility: OTHER | Age: 63
End: 2023-04-06

## 2023-04-06 ENCOUNTER — NURSE TRIAGE (OUTPATIENT)
Dept: FAMILY MEDICINE | Facility: OTHER | Age: 63
End: 2023-04-06

## 2023-04-06 NOTE — TELEPHONE ENCOUNTER
"S-(situation): Patient has increase swelling and burning sensation in both feet/toes.     B-(background): Patient has a history of hypertension and type 2 diabetes.     A-(assessment): Patient states the burning, swelling has increased over the past month and it is too painful to lay down. Patient states the pain is worst in the toes due to the swelling and redness. Patient reports keeping feet clean, dry and intact.     R-(recommendations): Patient was advised to call and try to get a same-day appointment with doctor. Patient verbalized understanding of needing to be seen by provider.    Loren Hancock RN on 4/6/2023 at 12:33 PM        Reason for Disposition    Toe pain is main symptom    Foot pain is main symptom    Tingling in feet and new or increased    Additional Information    Negative: Entire foot is cool or blue in comparison to other foot    Negative: Purple or black skin on foot or toe    Negative: Red area or streak and fever    Negative: Swollen foot and fever    Negative: Patient sounds very sick or weak to the triager    Answer Assessment - Initial Assessment Questions  1. ONSET: \"When did the pain start?\"       Been going on for a month  2. LOCATION: \"Where is the pain located?\"       Pain in feet to the toes  3. PAIN: \"How bad is the pain?\"    (Scale 1-10; or mild, moderate, severe)   - MILD (1-3): doesn't interfere with normal activities.    - MODERATE (4-7): interferes with normal activities (e.g., work or school) or awakens from sleep, limping.    - SEVERE (8-10): excruciating pain, unable to do any normal activities, unable to walk.       5-moderate  4. WORK OR EXERCISE: \"Has there been any recent work or exercise that involved this part of the body?\"       None  5. CAUSE: \"What do you think is causing the foot pain?\"      Diabetic Neuropathy  6. OTHER SYMPTOMS: \"Do you have any other symptoms?\" (e.g., leg pain, rash, fever, numbness)      None.  7. PREGNANCY: \"Is there any chance you are " "pregnant?\" \"When was your last menstrual period?\"      No    Protocols used: FOOT PAIN-A-OH, TOE PAIN-A-OH      "

## 2023-04-06 NOTE — TELEPHONE ENCOUNTER
Patient is scheduled to see provider tomorrow at 2:20. There are no other concerns at this time.    Loren Hancock RN on 4/6/2023 at 12:48 PM

## 2023-04-07 ENCOUNTER — OFFICE VISIT (OUTPATIENT)
Dept: FAMILY MEDICINE | Facility: OTHER | Age: 63
End: 2023-04-07
Attending: FAMILY MEDICINE
Payer: MEDICARE

## 2023-04-07 VITALS
TEMPERATURE: 97.4 F | DIASTOLIC BLOOD PRESSURE: 82 MMHG | OXYGEN SATURATION: 97 % | WEIGHT: 262.4 LBS | HEART RATE: 63 BPM | RESPIRATION RATE: 18 BRPM | BODY MASS INDEX: 41.72 KG/M2 | SYSTOLIC BLOOD PRESSURE: 126 MMHG

## 2023-04-07 DIAGNOSIS — L97.521 DIABETIC ULCER OF TOE OF LEFT FOOT ASSOCIATED WITH TYPE 2 DIABETES MELLITUS, LIMITED TO BREAKDOWN OF SKIN (H): ICD-10-CM

## 2023-04-07 DIAGNOSIS — E11.621 DIABETIC ULCER OF TOE OF LEFT FOOT ASSOCIATED WITH TYPE 2 DIABETES MELLITUS, LIMITED TO BREAKDOWN OF SKIN (H): ICD-10-CM

## 2023-04-07 DIAGNOSIS — E11.42 DIABETIC POLYNEUROPATHY ASSOCIATED WITH TYPE 2 DIABETES MELLITUS (H): Primary | ICD-10-CM

## 2023-04-07 PROCEDURE — G0463 HOSPITAL OUTPT CLINIC VISIT: HCPCS

## 2023-04-07 PROCEDURE — 99213 OFFICE O/P EST LOW 20 MIN: CPT | Performed by: FAMILY MEDICINE

## 2023-04-07 RX ORDER — SULFAMETHOXAZOLE/TRIMETHOPRIM 800-160 MG
1 TABLET ORAL 2 TIMES DAILY
Qty: 20 TABLET | Refills: 0 | Status: SHIPPED | OUTPATIENT
Start: 2023-04-07 | End: 2023-04-17

## 2023-04-07 RX ORDER — PREGABALIN 100 MG/1
100 CAPSULE ORAL 3 TIMES DAILY
Qty: 270 CAPSULE | Refills: 1 | Status: SHIPPED | OUTPATIENT
Start: 2023-04-07 | End: 2023-10-17

## 2023-04-07 ASSESSMENT — ANXIETY QUESTIONNAIRES
1. FEELING NERVOUS, ANXIOUS, OR ON EDGE: SEVERAL DAYS
7. FEELING AFRAID AS IF SOMETHING AWFUL MIGHT HAPPEN: SEVERAL DAYS
6. BECOMING EASILY ANNOYED OR IRRITABLE: SEVERAL DAYS
GAD7 TOTAL SCORE: 9
7. FEELING AFRAID AS IF SOMETHING AWFUL MIGHT HAPPEN: SEVERAL DAYS
5. BEING SO RESTLESS THAT IT IS HARD TO SIT STILL: MORE THAN HALF THE DAYS
3. WORRYING TOO MUCH ABOUT DIFFERENT THINGS: SEVERAL DAYS
IF YOU CHECKED OFF ANY PROBLEMS ON THIS QUESTIONNAIRE, HOW DIFFICULT HAVE THESE PROBLEMS MADE IT FOR YOU TO DO YOUR WORK, TAKE CARE OF THINGS AT HOME, OR GET ALONG WITH OTHER PEOPLE: SOMEWHAT DIFFICULT
4. TROUBLE RELAXING: NEARLY EVERY DAY
GAD7 TOTAL SCORE: 9
2. NOT BEING ABLE TO STOP OR CONTROL WORRYING: NOT AT ALL
8. IF YOU CHECKED OFF ANY PROBLEMS, HOW DIFFICULT HAVE THESE MADE IT FOR YOU TO DO YOUR WORK, TAKE CARE OF THINGS AT HOME, OR GET ALONG WITH OTHER PEOPLE?: SOMEWHAT DIFFICULT

## 2023-04-07 ASSESSMENT — PATIENT HEALTH QUESTIONNAIRE - PHQ9
10. IF YOU CHECKED OFF ANY PROBLEMS, HOW DIFFICULT HAVE THESE PROBLEMS MADE IT FOR YOU TO DO YOUR WORK, TAKE CARE OF THINGS AT HOME, OR GET ALONG WITH OTHER PEOPLE: SOMEWHAT DIFFICULT
SUM OF ALL RESPONSES TO PHQ QUESTIONS 1-9: 9
SUM OF ALL RESPONSES TO PHQ QUESTIONS 1-9: 9

## 2023-04-07 ASSESSMENT — PAIN SCALES - GENERAL: PAINLEVEL: MILD PAIN (3)

## 2023-04-07 NOTE — PROGRESS NOTES
"  Assessment & Plan     (E11.42) Diabetic polyneuropathy associated with type 2 diabetes mellitus (H)  (primary encounter diagnosis)  Comment: this is progressing. Options would include stopping zoloft and starting cymbalta, changing neurontin over to lyrica, doing topical treatment. We elected to change the neurontin.   Plan: pregabalin (LYRICA) 100 MG capsule             (E11.621,  L97.521) Diabetic ulcer of toe of left foot associated with type 2 diabetes mellitus, limited to breakdown of skin (H)  Comment: new ulcer, which is painful. Will assess him for PAD, and start antibiotics with MRSA coverage. Nothing to culture currently. Consult with our wound care CNP.   Plan: US DWAYNE Doppler No Exercise 1-2 Levels         Bilateral, sulfamethoxazole-trimethoprim         (BACTRIM DS) 800-160 MG tablet, Wound Care         Referral                        BMI:   Estimated body mass index is 41.72 kg/m  as calculated from the following:    Height as of 1/12/23: 1.689 m (5' 6.5\").    Weight as of this encounter: 119 kg (262 lb 6.4 oz).           Return in about 4 weeks (around 5/5/2023).    Sohail Harris MD  [facility]    Lui Dong is a 62 year old, presenting for the following health issues:  Pain (Bilateral foot pain)        4/7/2023     2:24 PM   Additional Questions   Roomed by LUCAS Kay   Accompanied by Self         4/7/2023     2:24 PM   Patient Reported Additional Medications   Patient reports taking the following new medications N/A     Pain    History of Present Illness       Diabetes:   He presents for follow up of diabetes.  He is checking home blood glucose a few times a month. He checks blood glucose before meals.  Blood glucose is sometimes over 200 and never under 70. When his blood glucose is low, the patient is asymptomatic for confusion, blurred vision, lethargy and reports not feeling dizzy, shaky, or weak.  He is concerned about other.  He is having numbness in feet, " burning in feet, redness, sores, or blisters on feet and blurry vision.         He eats 0-1 servings of fruits and vegetables daily.He exercises with enough effort to increase his heart rate 9 or less minutes per day.  He exercises with enough effort to increase his heart rate 3 or less days per week.   He is taking medications regularly.    Today's PHQ-9         PHQ-9 Total Score: 9    PHQ-9 Q9 Thoughts of better off dead/self-harm past 2 weeks :   Not at all    How difficult have these problems made it for you to do your work, take care of things at home, or get along with other people: Somewhat difficult  Today's VISHNU-7 Score: 9     Getting lots of pain in feet. Burning and tinging. It is getting worse and cannot sleep due to it. Cannot wear socks, due to the pain. Feels the neurontin is not helping any longer. Some nausea from it too. Pain is worse if he hangs feet over end of bed.     Recent Labs   Lab Test 03/03/23  1042 01/12/23  1200 10/03/22  1220 02/28/22  0956 07/15/21  1432 04/01/21  1431 12/28/20  1217 07/15/19  1610 07/15/19  1610 06/18/15  1625   A1C  --  7.1* 6.8* 7.0*   < > 9.9*  --   --   --   --    LDL  --   --  137*  --   --   --   --   --   --  139*   HDL  --   --  49  --   --   --   --   --   --  38   TRIG  --   --  118  --   --   --   --   --   --  172*   ALT  --   --   --   --   --  35 34  --  28  --    CR 0.94  --  0.98  --    < > 0.73 0.81   < > 0.69*  --    GFRESTIMATED >90  --  86  --    < > >90 >90   < > >90  --    GFRESTBLACK  --   --   --   --   --  >90 >90   < > >90  --    POTASSIUM 6.0*  --  5.5*  --    < > 4.7 4.8   < > 3.7  --    TSH  --  0.53  --   --   --   --   --   --   --   --     < > = values in this interval not displayed.      Current Outpatient Medications   Medication     acetaminophen (TYLENOL) 500 MG tablet     amitriptyline (ELAVIL) 50 MG tablet     atenolol (TENORMIN) 100 MG tablet     atorvastatin (LIPITOR) 40 MG tablet     bisacodyl (DULCOLAX) 5 MG EC tablet      blood glucose (NO BRAND SPECIFIED) test strip     blood glucose monitoring (NO BRAND SPECIFIED) meter device kit     levothyroxine (SYNTHROID/LEVOTHROID) 50 MCG tablet     lisinopril (ZESTRIL) 20 MG tablet     metFORMIN (GLUCOPHAGE) 1000 MG tablet     naloxone (NARCAN) 4 MG/0.1ML nasal spray     nortriptyline (PAMELOR) 50 MG capsule     [START ON 4/21/2023] oxyCODONE IR (ROXICODONE) 10 MG tablet     oxyCODONE IR (ROXICODONE) 10 MG tablet     oxyCODONE IR (ROXICODONE) 10 MG tablet     polyethylene glycol-electrolytes (NULYTELY) 420 g solution     pregabalin (LYRICA) 100 MG capsule     sertraline (ZOLOFT) 100 MG tablet     sulfamethoxazole-trimethoprim (BACTRIM DS) 800-160 MG tablet     TRUEplus Lancets 28G MISC     Current Facility-Administered Medications   Medication     lidocaine (PF) (XYLOCAINE) 1 % injection 4 mL     triamcinolone (KENALOG-40) injection 40 mg                   Review of Systems         Objective    /82   Pulse 63   Temp 97.4  F (36.3  C) (Tympanic)   Resp 18   Wt 119 kg (262 lb 6.4 oz)   SpO2 97%   BMI 41.72 kg/m    Body mass index is 41.72 kg/m .  Physical Exam  Constitutional:       Appearance: Normal appearance.   Skin:     Comments: Left 1st toe has a eschar, about 5 mm or so. Tender. Covered so unstageable.    Neurological:      Mental Status: He is alert.   Psychiatric:         Mood and Affect: Mood normal.         Behavior: Behavior normal.         Thought Content: Thought content normal.      dp pulse is weak, but has normal toe cap refill.

## 2023-04-07 NOTE — NURSING NOTE
"Chief Complaint   Patient presents with     Pain     Bilateral foot pain       Initial /82   Pulse 63   Temp 97.4  F (36.3  C) (Tympanic)   Resp 18   Wt 119 kg (262 lb 6.4 oz)   SpO2 97%   BMI 41.72 kg/m   Estimated body mass index is 41.72 kg/m  as calculated from the following:    Height as of 1/12/23: 1.689 m (5' 6.5\").    Weight as of this encounter: 119 kg (262 lb 6.4 oz).  Medication Reconciliation: complete    FOOD SECURITY SCREENING QUESTIONS  Hunger Vital Signs:  Within the past 12 months we worried whether our food would run out before we got money to buy more. Never  Within the past 12 months the food we bought just didn't last and we didn't have money to get more. Never  Eliza Chávez LPN 4/7/2023 2:28 PM      "

## 2023-04-12 ENCOUNTER — OFFICE VISIT (OUTPATIENT)
Dept: INTERNAL MEDICINE | Facility: OTHER | Age: 63
End: 2023-04-12
Attending: NURSE PRACTITIONER
Payer: MEDICARE

## 2023-04-12 VITALS
TEMPERATURE: 96.8 F | HEART RATE: 60 BPM | DIASTOLIC BLOOD PRESSURE: 76 MMHG | BODY MASS INDEX: 42.32 KG/M2 | OXYGEN SATURATION: 95 % | WEIGHT: 266.2 LBS | SYSTOLIC BLOOD PRESSURE: 130 MMHG | RESPIRATION RATE: 16 BRPM

## 2023-04-12 DIAGNOSIS — E11.621 DIABETIC ULCER OF TOE OF LEFT FOOT ASSOCIATED WITH TYPE 2 DIABETES MELLITUS, LIMITED TO BREAKDOWN OF SKIN (H): Primary | ICD-10-CM

## 2023-04-12 DIAGNOSIS — E11.42 TYPE 2 DIABETES MELLITUS WITH DIABETIC POLYNEUROPATHY, WITHOUT LONG-TERM CURRENT USE OF INSULIN (H): ICD-10-CM

## 2023-04-12 DIAGNOSIS — L97.521 DIABETIC ULCER OF TOE OF LEFT FOOT ASSOCIATED WITH TYPE 2 DIABETES MELLITUS, LIMITED TO BREAKDOWN OF SKIN (H): Primary | ICD-10-CM

## 2023-04-12 LAB — HBA1C MFR BLD: 7.3 % (ref 4–6.2)

## 2023-04-12 PROCEDURE — 97597 DBRDMT OPN WND 1ST 20 CM/<: CPT | Performed by: NURSE PRACTITIONER

## 2023-04-12 PROCEDURE — G0463 HOSPITAL OUTPT CLINIC VISIT: HCPCS | Mod: 25

## 2023-04-12 PROCEDURE — 36415 COLL VENOUS BLD VENIPUNCTURE: CPT | Mod: ZL | Performed by: NURSE PRACTITIONER

## 2023-04-12 PROCEDURE — 99213 OFFICE O/P EST LOW 20 MIN: CPT | Mod: 25 | Performed by: NURSE PRACTITIONER

## 2023-04-12 PROCEDURE — 83036 HEMOGLOBIN GLYCOSYLATED A1C: CPT | Mod: ZL | Performed by: NURSE PRACTITIONER

## 2023-04-12 ASSESSMENT — PAIN SCALES - GENERAL: PAINLEVEL: MODERATE PAIN (4)

## 2023-04-12 ASSESSMENT — ENCOUNTER SYMPTOMS: WOUND: 1

## 2023-04-12 NOTE — PROGRESS NOTES
ASSESSMENT:    ICD-10-CM    1. Diabetic ulcer of toe of left foot associated with type 2 diabetes mellitus, limited to breakdown of skin (H)  E11.621 Wound Care Referral    L97.521       2. Type 2 diabetes mellitus with diabetic polyneuropathy, without long-term current use of insulin (H)  E11.42 Hemoglobin A1c     Hemoglobin A1c     TRIM HYPERKERATOTIC SKIN LESION, ONE          PLAN:  Offload pressure at all times.  Recommend that he wears diabetic shoes and custom orthotics with any type of ambulation.  Follow-up if ulceration returns.  Finish out Bactrim.  Check hemoglobin A1c.  Continue with Lyrica and capsaicin.    SUBJECTIVE:    Patient is seen today for diabetic ulcer of left foot.  He tells me that he has concrete floors in his home and he believes that the area developed because he was walking with socks on his feet or barefooted on the surfaces.  Since he was seen in clinic last week he has switched over to putting orthotics into his crocs and then wearing only diabetic shoes during the day.  He is not walking barefooted or sock footed any longer.  He is due for hemoglobin A1c for diabetic management.  Also reports that he has neuropathy.  He started using capsaicin cream and switch from gabapentin to Lyrica and it is made a significant improvement.  He is finding that he is able to ambulate more regularly with less pain.  He continues on antibiotics at this time as he was treated for a mild infection of the wound at recent visit on April 7, 2023.      PROBLEM LIST:  Patient Active Problem List   Diagnosis     Essential hypertension     Hyperlipidemia     Obesity     PAIN CHRONIC( NEC)     HYPERLIPIDEMIA LDL GOAL <130     Allergic rhinitis     Aortic valve sclerosis     Back pain, chronic     Controlled substance agreement signed 12/6/19     Degeneration of cervical intervertebral disc     Grief reaction with prolonged bereavement     Knee pain, chronic     Primary osteoarthritis of right knee      Atelectasis     Failed back surgical syndrome     Morbid obesity (H)     Diabetes mellitus, type 2 (H)     Chronic, continuous use of opioids     Healthcare maintenance     Diabetic ulcer of toe of left foot associated with type 2 diabetes mellitus, limited to breakdown of skin (H)     Diabetic polyneuropathy associated with type 2 diabetes mellitus (H)     PAST MEDICAL HISTORY:  Past Medical History:   Diagnosis Date     Chest pain     12/12/2016     Other specified postprocedural states     Status post colonoscopy     Personal history of other medical treatment (CODE)     04/2009,MRI cervical spine     SURGICAL HISTORY:  Past Surgical History:   Procedure Laterality Date     ARTHROSCOPY SHOULDER ROTATOR CUFF REPAIR, SUBACROMIAL DECOMP, DIST CLAVICLE RESECTN, BICEP TENOTOMY Right 8/24/2020    Procedure: Diagnostic Right Shulder Scope w/ extensive debridement, and distal clavicle excision.;  Surgeon: Jaime Dean MD;  Location: [facility] OR     COLONOSCOPY      No Comments Provided     FUSION LUMBAR ANTERIOR ONE LEVEL      2004,Back surgery x 5 (fusion, hardware removal, stimulator and removal)     OTHER SURGICAL HISTORY      2005,205736,PERIPHERAL NERVE STIMULATOR,Lumbar nerve stimulator and subsequent removal     OTHER SURGICAL HISTORY      2005,205448,REMOVAL OF FOREIGN BODY, subsequent removal     OTHER SURGICAL HISTORY      [id number],INCISION AND DRAINAGE,from infection from nerve stimulator     OTHER SURGICAL HISTORY      04/2009,207388,SCAN-MRI INTERPRETATION,MRI cervical spine.       SOCIAL HISTORY:  Social History     Socioeconomic History     Marital status:      Spouse name: Not on file     Number of children: Not on file     Years of education: Not on file     Highest education level: Not on file   Occupational History     Occupation: DISABILITY   Tobacco Use     Smoking status: Never     Smokeless tobacco: Never   Vaping Use     Vaping status: Never Used   Substance and Sexual Activity     Alcohol use:  Not Currently     Comment: rare     Drug use: Never     Sexual activity: Yes     Partners: Female     Birth control/protection: None   Other Topics Concern     Parent/sibling w/ CABG, MI or angioplasty before 65F 55M? Not Asked   Social History Narrative    , on disability.    Preloaded 1/22/2013.     Social Determinants of Health     Financial Resource Strain: Not on file   Food Insecurity: Not on file   Transportation Needs: Not on file   Physical Activity: Not on file   Stress: Not on file   Social Connections: Not on file   Intimate Partner Violence: Not on file   Housing Stability: Not on file     FAMILYHISTORY:  Family History   Problem Relation Age of Onset     Family History Negative Daughter         Good Health     CURRENT MEDICATIONS:   Current Outpatient Medications   Medication Sig Dispense Refill     acetaminophen (TYLENOL) 500 MG tablet Take 500 mg by mouth every 4 hours as needed        amitriptyline (ELAVIL) 50 MG tablet Take 2 tablets (100 mg) by mouth At Bedtime 180 tablet 4     atenolol (TENORMIN) 100 MG tablet Take 1 tablet (100 mg) by mouth daily 90 tablet 4     atorvastatin (LIPITOR) 40 MG tablet Take 1 tablet (40 mg) by mouth daily 90 tablet 4     bisacodyl (DULCOLAX) 5 MG EC tablet Use as directed per colonoscopy prep. 2 tablet 0     blood glucose (NO BRAND SPECIFIED) test strip Use to test blood sugar 1 times daily or as directed. 90 strip 4     blood glucose monitoring (NO BRAND SPECIFIED) meter device kit Use to test blood sugar 1 times daily or as directed. 1 kit 0     levothyroxine (SYNTHROID/LEVOTHROID) 50 MCG tablet Take 1 tablet (50 mcg) by mouth daily 90 tablet 4     lisinopril (ZESTRIL) 20 MG tablet Take 1 tablet (20 mg) by mouth daily 90 tablet 4     metFORMIN (GLUCOPHAGE) 1000 MG tablet Take 1 tablet (1,000 mg) by mouth 2 times daily (with meals) 180 tablet 4     naloxone (NARCAN) 4 MG/0.1ML nasal spray Spray 1 spray (4 mg) into one nostril alternating nostrils once as  needed for opioid reversal every 2-3 minutes until assistance arrives 0.2 mL 3     nortriptyline (PAMELOR) 50 MG capsule Take 1 capsule (50 mg) by mouth At Bedtime 90 capsule 4     [START ON 4/21/2023] oxyCODONE IR (ROXICODONE) 10 MG tablet Take 1 tablet (10 mg) by mouth every 4 hours as needed for severe pain (7-10) 150 tablet 0     oxyCODONE IR (ROXICODONE) 10 MG tablet Take 1 tablet (10 mg) by mouth every 4 hours as needed for severe pain (7-10) 150 tablet 0     oxyCODONE IR (ROXICODONE) 10 MG tablet Take 1 tablet (10 mg) by mouth every 4 hours as needed for severe pain (7-10) 150 tablet 0     polyethylene glycol-electrolytes (NULYTELY) 420 g solution Take 4,000 mLs by mouth once       pregabalin (LYRICA) 100 MG capsule Take 1 capsule (100 mg) by mouth 3 times daily 270 capsule 1     sertraline (ZOLOFT) 100 MG tablet Take 1 tablet (100 mg) by mouth daily 90 tablet 4     sulfamethoxazole-trimethoprim (BACTRIM DS) 800-160 MG tablet Take 1 tablet by mouth 2 times daily for 10 days 20 tablet 0     TRUEplus Lancets 28G MISC Inject 1 lancet Subcutaneous daily 100 each 4     ALLERGIES:  Betadine [povidone iodine] and Liquid adhesive    REVIEW OF SYSTEMS:  Review of Systems   Skin: Positive for wound.   Negative for fever, chills, odorous and purulent drainage, surrounding redness and warmth      OBJECTIVE:  /76 (BP Location: Right arm, Patient Position: Sitting, Cuff Size: Adult Large)   Pulse 60   Temp 96.8  F (36  C) (Tympanic)   Resp 16   Wt 120.7 kg (266 lb 3.2 oz)   SpO2 95%   BMI 42.32 kg/m    EXAM:   Pleasant gentleman in no acute distress.  Affect normal.  Alert and oriented x4.  There is a wound that has closed on the plantar surface of the left great toe.  Along the medial aspect of the great toe is buildup of hyperkeratotic tissue.  After permission granted by patient the hyperkeratosis is debrided.  No current ulceration at this time.  No erythema, warmth.    A1C 7.1 % Feb 2023    Anamika DAILEY  [place], NP

## 2023-04-12 NOTE — NURSING NOTE
"Chief Complaint   Patient presents with     WOUND CARE       FOOD SECURITY SCREENING QUESTIONS  Hunger Vital Signs:  Within the past 12 months we worried whether our food would run out before we got money to buy more. Never  Within the past 12 months the food we bought just didn't last and we didn't have money to get more. Never  Elizabeth Wild LPN 4/12/2023 12:50 PM      Initial /76 (BP Location: Right arm, Patient Position: Sitting, Cuff Size: Adult Large)   Pulse 60   Temp 96.8  F (36  C) (Tympanic)   Resp 16   Wt 120.7 kg (266 lb 3.2 oz)   SpO2 95%   BMI 42.32 kg/m   Estimated body mass index is 42.32 kg/m  as calculated from the following:    Height as of 1/12/23: 1.689 m (5' 6.5\").    Weight as of this encounter: 120.7 kg (266 lb 3.2 oz).  Medication Reconciliation: complete    Elizabeth Wild LPN  "

## 2023-05-19 ENCOUNTER — OFFICE VISIT (OUTPATIENT)
Dept: FAMILY MEDICINE | Facility: OTHER | Age: 63
End: 2023-05-19
Attending: FAMILY MEDICINE
Payer: MEDICARE

## 2023-05-19 VITALS
BODY MASS INDEX: 42.32 KG/M2 | HEART RATE: 65 BPM | RESPIRATION RATE: 17 BRPM | TEMPERATURE: 97.6 F | SYSTOLIC BLOOD PRESSURE: 118 MMHG | WEIGHT: 266.2 LBS | OXYGEN SATURATION: 97 % | DIASTOLIC BLOOD PRESSURE: 74 MMHG

## 2023-05-19 DIAGNOSIS — M54.50 CHRONIC RIGHT-SIDED LOW BACK PAIN WITHOUT SCIATICA: ICD-10-CM

## 2023-05-19 DIAGNOSIS — G89.29 CHRONIC RIGHT-SIDED LOW BACK PAIN WITHOUT SCIATICA: ICD-10-CM

## 2023-05-19 DIAGNOSIS — E11.42 TYPE 2 DIABETES MELLITUS WITH DIABETIC POLYNEUROPATHY, WITHOUT LONG-TERM CURRENT USE OF INSULIN (H): Primary | ICD-10-CM

## 2023-05-19 DIAGNOSIS — R01.1 HEART MURMUR: ICD-10-CM

## 2023-05-19 LAB
ANION GAP SERPL CALCULATED.3IONS-SCNC: 7 MMOL/L (ref 7–15)
BUN SERPL-MCNC: 21.1 MG/DL (ref 8–23)
CALCIUM SERPL-MCNC: 9.3 MG/DL (ref 8.8–10.2)
CHLORIDE SERPL-SCNC: 102 MMOL/L (ref 98–107)
CREAT SERPL-MCNC: 0.91 MG/DL (ref 0.67–1.17)
DEPRECATED HCO3 PLAS-SCNC: 28 MMOL/L (ref 22–29)
GFR SERPL CREATININE-BSD FRML MDRD: >90 ML/MIN/1.73M2
GLUCOSE SERPL-MCNC: 138 MG/DL (ref 70–99)
HOLD SPECIMEN: NORMAL
POTASSIUM SERPL-SCNC: 5.1 MMOL/L (ref 3.4–5.3)
SODIUM SERPL-SCNC: 137 MMOL/L (ref 136–145)

## 2023-05-19 PROCEDURE — G0463 HOSPITAL OUTPT CLINIC VISIT: HCPCS | Performed by: FAMILY MEDICINE

## 2023-05-19 PROCEDURE — 80048 BASIC METABOLIC PNL TOTAL CA: CPT | Mod: ZL | Performed by: FAMILY MEDICINE

## 2023-05-19 PROCEDURE — 36415 COLL VENOUS BLD VENIPUNCTURE: CPT | Mod: ZL | Performed by: FAMILY MEDICINE

## 2023-05-19 PROCEDURE — 99214 OFFICE O/P EST MOD 30 MIN: CPT | Performed by: FAMILY MEDICINE

## 2023-05-19 RX ORDER — OXYCODONE HYDROCHLORIDE 10 MG/1
10 TABLET ORAL EVERY 4 HOURS PRN
Qty: 180 TABLET | Refills: 0 | Status: SHIPPED | OUTPATIENT
Start: 2023-05-19 | End: 2023-08-16

## 2023-05-19 RX ORDER — OXYCODONE HYDROCHLORIDE 10 MG/1
10 TABLET ORAL EVERY 4 HOURS PRN
Qty: 180 TABLET | Refills: 0 | Status: SHIPPED | OUTPATIENT
Start: 2023-06-16 | End: 2023-08-16

## 2023-05-19 RX ORDER — OXYCODONE HYDROCHLORIDE 10 MG/1
10 TABLET ORAL EVERY 4 HOURS PRN
Qty: 180 TABLET | Refills: 0 | Status: SHIPPED | OUTPATIENT
Start: 2023-07-14 | End: 2023-08-16

## 2023-05-19 ASSESSMENT — ANXIETY QUESTIONNAIRES
1. FEELING NERVOUS, ANXIOUS, OR ON EDGE: NOT AT ALL
GAD7 TOTAL SCORE: 6
4. TROUBLE RELAXING: NEARLY EVERY DAY
IF YOU CHECKED OFF ANY PROBLEMS ON THIS QUESTIONNAIRE, HOW DIFFICULT HAVE THESE PROBLEMS MADE IT FOR YOU TO DO YOUR WORK, TAKE CARE OF THINGS AT HOME, OR GET ALONG WITH OTHER PEOPLE: SOMEWHAT DIFFICULT
5. BEING SO RESTLESS THAT IT IS HARD TO SIT STILL: SEVERAL DAYS
3. WORRYING TOO MUCH ABOUT DIFFERENT THINGS: NOT AT ALL
8. IF YOU CHECKED OFF ANY PROBLEMS, HOW DIFFICULT HAVE THESE MADE IT FOR YOU TO DO YOUR WORK, TAKE CARE OF THINGS AT HOME, OR GET ALONG WITH OTHER PEOPLE?: SOMEWHAT DIFFICULT
2. NOT BEING ABLE TO STOP OR CONTROL WORRYING: NOT AT ALL
GAD7 TOTAL SCORE: 6
GAD7 TOTAL SCORE: 6
7. FEELING AFRAID AS IF SOMETHING AWFUL MIGHT HAPPEN: NOT AT ALL
7. FEELING AFRAID AS IF SOMETHING AWFUL MIGHT HAPPEN: NOT AT ALL
6. BECOMING EASILY ANNOYED OR IRRITABLE: MORE THAN HALF THE DAYS

## 2023-05-19 ASSESSMENT — ENCOUNTER SYMPTOMS
APNEA: 0
EYE DISCHARGE: 0
CONFUSION: 0
DIFFICULTY URINATING: 0
LIGHT-HEADEDNESS: 0
ABDOMINAL DISTENTION: 0
NUMBNESS: 0
SHORTNESS OF BREATH: 0
JOINT SWELLING: 0
FACIAL ASYMMETRY: 0
COLOR CHANGE: 0
NECK STIFFNESS: 0
DYSURIA: 0
AGITATION: 0
BACK PAIN: 1
EYE ITCHING: 0
MYALGIAS: 0
PALPITATIONS: 0
ARTHRALGIAS: 0
ACTIVITY CHANGE: 0
PARESTHESIAS: 1
DIZZINESS: 0
APPETITE CHANGE: 0
WOUND: 0
STRIDOR: 0
HEADACHES: 0

## 2023-05-19 ASSESSMENT — PAIN SCALES - GENERAL: PAINLEVEL: EXTREME PAIN (8)

## 2023-05-19 NOTE — NURSING NOTE
"Chief Complaint   Patient presents with     Recheck Medication       Initial /74   Pulse 65   Temp 97.6  F (36.4  C) (Tympanic)   Resp 17   Wt 120.7 kg (266 lb 3.2 oz)   SpO2 97%   BMI 42.32 kg/m   Estimated body mass index is 42.32 kg/m  as calculated from the following:    Height as of 1/12/23: 1.689 m (5' 6.5\").    Weight as of this encounter: 120.7 kg (266 lb 3.2 oz).  Medication Reconciliation: complete    FOOD SECURITY SCREENING QUESTIONS  Hunger Vital Signs:  Within the past 12 months we worried whether our food would run out before we got money to buy more. Never  Within the past 12 months the food we bought just didn't last and we didn't have money to get more. Never  Eliza Chávez LPN 5/19/2023 9:16 AM        "

## 2023-05-19 NOTE — PROGRESS NOTES
"  Assessment & Plan     (E11.42) Type 2 diabetes mellitus with diabetic polyneuropathy, without long-term current use of insulin (H)  (primary encounter diagnosis)  Comment: Patient presents with worsening neuropathy with type 2 diabetes.  Patient using Lyrica 100 mg tablets 3 times daily for neuropathy.  A1c has been stable between 6.7 and 7.3 for the past year.  He has been struggling with weight loss.  Plan: Basic Metabolic Panel, Semaglutide, 1 MG/DOSE,         (OZEMPIC) 4 MG/3ML pen, insulin pen needle (31G        X 8 MM) 31G X 8 MM miscellaneous          (M54.50,  G89.29) Chronic right-sided low back pain without sciatica  Comment: Patient has significant back pain secondary to work-related injury.  Patient continues with oxycodone 10 mg 5 tablets/day.  Plan: oxyCODONE IR (ROXICODONE) 10 MG tablet,         oxyCODONE IR (ROXICODONE) 10 MG tablet,         oxyCODONE IR (ROXICODONE) 10 MG tablet          (R01.1) Heart murmur  Comment: Patient presents with increased lower extremity edema with a crescendo decrescendo murmur heard at the right and left sternal borders.  Patient does not present with orthopnea.  We will proceed with echo to assess ejection fraction and possible heart failure.  Plan: Echocardiogram Complete                     BMI:   Estimated body mass index is 42.32 kg/m  as calculated from the following:    Height as of 1/12/23: 1.689 m (5' 6.5\").    Weight as of this encounter: 120.7 kg (266 lb 3.2 oz).           Return in about 3 months (around 8/19/2023).    Patient was seen and examined by me as well as Albino Orantes, MS3    Sohail Harris MD  [facility] [place]    Lui Dong is a 62 year old, presenting for the following health issues:  Recheck Medication        5/19/2023     9:12 AM   Additional Questions   Roomed by LUCAS Thao   Accompanied by Self         5/19/2023     9:12 AM   Patient Reported Additional Medications   Patient reports taking the following new " medications N/A     Patient presents to clinic today to discuss back pain, worsening neuropathy, diabetes.  Patient has persistent back pain due to a work-related injury associated with occasional radicular symptoms.  He manages this back pain with oxycodone 10 mg 5 tablet/day.  Patient has had worsening neuropathy associated with increased lower extremity edema and diabetes.  Patient has tried capsaicin rubs on his feet which provide minimal relief.  His Lyrica has provided relief as well.  Patient has struggled with weight gain and would like to try Ozempic for diabetes management and weight loss.    History of Present Illness       Back Pain:  He presents for follow up of back pain. Patient's back pain is a chronic problem.  Location of back pain:  Right lower back and left lower back  Description of back pain: burning  Back pain spreads: nowhere    Since patient first noticed back pain, pain is: always present, but gets better and worse  Does back pain interfere with his job:  No      Reason for visit:  Follow up    He eats 0-1 servings of fruits and vegetables daily.He consumes 0 sweetened beverage(s) daily.He exercises with enough effort to increase his heart rate 9 or less minutes per day.  He exercises with enough effort to increase his heart rate 3 or less days per week.   He is taking medications regularly.  Today's VISHNU-7 Score: 6               Review of Systems   Constitutional: Negative for activity change and appetite change.   HENT: Negative for congestion.    Eyes: Negative for discharge and itching.   Respiratory: Negative for apnea, shortness of breath and stridor.    Cardiovascular: Negative for chest pain, palpitations and peripheral edema.   Gastrointestinal: Negative for abdominal distention.   Genitourinary: Negative for difficulty urinating and dysuria.   Musculoskeletal: Positive for back pain. Negative for arthralgias, gait problem, joint swelling, myalgias and neck stiffness.   Skin:  Negative for color change and wound.   Neurological: Positive for paresthesias. Negative for dizziness, facial asymmetry, light-headedness, numbness and headaches.        Significant neuropathy in lower extremities bilaterally.   Psychiatric/Behavioral: Negative for agitation, behavioral problems and confusion.            Objective    /74   Pulse 65   Temp 97.6  F (36.4  C) (Tympanic)   Resp 17   Wt 120.7 kg (266 lb 3.2 oz)   SpO2 97%   BMI 42.32 kg/m    Body mass index is 42.32 kg/m .  Physical Exam  HENT:      Head: Normocephalic.      Mouth/Throat:      Mouth: Mucous membranes are moist.      Pharynx: Oropharynx is clear.   Cardiovascular:      Rate and Rhythm: Normal rate and regular rhythm.      Pulses: Normal pulses.      Heart sounds: Murmur heard.      Comments: Crescendo decrescendo murmur heard in the right and left sternal borders.  Pulmonary:      Effort: Pulmonary effort is normal.      Breath sounds: Normal breath sounds.   Abdominal:      General: Abdomen is flat. Bowel sounds are normal.   Musculoskeletal:         General: Tenderness and signs of injury present.      Right lower leg: Edema present.      Left lower leg: Edema present.      Comments: Lower back tenderness with spine flexion and extension.  Increased right and left lower extremity edema associated with stasis dermatitis and worsening neuropathy.   Neurological:      General: No focal deficit present.      Mental Status: He is alert. Mental status is at baseline.   Psychiatric:         Mood and Affect: Mood normal.         Behavior: Behavior normal.         Thought Content: Thought content normal.         Judgment: Judgment normal.            Results for orders placed or performed in visit on 05/19/23 (from the past 24 hour(s))   Basic Metabolic Panel   Result Value Ref Range    Sodium 137 136 - 145 mmol/L    Potassium 5.1 3.4 - 5.3 mmol/L    Chloride 102 98 - 107 mmol/L    Carbon Dioxide (CO2) 28 22 - 29 mmol/L    Anion Gap  7 7 - 15 mmol/L    Urea Nitrogen 21.1 8.0 - 23.0 mg/dL    Creatinine 0.91 0.67 - 1.17 mg/dL    Calcium 9.3 8.8 - 10.2 mg/dL    Glucose 138 (H) 70 - 99 mg/dL    GFR Estimate >90 >60 mL/min/1.73m2   Extra Tube    Narrative    The following orders were created for panel order Extra Tube.  Procedure                               Abnormality         Status                     ---------                               -----------         ------                     Extra Serum Separator Tu...[[id number]]                      Final result                 Please view results for these tests on the individual orders.   Extra Serum Separator Tube (SST)   Result Value Ref Range    Hold Specimen [facility]        ----- Services Performed by a MEDICAL STUDENT in Presence of RESIDENT/FELLOW Physician-------      Albino Orantes on 5/19/2023 at 11:48 AM

## 2023-05-19 NOTE — LETTER
Opioid / Opioid Plus Controlled Substance Agreement    This is an agreement between you and your provider about the safe and appropriate use of controlled substance/opioids prescribed by your care team. Controlled substances are medicines that can cause physical and mental dependence (abuse).    There are strict laws about having and using these medicines. We here at [facility] are committing to working with you in your efforts to get better. To support you in this work, we ll help you schedule regular office appointments for medicine refills. If we must cancel or change your appointment for any reason, we ll make sure you have enough medicine to last until your next appointment.     As a Provider, I will:  Listen carefully to your concerns and treat you with respect.   Recommend a treatment plan that I believe is in your best interest. This plan may involve therapies other than opioid pain medication.   Talk with you often about the possible benefits, and the risk of harm of any medicine that we prescribe for you.   Provide a plan on how to taper (discontinue or go off) using this medicine if the decision is made to stop its use.    As a Patient, I understand that opioid(s):   Are a controlled substance prescribed by my care team to help me function or work and manage my condition(s).   Are strong medicines and can cause serious side effects such as:  Drowsiness, which can seriously affect my driving ability  A lower breathing rate, enough to cause death  Harm to my thinking ability   Depression   Abuse of and addiction to this medicine  Need to be taken exactly as prescribed. Combining opioids with certain medicines or chemicals (such as illegal drugs, sedatives, sleeping pills, and benzodiazepines) can be dangerous or even fatal. If I stop opioids suddenly, I may have severe withdrawal symptoms.  Do not work for all types of pain nor for all patients. If they re not helpful, I may be asked to stop  them.        The risks, benefits and side effects of these medicine(s) were explained to me. I agree that:  I will take part in other treatments as advised by my care team. This may be psychiatry or counseling, physical therapy, behavioral therapy, group treatment or a referral to a specialist.     I will keep all my appointments. I understand that this is part of the monitoring of opioids. My care team may require an office visit for EVERY opioid/controlled substance refill. If I miss appointments or don t follow instructions, my care team may stop my medicine.    I will take my medicines as prescribed. I will not change the dose or schedule unless my care team tells me to. There will be no refills if I run out early.     I may be asked to come to the clinic and complete a urine drug test or complete a pill count at any time. If I don t give a urine sample or participate in a pill count, the care team may stop my medicine.    I will only receive prescriptions from this clinic for chronic pain. If I am treated by another provider for acute pain issues, I will tell them that I am taking opioid pain medication for chronic pain and that I have a treatment agreement with this provider. I will inform my [facility] care team within one business day if I am given a prescription for any pain medication by another healthcare provider. My [facility] care team can contact other providers and pharmacists about my use of any medicines.    It is up to me to make sure that I don t run out of my medicines on weekends or holidays. If my care team is willing to refill my opioid prescription without a visit, I must request refills only during office hours. Refills may take up to 3 business days to process. I will use one pharmacy to fill all my opioid and other controlled substance prescriptions. I will notify the clinic about any changes to my insurance or medication availability.    I am responsible for my  prescriptions. If the medicine/prescription is lost, stolen or destroyed, it will not be replaced. I also agree not to share controlled substance medicines with anyone.    I am aware I should not use any illegal or recreational drugs. I agree not to drink alcohol unless my care team says I can.       If I enroll in the [place] [facility] Cannabis program, I will tell my care team prior to my next refill.     I will tell my care team right away if I become pregnant, have a new medical problem treated outside of my regular clinic, or have a change in my medications.    I understand that this medicine can affect my thinking, judgment and reaction time. Alcohol and drugs affect the brain and body, which can affect the safety of my driving. Being under the influence of alcohol or drugs can affect my decision-making, behaviors, personal safety, and the safety of others. Driving while impaired (DWI) can occur if a person is driving, operating, or in physical control of a car, motorcycle, boat, snowmobile, ATV, motorbike, off-road vehicle, or any other motor vehicle ([place] Statute 169A.20). I understand the risk if I choose to drive or operate any vehicle or machinery.    I understand that if I do not follow any of the conditions above, my prescriptions or treatment may be stopped or changed.          Opioids  What You Need to Know    What are opioids?   Opioids are pain medicines that must be prescribed by a doctor. They are also known as narcotics.     Examples are:   morphine (MS Contin, Zaina)  oxycodone (Oxycontin)  oxycodone and acetaminophen (Percocet)  hydrocodone and acetaminophen (Vicodin, Norco)   fentanyl patch (Duragesic)   hydromorphone (Dilaudid)   methadone  codeine (Tylenol #3)     What do opioids do well?   Opioids are best for severe short-term pain such as after a surgery or injury. They may work well for cancer pain. They may help some people with long-lasting (chronic) pain.     What do opioids NOT do  well?   Opioids never get rid of pain entirely, and they don t work well for most patients with chronic pain. Opioids don t reduce swelling, one of the causes of pain.                                    Other ways to manage chronic pain and improve function include:     Treat the health problem that may be causing pain  Anti-inflammation medicines, which reduce swelling and tenderness, such as ibuprofen (Advil, Motrin) or naproxen (Aleve)  Acetaminophen (Tylenol)  Antidepressants and anti-seizure medicines, especially for nerve pain  Topical treatments such as patches or creams  Injections or nerve blocks  Chiropractic or osteopathic treatment  Acupuncture, massage, deep breathing, meditation, visual imagery, aromatherapy  Use heat or ice at the pain site  Physical therapy   Exercise  Stop smoking  Take part in therapy       Risks and side effects     Talk to your doctor before you start or decide to keep taking opioids. Possible side effects include:    Lowering your breathing rate enough to cause death  Overdose, including death, especially if taking higher than prescribed doses  Worse depression symptoms; less pleasure in things you usually enjoy  Feeling tired or sluggish  Slower thoughts or cloudy thinking  Being more sensitive to pain over time; pain is harder to control  Trouble sleeping or restless sleep  Changes in hormone levels (for example, less testosterone)  Changes in sex drive or ability to have sex  Constipation  Unsafe driving  Itching and sweating  Dizziness  Nausea, throwing up and dry mouth    What else should I know about opioids?    Opioids may lead to dependence, tolerance, or addiction.    Dependence means that if you stop or reduce the medicine too quickly, you will have withdrawal symptoms. These include loose poop (diarrhea), jitters, flu-like symptoms, nervousness and tremors. Dependence is not the same as addiction.                     Tolerance means needing higher doses over time to  get the same effect. This may increase the chance of serious side effects.    Addiction is when people improperly use a substance that harms their body, their mind or their relations with others. Use of opiates can cause a relapse of addiction if you have a history of drug or alcohol abuse.    People who have used opioids for a long time may have a lower quality of life, worse depression, higher levels of pain and more visits to doctors.    You can overdose on opioids. Take these steps to lower your risk of overdose:    Recognize the signs:  Signs of overdose include decrease or loss of consciousness (blackout), slowed breathing, trouble waking up and blue lips. If someone is worried about overdose, they should call 911.    Talk to your doctor about Narcan (naloxone).   If you are at risk for overdose, you may be given a prescription for Narcan. This medicine very quickly reverses the effects of opioids.   If you overdose, a friend or family member can give you Narcan while waiting for the ambulance. They need to know the signs of overdose and how to give Narcan.     Don't use alcohol or street drugs.   Taking them with opioids can cause death.    Do not take any of these medicines unless your doctor says it s OK. Taking these with opioids can cause death:  Benzodiazepines, such as lorazepam (Ativan), alprazolam (Xanax) or diazepam (Valium)  Muscle relaxers, such as cyclobenzaprine (Flexeril)  Sleeping pills like zolpidem (Ambien)   Other opioids      How to keep you and other people safe while taking opioids:    Never share your opioids with others.  Opioid medicines are regulated by the Drug Enforcement Agency ([place]ATH[place]). Selling or sharing medications is a criminal act.    2. Be sure to store opioids in a secure place, locked up if possible. Young children can easily swallow them and overdose.    3. When you are traveling with your medicines, keep them in the original bottles. If you use a pill box, be sure you also  carry a copy of your medicine list from your clinic or pharmacy.    4. Safe disposal of opioids    Most pharmacies have places to get rid of medicine, called disposal kiosks. Medicine disposal options are also available in every [facility]. Search your county and  medication disposal  to find more options. You can find more details at:  https://www.pca.[facility].[place]./living-green/managing-unwanted-medications     I agree that my provider, clinic care team, and pharmacy may work with any city, state or federal law enforcement agency that investigates the misuse, sale, or other diversion of my controlled medicine. I will allow my provider to discuss my care with, or share a copy of, this agreement with any other treating provider, pharmacy or emergency room where I receive care.    I have read this agreement and have asked questions about anything I did not understand.    _______________________________________________________  Patient Signature - Iker Young _____________________                   Date     _______________________________________________________  Provider Signature - Sohail Harris MD   _____________________                   Date     _______________________________________________________  Witness Signature (required if provider not present while patient signing)   _____________________                   Date

## 2023-05-22 ENCOUNTER — HOSPITAL ENCOUNTER (OUTPATIENT)
Facility: OTHER | Age: 63
End: 2023-05-22
Attending: SURGERY | Admitting: SURGERY
Payer: MEDICARE

## 2023-06-19 ENCOUNTER — HOSPITAL ENCOUNTER (OUTPATIENT)
Dept: ULTRASOUND IMAGING | Facility: OTHER | Age: 63
Discharge: HOME | End: 2023-06-19
Attending: FAMILY MEDICINE
Payer: MEDICARE

## 2023-06-19 ENCOUNTER — HOSPITAL ENCOUNTER (OUTPATIENT)
Dept: CARDIOLOGY | Facility: OTHER | Age: 63
Discharge: HOME | End: 2023-06-19
Attending: FAMILY MEDICINE
Payer: MEDICARE

## 2023-06-19 DIAGNOSIS — E11.621 DIABETIC ULCER OF TOE OF LEFT FOOT ASSOCIATED WITH TYPE 2 DIABETES MELLITUS, LIMITED TO BREAKDOWN OF SKIN (H): ICD-10-CM

## 2023-06-19 DIAGNOSIS — R01.1 HEART MURMUR: ICD-10-CM

## 2023-06-19 DIAGNOSIS — L97.521 DIABETIC ULCER OF TOE OF LEFT FOOT ASSOCIATED WITH TYPE 2 DIABETES MELLITUS, LIMITED TO BREAKDOWN OF SKIN (H): ICD-10-CM

## 2023-06-19 LAB — LVEF ECHO: NORMAL

## 2023-06-19 PROCEDURE — 93306 TTE W/DOPPLER COMPLETE: CPT

## 2023-06-19 PROCEDURE — 93922 UPR/L XTREMITY ART 2 LEVELS: CPT

## 2023-06-19 PROCEDURE — 93306 TTE W/DOPPLER COMPLETE: CPT | Mod: 26 | Performed by: STUDENT IN AN ORGANIZED HEALTH CARE EDUCATION/TRAINING PROGRAM

## 2023-08-16 ENCOUNTER — OFFICE VISIT (OUTPATIENT)
Dept: FAMILY MEDICINE | Facility: OTHER | Age: 63
End: 2023-08-16
Attending: FAMILY MEDICINE
Payer: MEDICARE

## 2023-08-16 VITALS
RESPIRATION RATE: 18 BRPM | WEIGHT: 263.4 LBS | TEMPERATURE: 97.5 F | HEART RATE: 65 BPM | DIASTOLIC BLOOD PRESSURE: 80 MMHG | SYSTOLIC BLOOD PRESSURE: 126 MMHG | OXYGEN SATURATION: 94 % | BODY MASS INDEX: 41.88 KG/M2

## 2023-08-16 DIAGNOSIS — M54.50 CHRONIC RIGHT-SIDED LOW BACK PAIN WITHOUT SCIATICA: ICD-10-CM

## 2023-08-16 DIAGNOSIS — G89.29 CHRONIC RIGHT-SIDED LOW BACK PAIN WITHOUT SCIATICA: ICD-10-CM

## 2023-08-16 PROCEDURE — G0463 HOSPITAL OUTPT CLINIC VISIT: HCPCS

## 2023-08-16 PROCEDURE — 99213 OFFICE O/P EST LOW 20 MIN: CPT | Performed by: FAMILY MEDICINE

## 2023-08-16 RX ORDER — OXYCODONE HYDROCHLORIDE 10 MG/1
10 TABLET ORAL EVERY 4 HOURS PRN
Qty: 180 TABLET | Refills: 0 | Status: SHIPPED | OUTPATIENT
Start: 2023-09-13 | End: 2023-11-08

## 2023-08-16 RX ORDER — OXYCODONE HYDROCHLORIDE 10 MG/1
10 TABLET ORAL EVERY 4 HOURS PRN
Qty: 180 TABLET | Refills: 0 | Status: SHIPPED | OUTPATIENT
Start: 2023-10-11 | End: 2023-11-08

## 2023-08-16 RX ORDER — OXYCODONE HYDROCHLORIDE 10 MG/1
10 TABLET ORAL EVERY 4 HOURS PRN
Qty: 180 TABLET | Refills: 0 | Status: SHIPPED | OUTPATIENT
Start: 2023-08-16 | End: 2023-11-08

## 2023-08-16 ASSESSMENT — ANXIETY QUESTIONNAIRES
7. FEELING AFRAID AS IF SOMETHING AWFUL MIGHT HAPPEN: NOT AT ALL
3. WORRYING TOO MUCH ABOUT DIFFERENT THINGS: SEVERAL DAYS
1. FEELING NERVOUS, ANXIOUS, OR ON EDGE: NOT AT ALL
GAD7 TOTAL SCORE: 8
4. TROUBLE RELAXING: MORE THAN HALF THE DAYS
6. BECOMING EASILY ANNOYED OR IRRITABLE: MORE THAN HALF THE DAYS
GAD7 TOTAL SCORE: 8
2. NOT BEING ABLE TO STOP OR CONTROL WORRYING: SEVERAL DAYS
5. BEING SO RESTLESS THAT IT IS HARD TO SIT STILL: MORE THAN HALF THE DAYS
IF YOU CHECKED OFF ANY PROBLEMS ON THIS QUESTIONNAIRE, HOW DIFFICULT HAVE THESE PROBLEMS MADE IT FOR YOU TO DO YOUR WORK, TAKE CARE OF THINGS AT HOME, OR GET ALONG WITH OTHER PEOPLE: NOT DIFFICULT AT ALL

## 2023-08-16 ASSESSMENT — PATIENT HEALTH QUESTIONNAIRE - PHQ9
SUM OF ALL RESPONSES TO PHQ QUESTIONS 1-9: 7
10. IF YOU CHECKED OFF ANY PROBLEMS, HOW DIFFICULT HAVE THESE PROBLEMS MADE IT FOR YOU TO DO YOUR WORK, TAKE CARE OF THINGS AT HOME, OR GET ALONG WITH OTHER PEOPLE: SOMEWHAT DIFFICULT
SUM OF ALL RESPONSES TO PHQ QUESTIONS 1-9: 7

## 2023-08-16 ASSESSMENT — PAIN SCALES - GENERAL: PAINLEVEL: MODERATE PAIN (4)

## 2023-08-16 NOTE — PROGRESS NOTES
"  Assessment & Plan     (M54.50,  G89.29) Chronic right-sided low back pain without sciatica  Comment: essentially stable. Has non work related medical problems that will be addressed outside of the WC visit.  reviewed, stable. Low risk for misuse or diversion. Refilled for the next 3 months.   Plan: oxyCODONE IR (ROXICODONE) 10 MG tablet,         oxyCODONE IR (ROXICODONE) 10 MG tablet,         oxyCODONE IR (ROXICODONE) 10 MG tablet                      BMI:   Estimated body mass index is 41.88 kg/m  as calculated from the following:    Height as of 1/12/23: 1.689 m (5' 6.5\").    Weight as of this encounter: 119.5 kg (263 lb 6.4 oz).       Depression Screening Follow Up        8/16/2023     9:53 AM   PHQ   PHQ-9 Total Score 7   Q9: Thoughts of better off dead/self-harm past 2 weeks Several days   F/U: Thoughts of suicide or self-harm Yes   F/U: Self harm-plan No   F/U: Self-harm action No   F/U: Safety concerns No                 Follow Up    Follow Up Actions Taken      Discussed the following ways the patient can remain in a safe environment:  be around others      Return in about 3 months (around 11/16/2023).    Sohail Harris MD  [facility]    Lui Dong is a 63 year old, presenting for the following health issues:  Recheck Medication      8/16/2023     9:57 AM   Additional Questions   Roomed by LUCAS Kya   Accompanied by Self         8/16/2023     9:57 AM   Patient Reported Additional Medications   Patient reports taking the following new medications N/A       History of Present Illness       Back Pain:  He presents for follow up of back pain. Patient's back pain is a recurring problem.  Location of back pain:  Right lower back, left lower back, right middle of back and left middle of back  Description of back pain: dull ache and sharp  Back pain spreads: right thigh and left thigh    Since patient first noticed back pain, pain is: always present, but gets better and " worse  Does back pain interfere with his job:  Yes       He eats 0-1 servings of fruits and vegetables daily.He consumes 0 sweetened beverage(s) daily.He exercises with enough effort to increase his heart rate 9 or less minutes per day.  He exercises with enough effort to increase his heart rate 3 or less days per week.   He is taking medications regularly.             WC follow up for his back injury, opiate refill. Lots of lower extremity neuropathy, intends to follow up with me soon on this in a non WC visit. Last night had suicidal ideation, when he could not stand or sleep. Thought of his family is what has been preventing him from self harm.  His daughter's death anniversary was 8/8, which is a trigger for his mood feeling.     Current Outpatient Medications   Medication    acetaminophen (TYLENOL) 500 MG tablet    amitriptyline (ELAVIL) 50 MG tablet    atenolol (TENORMIN) 100 MG tablet    atorvastatin (LIPITOR) 40 MG tablet    blood glucose (NO BRAND SPECIFIED) test strip    blood glucose monitoring (NO BRAND SPECIFIED) meter device kit    insulin pen needle (31G X 8 MM) 31G X 8 MM miscellaneous    levothyroxine (SYNTHROID/LEVOTHROID) 50 MCG tablet    lisinopril (ZESTRIL) 20 MG tablet    metFORMIN (GLUCOPHAGE) 1000 MG tablet    naloxone (NARCAN) 4 MG/0.1ML nasal spray    nortriptyline (PAMELOR) 50 MG capsule    oxyCODONE IR (ROXICODONE) 10 MG tablet    [START ON 10/11/2023] oxyCODONE IR (ROXICODONE) 10 MG tablet    [START ON 9/13/2023] oxyCODONE IR (ROXICODONE) 10 MG tablet    pregabalin (LYRICA) 100 MG capsule    Semaglutide, 1 MG/DOSE, (OZEMPIC) 4 MG/3ML pen    sertraline (ZOLOFT) 100 MG tablet    TRUEplus Lancets 28G MISC    bisacodyl (DULCOLAX) 5 MG EC tablet    polyethylene glycol-electrolytes (NULYTELY) 420 g solution     Current Facility-Administered Medications   Medication    lidocaine (PF) (XYLOCAINE) 1 % injection 4 mL    triamcinolone (KENALOG-40) injection 40 mg         Review of Systems          Objective    /80   Pulse 65   Temp 97.5  F (36.4  C) (Tympanic)   Resp 18   Wt 119.5 kg (263 lb 6.4 oz)   SpO2 94%   BMI 41.88 kg/m    Body mass index is 41.88 kg/m .  Physical Exam  Constitutional:       Appearance: Normal appearance.   Musculoskeletal:      Comments: Negative straight leg raise, walking with a mild limp, consistent with past exams.    Neurological:      Mental Status: He is alert.   Psychiatric:         Mood and Affect: Mood normal.         Behavior: Behavior normal.         Thought Content: Thought content normal.

## 2023-08-16 NOTE — NURSING NOTE
"Chief Complaint   Patient presents with    Recheck Medication       Initial /80   Pulse 65   Temp 97.5  F (36.4  C) (Tympanic)   Resp 18   Wt 119.5 kg (263 lb 6.4 oz)   SpO2 94%   BMI 41.88 kg/m   Estimated body mass index is 41.88 kg/m  as calculated from the following:    Height as of 1/12/23: 1.689 m (5' 6.5\").    Weight as of this encounter: 119.5 kg (263 lb 6.4 oz).  Medication Reconciliation: complete    FOOD SECURITY SCREENING QUESTIONS  Hunger Vital Signs:  Within the past 12 months we worried whether our food would run out before we got money to buy more. Never  Within the past 12 months the food we bought just didn't last and we didn't have money to get more. Never  Eliza Chávez LPN 8/16/2023 10:00 AM        "

## 2023-08-28 RX ORDER — NORTRIPTYLINE HYDROCHLORIDE 50 MG/1
50 CAPSULE ORAL AT BEDTIME
COMMUNITY
End: 2023-08-28

## 2023-09-01 ENCOUNTER — TRANSFERRED RECORDS (OUTPATIENT)
Dept: MULTI SPECIALTY CLINIC | Facility: CLINIC | Age: 63
End: 2023-09-01

## 2023-09-01 LAB — RETINOPATHY: NORMAL

## 2023-10-10 DIAGNOSIS — E11.42 DIABETIC POLYNEUROPATHY ASSOCIATED WITH TYPE 2 DIABETES MELLITUS (H): ICD-10-CM

## 2023-10-17 RX ORDER — PREGABALIN 100 MG/1
100 CAPSULE ORAL 3 TIMES DAILY
Qty: 270 CAPSULE | Refills: 0 | Status: SHIPPED | OUTPATIENT
Start: 2023-10-17 | End: 2024-03-08

## 2023-10-17 NOTE — TELEPHONE ENCOUNTER
[id number] of [place] sent Rx request for the following:      Requested Prescriptions   Pending Prescriptions Disp Refills    pregabalin (LYRICA) 100 MG capsule [Pharmacy Med Name: PREGABALIN 100MG CAPSULE] 270 capsule 1     Sig: TAKE 1 CAPSULE (100 MG) BY MOUTH 3 TIMES DAILY       There is no refill protocol information for this order        Last Prescription Date:   4/7/23  Last Fill Qty/Refills:         270, R-1    Last Office Visit:              8/16/23   Future Office visit:             Next 5 appointments (look out 90 days)      Nov 02, 2023  1:20 PM  SHORT with Sohail Harris MD  [facility] ([facility] ) [address]  [place] [address]  [phone]     Nov 08, 2023  9:40 AM  SHORT with Sohail Harris MD  [facility] ([facility] ) [address]  [place] [address]  [phone]          Per LOV note:  Return in about 3 months (around 11/16/2023).     Unable to complete prescription refill per RN Medication Refill Policy.     Haleigh Barfield RN .............. 10/17/2023  9:03 AM

## 2023-11-08 ENCOUNTER — OFFICE VISIT (OUTPATIENT)
Dept: FAMILY MEDICINE | Facility: OTHER | Age: 63
End: 2023-11-08
Attending: FAMILY MEDICINE
Payer: OTHER MISCELLANEOUS

## 2023-11-08 ENCOUNTER — OFFICE VISIT (OUTPATIENT)
Dept: FAMILY MEDICINE | Facility: OTHER | Age: 63
End: 2023-11-08
Attending: FAMILY MEDICINE
Payer: MEDICARE

## 2023-11-08 VITALS
HEART RATE: 61 BPM | SYSTOLIC BLOOD PRESSURE: 120 MMHG | DIASTOLIC BLOOD PRESSURE: 82 MMHG | BODY MASS INDEX: 41.91 KG/M2 | WEIGHT: 263.6 LBS | RESPIRATION RATE: 18 BRPM | OXYGEN SATURATION: 96 % | TEMPERATURE: 96.9 F

## 2023-11-08 VITALS
RESPIRATION RATE: 18 BRPM | BODY MASS INDEX: 41.37 KG/M2 | OXYGEN SATURATION: 96 % | DIASTOLIC BLOOD PRESSURE: 82 MMHG | SYSTOLIC BLOOD PRESSURE: 120 MMHG | WEIGHT: 263.6 LBS | HEIGHT: 67 IN | TEMPERATURE: 96.9 F | HEART RATE: 61 BPM

## 2023-11-08 DIAGNOSIS — E11.42 DIABETIC POLYNEUROPATHY ASSOCIATED WITH TYPE 2 DIABETES MELLITUS (H): Primary | ICD-10-CM

## 2023-11-08 DIAGNOSIS — G89.29 CHRONIC RIGHT-SIDED LOW BACK PAIN WITHOUT SCIATICA: ICD-10-CM

## 2023-11-08 DIAGNOSIS — M54.50 CHRONIC RIGHT-SIDED LOW BACK PAIN WITHOUT SCIATICA: Primary | ICD-10-CM

## 2023-11-08 DIAGNOSIS — M54.50 CHRONIC RIGHT-SIDED LOW BACK PAIN WITHOUT SCIATICA: ICD-10-CM

## 2023-11-08 DIAGNOSIS — G89.29 CHRONIC RIGHT-SIDED LOW BACK PAIN WITHOUT SCIATICA: Primary | ICD-10-CM

## 2023-11-08 LAB
AMPHETAMINES UR QL SCN: NORMAL
BARBITURATES UR QL SCN: NORMAL
BENZODIAZ UR QL SCN: NORMAL
BZE UR QL SCN: NORMAL
CANNABINOIDS UR QL SCN: NORMAL
CHOLEST SERPL-MCNC: 121 MG/DL
FENTANYL UR QL: NORMAL
HBA1C MFR BLD: 7 % (ref 4–6.2)
HDLC SERPL-MCNC: 62 MG/DL
LDLC SERPL CALC-MCNC: 39 MG/DL
NONHDLC SERPL-MCNC: 59 MG/DL
OPIATES UR QL SCN: NORMAL
PCP QUAL URINE (ROCHE): NORMAL
TRIGL SERPL-MCNC: 98 MG/DL

## 2023-11-08 PROCEDURE — 83036 HEMOGLOBIN GLYCOSYLATED A1C: CPT | Mod: ZL | Performed by: FAMILY MEDICINE

## 2023-11-08 PROCEDURE — 36415 COLL VENOUS BLD VENIPUNCTURE: CPT | Mod: ZL | Performed by: FAMILY MEDICINE

## 2023-11-08 PROCEDURE — 80307 DRUG TEST PRSMV CHEM ANLYZR: CPT | Mod: ZL | Performed by: FAMILY MEDICINE

## 2023-11-08 PROCEDURE — 99213 OFFICE O/P EST LOW 20 MIN: CPT | Performed by: FAMILY MEDICINE

## 2023-11-08 PROCEDURE — 80061 LIPID PANEL: CPT | Mod: ZL | Performed by: FAMILY MEDICINE

## 2023-11-08 PROCEDURE — G0463 HOSPITAL OUTPT CLINIC VISIT: HCPCS

## 2023-11-08 RX ORDER — OXYCODONE HYDROCHLORIDE 10 MG/1
10 TABLET ORAL EVERY 4 HOURS PRN
Qty: 180 TABLET | Refills: 0 | Status: SHIPPED | OUTPATIENT
Start: 2023-12-06 | End: 2024-02-05

## 2023-11-08 RX ORDER — OXYCODONE HYDROCHLORIDE 10 MG/1
10 TABLET ORAL EVERY 4 HOURS PRN
Qty: 180 TABLET | Refills: 0 | Status: SHIPPED | OUTPATIENT
Start: 2024-01-03 | End: 2024-01-19

## 2023-11-08 RX ORDER — OXYCODONE HYDROCHLORIDE 10 MG/1
10 TABLET ORAL EVERY 4 HOURS PRN
Qty: 180 TABLET | Refills: 0 | Status: SHIPPED | OUTPATIENT
Start: 2023-11-08 | End: 2024-02-05

## 2023-11-08 ASSESSMENT — PAIN SCALES - GENERAL
PAINLEVEL: MILD PAIN (2)
PAINLEVEL: WORST PAIN (10)

## 2023-11-08 NOTE — PROGRESS NOTES
"  Assessment & Plan     (M54.50,  G89.29) Chronic right-sided low back pain without sciatica  (primary encounter diagnosis)  Comment: no significant changes either improvement nor worsening.  reviewed and no evidence of misuse of opiates. Refilled without changes. Follow up in 3 months.   Plan: Urine Drug Screen, oxyCODONE IR (ROXICODONE) 10        MG tablet, oxyCODONE IR (ROXICODONE) 10 MG         tablet, oxyCODONE IR (ROXICODONE) 10 MG tablet                      BMI:   Estimated body mass index is 41.91 kg/m  as calculated from the following:    Height as of this encounter: 1.689 m (5' 6.5\").    Weight as of this encounter: 119.6 kg (263 lb 9.6 oz).           Return in about 3 months (around 2/8/2024).    Sohail Harris MD  [facility]    Lui Dong is a 63 year old, presenting for the following health issues:  Recheck Medication (Oxycodone)      11/8/2023     9:42 AM   Additional Questions   Roomed by LUCAS Kay   Accompanied by Self         11/8/2023     9:42 AM   Patient Reported Additional Medications   Patient reports taking the following new medications N/A       History of Present Illness       Back Pain:  He presents for follow up of back pain. Patient's back pain is a chronic problem.  Location of back pain:  Right middle of back  Description of back pain: burning  Back pain spreads: nowhere    Since patient first noticed back pain, pain is: always present, but gets better and worse  Does back pain interfere with his job:  Yes       He eats 0-1 servings of fruits and vegetables daily.He consumes 0 sweetened beverage(s) daily.He exercises with enough effort to increase his heart rate 9 or less minutes per day.  He exercises with enough effort to increase his heart rate 3 or less days per week.   He is taking medications regularly.         Pain History:  When did you first notice your pain? Years ago   Have you seen this provider for your pain in the past? Yes   Where in " "your body do you have pain? Back and feet  Are you seeing anyone else for your pain? No        1/12/2023    11:03 AM 4/7/2023     2:13 PM 8/16/2023     9:53 AM   PHQ-9 SCORE   PHQ-9 Total Score MyChart 6 (Mild depression) 9 (Mild depression) 7 (Mild depression)   PHQ-9 Total Score 6 9 7           4/7/2023     2:10 PM 5/19/2023     9:16 AM 8/16/2023     9:49 AM   VISHNU-7 SCORE   Total Score 9 (mild anxiety) 6 (mild anxiety) 8 (mild anxiety)   Total Score 9 6 8           12/1/2021     9:15 AM 11/8/2023     9:51 AM   PEG Score   PEG Total Score 6 3           Chronic Pain Follow Up:    Location of pain: low back, feet  Analgesia/pain control:    - Recent changes:  no    - Overall control: Inadequate pain control    - Current treatments: topical called Max Freeze, opiates. Walking, pacing   Adherence:     - Do you ever take more pain medicine than prescribed? No    - When did you take your last dose of pain medicine?  today   Adverse effects: No   PDMP Review         Value Time User    State PDMP site checked  Yes 11/8/2023 10:25 AM Sohail Harris MD          Last CSA Agreement:   CSA -- Patient Level:     [Media Unavailable] Controlled Substance Agreement - Opioid - Scan on 5/19/2023  2:02 PM   [Media Unavailable] Controlled Substance Agreement - Opioid - Scan on 5/27/2022 11:03 AM: Opioid   [Media Unavailable] Controlled Substance Agreement - Opioid - Scan on 5/6/2021 10:06 AM: CONTROLLED SUBSTANCE AGREEMENT OPIOID       Last UDS: 9/1/2022                Review of Systems         Objective    /82   Pulse 61   Temp 96.9  F (36.1  C) (Tympanic)   Resp 18   Ht 1.689 m (5' 6.5\")   Wt 119.6 kg (263 lb 9.6 oz)   SpO2 96%   BMI 41.91 kg/m    Body mass index is 41.91 kg/m .  Physical Exam  Constitutional:       Appearance: Normal appearance.   Musculoskeletal:      Comments: Mildly flexed lumbar spine with walking. Somewhat slow to get to a stand. No significant lower extremity weakness.    Neurological:      " General: No focal deficit present.      Mental Status: He is alert and oriented to person, place, and time.   Psychiatric:         Mood and Affect: Mood normal.         Behavior: Behavior normal.

## 2023-11-08 NOTE — NURSING NOTE
"Chief Complaint   Patient presents with    Diabetes       Initial /82   Pulse 61   Temp 96.9  F (36.1  C) (Tympanic)   Resp 18   Wt 119.6 kg (263 lb 9.6 oz)   SpO2 96%   BMI 41.91 kg/m   Estimated body mass index is 41.91 kg/m  as calculated from the following:    Height as of an earlier encounter on 11/8/23: 1.689 m (5' 6.5\").    Weight as of this encounter: 119.6 kg (263 lb 9.6 oz).  Medication Reconciliation: complete      "

## 2023-11-08 NOTE — PROGRESS NOTES
"  Assessment & Plan     (E11.42) Diabetic polyneuropathy associated with type 2 diabetes mellitus (H)  (primary encounter diagnosis)  Comment: diabetes control remains great. Bigger issue is symptom support for the neuropathy. Is on many meds for this and this simply might be the best that can be done medically.   Plan: Hemoglobin A1c, Lipid Panel, Chiropractic         Referral             (M54.50,  G89.29) Chronic right-sided low back pain without sciatica  Comment: see WC note for this.   Plan:               BMI:   Estimated body mass index is 41.91 kg/m  as calculated from the following:    Height as of an earlier encounter on 11/8/23: 1.689 m (5' 6.5\").    Weight as of this encounter: 119.6 kg (263 lb 9.6 oz).           Return in about 3 months (around 2/8/2024).    Sohail Harris MD  [facility]    Lui Dong is a 63 year old, presenting for the following health issues:  Diabetes      History of Present Illness       Back Pain:  He presents for follow up of back pain. Patient's back pain is a chronic problem.  Location of back pain:  Right middle of back  Description of back pain: burning  Back pain spreads: nowhere    Since patient first noticed back pain, pain is: always present, but gets better and worse  Does back pain interfere with his job:  Yes       He eats 0-1 servings of fruits and vegetables daily.He consumes 0 sweetened beverage(s) daily.He exercises with enough effort to increase his heart rate 9 or less minutes per day.  He exercises with enough effort to increase his heart rate 3 or less days per week.   He is taking medications regularly.         Diabetes Follow-up    How often are you checking your blood sugar? Not at all  What concerns do you have today about your diabetes? None   Do you have any of these symptoms? (Select all that apply)  Numbness in feet, Burning in feet, and Redness, sores, or blisters on feet      BP Readings from Last 2 Encounters:   11/08/23 " 120/82   11/08/23 120/82     Hemoglobin A1C (%)   Date Value   11/08/2023 7.0 (H)   04/12/2023 7.3 (H)   04/01/2021 9.9 (H)     LDL Cholesterol Calculated (mg/dL)   Date Value   11/08/2023 39   10/03/2022 137 (H)   06/18/2015 139 (H)             Having no chest pain or shortness of breath. Significant foot neuropathy. Has a new tens unit type device he ordered online with some help. Had an EMG once and these needles seemed to provide relief for a week or so. He feels the current meds are not really helping. Did not start ozempic due to cost.     Current Outpatient Medications   Medication    acetaminophen (TYLENOL) 500 MG tablet    atenolol (TENORMIN) 100 MG tablet    atorvastatin (LIPITOR) 40 MG tablet    blood glucose (NO BRAND SPECIFIED) test strip    blood glucose monitoring (NO BRAND SPECIFIED) meter device kit    insulin pen needle (31G X 8 MM) 31G X 8 MM miscellaneous    levothyroxine (SYNTHROID/LEVOTHROID) 50 MCG tablet    lisinopril (ZESTRIL) 20 MG tablet    metFORMIN (GLUCOPHAGE) 1000 MG tablet    naloxone (NARCAN) 4 MG/0.1ML nasal spray    nortriptyline (PAMELOR) 50 MG capsule    pregabalin (LYRICA) 100 MG capsule    Semaglutide, 1 MG/DOSE, (OZEMPIC) 4 MG/3ML pen    sertraline (ZOLOFT) 100 MG tablet    TRUEplus Lancets 28G MISC    bisacodyl (DULCOLAX) 5 MG EC tablet    [START ON 12/6/2023] oxyCODONE IR (ROXICODONE) 10 MG tablet    [START ON 1/3/2024] oxyCODONE IR (ROXICODONE) 10 MG tablet    oxyCODONE IR (ROXICODONE) 10 MG tablet    polyethylene glycol-electrolytes (NULYTELY) 420 g solution     Current Facility-Administered Medications   Medication    lidocaine (PF) (XYLOCAINE) 1 % injection 4 mL    triamcinolone (KENALOG-40) injection 40 mg       Review of Systems         Objective    /82   Pulse 61   Temp 96.9  F (36.1  C) (Tympanic)   Resp 18   Wt 119.6 kg (263 lb 9.6 oz)   SpO2 96%   BMI 41.91 kg/m    Body mass index is 41.91 kg/m .  Physical Exam   GENERAL: healthy, alert and no  distress  RESP: lungs clear to auscultation - no rales, rhonchi or wheezes  CV: regular rate and rhythm, normal S1 S2, no S3 or S4, stable 2/6 murmur, no click or rub, no peripheral edema and peripheral pulses strong  Diabetic foot exam: normal DP and PT pulses, no trophic changes or ulcerative lesions, and abnormal sensory exam    Results for orders placed or performed in visit on 11/08/23   Urine Drug Screen Panel     Status: Normal   Result Value Ref Range    Amphetamines Urine Screen Negative Screen Negative    Barbituates Urine Screen Negative Screen Negative    Benzodiazepine Urine Screen Negative Screen Negative    Cannabinoids Urine Screen Negative Screen Negative    Cocaine Urine Screen Negative Screen Negative    Fentanyl Qual Urine Screen Negative Screen Negative    Opiates Urine Screen Negative Screen Negative    PCP Urine Screen Negative Screen Negative   Hemoglobin A1c     Status: Abnormal   Result Value Ref Range    Hemoglobin A1C 7.0 (H) 4.0 - 6.2 %   Lipid Panel     Status: Normal   Result Value Ref Range    Cholesterol 121 <200 mg/dL    Triglycerides 98 <150 mg/dL    Direct Measure HDL 62 >=40 mg/dL    LDL Cholesterol Calculated 39 <=100 mg/dL    Non HDL Cholesterol 59 <130 mg/dL    Narrative    Cholesterol  Desirable:  <200 mg/dL    Triglycerides  Normal:  Less than 150 mg/dL  Borderline High:  150-199 mg/dL  High:  200-499 mg/dL  Very High:  Greater than or equal to 500 mg/dL    Direct Measure HDL  Female:  Greater than or equal to 50 mg/dL   Male:  Greater than or equal to 40 mg/dL    LDL Cholesterol  Desirable:  <100mg/dL  Above Desirable:  100-129 mg/dL   Borderline High:  130-159 mg/dL   High:  160-189 mg/dL   Very High:  >= 190 mg/dL    Non HDL Cholesterol  Desirable:  130 mg/dL  Above Desirable:  130-159 mg/dL  Borderline High:  160-189 mg/dL  High:  190-219 mg/dL  Very High:  Greater than or equal to 220 mg/dL   Urine Drug Screen     Status: Normal    Narrative    The following orders were  created for panel order Urine Drug Screen.  Procedure                               Abnormality         Status                     ---------                               -----------         ------                     Urine Drug Screen Panel[[id number]]      Normal              Final result                 Please view results for these tests on the individual orders.

## 2023-11-08 NOTE — NURSING NOTE
"Chief Complaint   Patient presents with    Recheck Medication     Oxycodone       Initial /82   Pulse 61   Temp 96.9  F (36.1  C) (Tympanic)   Resp 18   Ht 1.689 m (5' 6.5\")   Wt 119.6 kg (263 lb 9.6 oz)   SpO2 96%   BMI 41.91 kg/m   Estimated body mass index is 41.91 kg/m  as calculated from the following:    Height as of this encounter: 1.689 m (5' 6.5\").    Weight as of this encounter: 119.6 kg (263 lb 9.6 oz).  Medication Reconciliation: complete        "

## 2024-01-19 DIAGNOSIS — M54.50 CHRONIC RIGHT-SIDED LOW BACK PAIN WITHOUT SCIATICA: ICD-10-CM

## 2024-01-19 DIAGNOSIS — G89.29 CHRONIC RIGHT-SIDED LOW BACK PAIN WITHOUT SCIATICA: ICD-10-CM

## 2024-01-19 RX ORDER — OXYCODONE HYDROCHLORIDE 10 MG/1
10 TABLET ORAL EVERY 4 HOURS PRN
Qty: 115 TABLET | Refills: 0 | Status: SHIPPED | OUTPATIENT
Start: 2024-01-19 | End: 2024-02-05

## 2024-01-19 NOTE — TELEPHONE ENCOUNTER
Called and spoke to Patient after verifying last name and date of birth.   [place] neighbors have been looking and can't find them. Lost all his pills and this is the only one that he doesn't have refills for.     Sena Moss RN on 1/19/2024 at 12:03 PM

## 2024-01-19 NOTE — TELEPHONE ENCOUNTER
Reason for call: Medication or medication refill    Name of medication requested: oxycodone    How many days of medication do you have left? ZERO    What pharmacy do you use? [facility]    Preferred method for responding to this message: Telephone Call    Phone number patient can be reached at: Home number on file [phone] (home)    If we cannot reach you directly, may we leave a detailed response at the number you provided? No      Patient stated he lost his medication.            Renee Finch on 1/19/2024 at 11:47 AM

## 2024-01-19 NOTE — TELEPHONE ENCOUNTER
Fareed Arroyo sent Rx request for the following:      Requested Prescriptions   Pending Prescriptions Disp Refills    oxyCODONE IR (ROXICODONE) 10 MG tablet 115 tablet 0     Sig: Take 1 tablet (10 mg) by mouth every 4 hours as needed for severe pain       There is no refill protocol information for this order      Last Prescription Date:   01/03/24  Last Fill Qty/Refills:         180, R-0      Last Office Visit:              11/08/23   Future Office visit:           02/05/24    Routing refill request to provider for review/approval because:  Drug not on the [facility] refill protocol     [place]. [place] neighbors have been looking and can't find them.  Vaughn'd up for a partial month supply to get him to his appointment with you on 02/05/24.     Sena Moss, RN on 1/19/2024 at 12:12 PM

## 2024-02-05 ENCOUNTER — OFFICE VISIT (OUTPATIENT)
Dept: FAMILY MEDICINE | Facility: OTHER | Age: 64
End: 2024-02-05
Attending: FAMILY MEDICINE
Payer: MEDICARE

## 2024-02-05 ENCOUNTER — OFFICE VISIT (OUTPATIENT)
Dept: FAMILY MEDICINE | Facility: OTHER | Age: 64
End: 2024-02-05
Attending: FAMILY MEDICINE
Payer: OTHER MISCELLANEOUS

## 2024-02-05 ENCOUNTER — OFFICE VISIT (OUTPATIENT)
Dept: ORTHOPEDICS | Facility: OTHER | Age: 64
End: 2024-02-05
Attending: ORTHOPAEDIC SURGERY
Payer: MEDICARE

## 2024-02-05 VITALS
DIASTOLIC BLOOD PRESSURE: 76 MMHG | BODY MASS INDEX: 38.86 KG/M2 | OXYGEN SATURATION: 96 % | SYSTOLIC BLOOD PRESSURE: 122 MMHG | HEART RATE: 57 BPM | WEIGHT: 244.4 LBS | TEMPERATURE: 97.2 F | RESPIRATION RATE: 14 BRPM

## 2024-02-05 VITALS
HEART RATE: 57 BPM | RESPIRATION RATE: 14 BRPM | TEMPERATURE: 97.2 F | DIASTOLIC BLOOD PRESSURE: 76 MMHG | OXYGEN SATURATION: 96 % | SYSTOLIC BLOOD PRESSURE: 122 MMHG | WEIGHT: 244.4 LBS | BODY MASS INDEX: 38.86 KG/M2

## 2024-02-05 DIAGNOSIS — E66.01 MORBID OBESITY (H): ICD-10-CM

## 2024-02-05 DIAGNOSIS — M54.50 CHRONIC RIGHT-SIDED LOW BACK PAIN WITHOUT SCIATICA: Primary | ICD-10-CM

## 2024-02-05 DIAGNOSIS — G89.29 CHRONIC RIGHT-SIDED LOW BACK PAIN WITHOUT SCIATICA: Primary | ICD-10-CM

## 2024-02-05 DIAGNOSIS — E11.42 DIABETIC POLYNEUROPATHY ASSOCIATED WITH TYPE 2 DIABETES MELLITUS (H): Primary | ICD-10-CM

## 2024-02-05 DIAGNOSIS — M25.511 RIGHT SHOULDER PAIN, UNSPECIFIED CHRONICITY: Primary | ICD-10-CM

## 2024-02-05 DIAGNOSIS — Z12.11 COLON CANCER SCREENING: ICD-10-CM

## 2024-02-05 PROBLEM — E11.621 DIABETIC ULCER OF TOE OF LEFT FOOT ASSOCIATED WITH TYPE 2 DIABETES MELLITUS, LIMITED TO BREAKDOWN OF SKIN (H): Status: RESOLVED | Noted: 2023-04-07 | Resolved: 2024-02-05

## 2024-02-05 PROBLEM — L97.521 DIABETIC ULCER OF TOE OF LEFT FOOT ASSOCIATED WITH TYPE 2 DIABETES MELLITUS, LIMITED TO BREAKDOWN OF SKIN (H): Status: RESOLVED | Noted: 2023-04-07 | Resolved: 2024-02-05

## 2024-02-05 PROCEDURE — 20610 DRAIN/INJ JOINT/BURSA W/O US: CPT | Mod: RT | Performed by: ORTHOPAEDIC SURGERY

## 2024-02-05 PROCEDURE — 99213 OFFICE O/P EST LOW 20 MIN: CPT | Performed by: FAMILY MEDICINE

## 2024-02-05 PROCEDURE — G0463 HOSPITAL OUTPT CLINIC VISIT: HCPCS

## 2024-02-05 PROCEDURE — 250N000009 HC RX 250: Performed by: ORTHOPAEDIC SURGERY

## 2024-02-05 PROCEDURE — 250N000011 HC RX IP 250 OP 636: Performed by: ORTHOPAEDIC SURGERY

## 2024-02-05 PROCEDURE — G0463 HOSPITAL OUTPT CLINIC VISIT: HCPCS | Mod: 25

## 2024-02-05 RX ORDER — OXYCODONE HYDROCHLORIDE 10 MG/1
10 TABLET ORAL EVERY 4 HOURS PRN
Qty: 180 TABLET | Refills: 0 | Status: SHIPPED | OUTPATIENT
Start: 2024-03-04 | End: 2024-04-29

## 2024-02-05 RX ORDER — LIDOCAINE HYDROCHLORIDE 10 MG/ML
4 INJECTION, SOLUTION EPIDURAL; INFILTRATION; INTRACAUDAL; PERINEURAL ONCE
Status: COMPLETED | OUTPATIENT
Start: 2024-02-05 | End: 2024-02-05

## 2024-02-05 RX ORDER — OXYCODONE HYDROCHLORIDE 10 MG/1
10 TABLET ORAL EVERY 4 HOURS PRN
Qty: 115 TABLET | Refills: 0 | Status: SHIPPED | OUTPATIENT
Start: 2024-02-05 | End: 2024-02-21

## 2024-02-05 RX ORDER — TRIAMCINOLONE ACETONIDE 40 MG/ML
40 INJECTION, SUSPENSION INTRA-ARTICULAR; INTRAMUSCULAR ONCE
Status: COMPLETED | OUTPATIENT
Start: 2024-02-05 | End: 2024-02-05

## 2024-02-05 RX ORDER — NORTRIPTYLINE HYDROCHLORIDE 50 MG/1
50 CAPSULE ORAL AT BEDTIME
Qty: 90 CAPSULE | Refills: 4 | Status: SHIPPED | OUTPATIENT
Start: 2024-02-05

## 2024-02-05 RX ORDER — OXYCODONE HYDROCHLORIDE 10 MG/1
10 TABLET ORAL EVERY 4 HOURS PRN
Qty: 180 TABLET | Refills: 0 | Status: SHIPPED | OUTPATIENT
Start: 2024-04-01 | End: 2024-04-29

## 2024-02-05 RX ADMIN — LIDOCAINE HYDROCHLORIDE 4 ML: 10 INJECTION, SOLUTION INFILTRATION; PERINEURAL at 11:33

## 2024-02-05 RX ADMIN — TRIAMCINOLONE ACETONIDE 40 MG: 40 INJECTION, SUSPENSION INTRA-ARTICULAR; INTRAMUSCULAR at 11:34

## 2024-02-05 ASSESSMENT — PAIN SCALES - GENERAL: PAINLEVEL: NO PAIN (0)

## 2024-02-05 NOTE — PROGRESS NOTES
"  Assessment & Plan     (E11.42) Diabetic polyneuropathy associated with type 2 diabetes mellitus (H)  (primary encounter diagnosis)  Comment: great control, is too early for repeat A1c, but lab orders placed  Plan: Hemoglobin A1c, TSH, Albumin Random Urine         Quantitative with Creat Ratio             (E66.01) Morbid obesity (H)  Comment: advised ongoing weight loss (has had significant success already) to help with his diabetes control   Plan:      (Z12.11) Colon cancer screening  Comment:    Plan: Colonoscopy Screening  Referral                     BMI  Estimated body mass index is 38.86 kg/m  as calculated from the following:    Height as of 11/8/23: 1.689 m (5' 6.5\").    Weight as of this encounter: 110.9 kg (244 lb 6.4 oz).             Return in about 3 months (around 5/5/2024).    Lui Dong is a 63 year old, presenting for the following health issues:  Diabetes    HPI       Diabetes Follow-up    How often are you checking your blood sugar? Not at all  What concerns do you have today about your diabetes? None   Do you have any of these symptoms? (Select all that apply)  Numbness in feet, Burning in feet, Redness, sores, or blisters on feet, and Weight loss  Have you had a diabetic eye exam in the last 12 months? Yes- Date of last eye exam: 6 Months ago,  Location: [facility]    Has continued to lose weight. Down another 19# from last visit. Uri for 3 weeks, nearly resolved. COVID negative at home.     BP Readings from Last 2 Encounters:   02/05/24 122/76   02/05/24 122/76     Hemoglobin A1C (%)   Date Value   11/08/2023 7.0 (H)   04/12/2023 7.3 (H)   04/01/2021 9.9 (H)     LDL Cholesterol Calculated (mg/dL)   Date Value   11/08/2023 39   10/03/2022 137 (H)   06/18/2015 139 (H)         How many servings of fruits and vegetables do you eat daily?  2-3  On average, how many sweetened beverages do you drink each day (Examples: soda, juice, sweet tea, etc.  Do NOT count diet or artificially " sweetened beverages)?   0  How many days per week do you exercise enough to make your heart beat faster? 3 or less  How many minutes a day do you exercise enough to make your heart beat faster? 10 - 19  How many days per week do you miss taking your medication? 0      Current Outpatient Medications   Medication    acetaminophen (TYLENOL) 500 MG tablet    atenolol (TENORMIN) 100 MG tablet    atorvastatin (LIPITOR) 40 MG tablet    blood glucose (NO BRAND SPECIFIED) test strip    blood glucose monitoring (NO BRAND SPECIFIED) meter device kit    insulin pen needle (31G X 8 MM) 31G X 8 MM miscellaneous    levothyroxine (SYNTHROID/LEVOTHROID) 50 MCG tablet    lisinopril (ZESTRIL) 20 MG tablet    metFORMIN (GLUCOPHAGE) 1000 MG tablet    naloxone (NARCAN) 4 MG/0.1ML nasal spray    nortriptyline (PAMELOR) 50 MG capsule    pregabalin (LYRICA) 100 MG capsule    Semaglutide, 1 MG/DOSE, (OZEMPIC) 4 MG/3ML pen    sertraline (ZOLOFT) 100 MG tablet    TRUEplus Lancets 28G MISC    bisacodyl (DULCOLAX) 5 MG EC tablet    [START ON 3/4/2024] oxyCODONE IR (ROXICODONE) 10 MG tablet    [START ON 4/1/2024] oxyCODONE IR (ROXICODONE) 10 MG tablet    oxyCODONE IR (ROXICODONE) 10 MG tablet    polyethylene glycol-electrolytes (NULYTELY) 420 g solution     Current Facility-Administered Medications   Medication    lidocaine (PF) (XYLOCAINE) 1 % injection 4 mL    triamcinolone (KENALOG-40) injection 40 mg             Objective    /76   Pulse 57   Temp 97.2  F (36.2  C) (Tympanic)   Resp 14   Wt 110.9 kg (244 lb 6.4 oz)   SpO2 96%   BMI 38.86 kg/m    Body mass index is 38.86 kg/m .  Physical Exam  Constitutional:       Appearance: Normal appearance.   Cardiovascular:      Rate and Rhythm: Normal rate and regular rhythm.   Pulmonary:      Effort: Pulmonary effort is normal. No respiratory distress.      Breath sounds: No stridor. Rhonchi present.      Comments: Bilateral coarse rhonchi with great air movement.   Skin:     Comments: Feet  without any current ulcers. Has pre ulcerative callous on left 1st toe.    Neurological:      General: No focal deficit present.      Mental Status: He is alert and oriented to person, place, and time.   Psychiatric:         Mood and Affect: Mood normal.         Behavior: Behavior normal.            No results found for any visits on 02/05/24.        Signed Electronically by: Sohail Harris MD

## 2024-02-05 NOTE — PROGRESS NOTES
Subjective:    63-year-old gentleman with right rotator cuff tear arthropathy.  He has done very well receiving the last injection a little over a year ago actually.  He has been getting along quite well despite having a large rotator cuff tear and rotator cuff tear arthropathy    Objective:    On examination his shoulder appears benign.  He has significant weakness with supraspinatus and infraspinatus testing.  Otherwise neurovascularly intact    Assessment:    63-year-old gentleman with rotator cuff tear arthropathy of the right shoulder    Plan:    Given an injection into the right subacromial space today.  He tolerated well and had good relief of his pain.  Will see him back on an as-needed basis.    A steroid injection was performed at right subacromial space using 1% plain Lidocaine and 40 mg of Kenalog. This was well tolerated.

## 2024-02-05 NOTE — NURSING NOTE
"Chief Complaint   Patient presents with    Work Comp     Oxycodone       Initial /76   Pulse 57   Temp 97.2  F (36.2  C) (Tympanic)   Resp 14   Wt 110.9 kg (244 lb 6.4 oz)   SpO2 96%   BMI 38.86 kg/m   Estimated body mass index is 38.86 kg/m  as calculated from the following:    Height as of 11/8/23: 1.689 m (5' 6.5\").    Weight as of this encounter: 110.9 kg (244 lb 6.4 oz).  Medication Reconciliation: complete        "

## 2024-02-05 NOTE — NURSING NOTE
"Chief Complaint   Patient presents with    Diabetes       Initial /76   Pulse 57   Temp 97.2  F (36.2  C) (Tympanic)   Resp 14   Wt 110.9 kg (244 lb 6.4 oz)   SpO2 96%   BMI 38.86 kg/m   Estimated body mass index is 38.86 kg/m  as calculated from the following:    Height as of 11/8/23: 1.689 m (5' 6.5\").    Weight as of this encounter: 110.9 kg (244 lb 6.4 oz).  Medication Reconciliation: complete          "

## 2024-02-05 NOTE — PROGRESS NOTES
"  Assessment & Plan     (M54.50,  G89.29) Chronic right-sided low back pain without sciatica  (primary encounter diagnosis)  Comment: exam remains stable.  reviewed and also is stable. He is not yet ready to taper further. Refilled below for 3 months without changes   Plan: oxyCODONE IR (ROXICODONE) 10 MG tablet,         oxyCODONE IR (ROXICODONE) 10 MG tablet,         oxyCODONE IR (ROXICODONE) 10 MG tablet,         nortriptyline (PAMELOR) 50 MG capsule                     BMI  Estimated body mass index is 38.86 kg/m  as calculated from the following:    Height as of 11/8/23: 1.689 m (5' 6.5\").    Weight as of this encounter: 110.9 kg (244 lb 6.4 oz).             Return in about 3 months (around 5/5/2024).    Lui Dong is a 63 year old, presenting for the following health issues:  Work Comp (Oxycodone)      2/5/2024    10:44 AM   Additional Questions   Roomed by LUCAS Kay   Accompanied by Self         2/5/2024    10:44 AM   Patient Reported Additional Medications   Patient reports taking the following new medications N/A     HPI       Pain History:  When did you first notice your pain? Chronic   Have you seen this provider for your pain in the past? Yes   Where in your body do you have pain? Low back  Are you seeing anyone else for your pain? No        1/12/2023    11:03 AM 4/7/2023     2:13 PM 8/16/2023     9:53 AM   PHQ-9 SCORE   PHQ-9 Total Score MyChart 6 (Mild depression) 9 (Mild depression) 7 (Mild depression)   PHQ-9 Total Score 6 9 7           4/7/2023     2:10 PM 5/19/2023     9:16 AM 8/16/2023     9:49 AM   VISHNU-7 SCORE   Total Score 9 (mild anxiety) 6 (mild anxiety) 8 (mild anxiety)   Total Score 9 6 8               12/1/2021     9:15 AM 11/8/2023     9:51 AM 2/5/2024    10:50 AM   PEG Score   PEG Total Score 6 3 5       Chronic Pain Follow Up:    Location of pain: back  Analgesia/pain control:    - Recent changes:  no    - Overall control: Tolerable with discomfort    - Current treatments: " oxycodone, lyrica, weight loss, topical Max Freeze  Adherence:     - Do you ever take more pain medicine than prescribed? No    - When did you take your last dose of pain medicine?  today   Adverse effects: No   PDMP Review         Value Time User    State PDMP site checked  Yes 1/19/2024 12:52 PM Sohail Harris MD          Last CSA Agreement:   CSA -- Patient Level:     [Media Unavailable] Controlled Substance Agreement - Opioid - Scan on 5/19/2023  2:02 PM   [Media Unavailable] Controlled Substance Agreement - Opioid - Scan on 5/27/2022 11:03 AM: Opioid   [Media Unavailable] Controlled Substance Agreement - Opioid - Scan on 5/6/2021 10:06 AM: CONTROLLED SUBSTANCE AGREEMENT OPIOID       Last UDS: 9/1/2022                        Objective    /76   Pulse 57   Temp 97.2  F (36.2  C) (Tympanic)   Resp 14   Wt 110.9 kg (244 lb 6.4 oz)   SpO2 96%   BMI 38.86 kg/m    Body mass index is 38.86 kg/m .  Physical Exam  Constitutional:       Appearance: Normal appearance.   Musculoskeletal:      Comments: Walks without limping. Able to stand without assistance. Negative straight leg raise bilateral    Neurological:      General: No focal deficit present.      Mental Status: He is alert and oriented to person, place, and time.                    Signed Electronically by: Sohail Harris MD

## 2024-02-09 ENCOUNTER — TELEPHONE (OUTPATIENT)
Dept: SURGERY | Facility: OTHER | Age: 64
End: 2024-02-09

## 2024-02-09 NOTE — TELEPHONE ENCOUNTER
Screening Questions for the Scheduling of Screening Colonoscopies   (If Colonoscopy is diagnostic, Provider should review the chart before scheduling.)  Are you younger than 45 or older than 80?  NO  Do you take aspirin or fish oil?  NO (if yes, tell patient to stop 1 week prior to Colonoscopy)  Do you take warfarin (Coumadin), clopidogrel (Plavix), apixaban (Eliquis), dabigatram (Pradaxa), rivaroxaban (Xarelto) or any blood thinner? NO  Do you take Ozempic? YES  Do you use oxygen or a CPAP at home?  NO  Do you have kidney disease? NO  Are you on dialysis? NO  Have you had a stroke or heart attack in the last year? NO  Have you had a stent in your heart or any blood vessel in the last year? NO  Have you had a transplant of any organ? NO  Have you had a colonoscopy or upper endoscopy (EGD) before? YES         When?  2003  Date of scheduled Colonoscopy. 04/04/2024  Provider KUMAR  [facility] - PATIENT STATES HE STILL HAS THE DRINK PREP FROM HIS PREVIOUSLY SCHEDULED PROCEDURE

## 2024-02-21 ENCOUNTER — TELEPHONE (OUTPATIENT)
Dept: FAMILY MEDICINE | Facility: OTHER | Age: 64
End: 2024-02-21
Payer: COMMERCIAL

## 2024-02-21 DIAGNOSIS — M54.50 CHRONIC RIGHT-SIDED LOW BACK PAIN WITHOUT SCIATICA: ICD-10-CM

## 2024-02-21 DIAGNOSIS — G89.29 CHRONIC RIGHT-SIDED LOW BACK PAIN WITHOUT SCIATICA: ICD-10-CM

## 2024-02-21 RX ORDER — OXYCODONE HYDROCHLORIDE 10 MG/1
10 TABLET ORAL EVERY 4 HOURS PRN
Qty: 65 TABLET | Refills: 0 | Status: SHIPPED | OUTPATIENT
Start: 2024-02-21 | End: 2024-04-29

## 2024-02-21 NOTE — TELEPHONE ENCOUNTER
Patient reports not having enough oxycodone until his next appointment. He states the prescription that was written on 02/05/2024 was written 115 tablet when he normally gets 180. Patient will not have enough before next appointment on 04/29/2024. Please advise.  Eliza Chávez LPN

## 2024-02-26 NOTE — PROGRESS NOTES
SUBJECTIVE:   Iker Young is a 58 year old male who presents to clinic today for the following health issues:    HPI  Follow up on work injury, chronic back pains.  Had a tox screen 3 months ago, was as expected.  Pain is a little better, at 4/10.  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  Using Fast Freeze on his back as well, really helps.      Discussed with him a further taper to get under 90 MME.  He wants to cut back on the tramadol, feels the oxycodone is most helpful.    Patient Active Problem List    Diagnosis Date Noted     Back pain, chronic 02/07/2018     Priority: Medium     Degeneration of cervical intervertebral disc 02/07/2018     Priority: Medium     Grief reaction with prolonged bereavement 02/15/2017     Priority: Medium     Chest pain 12/12/2016     Priority: Medium     Aortic valve sclerosis 12/08/2016     Priority: Medium     HTN (hypertension) 06/18/2015     Priority: Medium     Knee pain, chronic 06/18/2015     Priority: Medium     Primary osteoarthritis of right knee 06/18/2015     Priority: Medium     Pain medication agreement 02/06/2014     Priority: Medium     Controlled substance agreement signed 07/02/2013     Priority: Medium     Allergic rhinitis 06/14/2011     Priority: Medium     HYPERLIPIDEMIA LDL GOAL <130 10/31/2010     Priority: Medium     PAIN CHRONIC( NEC) 12/04/2007     Priority: Medium     Obesity 04/24/2007     Priority: Medium     Problem list name updated by automated process. Provider to review       Essential hypertension      Priority: Medium     Problem list name updated by automated process. Provider to review       Hyperlipidemia      Priority: Medium     Problem list name updated by automated process. Provider to review       Past Surgical History:   Procedure Laterality Date     COLONOSCOPY      No Comments Provided     FUSION LUMBAR ANTERIOR ONE LEVEL      2004,Back surgery x 5 (fusion, hardware removal, stimulator  patient to take to ER.   and removal)     OTHER SURGICAL HISTORY      2005,205736,PERIPHERAL NERVE STIMULATOR,Lumbar nerve stimulator and subsequent removal     OTHER SURGICAL HISTORY      2005,205448,REMOVAL OF FOREIGN BODY, subsequent removal     OTHER SURGICAL HISTORY      208566,INCISION AND DRAINAGE,from infection from nerve stimulator     OTHER SURGICAL HISTORY      04/2009,207388,SCAN-MRI INTERPRETATION,MRI cervical spine.     Social History     Tobacco Use     Smoking status: Never Smoker     Smokeless tobacco: Never Used   Substance Use Topics     Alcohol use: No     Current Outpatient Medications   Medication Sig Dispense Refill     oxyCODONE IR (ROXICODONE) 10 MG tablet Take 1 tablet (10 mg) by mouth every 4 hours as needed for moderate to severe pain Fill on/after 4/13/19 180 tablet 0     traMADol (ULTRAM) 50 MG tablet Take 1 tablet (50 mg) by mouth every 6 hours as needed for severe pain 120 tablet 5     acetaminophen (TYLENOL) 325 MG tablet Take by mouth every 4 hours as needed       amitriptyline (ELAVIL) 50 MG tablet Take 2 tablets (100 mg) by mouth At Bedtime 60 tablet 11     atenolol (TENORMIN) 100 MG tablet Take 1 tablet (100 mg) by mouth daily 90 tablet 3     gabapentin (NEURONTIN) 300 MG capsule Take 1 capsule (300 mg) by mouth 2 times daily 180 capsule 3     ibuprofen (ADVIL/MOTRIN) 600 MG tablet Take 1 tablet (600 mg) by mouth every 6 hours as needed for moderate pain 120 tablet 3     sertraline (ZOLOFT) 100 MG tablet Take 1 tablet (100 mg) by mouth daily 90 tablet 3       Review of Systems     OBJECTIVE:     /74 (BP Location: Right arm, Patient Position: Sitting, Cuff Size: Adult Large)   Pulse 58   Temp 98  F (36.7  C) (Tympanic)   Resp 18   Wt 128.4 kg (283 lb)   BMI 41.79 kg/m    Body mass index is 41.79 kg/m .  Physical Exam   Constitutional: He is oriented to person, place, and time. He appears well-developed and well-nourished. No distress.   Musculoskeletal:   No lumbar pain on palpation, negative  straight leg raise.  Lower extremity strength is normal.   Neurological: He is alert and oriented to person, place, and time.   Skin: Skin is warm and dry. He is not diaphoretic.       Diagnostic Test Results:  none     ASSESSMENT/PLAN:         (M54.5,  G89.29) Chronic right-sided low back pain without sciatica  Comment: no significant change  Plan: oxyCODONE IR (ROXICODONE) 10 MG tablet,         DISCONTINUED: oxyCODONE IR (ROXICODONE) 10 MG         tablet, DISCONTINUED: oxyCODONE IR (ROXICODONE)        10 MG tablet         He agreed to taper further.  We dropped the tramadol down from 8 daily to 4 daily.  This is still not at 90 MME daily, but is under the 120 MME daily per our clinic policy.    (M50.30) Degeneration of cervical intervertebral disc  Comment:    Plan: traMADol (ULTRAM) 50 MG tablet              reviewed and stable.    Sohail Harris MD  [facility]

## 2024-03-08 DIAGNOSIS — E11.42 DIABETIC POLYNEUROPATHY ASSOCIATED WITH TYPE 2 DIABETES MELLITUS (H): ICD-10-CM

## 2024-03-08 RX ORDER — PREGABALIN 100 MG/1
100 CAPSULE ORAL 3 TIMES DAILY
Qty: 270 CAPSULE | Refills: 0 | Status: ON HOLD | OUTPATIENT
Start: 2024-03-08 | End: 2024-04-04

## 2024-03-08 NOTE — TELEPHONE ENCOUNTER
[facility] sent Rx request for the following:      Requested Prescriptions   Pending Prescriptions Disp Refills    pregabalin (LYRICA) 100 MG capsule [Pharmacy Med Name: PREGABALIN 100MG CAPSULE] 270 capsule 0     Sig: TAKE 1 CAPSULE (100 MG) BY MOUTH 3 TIMES DAILY       There is no refill protocol information for this order          Last Prescription Date:   10/17/23  Last Fill Qty/Refills:         270, R-0    Last Office Visit:              2/5/24   Future Office visit:           4/29/24    Lynn Francis RN on 3/8/2024 at 9:56 AM

## 2024-03-18 ENCOUNTER — NURSE TRIAGE (OUTPATIENT)
Dept: FAMILY MEDICINE | Facility: OTHER | Age: 64
End: 2024-03-18
Payer: COMMERCIAL

## 2024-03-18 NOTE — TELEPHONE ENCOUNTER
S-(situation): patient calling about low blood pressure    B-(background): patient has history of high blood pressure and diabetes.   states that he just started checking his blood pressure again yesterday.  States took his blood pressure machine out of closet after not using it for years.  States one of the batteries was corroded and he cleaned it out good and put new ones in.  States has been feeling blurry, dizzy with standing at times.  States has been to the eye doctor and got new glasses about a week ago and was told he might need new ones soon after due to his diabetes.  Patient states he has really been watching his diet and has lost 30 lbs in the last 3 months.    A-(assessment): patient states BP yesterday was 87/58, states he was feeling dizzy.  Patient states he is feeling fine this morning.  States this morning   0730 BP: 100/64  0915 BP: 107/64  0950 BP: 120/73    States today he did not take his Atenolol this morning and wondering if he should hold medication again tomorrow.    R-(recommendations): informed patient he should be seen.  Patient is scheduled with Ana Stiles tomorrow at 1245.  Informed patient will route message for Lino review and if any other suggestion will let patient know  Cass Augustin RN on 3/18/2024 at 10:09 AM      Reason for Disposition   Brief dizziness or lightheadedness after standing up or eating   Fall in systolic BP > 20 mm Hg from normal and NOT dizzy, lightheaded, or weak    Additional Information   Negative: Systolic BP < 90 and feeling dizzy, lightheaded, or weak   Negative: Started suddenly after an allergic medicine, an allergic food, or bee sting   Negative: Shock suspected (e.g., cold/pale/clammy skin, too weak to stand, low BP, rapid pulse)   Negative: Difficult to awaken or acting confused (e.g., disoriented, slurred speech)   Negative: Fainted   Negative: Chest pain   Negative: Bleeding (e.g., vomiting blood, rectal bleeding or tarry stools, severe  vaginal bleeding)   Negative: Extra heartbeats, irregular heart beating, or heart is beating very fast (i.e., 'palpitations')   Negative: Sounds like a life-threatening emergency to the triager   Negative: Systolic BP < 80 and NOT dizzy, lightheaded or weak (feels normal)   Negative: Abdominal pain   Negative: Major surgery in the past month   Negative: Fever > 100.4 F (38.0 C)   Negative: Drinking very little and dehydration suspected (e.g., no urine > 12 hours, very dry mouth, very lightheaded)   Negative: Fall in systolic BP > 20 mm Hg from normal and feeling dizzy, lightheaded, or weak   Negative: Patient sounds very sick or weak to the triager   Negative: Systolic BP < 90 and NOT dizzy, lightheaded or weak   Negative: Systolic BP  while taking blood pressure medications and NOT dizzy, lightheaded or weak   Negative: Fall in systolic BP > 20 mmHg after standing up   Negative: Fall in systolic BP > 20 mmHg after eating a meal   Negative: Diastolic BP < 50 mm Hg    Protocols used: Blood Pressure - Low-A-OH

## 2024-03-18 NOTE — TELEPHONE ENCOUNTER
Agree with recommendation to be seen, if feeling worse before tomorrow's appointment recommend ER or rapid clinic.     Ana Stiles PA-C  3/18/2024  10:24 AM

## 2024-03-18 NOTE — TELEPHONE ENCOUNTER
Called and informed patient of Aan Stiles's response and patient verbalized understanding.  Cass Augustin RN on 3/18/2024 at 10:39 AM

## 2024-03-19 ENCOUNTER — OFFICE VISIT (OUTPATIENT)
Dept: FAMILY MEDICINE | Facility: OTHER | Age: 64
End: 2024-03-19
Attending: PHYSICIAN ASSISTANT
Payer: MEDICARE

## 2024-03-19 VITALS
HEART RATE: 48 BPM | RESPIRATION RATE: 18 BRPM | WEIGHT: 235 LBS | OXYGEN SATURATION: 93 % | SYSTOLIC BLOOD PRESSURE: 106 MMHG | BODY MASS INDEX: 37.36 KG/M2 | TEMPERATURE: 96.3 F | DIASTOLIC BLOOD PRESSURE: 68 MMHG

## 2024-03-19 DIAGNOSIS — I95.1 ORTHOSTATIC HYPOTENSION: ICD-10-CM

## 2024-03-19 DIAGNOSIS — E87.5 HYPERKALEMIA: ICD-10-CM

## 2024-03-19 DIAGNOSIS — E03.9 HYPOTHYROIDISM, UNSPECIFIED TYPE: ICD-10-CM

## 2024-03-19 DIAGNOSIS — E11.42 DIABETIC POLYNEUROPATHY ASSOCIATED WITH TYPE 2 DIABETES MELLITUS (H): Primary | ICD-10-CM

## 2024-03-19 DIAGNOSIS — R42 VERTIGO: ICD-10-CM

## 2024-03-19 DIAGNOSIS — D64.9 LOW HEMOGLOBIN: ICD-10-CM

## 2024-03-19 DIAGNOSIS — E66.01 MORBID OBESITY (H): ICD-10-CM

## 2024-03-19 LAB
ALBUMIN SERPL BCG-MCNC: 4.8 G/DL (ref 3.5–5.2)
ALBUMIN UR-MCNC: 70 MG/DL
ALP SERPL-CCNC: 106 U/L (ref 40–150)
ALT SERPL W P-5'-P-CCNC: 30 U/L (ref 0–70)
ANION GAP SERPL CALCULATED.3IONS-SCNC: 12 MMOL/L (ref 7–15)
APPEARANCE UR: CLEAR
AST SERPL W P-5'-P-CCNC: 26 U/L (ref 0–45)
ATRIAL RATE - MUSE: 65 BPM
BACTERIA #/AREA URNS HPF: ABNORMAL /HPF
BASOPHILS # BLD AUTO: 0.1 10E3/UL (ref 0–0.2)
BASOPHILS NFR BLD AUTO: 1 %
BILIRUB SERPL-MCNC: 0.4 MG/DL
BILIRUB UR QL STRIP: NEGATIVE
BUN SERPL-MCNC: 19.9 MG/DL (ref 8–23)
CALCIUM SERPL-MCNC: 10 MG/DL (ref 8.8–10.2)
CHLORIDE SERPL-SCNC: 103 MMOL/L (ref 98–107)
COLOR UR AUTO: YELLOW
CREAT SERPL-MCNC: 1.1 MG/DL (ref 0.67–1.17)
CREAT UR-MCNC: 214.7 MG/DL
DEPRECATED HCO3 PLAS-SCNC: 22 MMOL/L (ref 22–29)
DIASTOLIC BLOOD PRESSURE - MUSE: NORMAL MMHG
EGFRCR SERPLBLD CKD-EPI 2021: 74 ML/MIN/1.73M2
EOSINOPHIL # BLD AUTO: 0.2 10E3/UL (ref 0–0.7)
EOSINOPHIL NFR BLD AUTO: 2 %
ERYTHROCYTE [DISTWIDTH] IN BLOOD BY AUTOMATED COUNT: 14.2 % (ref 10–15)
FERRITIN SERPL-MCNC: 195 NG/ML (ref 31–409)
GLUCOSE SERPL-MCNC: 99 MG/DL (ref 70–99)
GLUCOSE UR STRIP-MCNC: NEGATIVE MG/DL
HBA1C MFR BLD: 5.9 % (ref 4–6.2)
HCT VFR BLD AUTO: 38.4 % (ref 40–53)
HGB BLD-MCNC: 12.4 G/DL (ref 13.3–17.7)
HGB UR QL STRIP: NEGATIVE
HYALINE CASTS: 215 /LPF
IMM GRANULOCYTES # BLD: 0 10E3/UL
IMM GRANULOCYTES NFR BLD: 0 %
INTERPRETATION ECG - MUSE: NORMAL
IRON BINDING CAPACITY (ROCHE): 241 UG/DL (ref 240–430)
IRON SATN MFR SERPL: 27 % (ref 15–46)
IRON SERPL-MCNC: 66 UG/DL (ref 61–157)
KETONES UR STRIP-MCNC: NEGATIVE MG/DL
LEUKOCYTE ESTERASE UR QL STRIP: NEGATIVE
LYMPHOCYTES # BLD AUTO: 2.4 10E3/UL (ref 0.8–5.3)
LYMPHOCYTES NFR BLD AUTO: 23 %
MCH RBC QN AUTO: 30.2 PG (ref 26.5–33)
MCHC RBC AUTO-ENTMCNC: 32.3 G/DL (ref 31.5–36.5)
MCV RBC AUTO: 93 FL (ref 78–100)
MICROALBUMIN UR-MCNC: 32.7 MG/L
MICROALBUMIN/CREAT UR: 15.23 MG/G CR (ref 0–17)
MONOCYTES # BLD AUTO: 0.9 10E3/UL (ref 0–1.3)
MONOCYTES NFR BLD AUTO: 9 %
MUCOUS THREADS #/AREA URNS LPF: PRESENT /LPF
NEUTROPHILS # BLD AUTO: 6.8 10E3/UL (ref 1.6–8.3)
NEUTROPHILS NFR BLD AUTO: 65 %
NITRATE UR QL: NEGATIVE
NRBC # BLD AUTO: 0 10E3/UL
NRBC BLD AUTO-RTO: 0 /100
P AXIS - MUSE: 60 DEGREES
PH UR STRIP: 5.5 [PH] (ref 5–9)
PLATELET # BLD AUTO: 223 10E3/UL (ref 150–450)
POTASSIUM SERPL-SCNC: 5.6 MMOL/L (ref 3.4–5.3)
PR INTERVAL - MUSE: 210 MS
PROT SERPL-MCNC: 8.2 G/DL (ref 6.4–8.3)
QRS DURATION - MUSE: 102 MS
QT - MUSE: 378 MS
QTC - MUSE: 393 MS
R AXIS - MUSE: 96 DEGREES
RBC # BLD AUTO: 4.11 10E6/UL (ref 4.4–5.9)
RBC URINE: 43 /HPF
SODIUM SERPL-SCNC: 137 MMOL/L (ref 135–145)
SP GR UR STRIP: 1.02 (ref 1–1.03)
SYSTOLIC BLOOD PRESSURE - MUSE: NORMAL MMHG
T AXIS - MUSE: 67 DEGREES
TSH SERPL DL<=0.005 MIU/L-ACNC: 0.32 UIU/ML (ref 0.3–4.2)
UROBILINOGEN UR STRIP-MCNC: NORMAL MG/DL
VENTRICULAR RATE- MUSE: 65 BPM
WBC # BLD AUTO: 10.4 10E3/UL (ref 4–11)
WBC URINE: 29 /HPF

## 2024-03-19 PROCEDURE — 82728 ASSAY OF FERRITIN: CPT | Mod: ZL | Performed by: PHYSICIAN ASSISTANT

## 2024-03-19 PROCEDURE — 93010 ELECTROCARDIOGRAM REPORT: CPT | Performed by: INTERNAL MEDICINE

## 2024-03-19 PROCEDURE — 99214 OFFICE O/P EST MOD 30 MIN: CPT | Performed by: PHYSICIAN ASSISTANT

## 2024-03-19 PROCEDURE — 93005 ELECTROCARDIOGRAM TRACING: CPT | Performed by: PHYSICIAN ASSISTANT

## 2024-03-19 PROCEDURE — 81001 URINALYSIS AUTO W/SCOPE: CPT | Mod: ZL | Performed by: PHYSICIAN ASSISTANT

## 2024-03-19 PROCEDURE — 83540 ASSAY OF IRON: CPT | Mod: ZL | Performed by: PHYSICIAN ASSISTANT

## 2024-03-19 PROCEDURE — 82040 ASSAY OF SERUM ALBUMIN: CPT | Mod: ZL | Performed by: PHYSICIAN ASSISTANT

## 2024-03-19 PROCEDURE — 82043 UR ALBUMIN QUANTITATIVE: CPT | Mod: ZL | Performed by: PHYSICIAN ASSISTANT

## 2024-03-19 PROCEDURE — 87086 URINE CULTURE/COLONY COUNT: CPT | Mod: ZL | Performed by: PHYSICIAN ASSISTANT

## 2024-03-19 PROCEDURE — 36415 COLL VENOUS BLD VENIPUNCTURE: CPT | Mod: ZL | Performed by: PHYSICIAN ASSISTANT

## 2024-03-19 PROCEDURE — 84443 ASSAY THYROID STIM HORMONE: CPT | Mod: ZL | Performed by: PHYSICIAN ASSISTANT

## 2024-03-19 PROCEDURE — 83036 HEMOGLOBIN GLYCOSYLATED A1C: CPT | Mod: ZL | Performed by: PHYSICIAN ASSISTANT

## 2024-03-19 PROCEDURE — 83550 IRON BINDING TEST: CPT | Mod: ZL | Performed by: PHYSICIAN ASSISTANT

## 2024-03-19 PROCEDURE — G0463 HOSPITAL OUTPT CLINIC VISIT: HCPCS | Performed by: PHYSICIAN ASSISTANT

## 2024-03-19 PROCEDURE — 85048 AUTOMATED LEUKOCYTE COUNT: CPT | Mod: ZL | Performed by: PHYSICIAN ASSISTANT

## 2024-03-19 ASSESSMENT — ANXIETY QUESTIONNAIRES
5. BEING SO RESTLESS THAT IT IS HARD TO SIT STILL: NOT AT ALL
1. FEELING NERVOUS, ANXIOUS, OR ON EDGE: NOT AT ALL
6. BECOMING EASILY ANNOYED OR IRRITABLE: SEVERAL DAYS
IF YOU CHECKED OFF ANY PROBLEMS ON THIS QUESTIONNAIRE, HOW DIFFICULT HAVE THESE PROBLEMS MADE IT FOR YOU TO DO YOUR WORK, TAKE CARE OF THINGS AT HOME, OR GET ALONG WITH OTHER PEOPLE: NOT DIFFICULT AT ALL
8. IF YOU CHECKED OFF ANY PROBLEMS, HOW DIFFICULT HAVE THESE MADE IT FOR YOU TO DO YOUR WORK, TAKE CARE OF THINGS AT HOME, OR GET ALONG WITH OTHER PEOPLE?: NOT DIFFICULT AT ALL
GAD7 TOTAL SCORE: 2
3. WORRYING TOO MUCH ABOUT DIFFERENT THINGS: SEVERAL DAYS
7. FEELING AFRAID AS IF SOMETHING AWFUL MIGHT HAPPEN: NOT AT ALL
GAD7 TOTAL SCORE: 2
2. NOT BEING ABLE TO STOP OR CONTROL WORRYING: NOT AT ALL
7. FEELING AFRAID AS IF SOMETHING AWFUL MIGHT HAPPEN: NOT AT ALL
4. TROUBLE RELAXING: NOT AT ALL
GAD7 TOTAL SCORE: 2

## 2024-03-19 ASSESSMENT — PAIN SCALES - GENERAL: PAINLEVEL: NO PAIN (0)

## 2024-03-19 ASSESSMENT — PATIENT HEALTH QUESTIONNAIRE - PHQ9
10. IF YOU CHECKED OFF ANY PROBLEMS, HOW DIFFICULT HAVE THESE PROBLEMS MADE IT FOR YOU TO DO YOUR WORK, TAKE CARE OF THINGS AT HOME, OR GET ALONG WITH OTHER PEOPLE: NOT DIFFICULT AT ALL
SUM OF ALL RESPONSES TO PHQ QUESTIONS 1-9: 4
SUM OF ALL RESPONSES TO PHQ QUESTIONS 1-9: 4

## 2024-03-19 NOTE — NURSING NOTE
"Patient presents to the clinic for hypotension concerns.    FOOD SECURITY SCREENING QUESTIONS:    The next two questions are to help us understand your food security.  If you are feeling you need any assistance in this area, we have resources available to support you today.    Hunger Vital Signs:  Within the past 12 months we worried whether our food would run out before we got money to buy more. Never  Within the past 12 months the food we bought just didn't last and we didn't have money to get more. Never    Advance Care Directive on file? no  Advance Care Directive provided to patient? Declined.      Chief Complaint   Patient presents with    Hypotension       Initial /80 (BP Location: Right arm, Patient Position: Sitting, Cuff Size: Adult Large)   Pulse 76   Temp (!) 96.3  F (35.7  C) (Tympanic)   Resp 18   Wt 106.6 kg (235 lb)   SpO2 93%   BMI 37.36 kg/m   Estimated body mass index is 37.36 kg/m  as calculated from the following:    Height as of 11/8/23: 1.689 m (5' 6.5\").    Weight as of this encounter: 106.6 kg (235 lb).  Medication Reconciliation: complete        Odessa Bernardo LPN     "

## 2024-03-19 NOTE — H&P (VIEW-ONLY)
Assessment & Plan       ICD-10-CM    1. Diabetic polyneuropathy associated with type 2 diabetes mellitus (H)  E11.42 CBC and Differential     Comprehensive Metabolic Panel     EKG 12-lead, tracing only (Same Day)     Orthostatic blood pressure and pulse     UA Macroscopic with reflex to Microscopic and Culture     Hemoglobin A1c     Albumin Random Urine Quantitative with Creat Ratio     CBC and Differential     Comprehensive Metabolic Panel     UA Macroscopic with reflex to Microscopic and Culture     Hemoglobin A1c     Albumin Random Urine Quantitative with Creat Ratio     Urine Culture      2. Hypothyroidism, unspecified type  E03.9 TSH Reflex GH     TSH Reflex GH      3. Vertigo  R42 EKG 12-lead, tracing only (Same Day)      4. Morbid obesity (H)  E66.01       5. Low hemoglobin  D64.9 Ferritin     Iron & Iron Binding Capacity      6. Orthostatic hypotension  I95.1       7. Hyperkalemia  E87.5         Type 2 diabetes, demonstrating excellent control, A1c returning at 5.9% today, previously 7.0% in November 2023.  Congratulated patient on hard work.  Lipid panel stable in November 2023.  Continue current medication regimen.  Up-to-date on prescription refills, recommend formal eye exam, patient will call to schedule at his convenience, up-to-date on diabetic foot exam.  Hypothyroidism, stable, TSH returning at 0.32 today.  Continue current dose of levothyroxine/Synthroid 50 mcg daily.  Recommend to take first thing in the morning on empty stomach, 30 to 60 minutes prior to eating or taking other medications.  Vertigo, unclear etiology, likely related to blood pressure.  EKG with sinus rhythm with first-degree AV block and rightward axis, this is nearly identical to EKG from 7/8/2021 from [facility] demonstrating first-degree AV block with PACs.  Morbid obesity, continues to work hard on weight loss, congratulated on progress, has lost approximately 30 pounds.  Continue with heart healthy diet including increased  "lean proteins, fruits and vegetables.  Goal exercise 150 minutes of moderate exercise per week.  Chronic waxing and waning hemoglobin levels, returning at 12.4 today and hematocrit 38.4.  I added on ferritin and iron binding studies to assure patient was not anemic.  These both returned within normal balance.  Recommend to proceed with colonoscopy on 4/4/2024 for additional evaluation.  Blood pressure is positive for orthostatic hypotension.  Discussed with patient that weight loss can cause blood pressures to lower/normalize.  I would recommend he hold his atenolol x 1 week, continue to check blood pressures at home, return to the clinic in 1 week for reevaluation.  Recommend he discontinue his lisinopril as recommended by [facility] in 2021.  Lisinopril is likely the etiology of his hyperkalemia which is new onset.  Hyperkalemia, attributed to recent restart of lisinopril 20 mg daily.  Recommend cessation.  Recommend to keep an eye on potassium rich foods including  spinach, beans, yogurt, avocados, bananas and potatoes, etc., as this may cause further elevation.    BMI  Estimated body mass index is 37.36 kg/m  as calculated from the following:    Height as of 11/8/23: 1.689 m (5' 6.5\").    Weight as of this encounter: 106.6 kg (235 lb).   Weight management plan: Discussed healthy diet and exercise guidelines    See Patient Instructions    Return in 1 week (on 3/26/2024) for BP Recheck.     Lui Dong is a 63 year old, presenting for the following health issues:  Hypotension        3/19/2024     1:01 PM   Additional Questions   Roomed by gia calderón lpn     History of Present Illness       Diabetes:   He presents for follow up of diabetes.    He is not checking blood glucose.         He has no concerns regarding his diabetes at this time.  He is having redness, sores, or blisters on feet and blurry vision.            Hypertension: He presents for follow up of hypertension.  He does check blood pressure  regularly " outside of the clinic. Outpatient blood pressures have not been over 140/90. He follows a low salt diet.     He eats 2-3 servings of fruits and vegetables daily.He consumes 0 sweetened beverage(s) daily.   He is taking medications regularly.     Iker presents to the clinic today for evaluation of dizziness/lightheadedness.  This has been ongoing for a few weeks, possibly longer.  He states there have been a few times that he has lost his balance and falling, he has not hit his head or lost consciousness.  He also reports vision changes of blurry vision cessation vision.  He is unsure when his last eye appointment was but does have glasses, his vision is worse with wearing his glasses, he is going to try to make an eye appointment.  He declines any chest pain, shortness of breath, centralized or peripheral edema or headaches.  He declines any palpitations.  Current blood pressure medications include atenolol 100 mg daily, he states he was previously on lisinopril 20 mg daily but this was stopped when he was hospitalized at [facility] for renal failure.  He restarted this recently as he thought the medication was for depression, his blood pressure was low around this time.  He reports a blood pressure reading of 88/50 recently, 91/56 this morning as well as 127/80 systolic today.  Nursing checked his blood pressure machine which appears to be accurate.  He also states that he has lost a large amount of weight lately, this has been intentional.  He is typically eating 1 meal a day, this is dinnertime.  He typically eats a lean protein and vegetables.  Throughout the day he will munch on bananas, grapes, corn, carrots and other vegetables/fruits.  He also does report that rapid positional changes as well as no history of heart attack or stroke.    Comorbid conditions include diabetes, blood sugars running within goal range, he checks before and after meals.  Current medications include metformin 1000 mg daily and  Ozempic 1 mg daily.  He also has hypothyroidism, current dose of levothyroxine is 50 mcg daily, he takes this first thing in the morning on empty stomach prior to eating or drinking (30 to 60 minutes prior). No changes to energy, hair, skin, nails.     Review of Systems  Constitutional, HEENT, cardiovascular, pulmonary, GI, , musculoskeletal, neuro, skin, endocrine and psych systems are negative, except as otherwise noted.        Objective    /68 (BP Location: Right arm, Patient Position: Standing, Cuff Size: Adult Large)   Pulse (!) 48   Temp (!) 96.3  F (35.7  C) (Tympanic)   Resp 18   Wt 106.6 kg (235 lb)   SpO2 93%   BMI 37.36 kg/m    Body mass index is 37.36 kg/m .  Physical Exam   GENERAL: alert and no distress  EYES: Eyes grossly normal to inspection, PERRL and conjunctivae and sclerae normal  HENT: ear canals and TM's normal, nose and mouth without ulcers or lesions  NECK: no adenopathy, no asymmetry, masses, or scars  RESP: lungs clear to auscultation - no rales, rhonchi or wheezes  CV: regular rate and rhythm, normal S1 S2, no S3 or S4, no murmur, click or rub, no peripheral edema  ABDOMEN: soft, nontender, no hepatosplenomegaly, no masses and bowel sounds normal  MS: no gross musculoskeletal defects noted, no edema  SKIN: no suspicious lesions or rashes  NEURO: Awake, alert, oriented to name, place and time.  Cranial nerves II-XII are grossly intact.  Motor shows good stregth, bulk and tone bilaterally.  Cerebellar finger to nose, heel to shin intact.  Sensory is intact to fine touch and pin prick.  Babinski down going, Romberg negative, and gait is normal.  PSYCH: mentation appears normal, affect normal/bright  LYMPH: normal ant/post cervical, supraclavicular nodes    Results for orders placed or performed in visit on 03/19/24   Comprehensive Metabolic Panel     Status: Abnormal   Result Value Ref Range    Sodium 137 135 - 145 mmol/L    Potassium 5.6 (H) 3.4 - 5.3 mmol/L    Carbon Dioxide  (CO2) 22 22 - 29 mmol/L    Anion Gap 12 7 - 15 mmol/L    Urea Nitrogen 19.9 8.0 - 23.0 mg/dL    Creatinine 1.10 0.67 - 1.17 mg/dL    GFR Estimate 74 >60 mL/min/1.73m2    Calcium 10.0 8.8 - 10.2 mg/dL    Chloride 103 98 - 107 mmol/L    Glucose 99 70 - 99 mg/dL    Alkaline Phosphatase 106 40 - 150 U/L    AST 26 0 - 45 U/L    ALT 30 0 - 70 U/L    Protein Total 8.2 6.4 - 8.3 g/dL    Albumin 4.8 3.5 - 5.2 g/dL    Bilirubin Total 0.4 <=1.2 mg/dL   TSH Reflex GH     Status: Normal   Result Value Ref Range    TSH 0.32 0.30 - 4.20 uIU/mL   UA Macroscopic with reflex to Microscopic and Culture     Status: Abnormal    Specimen: Urine, Clean Catch   Result Value Ref Range    Color Urine Yellow Colorless, Straw, Light Yellow, Yellow    Appearance Urine Clear Clear    Glucose Urine Negative Negative mg/dL    Bilirubin Urine Negative Negative    Ketones Urine Negative Negative mg/dL    Specific Gravity Urine 1.025 1.000 - 1.030    Blood Urine Negative Negative    pH Urine 5.5 5.0 - 9.0    Protein Albumin Urine 70 (A) Negative mg/dL    Urobilinogen Urine Normal Normal, 2.0 mg/dL    Nitrite Urine Negative Negative    Leukocyte Esterase Urine Negative Negative    Bacteria Urine Few (A) None Seen /HPF    Mucus Urine Present (A) None Seen /LPF    RBC Urine 43 (H) <=2 /HPF    WBC Urine 29 (H) <=5 /HPF    Hyaline Casts Urine 215 (H) <=2 /LPF    Narrative    Urine Culture ordered based on laboratory criteria   Hemoglobin A1c     Status: Normal   Result Value Ref Range    Hemoglobin A1C 5.9 4.0 - 6.2 %   Albumin Random Urine Quantitative with Creat Ratio     Status: None   Result Value Ref Range    Creatinine Urine mg/dL 214.7 mg/dL    Albumin Urine mg/L 32.7 mg/L    Albumin Urine mg/g Cr 15.23 0.00 - 17.00 mg/g Cr   CBC with platelets and differential     Status: Abnormal   Result Value Ref Range    WBC Count 10.4 4.0 - 11.0 10e3/uL    RBC Count 4.11 (L) 4.40 - 5.90 10e6/uL    Hemoglobin 12.4 (L) 13.3 - 17.7 g/dL    Hematocrit 38.4 (L)  40.0 - 53.0 %    MCV 93 78 - 100 fL    MCH 30.2 26.5 - 33.0 pg    MCHC 32.3 31.5 - 36.5 g/dL    RDW 14.2 10.0 - 15.0 %    Platelet Count 223 150 - 450 10e3/uL    % Neutrophils 65 %    % Lymphocytes 23 %    % Monocytes 9 %    % Eosinophils 2 %    % Basophils 1 %    % Immature Granulocytes 0 %    NRBCs per 100 WBC 0 <1 /100    Absolute Neutrophils 6.8 1.6 - 8.3 10e3/uL    Absolute Lymphocytes 2.4 0.8 - 5.3 10e3/uL    Absolute Monocytes 0.9 0.0 - 1.3 10e3/uL    Absolute Eosinophils 0.2 0.0 - 0.7 10e3/uL    Absolute Basophils 0.1 0.0 - 0.2 10e3/uL    Absolute Immature Granulocytes 0.0 <=0.4 10e3/uL    Absolute NRBCs 0.0 10e3/uL   Ferritin     Status: Normal   Result Value Ref Range    Ferritin 195 31 - 409 ng/mL   Iron & Iron Binding Capacity     Status: Normal   Result Value Ref Range    Iron 66 61 - 157 ug/dL    Iron Binding Capacity 241 240 - 430 ug/dL    Iron Sat Index 27 15 - 46 %   EKG 12-lead, tracing only (Same Day)     Status: None (Preliminary result)   Result Value Ref Range    Systolic Blood Pressure  mmHg    Diastolic Blood Pressure  mmHg    Ventricular Rate 65 BPM    Atrial Rate 65 BPM    VT Interval 210 ms    QRS Duration 102 ms     ms    QTc 393 ms    P Axis 60 degrees    R AXIS 96 degrees    T Axis 67 degrees    Interpretation ECG       Sinus rhythm with 1st degree A-V block  Rightward axis  Borderline ECG  When compared with ECG of 17-AUG-2020 13:13,  No significant change was found     CBC and Differential     Status: Abnormal    Narrative    The following orders were created for panel order CBC and Differential.  Procedure                               Abnormality         Status                     ---------                               -----------         ------                     CBC with platelets and d...[[id number]]  Abnormal            Final result                 Please view results for these tests on the individual orders.       Signed Electronically by: Ana Stiles PA-C

## 2024-03-19 NOTE — PROGRESS NOTES
Assessment & Plan       ICD-10-CM    1. Diabetic polyneuropathy associated with type 2 diabetes mellitus (H)  E11.42 CBC and Differential     Comprehensive Metabolic Panel     EKG 12-lead, tracing only (Same Day)     Orthostatic blood pressure and pulse     UA Macroscopic with reflex to Microscopic and Culture     Hemoglobin A1c     Albumin Random Urine Quantitative with Creat Ratio     CBC and Differential     Comprehensive Metabolic Panel     UA Macroscopic with reflex to Microscopic and Culture     Hemoglobin A1c     Albumin Random Urine Quantitative with Creat Ratio     Urine Culture      2. Hypothyroidism, unspecified type  E03.9 TSH Reflex [facility]     TSH Reflex [facility]      3. Vertigo  R42 EKG 12-lead, tracing only (Same Day)      4. Morbid obesity (H)  E66.01       5. Low hemoglobin  D64.9 Ferritin     Iron & Iron Binding Capacity      6. Orthostatic hypotension  I95.1       7. Hyperkalemia  E87.5         Type 2 diabetes, demonstrating excellent control, A1c returning at 5.9% today, previously 7.0% in November 2023.  Congratulated patient on hard work.  Lipid panel stable in November 2023.  Continue current medication regimen.  Up-to-date on prescription refills, recommend formal eye exam, patient will call to schedule at his convenience, up-to-date on diabetic foot exam.  Hypothyroidism, stable, TSH returning at 0.32 today.  Continue current dose of levothyroxine/Synthroid 50 mcg daily.  Recommend to take first thing in the morning on empty stomach, 30 to 60 minutes prior to eating or taking other medications.  Vertigo, unclear etiology, likely related to blood pressure.  EKG with sinus rhythm with first-degree AV block and rightward axis, this is nearly identical to EKG from 7/8/2021 from [facility] demonstrating first-degree AV block with PACs.  Morbid obesity, continues to work hard on weight loss, congratulated on progress, has lost approximately 30 pounds.  Continue with heart healthy diet including increased  "lean proteins, fruits and vegetables.  Goal exercise 150 minutes of moderate exercise per week.  Chronic waxing and waning hemoglobin levels, returning at 12.4 today and hematocrit 38.4.  I added on ferritin and iron binding studies to assure patient was not anemic.  These both returned within normal balance.  Recommend to proceed with colonoscopy on 4/4/2024 for additional evaluation.  Blood pressure is positive for orthostatic hypotension.  Discussed with patient that weight loss can cause blood pressures to lower/normalize.  I would recommend he hold his atenolol x 1 week, continue to check blood pressures at home, return to the clinic in 1 week for reevaluation.  Recommend he discontinue his lisinopril as recommended by [facility] in 2021.  Lisinopril is likely the etiology of his hyperkalemia which is new onset.  Hyperkalemia, attributed to recent restart of lisinopril 20 mg daily.  Recommend cessation.  Recommend to keep an eye on potassium rich foods including  spinach, beans, yogurt, avocados, bananas and potatoes, etc., as this may cause further elevation.    BMI  Estimated body mass index is 37.36 kg/m  as calculated from the following:    Height as of 11/8/23: 1.689 m (5' 6.5\").    Weight as of this encounter: 106.6 kg (235 lb).   Weight management plan: Discussed healthy diet and exercise guidelines    See Patient Instructions    Return in 1 week (on 3/26/2024) for BP Recheck.     Lui Dong is a 63 year old, presenting for the following health issues:  Hypotension        3/19/2024     1:01 PM   Additional Questions   Roomed by gia calderón lpn     History of Present Illness       Diabetes:   He presents for follow up of diabetes.    He is not checking blood glucose.         He has no concerns regarding his diabetes at this time.  He is having redness, sores, or blisters on feet and blurry vision.            Hypertension: He presents for follow up of hypertension.  He does check blood pressure  regularly " outside of the clinic. Outpatient blood pressures have not been over 140/90. He follows a low salt diet.     He eats 2-3 servings of fruits and vegetables daily.He consumes 0 sweetened beverage(s) daily.   He is taking medications regularly.     Iker presents to the clinic today for evaluation of dizziness/lightheadedness.  This has been ongoing for a few weeks, possibly longer.  He states there have been a few times that he has lost his balance and falling, he has not hit his head or lost consciousness.  He also reports vision changes of blurry vision cessation vision.  He is unsure when his last eye appointment was but does have glasses, his vision is worse with wearing his glasses, he is going to try to make an eye appointment.  He declines any chest pain, shortness of breath, centralized or peripheral edema or headaches.  He declines any palpitations.  Current blood pressure medications include atenolol 100 mg daily, he states he was previously on lisinopril 20 mg daily but this was stopped when he was hospitalized at [facility] for renal failure.  He restarted this recently as he thought the medication was for depression, his blood pressure was low around this time.  He reports a blood pressure reading of 88/50 recently, 91/56 this morning as well as 127/80 systolic today.  Nursing checked his blood pressure machine which appears to be accurate.  He also states that he has lost a large amount of weight lately, this has been intentional.  He is typically eating 1 meal a day, this is dinnertime.  He typically eats a lean protein and vegetables.  Throughout the day he will munch on bananas, grapes, corn, carrots and other vegetables/fruits.  He also does report that rapid positional changes as well as no history of heart attack or stroke.    Comorbid conditions include diabetes, blood sugars running within goal range, he checks before and after meals.  Current medications include metformin 1000 mg daily and  Ozempic 1 mg daily.  He also has hypothyroidism, current dose of levothyroxine is 50 mcg daily, he takes this first thing in the morning on empty stomach prior to eating or drinking (30 to 60 minutes prior). No changes to energy, hair, skin, nails.     Review of Systems  Constitutional, HEENT, cardiovascular, pulmonary, GI, , musculoskeletal, neuro, skin, endocrine and psych systems are negative, except as otherwise noted.        Objective    /68 (BP Location: Right arm, Patient Position: Standing, Cuff Size: Adult Large)   Pulse (!) 48   Temp (!) 96.3  F (35.7  C) (Tympanic)   Resp 18   Wt 106.6 kg (235 lb)   SpO2 93%   BMI 37.36 kg/m    Body mass index is 37.36 kg/m .  Physical Exam   GENERAL: alert and no distress  EYES: Eyes grossly normal to inspection, PERRL and conjunctivae and sclerae normal  HENT: ear canals and TM's normal, nose and mouth without ulcers or lesions  NECK: no adenopathy, no asymmetry, masses, or scars  RESP: lungs clear to auscultation - no rales, rhonchi or wheezes  CV: regular rate and rhythm, normal S1 S2, no S3 or S4, no murmur, click or rub, no peripheral edema  ABDOMEN: soft, nontender, no hepatosplenomegaly, no masses and bowel sounds normal  MS: no gross musculoskeletal defects noted, no edema  SKIN: no suspicious lesions or rashes  NEURO: Awake, alert, oriented to name, place and time.  Cranial nerves II-XII are grossly intact.  Motor shows good stregth, bulk and tone bilaterally.  Cerebellar finger to nose, heel to shin intact.  Sensory is intact to fine touch and pin prick.  Babinski down going, Romberg negative, and gait is normal.  PSYCH: mentation appears normal, affect normal/bright  LYMPH: normal ant/post cervical, supraclavicular nodes    Results for orders placed or performed in visit on 03/19/24   Comprehensive Metabolic Panel     Status: Abnormal   Result Value Ref Range    Sodium 137 135 - 145 mmol/L    Potassium 5.6 (H) 3.4 - 5.3 mmol/L    Carbon Dioxide  (CO2) 22 22 - 29 mmol/L    Anion Gap 12 7 - 15 mmol/L    Urea Nitrogen 19.9 8.0 - 23.0 mg/dL    Creatinine 1.10 0.67 - 1.17 mg/dL    GFR Estimate 74 >60 mL/min/1.73m2    Calcium 10.0 8.8 - 10.2 mg/dL    Chloride 103 98 - 107 mmol/L    Glucose 99 70 - 99 mg/dL    Alkaline Phosphatase 106 40 - 150 U/L    AST 26 0 - 45 U/L    ALT 30 0 - 70 U/L    Protein Total 8.2 6.4 - 8.3 g/dL    Albumin 4.8 3.5 - 5.2 g/dL    Bilirubin Total 0.4 <=1.2 mg/dL   TSH Reflex [facility]     Status: Normal   Result Value Ref Range    TSH 0.32 0.30 - 4.20 uIU/mL   UA Macroscopic with reflex to Microscopic and Culture     Status: Abnormal    Specimen: Urine, Clean Catch   Result Value Ref Range    Color Urine Yellow Colorless, Straw, Light Yellow, Yellow    Appearance Urine Clear Clear    Glucose Urine Negative Negative mg/dL    Bilirubin Urine Negative Negative    Ketones Urine Negative Negative mg/dL    Specific Gravity Urine 1.025 1.000 - 1.030    Blood Urine Negative Negative    pH Urine 5.5 5.0 - 9.0    Protein Albumin Urine 70 (A) Negative mg/dL    Urobilinogen Urine Normal Normal, 2.0 mg/dL    Nitrite Urine Negative Negative    Leukocyte Esterase Urine Negative Negative    Bacteria Urine Few (A) None Seen /HPF    Mucus Urine Present (A) None Seen /LPF    RBC Urine 43 (H) <=2 /HPF    WBC Urine 29 (H) <=5 /HPF    Hyaline Casts Urine 215 (H) <=2 /LPF    Narrative    Urine Culture ordered based on laboratory criteria   Hemoglobin A1c     Status: Normal   Result Value Ref Range    Hemoglobin A1C 5.9 4.0 - 6.2 %   Albumin Random Urine Quantitative with Creat Ratio     Status: None   Result Value Ref Range    Creatinine Urine mg/dL 214.7 mg/dL    Albumin Urine mg/L 32.7 mg/L    Albumin Urine mg/g Cr 15.23 0.00 - 17.00 mg/g Cr   CBC with platelets and differential     Status: Abnormal   Result Value Ref Range    WBC Count 10.4 4.0 - 11.0 10e3/uL    RBC Count 4.11 (L) 4.40 - 5.90 10e6/uL    Hemoglobin 12.4 (L) 13.3 - 17.7 g/dL    Hematocrit 38.4 (L)  40.0 - 53.0 %    MCV 93 78 - 100 fL    MCH 30.2 26.5 - 33.0 pg    MCHC 32.3 31.5 - 36.5 g/dL    RDW 14.2 10.0 - 15.0 %    Platelet Count 223 150 - 450 10e3/uL    % Neutrophils 65 %    % Lymphocytes 23 %    % Monocytes 9 %    % Eosinophils 2 %    % Basophils 1 %    % Immature Granulocytes 0 %    NRBCs per 100 WBC 0 <1 /100    Absolute Neutrophils 6.8 1.6 - 8.3 10e3/uL    Absolute Lymphocytes 2.4 0.8 - 5.3 10e3/uL    Absolute Monocytes 0.9 0.0 - 1.3 10e3/uL    Absolute Eosinophils 0.2 0.0 - 0.7 10e3/uL    Absolute Basophils 0.1 0.0 - 0.2 10e3/uL    Absolute Immature Granulocytes 0.0 <=0.4 10e3/uL    Absolute NRBCs 0.0 10e3/uL   Ferritin     Status: Normal   Result Value Ref Range    Ferritin 195 31 - 409 ng/mL   Iron & Iron Binding Capacity     Status: Normal   Result Value Ref Range    Iron 66 61 - 157 ug/dL    Iron Binding Capacity 241 240 - 430 ug/dL    Iron Sat Index 27 15 - 46 %   EKG 12-lead, tracing only (Same Day)     Status: None (Preliminary result)   Result Value Ref Range    Systolic Blood Pressure  mmHg    Diastolic Blood Pressure  mmHg    Ventricular Rate 65 BPM    Atrial Rate 65 BPM    NV Interval 210 ms    QRS Duration 102 ms     ms    QTc 393 ms    P Axis 60 degrees    R AXIS 96 degrees    T Axis 67 degrees    Interpretation ECG       Sinus rhythm with 1st degree A-V block  Rightward axis  Borderline ECG  When compared with ECG of 17-AUG-2020 13:13,  No significant change was found     CBC and Differential     Status: Abnormal    Narrative    The following orders were created for panel order CBC and Differential.  Procedure                               Abnormality         Status                     ---------                               -----------         ------                     CBC with platelets and d...[[id number]]  Abnormal            Final result                 Please view results for these tests on the individual orders.       Signed Electronically by: Ana Stiles PA-C

## 2024-03-19 NOTE — PATIENT INSTRUCTIONS
Lab work on average will return in 1-2 hours, unless listed otherwise below (your provider will typically review & provide you with results and recommendations within 1 day):  - CBC - blood counts  - CMP/BMP - kidney and electrolyte lab. CMP adds in liver function  - A1c - 3 month average of blood sugars  - TSH - thyroid lab  - Urine - checking for protein and sugar/glucose in the urine  - Microalbumin - checking kidney function via urine sample, in addition to above blood work

## 2024-03-21 LAB — BACTERIA UR CULT: NO GROWTH

## 2024-03-30 DIAGNOSIS — F43.29 GRIEF REACTION WITH PROLONGED BEREAVEMENT: ICD-10-CM

## 2024-03-30 DIAGNOSIS — E03.9 HYPOTHYROIDISM, UNSPECIFIED TYPE: ICD-10-CM

## 2024-03-30 DIAGNOSIS — I10 ESSENTIAL HYPERTENSION: ICD-10-CM

## 2024-03-30 DIAGNOSIS — E11.42 TYPE 2 DIABETES MELLITUS WITH DIABETIC POLYNEUROPATHY, WITHOUT LONG-TERM CURRENT USE OF INSULIN (H): Primary | ICD-10-CM

## 2024-04-02 RX ORDER — LISINOPRIL 20 MG/1
20 TABLET ORAL DAILY
Qty: 90 TABLET | Refills: 4 | Status: SHIPPED | OUTPATIENT
Start: 2024-04-02

## 2024-04-02 RX ORDER — SERTRALINE HYDROCHLORIDE 100 MG/1
100 TABLET, FILM COATED ORAL DAILY
Qty: 90 TABLET | Refills: 4 | Status: SHIPPED | OUTPATIENT
Start: 2024-04-02

## 2024-04-02 RX ORDER — ATORVASTATIN CALCIUM 40 MG/1
40 TABLET, FILM COATED ORAL DAILY
Qty: 90 TABLET | Refills: 4 | Status: SHIPPED | OUTPATIENT
Start: 2024-04-02

## 2024-04-02 RX ORDER — ATENOLOL 100 MG/1
50 TABLET ORAL DAILY
Qty: 45 TABLET | Refills: 4 | Status: SHIPPED | OUTPATIENT
Start: 2024-04-02 | End: 2024-08-15

## 2024-04-02 RX ORDER — LEVOTHYROXINE SODIUM 50 UG/1
50 TABLET ORAL DAILY
Qty: 90 TABLET | Refills: 4 | Status: SHIPPED | OUTPATIENT
Start: 2024-04-02

## 2024-04-04 ENCOUNTER — HOSPITAL ENCOUNTER (OUTPATIENT)
Facility: OTHER | Age: 64
Discharge: HOME | End: 2024-04-04
Attending: SURGERY | Admitting: SURGERY
Payer: MEDICARE

## 2024-04-04 ENCOUNTER — ANESTHESIA (OUTPATIENT)
Dept: SURGERY | Facility: OTHER | Age: 64
End: 2024-04-04
Payer: MEDICARE

## 2024-04-04 ENCOUNTER — ANESTHESIA EVENT (OUTPATIENT)
Dept: SURGERY | Facility: OTHER | Age: 64
End: 2024-04-04
Payer: MEDICARE

## 2024-04-04 VITALS
WEIGHT: 230 LBS | HEIGHT: 67 IN | SYSTOLIC BLOOD PRESSURE: 147 MMHG | TEMPERATURE: 97.6 F | BODY MASS INDEX: 36.1 KG/M2 | HEART RATE: 57 BPM | OXYGEN SATURATION: 95 % | RESPIRATION RATE: 18 BRPM | DIASTOLIC BLOOD PRESSURE: 92 MMHG

## 2024-04-04 DIAGNOSIS — E11.42 DIABETIC POLYNEUROPATHY ASSOCIATED WITH TYPE 2 DIABETES MELLITUS (H): ICD-10-CM

## 2024-04-04 LAB — GLUCOSE BLDC GLUCOMTR-MCNC: 72 MG/DL (ref 70–99)

## 2024-04-04 PROCEDURE — 82962 GLUCOSE BLOOD TEST: CPT

## 2024-04-04 PROCEDURE — 258N000003 HC RX IP 258 OP 636: Performed by: SURGERY

## 2024-04-04 PROCEDURE — 250N000011 HC RX IP 250 OP 636: Performed by: NURSE ANESTHETIST, CERTIFIED REGISTERED

## 2024-04-04 PROCEDURE — G0121 COLON CA SCRN NOT HI RSK IND: HCPCS | Performed by: SURGERY

## 2024-04-04 PROCEDURE — 250N000009 HC RX 250: Performed by: NURSE ANESTHETIST, CERTIFIED REGISTERED

## 2024-04-04 PROCEDURE — 258N000003 HC RX IP 258 OP 636: Performed by: NURSE ANESTHETIST, CERTIFIED REGISTERED

## 2024-04-04 PROCEDURE — 45378 DIAGNOSTIC COLONOSCOPY: CPT | Performed by: SURGERY

## 2024-04-04 RX ORDER — NALOXONE HYDROCHLORIDE 0.4 MG/ML
0.4 INJECTION, SOLUTION INTRAMUSCULAR; INTRAVENOUS; SUBCUTANEOUS
Status: DISCONTINUED | OUTPATIENT
Start: 2024-04-04 | End: 2024-04-04 | Stop reason: HOSPADM

## 2024-04-04 RX ORDER — FLUMAZENIL 0.1 MG/ML
0.2 INJECTION, SOLUTION INTRAVENOUS
Status: DISCONTINUED | OUTPATIENT
Start: 2024-04-04 | End: 2024-04-04 | Stop reason: HOSPADM

## 2024-04-04 RX ORDER — PREGABALIN 100 MG/1
100 CAPSULE ORAL 2 TIMES DAILY
Status: SHIPPED | DISCHARGE
Start: 2024-04-04 | End: 2024-06-03

## 2024-04-04 RX ORDER — NALOXONE HYDROCHLORIDE 0.4 MG/ML
0.2 INJECTION, SOLUTION INTRAMUSCULAR; INTRAVENOUS; SUBCUTANEOUS
Status: DISCONTINUED | OUTPATIENT
Start: 2024-04-04 | End: 2024-04-04 | Stop reason: HOSPADM

## 2024-04-04 RX ORDER — SODIUM CHLORIDE, SODIUM LACTATE, POTASSIUM CHLORIDE, CALCIUM CHLORIDE 600; 310; 30; 20 MG/100ML; MG/100ML; MG/100ML; MG/100ML
INJECTION, SOLUTION INTRAVENOUS CONTINUOUS
Status: DISCONTINUED | OUTPATIENT
Start: 2024-04-04 | End: 2024-04-04 | Stop reason: HOSPADM

## 2024-04-04 RX ORDER — LIDOCAINE HYDROCHLORIDE 20 MG/ML
INJECTION, SOLUTION INFILTRATION; PERINEURAL PRN
Status: DISCONTINUED | OUTPATIENT
Start: 2024-04-04 | End: 2024-04-04

## 2024-04-04 RX ORDER — PROPOFOL 10 MG/ML
INJECTION, EMULSION INTRAVENOUS PRN
Status: DISCONTINUED | OUTPATIENT
Start: 2024-04-04 | End: 2024-04-04

## 2024-04-04 RX ORDER — PROPOFOL 10 MG/ML
INJECTION, EMULSION INTRAVENOUS CONTINUOUS PRN
Status: DISCONTINUED | OUTPATIENT
Start: 2024-04-04 | End: 2024-04-04

## 2024-04-04 RX ORDER — LIDOCAINE 40 MG/G
CREAM TOPICAL
Status: DISCONTINUED | OUTPATIENT
Start: 2024-04-04 | End: 2024-04-04 | Stop reason: HOSPADM

## 2024-04-04 RX ADMIN — PHENYLEPHRINE HYDROCHLORIDE 200 MCG: 10 INJECTION INTRAVENOUS at 12:16

## 2024-04-04 RX ADMIN — PROPOFOL 135 MCG/KG/MIN: 10 INJECTION, EMULSION INTRAVENOUS at 12:01

## 2024-04-04 RX ADMIN — PHENYLEPHRINE HYDROCHLORIDE 200 MCG: 10 INJECTION INTRAVENOUS at 12:19

## 2024-04-04 RX ADMIN — PHENYLEPHRINE HYDROCHLORIDE 100 MCG: 10 INJECTION INTRAVENOUS at 12:10

## 2024-04-04 RX ADMIN — SODIUM CHLORIDE, POTASSIUM CHLORIDE, SODIUM LACTATE AND CALCIUM CHLORIDE: 600; 310; 30; 20 INJECTION, SOLUTION INTRAVENOUS at 11:54

## 2024-04-04 RX ADMIN — PHENYLEPHRINE HYDROCHLORIDE 200 MCG: 10 INJECTION INTRAVENOUS at 12:33

## 2024-04-04 RX ADMIN — PHENYLEPHRINE HYDROCHLORIDE 200 MCG: 10 INJECTION INTRAVENOUS at 12:37

## 2024-04-04 RX ADMIN — PHENYLEPHRINE HYDROCHLORIDE 100 MCG: 10 INJECTION INTRAVENOUS at 12:07

## 2024-04-04 RX ADMIN — PHENYLEPHRINE HYDROCHLORIDE 200 MCG: 10 INJECTION INTRAVENOUS at 12:13

## 2024-04-04 RX ADMIN — LIDOCAINE HYDROCHLORIDE 40 MG: 20 INJECTION, SOLUTION INFILTRATION; PERINEURAL at 12:01

## 2024-04-04 RX ADMIN — PHENYLEPHRINE HYDROCHLORIDE 200 MCG: 10 INJECTION INTRAVENOUS at 12:25

## 2024-04-04 RX ADMIN — PHENYLEPHRINE HYDROCHLORIDE 200 MCG: 10 INJECTION INTRAVENOUS at 12:22

## 2024-04-04 RX ADMIN — PROPOFOL 100 MG: 10 INJECTION, EMULSION INTRAVENOUS at 12:01

## 2024-04-04 ASSESSMENT — ACTIVITIES OF DAILY LIVING (ADL)
ADLS_ACUITY_SCORE: 27
ADLS_ACUITY_SCORE: 29

## 2024-04-04 NOTE — ANESTHESIA PREPROCEDURE EVALUATION
Anesthesia Pre-Procedure Evaluation    Patient: Iker Young   MRN: [id number] : 1960        Procedure : Procedure(s):  Colonoscopy          Past Medical History:   Diagnosis Date    Chest pain     2016    Other specified postprocedural states     Status post colonoscopy    Personal history of other medical treatment (CODE)     2009,MRI cervical spine      Past Surgical History:   Procedure Laterality Date    ARTHROSCOPY SHOULDER ROTATOR CUFF REPAIR, SUBACROMIAL DECOMP, DIST CLAVICLE RESECTN, BICEP TENOTOMY Right 2020    Procedure: Diagnostic Right Shulder Scope w/ extensive debridement, and distal clavicle excision.;  Surgeon: Jaime Dean MD;  Location: [facility] OR    COLONOSCOPY      No Comments Provided    FUSION LUMBAR ANTERIOR ONE LEVEL      ,Back surgery x 5 (fusion, hardware removal, stimulator and removal)    OTHER SURGICAL HISTORY      ,2057,PERIPHERAL NERVE STIMULATOR,Lumbar nerve stimulator and subsequent removal    OTHER SURGICAL HISTORY      ,REMOVAL OF FOREIGN BODY, subsequent removal    OTHER SURGICAL HISTORY      2085,INCISION AND DRAINAGE,from infection from nerve stimulator    OTHER SURGICAL HISTORY      2009,2073,SCAN-MRI INTERPRETATION,MRI cervical spine.      Allergies   Allergen Reactions    Betadine [Povidone Iodine] Rash and Dermatitis     Severe blistering     Liquid Adhesive Dermatitis      Social History     Tobacco Use    Smoking status: Never    Smokeless tobacco: Never   Substance Use Topics    Alcohol use: Not Currently     Comment: rare      Wt Readings from Last 1 Encounters:   24 104.3 kg (230 lb)        Anesthesia Evaluation   Pt has had prior anesthetic.         ROS/MED HX  ENT/Pulmonary:  - neg pulmonary ROS     Neurologic:     (+)    peripheral neuropathy,                            Cardiovascular: Comment: Negative stress test  Lost 60 lb this past year and has been more active  Moderate aortic stenosis    (+)   "hypertension- -   -  - -                                      METS/Exercise Tolerance: 4 - Raking leaves, gardening    Hematologic: Comments: Hx of anemia      Musculoskeletal: Comment: Recent fall and injured his left shoulder      GI/Hepatic: Comment: Rare heartburn    (+)        bowel prep,            Renal/Genitourinary:  - neg Renal ROS     Endo:     (+) type I DM,  Last HgA1c: 5.9,       thyroid problem,     Obesity,       Psychiatric/Substance Use:  - neg psychiatric ROS     Infectious Disease:  - neg infectious disease ROS     Malignancy:       Other:            Physical Exam    Airway        Mallampati: I   TM distance: > 3 FB   Neck ROM: full   Mouth opening: > 3 cm    Respiratory Devices and Support         Dental       (+) Multiple visibly decayed, broken teeth      Cardiovascular   cardiovascular exam normal          Pulmonary   pulmonary exam normal                OUTSIDE LABS:  CBC:   Lab Results   Component Value Date    WBC 10.4 03/19/2024    WBC 10.8 03/03/2023    HGB 12.4 (L) 03/19/2024    HGB 12.3 (L) 03/03/2023    HCT 38.4 (L) 03/19/2024    HCT 38.3 (L) 03/03/2023     03/19/2024     03/03/2023     BMP:   Lab Results   Component Value Date     03/19/2024     05/19/2023    POTASSIUM 5.6 (H) 03/19/2024    POTASSIUM 5.1 05/19/2023    CHLORIDE 103 03/19/2024    CHLORIDE 102 05/19/2023    CO2 22 03/19/2024    CO2 28 05/19/2023    BUN 19.9 03/19/2024    BUN 21.1 05/19/2023    CR 1.10 03/19/2024    CR 0.91 05/19/2023    GLC 99 03/19/2024     (H) 05/19/2023     COAGS: No results found for: \"PTT\", \"INR\", \"FIBR\"  POC: No results found for: \"BGM\", \"HCG\", \"HCGS\"  HEPATIC:   Lab Results   Component Value Date    ALBUMIN 4.8 03/19/2024    PROTTOTAL 8.2 03/19/2024    ALT 30 03/19/2024    AST 26 03/19/2024    ALKPHOS 106 03/19/2024    BILITOTAL 0.4 03/19/2024     OTHER:   Lab Results   Component Value Date    A1C 5.9 03/19/2024    ALYSA 10.0 03/19/2024    TSH 0.32 03/19/2024    SED " "14 03/03/2023       Anesthesia Plan    ASA Status:  3    NPO Status:  NPO Appropriate    Anesthesia Type: MAC.              Consents    Anesthesia Plan(s) and associated risks, benefits, and realistic alternatives discussed. Questions answered and patient/representative(s) expressed understanding.     - Discussed: Risks, Benefits and Alternatives for BOTH SEDATION and the PROCEDURE were discussed     - Discussed with:  Patient            Postoperative Care            Comments:               STEFFEN BATISTA CRNA    I have reviewed the pertinent notes and labs in the chart from the past 30 days and (re)examined the patient.  Any updates or changes from those notes are reflected in this note.    # Hyperkalemia: Highest K = 5.6 mmol/L in last 30 days, will monitor as appropriate           # Obesity: Estimated body mass index is 36.57 kg/m  as calculated from the following:    Height as of this encounter: 1.689 m (5' 6.5\").    Weight as of this encounter: 104.3 kg (230 lb).      "

## 2024-04-04 NOTE — OR NURSING
patient has been discharged to home at 1330 via ambulation accompanied by neighbor    Written discharge instructions were provided to patient.  Prescriptions were none.      Patient and adult caring for them verbalize understanding of discharge instructions including no driving until tomorrow and no longer taking narcotic pain medications - no operating mechanical equipment and no making any important decisions.They understand reason for discharge, and necessary follow-up appointments.

## 2024-04-04 NOTE — OP NOTE
PROCEDURE NOTE    SURGEON:Aaron Glover MD    PRE-OP DIAGNOSIS:  Screening Colonoscopy      POST-OP DIAGNOSIS: Diverticula and hemorrhoids    PROCEDURE: Colonoscopy and a anoscopy with banding    SPECIMEN:      * No specimens in log *    ANESTHESIA:  MAC CRNA Independent: Victor Hugo Waller APRN CRNA; Dasia Vance APRN CRNA   Coverage requested due to BMI over 30    ESTIMATED BLOOD LOSS: none    COMPLICATIONS:  None    INDICATION FOR THE PROCEDURE: The patient is a 63 year old male. The patient presents with need for screening and history of hemorrhoids. I explained to the patient the risks, benefits and alternatives to screening colonoscopy for evaluating for cancer or polyps. We discussed the risks including bleeding, perforation, potential inability to reach the cecum and the risks of sedation. The patient's questions were answered and the patient wished to proceed. Informed consent paperwork was completed.    PROCEDURE: The patient was taken to the endoscopy suite. Appropriate monitors were attached. The patient was placed in the left lateral decubitus position. Timeout was performed confirming the patient's identity and procedure to be performed.  After appropriate sedation was confirmed, digital rectal exam was performed.  There was normal tone and no gross abnormality was noted.  The lubricated colonoscope was introduced into the anus the colon was insufflated with air. The prep quality was adequate. Under direct visualization the scope was advanced to the cecum. The mucosa of the colon was inspected while withdrawing the scope.  With extensive irrigation were able to visualize the colon.  We did not identify any polyps.. The scope was retroflexed in the rectum and the anorectal junction was inspected.  Irritated hemorrhoidal tissue was noted.  The scope was returned to a neutral position and the colon was decompressed. The scope was removed.  A lighted anoscope was inserted.  2 bands columns of  hemorrhoids were ligated.  The patient tolerated the procedure with no immediately apparent complication. The patient was taken to recovery in stable condition.    FOLLOW UP: RECOMMEND high fiber diet, will call with pathology results.  10-year follow-up for screening    Aaron Glover MD on 4/4/2024 at 12:49 PM

## 2024-04-04 NOTE — ANESTHESIA CARE TRANSFER NOTE
Patient: Iker Young    Procedure: Procedure(s):  Colonoscopy WITH HEMORRHOIDECTOMY       Diagnosis: Encounter for screening colonoscopy [Z12.11]  Diagnosis Additional Information: No value filed.    Anesthesia Type:   MAC     Note:    Oropharynx: oropharynx clear of all foreign objects and spontaneously breathing  Level of Consciousness: drowsy  Oxygen Supplementation: nasal cannula  Level of Supplemental Oxygen (L/min / FiO2): 3  Independent Airway: airway patency satisfactory and stable  Dentition: dentition unchanged  Vital Signs Stable: post-procedure vital signs reviewed and stable  Report to RN Given: handoff report given  Patient transferred to: Phase II    Handoff Report: Identifed the Patient, Identified the Reponsible Provider, Reviewed the pertinent medical history, Discussed the surgical course, Reviewed Intra-OP anesthesia mangement and issues during anesthesia, Set expectations for post-procedure period and Allowed opportunity for questions and acknowledgement of understanding      Vitals:  Vitals Value Taken Time   BP     Temp     Pulse     Resp     SpO2         Electronically Signed By: STEFFEN CRISOSTOMO CRNA  April 4, 2024  12:56 PM

## 2024-04-04 NOTE — DISCHARGE INSTRUCTIONS
[facility]  Adult Discharge Orders & Instructions    ________________________________________________________________          For 12 hours after surgery  Get plenty of rest.  A responsible adult must stay with you for at least 12 hours after you leave the hospital.   You may feel lightheaded.  IF so, sit for a few minutes before standing.  Have someone help you get up.   You may have a slight fever. Call the doctor if your fever is over 101 F (38.3 C) (taken under the tongue) or lasts longer than 24 hours.  You may have a dry mouth, a sore throat, muscle aches or trouble sleeping.  These should go away after 24 hours.  Do not make important or legal decisions.  6.   Do not drive or use heavy equipment.  If you have weakness or tingling, don't drive or use heavy equipment until this feeling goes away.    To contact a doctor, call   [phone]_______________________

## 2024-04-04 NOTE — INTERVAL H&P NOTE
"I have reviewed the surgical (or preoperative) H&P that is linked to this encounter, and examined the patient. There are no significant changes    Clinical Conditions Present on Arrival:  Clinically Significant Risk Factors Present on Admission        # Hyperkalemia: Highest K = 5.6 mmol/L in last 30 days, will monitor as appropriate           # Obesity: Estimated body mass index is 36.57 kg/m  as calculated from the following:    Height as of this encounter: 1.689 m (5' 6.5\").    Weight as of this encounter: 104.3 kg (230 lb).       "

## 2024-04-04 NOTE — ANESTHESIA POSTPROCEDURE EVALUATION
Patient: Iker Young    Procedure: Procedure(s):  Colonoscopy WITH HEMORRHOIDECTOMY       Anesthesia Type:  MAC    Note:  Disposition: Outpatient   Postop Pain Control: Uneventful            Sign Out: Well controlled pain   PONV: No   Neuro/Psych: Uneventful            Sign Out: Acceptable/Baseline neuro status   Airway/Respiratory: Uneventful            Sign Out: Acceptable/Baseline resp. status   CV/Hemodynamics: Uneventful            Sign Out: Acceptable CV status; No obvious hypovolemia; No obvious fluid overload   Other NRE: NONE   DID A NON-ROUTINE EVENT OCCUR?            Last vitals:  Vitals Value Taken Time   /60 04/04/24 1300   Temp     Pulse 61 04/04/24 1300   Resp     SpO2 85 % 04/04/24 1305   Vitals shown include unfiled device data.    Electronically Signed By: STEFFEN BATISTA CRNA  April 4, 2024  1:12 PM

## 2024-04-26 DIAGNOSIS — M25.512 LEFT SHOULDER PAIN, UNSPECIFIED CHRONICITY: Primary | ICD-10-CM

## 2024-04-29 ENCOUNTER — OFFICE VISIT (OUTPATIENT)
Dept: ORTHOPEDICS | Facility: OTHER | Age: 64
End: 2024-04-29
Attending: ORTHOPAEDIC SURGERY
Payer: COMMERCIAL

## 2024-04-29 ENCOUNTER — TELEPHONE (OUTPATIENT)
Dept: FAMILY MEDICINE | Facility: OTHER | Age: 64
End: 2024-04-29

## 2024-04-29 ENCOUNTER — OFFICE VISIT (OUTPATIENT)
Dept: FAMILY MEDICINE | Facility: OTHER | Age: 64
End: 2024-04-29
Attending: FAMILY MEDICINE
Payer: OTHER MISCELLANEOUS

## 2024-04-29 ENCOUNTER — HOSPITAL ENCOUNTER (OUTPATIENT)
Dept: GENERAL RADIOLOGY | Facility: OTHER | Age: 64
Discharge: HOME | End: 2024-04-29
Attending: ORTHOPAEDIC SURGERY
Payer: OTHER MISCELLANEOUS

## 2024-04-29 VITALS
BODY MASS INDEX: 38 KG/M2 | WEIGHT: 239 LBS | SYSTOLIC BLOOD PRESSURE: 136 MMHG | TEMPERATURE: 97.3 F | DIASTOLIC BLOOD PRESSURE: 84 MMHG | HEART RATE: 65 BPM | RESPIRATION RATE: 16 BRPM | OXYGEN SATURATION: 96 %

## 2024-04-29 DIAGNOSIS — F41.8 SITUATIONAL ANXIETY: Primary | ICD-10-CM

## 2024-04-29 DIAGNOSIS — M25.512 LEFT SHOULDER PAIN, UNSPECIFIED CHRONICITY: Primary | ICD-10-CM

## 2024-04-29 DIAGNOSIS — M25.512 LEFT SHOULDER PAIN, UNSPECIFIED CHRONICITY: ICD-10-CM

## 2024-04-29 DIAGNOSIS — M54.50 CHRONIC RIGHT-SIDED LOW BACK PAIN WITHOUT SCIATICA: Primary | ICD-10-CM

## 2024-04-29 DIAGNOSIS — G89.29 CHRONIC RIGHT-SIDED LOW BACK PAIN WITHOUT SCIATICA: Primary | ICD-10-CM

## 2024-04-29 PROCEDURE — 99213 OFFICE O/P EST LOW 20 MIN: CPT | Performed by: FAMILY MEDICINE

## 2024-04-29 PROCEDURE — 99203 OFFICE O/P NEW LOW 30 MIN: CPT | Performed by: ORTHOPAEDIC SURGERY

## 2024-04-29 PROCEDURE — G0463 HOSPITAL OUTPT CLINIC VISIT: HCPCS

## 2024-04-29 PROCEDURE — 73030 X-RAY EXAM OF SHOULDER: CPT | Mod: LT

## 2024-04-29 RX ORDER — OXYCODONE HYDROCHLORIDE 10 MG/1
10 TABLET ORAL EVERY 4 HOURS PRN
Qty: 180 TABLET | Refills: 0 | Status: SHIPPED | OUTPATIENT
Start: 2024-05-27 | End: 2024-07-18

## 2024-04-29 RX ORDER — OXYCODONE HYDROCHLORIDE 10 MG/1
10 TABLET ORAL EVERY 4 HOURS PRN
Qty: 65 TABLET | Refills: 0 | Status: SHIPPED | OUTPATIENT
Start: 2024-04-29 | End: 2024-05-03

## 2024-04-29 RX ORDER — LORAZEPAM 1 MG/1
TABLET ORAL
Qty: 1 TABLET | Refills: 0 | Status: SHIPPED | OUTPATIENT
Start: 2024-04-29 | End: 2024-06-03

## 2024-04-29 RX ORDER — OXYCODONE HYDROCHLORIDE 10 MG/1
10 TABLET ORAL EVERY 4 HOURS PRN
Qty: 180 TABLET | Refills: 0 | Status: SHIPPED | OUTPATIENT
Start: 2024-06-24 | End: 2024-07-18

## 2024-04-29 ASSESSMENT — PAIN SCALES - GENERAL: PAINLEVEL: SEVERE PAIN (6)

## 2024-04-29 NOTE — NURSING NOTE
"Chief Complaint   Patient presents with    Work Comp     Oxycodone       Initial /84   Pulse 65   Temp 97.3  F (36.3  C) (Tympanic)   Resp 16   Wt 108.4 kg (239 lb)   SpO2 96%   BMI 38.00 kg/m   Estimated body mass index is 38 kg/m  as calculated from the following:    Height as of 4/4/24: 1.689 m (5' 6.5\").    Weight as of this encounter: 108.4 kg (239 lb).  Medication Reconciliation: complete          "

## 2024-04-29 NOTE — TELEPHONE ENCOUNTER
Pt asked if Dr. Harris could potentially fill this?  He is concerned that it won't be able to be filled in time, as his MRI is tomorrow, 4/30/2024.   Bárbara Sena on 4/29/2024 at 10:23 AM

## 2024-04-29 NOTE — PROGRESS NOTES
Subjective:    63-year-old gentleman with right shoulder rotator cuff tear arthropathy.  He states that the last no fall that we had which was several weeks ago now he slipped and injured his left shoulder.  Is been having significant difficulty using it and of note his left shoulders been hurting him quite a bit more than his right shoulder is now    Objective:    On examination today he has full range of motion of bilateral shoulders with positive Neer impingement sign.  He has weakness with supraspinatus testing marked weakness and and pain with infraspinatus testing.    Assessment:    63-year-old gentleman likely has a large rotator cuff tear of his left shoulder in addition to the rotator cuff tear arthropathy of his right shoulder.    Plan:    Will obtain an MRI of his left shoulder hopefully soon and I will call him with results.  [place] and will be gone for a month.  If he needs surgery on his left shoulder is likely going to be delayed for short period of time

## 2024-04-29 NOTE — LETTER

## 2024-04-29 NOTE — PROGRESS NOTES
Assessment & Plan     (M54.50,  G89.29) Chronic right-sided low back pain without sciatica  (primary encounter diagnosis)  Comment:  reviewed and consistent with his dosing, low risk for misuse or diversion. Refilled without change. Pain is improving overall with his ongoing weight loss, congratulated him on the changes.   Plan: oxyCODONE IR (ROXICODONE) 10 MG tablet,         oxyCODONE IR (ROXICODONE) 10 MG tablet,         oxyCODONE IR (ROXICODONE) 10 MG tablet                           Return in about 3 months (around 7/29/2024) for Follow up, with me.    Lui Dong is a 63 year old, presenting for the following health issues:  Work Comp (Oxycodone)      4/29/2024     9:20 AM   Additional Questions   Roomed by LUCAS Kya   Accompanied by Self         4/29/2024     9:20 AM   Patient Reported Additional Medications   Patient reports taking the following new medications N/A     History of Present Illness       Back Pain:  He presents for follow up of back pain. Patient's back pain is a chronic problem.  Location of back pain:  Right lower back and left lower back  Description of back pain: dull ache  Back pain spreads: nowhere    Since patient first noticed back pain, pain is: always present, but gets better and worse  Does back pain interfere with his job:  Yes       He eats 2-3 servings of fruits and vegetables daily.He consumes 0 sweetened beverage(s) daily.He exercises with enough effort to increase his heart rate 9 or less minutes per day.  He exercises with enough effort to increase his heart rate 3 or less days per week.   He is taking medications regularly.     WC follow up. Pain is about the same if not improving a little with ongoing weight loss. Using ice often.    Pain History:  When did you first notice your pain? 2003   Have you seen this provider for your pain in the past? Yes   Where in your body do you have pain? Low back  Are you seeing anyone else for your pain? No        4/7/2023      2:13 PM 8/16/2023     9:53 AM 3/19/2024    12:50 PM   PHQ-9 SCORE   PHQ-9 Total Score MyChart 9 (Mild depression) 7 (Mild depression) 4 (Minimal depression)   PHQ-9 Total Score 9 7 4           5/19/2023     9:16 AM 8/16/2023     9:49 AM 3/19/2024    12:52 PM   VISHNU-7 SCORE   Total Score 6 (mild anxiety) 8 (mild anxiety) 2 (minimal anxiety)   Total Score 6 8 2               Chronic Pain Follow Up:    Location of pain: low back  Analgesia/pain control:    - Recent changes:  fell on shoulder, getting this worked up    - Overall control: Tolerable with discomfort    - Current treatments: ice, topicals. 75% improvement in lower extremity nerve pain with weight loss. Can now walk 1 mile.   Adherence:     - Do you ever take more pain medicine than prescribed? No    - When did you take your last dose of pain medicine?  today   Adverse effects: No   PDMP Review         Value Time User    State PDMP site checked  Yes 4/29/2024  9:38 AM Sohail Harris MD          Last CSA Agreement:   CSA -- Patient Level:     [Media Unavailable] Controlled Substance Agreement - Opioid - Scan on 5/19/2023  2:02 PM   [Media Unavailable] Controlled Substance Agreement - Opioid - Scan on 5/27/2022 11:03 AM: Opioid   [Media Unavailable] Controlled Substance Agreement - Opioid - Scan on 5/6/2021 10:06 AM: CONTROLLED SUBSTANCE AGREEMENT OPIOID       Last UDS: 9/1/2022                        Objective    /84   Pulse 65   Temp 97.3  F (36.3  C) (Tympanic)   Resp 16   Wt 108.4 kg (239 lb)   SpO2 96%   BMI 38.00 kg/m    Body mass index is 38 kg/m .  Physical Exam   GENERAL: alert and no distress  MS: slow to stand but can get on table alone.no lumbar pain on palpation, negative straight leg raise and normal lower extremity strength  PSYCH: mentation appears normal, affect normal/bright            Signed Electronically by: Sohail Harris MD

## 2024-04-29 NOTE — TELEPHONE ENCOUNTER
Patient presented to counter that he has an MRI for left shoulder with Dr. Ibarra scheduled and stated that he gets claustrophobic inside the MRI machine. Patient would like a prescription to help with the anxiety.  Jeanne Simpson on 4/29/2024 at 10:15 AM

## 2024-05-01 ENCOUNTER — HOSPITAL ENCOUNTER (OUTPATIENT)
Dept: MRI IMAGING | Facility: OTHER | Age: 64
Discharge: HOME | End: 2024-05-01
Attending: ORTHOPAEDIC SURGERY | Admitting: ORTHOPAEDIC SURGERY
Payer: MEDICARE

## 2024-05-01 DIAGNOSIS — M25.512 LEFT SHOULDER PAIN, UNSPECIFIED CHRONICITY: ICD-10-CM

## 2024-05-01 PROCEDURE — 73221 MRI JOINT UPR EXTREM W/O DYE: CPT | Mod: LT,ME

## 2024-05-03 RX ORDER — OXYCODONE HYDROCHLORIDE 10 MG/1
10 TABLET ORAL EVERY 4 HOURS PRN
Qty: 180 TABLET | Refills: 0 | Status: SHIPPED | OUTPATIENT
Start: 2024-05-03 | End: 2024-07-18

## 2024-06-03 ENCOUNTER — OFFICE VISIT (OUTPATIENT)
Dept: FAMILY MEDICINE | Facility: OTHER | Age: 64
End: 2024-06-03
Attending: NURSE PRACTITIONER
Payer: COMMERCIAL

## 2024-06-03 VITALS
RESPIRATION RATE: 16 BRPM | HEIGHT: 67 IN | DIASTOLIC BLOOD PRESSURE: 88 MMHG | WEIGHT: 230.8 LBS | TEMPERATURE: 97.6 F | BODY MASS INDEX: 36.22 KG/M2 | HEART RATE: 56 BPM | OXYGEN SATURATION: 95 % | SYSTOLIC BLOOD PRESSURE: 138 MMHG

## 2024-06-03 DIAGNOSIS — Z01.818 PREOP GENERAL PHYSICAL EXAM: Primary | ICD-10-CM

## 2024-06-03 DIAGNOSIS — E11.42 TYPE 2 DIABETES MELLITUS WITH DIABETIC POLYNEUROPATHY, WITHOUT LONG-TERM CURRENT USE OF INSULIN (H): ICD-10-CM

## 2024-06-03 DIAGNOSIS — E78.5 HYPERLIPIDEMIA, UNSPECIFIED HYPERLIPIDEMIA TYPE: ICD-10-CM

## 2024-06-03 DIAGNOSIS — I10 ESSENTIAL HYPERTENSION: ICD-10-CM

## 2024-06-03 DIAGNOSIS — E66.01 MORBID OBESITY (H): ICD-10-CM

## 2024-06-03 DIAGNOSIS — S46.012D TRAUMATIC TEAR OF LEFT ROTATOR CUFF, UNSPECIFIED TEAR EXTENT, SUBSEQUENT ENCOUNTER: ICD-10-CM

## 2024-06-03 LAB
ANION GAP SERPL CALCULATED.3IONS-SCNC: 9 MMOL/L (ref 7–15)
BUN SERPL-MCNC: 19.4 MG/DL (ref 8–23)
CALCIUM SERPL-MCNC: 9.8 MG/DL (ref 8.8–10.2)
CHLORIDE SERPL-SCNC: 101 MMOL/L (ref 98–107)
CREAT SERPL-MCNC: 1.01 MG/DL (ref 0.67–1.17)
DEPRECATED HCO3 PLAS-SCNC: 26 MMOL/L (ref 22–29)
EGFRCR SERPLBLD CKD-EPI 2021: 83 ML/MIN/1.73M2
GLUCOSE SERPL-MCNC: 104 MG/DL (ref 70–99)
HBA1C MFR BLD: 5.6 % (ref 4–6.2)
HGB BLD-MCNC: 12.1 G/DL (ref 13.3–17.7)
POTASSIUM SERPL-SCNC: 5 MMOL/L (ref 3.4–5.3)
SODIUM SERPL-SCNC: 136 MMOL/L (ref 135–145)

## 2024-06-03 PROCEDURE — 36415 COLL VENOUS BLD VENIPUNCTURE: CPT | Mod: ZL | Performed by: NURSE PRACTITIONER

## 2024-06-03 PROCEDURE — 99214 OFFICE O/P EST MOD 30 MIN: CPT | Performed by: NURSE PRACTITIONER

## 2024-06-03 PROCEDURE — 83036 HEMOGLOBIN GLYCOSYLATED A1C: CPT | Mod: ZL | Performed by: NURSE PRACTITIONER

## 2024-06-03 PROCEDURE — 80048 BASIC METABOLIC PNL TOTAL CA: CPT | Mod: ZL | Performed by: NURSE PRACTITIONER

## 2024-06-03 PROCEDURE — G0463 HOSPITAL OUTPT CLINIC VISIT: HCPCS

## 2024-06-03 PROCEDURE — 85018 HEMOGLOBIN: CPT | Mod: ZL | Performed by: NURSE PRACTITIONER

## 2024-06-03 ASSESSMENT — ANXIETY QUESTIONNAIRES
GAD7 TOTAL SCORE: 2
4. TROUBLE RELAXING: SEVERAL DAYS
1. FEELING NERVOUS, ANXIOUS, OR ON EDGE: NOT AT ALL
7. FEELING AFRAID AS IF SOMETHING AWFUL MIGHT HAPPEN: NOT AT ALL
7. FEELING AFRAID AS IF SOMETHING AWFUL MIGHT HAPPEN: NOT AT ALL
2. NOT BEING ABLE TO STOP OR CONTROL WORRYING: NOT AT ALL
IF YOU CHECKED OFF ANY PROBLEMS ON THIS QUESTIONNAIRE, HOW DIFFICULT HAVE THESE PROBLEMS MADE IT FOR YOU TO DO YOUR WORK, TAKE CARE OF THINGS AT HOME, OR GET ALONG WITH OTHER PEOPLE: NOT DIFFICULT AT ALL
8. IF YOU CHECKED OFF ANY PROBLEMS, HOW DIFFICULT HAVE THESE MADE IT FOR YOU TO DO YOUR WORK, TAKE CARE OF THINGS AT HOME, OR GET ALONG WITH OTHER PEOPLE?: NOT DIFFICULT AT ALL
5. BEING SO RESTLESS THAT IT IS HARD TO SIT STILL: SEVERAL DAYS
3. WORRYING TOO MUCH ABOUT DIFFERENT THINGS: NOT AT ALL
GAD7 TOTAL SCORE: 2
6. BECOMING EASILY ANNOYED OR IRRITABLE: NOT AT ALL
GAD7 TOTAL SCORE: 2

## 2024-06-03 ASSESSMENT — PAIN SCALES - PAIN ENJOYMENT GENERAL ACTIVITY SCALE (PEG)
PEG_TOTALSCORE: 6
INTERFERED_ENJOYMENT_LIFE: 6
AVG_PAIN_PASTWEEK: 6
PEG_TOTALSCORE: 6
AVG_PAIN_PASTWEEK: 6
INTERFERED_GENERAL_ACTIVITY: 6
INTERFERED_GENERAL_ACTIVITY: 6
INTERFERED_ENJOYMENT_LIFE: 6

## 2024-06-03 NOTE — PROGRESS NOTES
Preoperative Evaluation  [facility]  [address]  [place] [address]  Phone: [phone]  Fax: [phone]  Primary Provider: Sohail Harris MD  Pre-op Performing Provider: STEFFEN Nina CNP  Dominic 3, 2024             6/3/2024   Surgical Information   What procedure is being done? Left rotator cuff   Facility or Hospital where procedure/surgery will be performed: [facility]   Who is doing the procedure / surgery?    Date of surgery / procedure: 06/10/24   Time of surgery / procedure: na   Where do you plan to recover after surgery? at home with family     Fax number for surgical facility: Note does not need to be faxed, will be available electronically in Epic.    Assessment & Plan     The proposed surgical procedure is considered INTERMEDIATE risk.    Problem List Items Addressed This Visit          Digestive    Morbid obesity (H)       Endocrine    Hyperlipidemia    Diabetes mellitus, type 2 (H)    Relevant Orders    Hemoglobin A1c (Completed)       Circulatory    Essential hypertension     Other Visit Diagnoses       Preop general physical exam    -  Primary    Relevant Orders    Basic Metabolic Panel (Completed)    Hemoglobin (Completed)    Traumatic tear of left rotator cuff, unspecified tear extent, subsequent encounter              BMP and hemoglobin stable.  A1c pending.  Optimized for upcoming procedure as requested.     - No identified additional risk factors other than previously addressed    Preoperative Medication Instructions  Antiplatelet or Anticoagulation Medication Instructions   - Patient is on no antiplatelet or anticoagulation medications.    Additional Medication Instructions  Take all scheduled medications on the day of surgery EXCEPT for modifications listed below:   - Lisinopril: DO NOT TAKE on day of surgery (minimum 11 hours for general anesthesia).   - Beta Blockers: Continue taking on the day of surgery.   - Statins: Continue taking on the  day of surgery.    - metformin: DO NOT TAKE day of surgery.    Recommendation  Approval given to proceed with proposed procedure, without further diagnostic evaluation.        Lui Dong is a 63 year old, presenting for the following:  Pre-Op Exam        HPI related to upcoming procedure: He presents to clinic today for preop clearance for left shoulder surgery.  He injured this this winter.  He is feeling well today, denies any significant health concerns other than pain to left shoulder.  Blood pressure and diabetes has been stable.  Last hemoglobin A1c was 5.92 months ago.  He reports he never started semaglutide as this was denied by insurance.  He has not been taking Lyrica for several months, has not noted any change to mood or nerve pain.        6/3/2024   Pre-Op Questionnaire   Have you ever had a heart attack or stroke? No   Have you ever had surgery on your heart or blood vessels, such as a stent placement, a coronary artery bypass, or surgery on an artery in your head, neck, heart, or legs? No   Do you have chest pain with activity? No   Do you have a history of heart failure? No   Do you currently have a cold, bronchitis or symptoms of other infection? No   Do you have a cough, shortness of breath, or wheezing? No   Do you or anyone in your family have previous history of blood clots? (!) YES mom had history   Do you or does anyone in your family have a serious bleeding problem such as prolonged bleeding following surgeries or cuts? No   Have you ever had problems with anemia or been told to take iron pills? No   Have you had any abnormal blood loss such as black, tarry or bloody stools? No   Have you ever had a blood transfusion? No   Are you willing to have a blood transfusion if it is medically needed before, during, or after your surgery? Yes   Have you or any of your relatives ever had problems with anesthesia? (!) YES remote history trouble waking up   Do you have sleep apnea, excessive  snoring or daytime drowsiness? No   Do you have any artifical heart valves or other implanted medical devices like a pacemaker, defibrillator, or continuous glucose monitor? No   Do you have artificial joints? No   Are you allergic to latex? No     Health Care Directive  Patient does not have a Health Care Directive or Living Will: Discussed advance care planning with patient; however, patient declined at this time.    Preoperative Review of    reviewed - controlled substances reflected in medication list.      Status of Chronic Conditions:  See problem list for active medical problems.  Problems all longstanding and stable, except as noted/documented.  See ROS for pertinent symptoms related to these conditions.    Patient Active Problem List    Diagnosis Date Noted    Hypothyroidism, unspecified type 03/19/2024     Priority: Medium    Vertigo 03/19/2024     Priority: Medium    Diabetic polyneuropathy associated with type 2 diabetes mellitus (H) 04/07/2023     Priority: Medium    Healthcare maintenance 01/18/2023     Priority: Medium    Chronic, continuous use of opioids 12/01/2021     Priority: Medium    Diabetes mellitus, type 2 (H) 07/15/2021     Priority: Medium    Morbid obesity (H) 04/01/2021     Priority: Medium    Failed back surgical syndrome 12/28/2020     Priority: Medium    Atelectasis 08/24/2020     Priority: Medium    Back pain, chronic 02/07/2018     Priority: Medium    Degeneration of cervical intervertebral disc 02/07/2018     Priority: Medium    Grief reaction with prolonged bereavement 02/15/2017     Priority: Medium    Aortic valve sclerosis 12/08/2016     Priority: Medium    Knee pain, chronic 06/18/2015     Priority: Medium    Primary osteoarthritis of right knee 06/18/2015     Priority: Medium    Controlled substance agreement signed 12/6/19 07/02/2013     Priority: Medium    Allergic rhinitis 06/14/2011     Priority: Medium    HYPERLIPIDEMIA LDL GOAL <130 10/31/2010     Priority: Medium     PAIN CHRONIC( [facility]) 12/04/2007     Priority: Medium    Obesity 04/24/2007     Priority: Medium     Problem list name updated by automated process. Provider to review      Essential hypertension      Priority: Medium     Problem list name updated by automated process. Provider to review      Hyperlipidemia      Priority: Medium     Problem list name updated by automated process. Provider to review        Past Medical History:   Diagnosis Date    Chest pain     12/12/2016    Other specified postprocedural states     Status post colonoscopy    Personal history of other medical treatment (CODE)     04/2009,MRI cervical spine     Past Surgical History:   Procedure Laterality Date    ARTHROSCOPY SHOULDER ROTATOR CUFF REPAIR, SUBACROMIAL DECOMP, DIST CLAVICLE RESECTN, BICEP TENOTOMY Right 8/24/2020    Procedure: Diagnostic Right Shulder Scope w/ extensive debridement, and distal clavicle excision.;  Surgeon: Jaime Dean MD;  Location: [facility] OR    COLONOSCOPY      No Comments Provided    COLONOSCOPY N/A 4/4/2024    Procedure: Colonoscopy WITH HEMORRHOIDECTOMY;  Surgeon: Aaron Glover MD;  Location: [facility] OR    FUSION LUMBAR ANTERIOR ONE LEVEL      2004,Back surgery x 5 (fusion, hardware removal, stimulator and removal)    OTHER SURGICAL HISTORY      2005,205736,PERIPHERAL NERVE STIMULATOR,Lumbar nerve stimulator and subsequent removal    OTHER SURGICAL HISTORY      2005,205448,REMOVAL OF FOREIGN BODY, subsequent removal    OTHER SURGICAL HISTORY      [id number],INCISION AND DRAINAGE,from infection from nerve stimulator    OTHER SURGICAL HISTORY      04/2009,207388,SCAN-MRI INTERPRETATION,MRI cervical spine.     Current Outpatient Medications   Medication Sig Dispense Refill    acetaminophen (TYLENOL) 500 MG tablet Take 500 mg by mouth every 4 hours as needed       atenolol (TENORMIN) 100 MG tablet Take 0.5 tablets (50 mg) by mouth daily 45 tablet 4    atorvastatin (LIPITOR) 40 MG tablet TAKE 1 TABLET (40 MG) BY MOUTH DAILY  90 tablet 4    blood glucose (NO BRAND SPECIFIED) test strip Use to test blood sugar 1 times daily or as directed. 90 strip 4    blood glucose monitoring (NO BRAND SPECIFIED) meter device kit Use to test blood sugar 1 times daily or as directed. 1 kit 0    insulin pen needle (31G X 8 MM) 31G X 8 MM miscellaneous Use 1 pen needles weekly or as directed. 30 each 4    levothyroxine (SYNTHROID/LEVOTHROID) 50 MCG tablet TAKE 1 TABLET (50 MCG) BY MOUTH DAILY 90 tablet 4    lisinopril (ZESTRIL) 20 MG tablet TAKE 1 TABLET (20 MG) BY MOUTH DAILY 90 tablet 4    metFORMIN (GLUCOPHAGE) 1000 MG tablet TAKE 1 TABLET (1,000 MG) BYMOUTH 2 TIMES DAILY (WITH MEALS) 180 tablet 4    nortriptyline (PAMELOR) 50 MG capsule Take 1 capsule (50 mg) by mouth at bedtime 90 capsule 4    oxyCODONE IR (ROXICODONE) 10 MG tablet Take 1 tablet (10 mg) by mouth every 4 hours as needed for severe pain 180 tablet 0    oxyCODONE IR (ROXICODONE) 10 MG tablet Take 1 tablet (10 mg) by mouth every 4 hours as needed for severe pain 180 tablet 0    [START ON 6/24/2024] oxyCODONE IR (ROXICODONE) 10 MG tablet Take 1 tablet (10 mg) by mouth every 4 hours as needed for severe pain 180 tablet 0    sertraline (ZOLOFT) 100 MG tablet TAKE 1 TABLET (100 MG) BY MOUTH DAILY 90 tablet 4    TRUEplus Lancets 28G MISC Inject 1 lancet Subcutaneous daily 100 each 4    naloxone (NARCAN) 4 MG/0.1ML nasal spray Spray 1 spray (4 mg) into one nostril alternating nostrils once as needed for opioid reversal every 2-3 minutes until assistance arrives (Patient not taking: Reported on 6/3/2024) 0.2 mL 3       Allergies   Allergen Reactions    Betadine [Povidone Iodine] Rash and Dermatitis     Severe blistering     Liquid Adhesive Dermatitis        Social History     Tobacco Use    Smoking status: Never    Smokeless tobacco: Never   Substance Use Topics    Alcohol use: Not Currently     Comment: rare     Family History   Problem Relation Age of Onset    Family History Negative Daughter   "       Good Health     History   Drug Use Unknown           Objective    /88   Pulse 56   Temp 97.6  F (36.4  C)   Resp 16   Ht 1.689 m (5' 6.5\")   Wt 104.7 kg (230 lb 12.8 oz)   SpO2 95%   BMI 36.69 kg/m     Estimated body mass index is 36.69 kg/m  as calculated from the following:    Height as of this encounter: 1.689 m (5' 6.5\").    Weight as of this encounter: 104.7 kg (230 lb 12.8 oz).  Physical Exam  GENERAL: alert and no distress  EYES: Eyes grossly normal to inspection, PERRL and conjunctivae and sclerae normal  HENT: ear canals and TM's normal, nose and mouth without ulcers or lesions  NECK: no adenopathy, no asymmetry, masses, or scars  RESP: lungs clear to auscultation - no rales, rhonchi or wheezes  CV: regular rate and rhythm, normal S1 S2, no S3 or S4, no murmur, click or rub, no peripheral edema  ABDOMEN: soft, nontender, no hepatosplenomegaly, no masses and bowel sounds normal  MS: no gross musculoskeletal defects noted, no edema  SKIN: no suspicious lesions or rashes  NEURO: Normal strength and tone, mentation intact and speech normal  PSYCH: mentation appears normal, affect normal/bright    Recent Labs   Lab Test 03/19/24  1414 11/08/23  1042   HGB 12.4*  --      --      --    POTASSIUM 5.6*  --    CR 1.10  --    A1C 5.9 7.0*        Diagnostics  Recent Results (from the past 24 hour(s))   Basic Metabolic Panel    Collection Time: 06/03/24 10:25 AM   Result Value Ref Range    Sodium 136 135 - 145 mmol/L    Potassium 5.0 3.4 - 5.3 mmol/L    Chloride 101 98 - 107 mmol/L    Carbon Dioxide (CO2) 26 22 - 29 mmol/L    Anion Gap 9 7 - 15 mmol/L    Urea Nitrogen 19.4 8.0 - 23.0 mg/dL    Creatinine 1.01 0.67 - 1.17 mg/dL    GFR Estimate 83 >60 mL/min/1.73m2    Calcium 9.8 8.8 - 10.2 mg/dL    Glucose 104 (H) 70 - 99 mg/dL   Hemoglobin    Collection Time: 06/03/24 10:25 AM   Result Value Ref Range    Hemoglobin 12.1 (L) 13.3 - 17.7 g/dL   Hemoglobin A1c    Collection Time: 06/03/24 " 10:25 AM   Result Value Ref Range    Hemoglobin A1C 5.6 4.0 - 6.2 %      No EKG this visit, completed in the last 90 days.    Revised Cardiac Risk Index (RCRI)  The patient has the following serious cardiovascular risks for perioperative complications:   - No serious cardiac risks = 0 points     RCRI Interpretation: 0 points: Class I (very low risk - 0.4% complication rate)         Signed Electronically by: STEFFEN Nina CNP  Copy of this evaluation report is provided to requesting physician.

## 2024-06-03 NOTE — H&P (VIEW-ONLY)
Preoperative Evaluation  [facility]  [address]  [place] [address]  Phone: [phone]  Fax: [phone]  Primary Provider: Sohail Harris MD  Pre-op Performing Provider: STEFFEN Nina CNP  Dominic 3, 2024             6/3/2024   Surgical Information   What procedure is being done? Left rotator cuff   Facility or Hospital where procedure/surgery will be performed: [facility]   Who is doing the procedure / surgery?    Date of surgery / procedure: 06/10/24   Time of surgery / procedure: na   Where do you plan to recover after surgery? at home with family     Fax number for surgical facility: Note does not need to be faxed, will be available electronically in Epic.    Assessment & Plan     The proposed surgical procedure is considered INTERMEDIATE risk.    Problem List Items Addressed This Visit          Digestive    Morbid obesity (H)       Endocrine    Hyperlipidemia    Diabetes mellitus, type 2 (H)    Relevant Orders    Hemoglobin A1c (Completed)       Circulatory    Essential hypertension     Other Visit Diagnoses       Preop general physical exam    -  Primary    Relevant Orders    Basic Metabolic Panel (Completed)    Hemoglobin (Completed)    Traumatic tear of left rotator cuff, unspecified tear extent, subsequent encounter              BMP and hemoglobin stable.  A1c pending.  Optimized for upcoming procedure as requested.     - No identified additional risk factors other than previously addressed    Preoperative Medication Instructions  Antiplatelet or Anticoagulation Medication Instructions   - Patient is on no antiplatelet or anticoagulation medications.    Additional Medication Instructions  Take all scheduled medications on the day of surgery EXCEPT for modifications listed below:   - Lisinopril: DO NOT TAKE on day of surgery (minimum 11 hours for general anesthesia).   - Beta Blockers: Continue taking on the day of surgery.   - Statins: Continue taking on the  day of surgery.    - metformin: DO NOT TAKE day of surgery.    Recommendation  Approval given to proceed with proposed procedure, without further diagnostic evaluation.        Lui Dong is a 63 year old, presenting for the following:  Pre-Op Exam        HPI related to upcoming procedure: He presents to clinic today for preop clearance for left shoulder surgery.  He injured this this winter.  He is feeling well today, denies any significant health concerns other than pain to left shoulder.  Blood pressure and diabetes has been stable.  Last hemoglobin A1c was 5.92 months ago.  He reports he never started semaglutide as this was denied by insurance.  He has not been taking Lyrica for several months, has not noted any change to mood or nerve pain.        6/3/2024   Pre-Op Questionnaire   Have you ever had a heart attack or stroke? No   Have you ever had surgery on your heart or blood vessels, such as a stent placement, a coronary artery bypass, or surgery on an artery in your head, neck, heart, or legs? No   Do you have chest pain with activity? No   Do you have a history of heart failure? No   Do you currently have a cold, bronchitis or symptoms of other infection? No   Do you have a cough, shortness of breath, or wheezing? No   Do you or anyone in your family have previous history of blood clots? (!) YES mom had history   Do you or does anyone in your family have a serious bleeding problem such as prolonged bleeding following surgeries or cuts? No   Have you ever had problems with anemia or been told to take iron pills? No   Have you had any abnormal blood loss such as black, tarry or bloody stools? No   Have you ever had a blood transfusion? No   Are you willing to have a blood transfusion if it is medically needed before, during, or after your surgery? Yes   Have you or any of your relatives ever had problems with anesthesia? (!) YES remote history trouble waking up   Do you have sleep apnea, excessive  snoring or daytime drowsiness? No   Do you have any artifical heart valves or other implanted medical devices like a pacemaker, defibrillator, or continuous glucose monitor? No   Do you have artificial joints? No   Are you allergic to latex? No     Health Care Directive  Patient does not have a Health Care Directive or Living Will: Discussed advance care planning with patient; however, patient declined at this time.    Preoperative Review of    reviewed - controlled substances reflected in medication list.      Status of Chronic Conditions:  See problem list for active medical problems.  Problems all longstanding and stable, except as noted/documented.  See ROS for pertinent symptoms related to these conditions.    Patient Active Problem List    Diagnosis Date Noted    Hypothyroidism, unspecified type 03/19/2024     Priority: Medium    Vertigo 03/19/2024     Priority: Medium    Diabetic polyneuropathy associated with type 2 diabetes mellitus (H) 04/07/2023     Priority: Medium    Healthcare maintenance 01/18/2023     Priority: Medium    Chronic, continuous use of opioids 12/01/2021     Priority: Medium    Diabetes mellitus, type 2 (H) 07/15/2021     Priority: Medium    Morbid obesity (H) 04/01/2021     Priority: Medium    Failed back surgical syndrome 12/28/2020     Priority: Medium    Atelectasis 08/24/2020     Priority: Medium    Back pain, chronic 02/07/2018     Priority: Medium    Degeneration of cervical intervertebral disc 02/07/2018     Priority: Medium    Grief reaction with prolonged bereavement 02/15/2017     Priority: Medium    Aortic valve sclerosis 12/08/2016     Priority: Medium    Knee pain, chronic 06/18/2015     Priority: Medium    Primary osteoarthritis of right knee 06/18/2015     Priority: Medium    Controlled substance agreement signed 12/6/19 07/02/2013     Priority: Medium    Allergic rhinitis 06/14/2011     Priority: Medium    HYPERLIPIDEMIA LDL GOAL <130 10/31/2010     Priority: Medium     PAIN CHRONIC( [facility]) 12/04/2007     Priority: Medium    Obesity 04/24/2007     Priority: Medium     Problem list name updated by automated process. Provider to review      Essential hypertension      Priority: Medium     Problem list name updated by automated process. Provider to review      Hyperlipidemia      Priority: Medium     Problem list name updated by automated process. Provider to review        Past Medical History:   Diagnosis Date    Chest pain     12/12/2016    Other specified postprocedural states     Status post colonoscopy    Personal history of other medical treatment (CODE)     04/2009,MRI cervical spine     Past Surgical History:   Procedure Laterality Date    ARTHROSCOPY SHOULDER ROTATOR CUFF REPAIR, SUBACROMIAL DECOMP, DIST CLAVICLE RESECTN, BICEP TENOTOMY Right 8/24/2020    Procedure: Diagnostic Right Shulder Scope w/ extensive debridement, and distal clavicle excision.;  Surgeon: Jaime Dean MD;  Location: [facility] OR    COLONOSCOPY      No Comments Provided    COLONOSCOPY N/A 4/4/2024    Procedure: Colonoscopy WITH HEMORRHOIDECTOMY;  Surgeon: Aaron Glover MD;  Location: [facility] OR    FUSION LUMBAR ANTERIOR ONE LEVEL      2004,Back surgery x 5 (fusion, hardware removal, stimulator and removal)    OTHER SURGICAL HISTORY      2005,205736,PERIPHERAL NERVE STIMULATOR,Lumbar nerve stimulator and subsequent removal    OTHER SURGICAL HISTORY      2005,205448,REMOVAL OF FOREIGN BODY, subsequent removal    OTHER SURGICAL HISTORY      208566,INCISION AND DRAINAGE,from infection from nerve stimulator    OTHER SURGICAL HISTORY      04/2009,207388,SCAN-MRI INTERPRETATION,MRI cervical spine.     Current Outpatient Medications   Medication Sig Dispense Refill    acetaminophen (TYLENOL) 500 MG tablet Take 500 mg by mouth every 4 hours as needed       atenolol (TENORMIN) 100 MG tablet Take 0.5 tablets (50 mg) by mouth daily 45 tablet 4    atorvastatin (LIPITOR) 40 MG tablet TAKE 1 TABLET (40 MG) BY MOUTH DAILY  90 tablet 4    blood glucose (NO BRAND SPECIFIED) test strip Use to test blood sugar 1 times daily or as directed. 90 strip 4    blood glucose monitoring (NO BRAND SPECIFIED) meter device kit Use to test blood sugar 1 times daily or as directed. 1 kit 0    insulin pen needle (31G X 8 MM) 31G X 8 MM miscellaneous Use 1 pen needles weekly or as directed. 30 each 4    levothyroxine (SYNTHROID/LEVOTHROID) 50 MCG tablet TAKE 1 TABLET (50 MCG) BY MOUTH DAILY 90 tablet 4    lisinopril (ZESTRIL) 20 MG tablet TAKE 1 TABLET (20 MG) BY MOUTH DAILY 90 tablet 4    metFORMIN (GLUCOPHAGE) 1000 MG tablet TAKE 1 TABLET (1,000 MG) BYMOUTH 2 TIMES DAILY (WITH MEALS) 180 tablet 4    nortriptyline (PAMELOR) 50 MG capsule Take 1 capsule (50 mg) by mouth at bedtime 90 capsule 4    oxyCODONE IR (ROXICODONE) 10 MG tablet Take 1 tablet (10 mg) by mouth every 4 hours as needed for severe pain 180 tablet 0    oxyCODONE IR (ROXICODONE) 10 MG tablet Take 1 tablet (10 mg) by mouth every 4 hours as needed for severe pain 180 tablet 0    [START ON 6/24/2024] oxyCODONE IR (ROXICODONE) 10 MG tablet Take 1 tablet (10 mg) by mouth every 4 hours as needed for severe pain 180 tablet 0    sertraline (ZOLOFT) 100 MG tablet TAKE 1 TABLET (100 MG) BY MOUTH DAILY 90 tablet 4    TRUEplus Lancets 28G MISC Inject 1 lancet Subcutaneous daily 100 each 4    naloxone (NARCAN) 4 MG/0.1ML nasal spray Spray 1 spray (4 mg) into one nostril alternating nostrils once as needed for opioid reversal every 2-3 minutes until assistance arrives (Patient not taking: Reported on 6/3/2024) 0.2 mL 3       Allergies   Allergen Reactions    Betadine [Povidone Iodine] Rash and Dermatitis     Severe blistering     Liquid Adhesive Dermatitis        Social History     Tobacco Use    Smoking status: Never    Smokeless tobacco: Never   Substance Use Topics    Alcohol use: Not Currently     Comment: rare     Family History   Problem Relation Age of Onset    Family History Negative Daughter   "       Good Health     History   Drug Use Unknown           Objective    /88   Pulse 56   Temp 97.6  F (36.4  C)   Resp 16   Ht 1.689 m (5' 6.5\")   Wt 104.7 kg (230 lb 12.8 oz)   SpO2 95%   BMI 36.69 kg/m     Estimated body mass index is 36.69 kg/m  as calculated from the following:    Height as of this encounter: 1.689 m (5' 6.5\").    Weight as of this encounter: 104.7 kg (230 lb 12.8 oz).  Physical Exam  GENERAL: alert and no distress  EYES: Eyes grossly normal to inspection, PERRL and conjunctivae and sclerae normal  HENT: ear canals and TM's normal, nose and mouth without ulcers or lesions  NECK: no adenopathy, no asymmetry, masses, or scars  RESP: lungs clear to auscultation - no rales, rhonchi or wheezes  CV: regular rate and rhythm, normal S1 S2, no S3 or S4, no murmur, click or rub, no peripheral edema  ABDOMEN: soft, nontender, no hepatosplenomegaly, no masses and bowel sounds normal  MS: no gross musculoskeletal defects noted, no edema  SKIN: no suspicious lesions or rashes  NEURO: Normal strength and tone, mentation intact and speech normal  PSYCH: mentation appears normal, affect normal/bright    Recent Labs   Lab Test 03/19/24  1414 11/08/23  1042   HGB 12.4*  --      --      --    POTASSIUM 5.6*  --    CR 1.10  --    A1C 5.9 7.0*        Diagnostics  Recent Results (from the past 24 hour(s))   Basic Metabolic Panel    Collection Time: 06/03/24 10:25 AM   Result Value Ref Range    Sodium 136 135 - 145 mmol/L    Potassium 5.0 3.4 - 5.3 mmol/L    Chloride 101 98 - 107 mmol/L    Carbon Dioxide (CO2) 26 22 - 29 mmol/L    Anion Gap 9 7 - 15 mmol/L    Urea Nitrogen 19.4 8.0 - 23.0 mg/dL    Creatinine 1.01 0.67 - 1.17 mg/dL    GFR Estimate 83 >60 mL/min/1.73m2    Calcium 9.8 8.8 - 10.2 mg/dL    Glucose 104 (H) 70 - 99 mg/dL   Hemoglobin    Collection Time: 06/03/24 10:25 AM   Result Value Ref Range    Hemoglobin 12.1 (L) 13.3 - 17.7 g/dL   Hemoglobin A1c    Collection Time: 06/03/24 " 10:25 AM   Result Value Ref Range    Hemoglobin A1C 5.6 4.0 - 6.2 %      No EKG this visit, completed in the last 90 days.    Revised Cardiac Risk Index (RCRI)  The patient has the following serious cardiovascular risks for perioperative complications:   - No serious cardiac risks = 0 points     RCRI Interpretation: 0 points: Class I (very low risk - 0.4% complication rate)         Signed Electronically by: STEFFEN Nina CNP  Copy of this evaluation report is provided to requesting physician.

## 2024-06-03 NOTE — PATIENT INSTRUCTIONS

## 2024-06-07 ENCOUNTER — ANESTHESIA EVENT (OUTPATIENT)
Dept: SURGERY | Facility: OTHER | Age: 64
End: 2024-06-07
Payer: MEDICARE

## 2024-06-10 ENCOUNTER — ANESTHESIA (OUTPATIENT)
Dept: SURGERY | Facility: OTHER | Age: 64
End: 2024-06-10
Payer: MEDICARE

## 2024-06-10 ENCOUNTER — HOSPITAL ENCOUNTER (OUTPATIENT)
Facility: OTHER | Age: 64
Discharge: HOME | End: 2024-06-10
Attending: ORTHOPAEDIC SURGERY | Admitting: ORTHOPAEDIC SURGERY
Payer: MEDICARE

## 2024-06-10 VITALS
TEMPERATURE: 96.1 F | DIASTOLIC BLOOD PRESSURE: 86 MMHG | WEIGHT: 230 LBS | OXYGEN SATURATION: 93 % | BODY MASS INDEX: 36.1 KG/M2 | HEIGHT: 67 IN | SYSTOLIC BLOOD PRESSURE: 106 MMHG | HEART RATE: 66 BPM | RESPIRATION RATE: 10 BRPM

## 2024-06-10 DIAGNOSIS — M75.112 NONTRAUMATIC INCOMPLETE TEAR OF LEFT ROTATOR CUFF: Primary | ICD-10-CM

## 2024-06-10 LAB
GLUCOSE BLDC GLUCOMTR-MCNC: 112 MG/DL (ref 70–99)
GLUCOSE BLDC GLUCOMTR-MCNC: 122 MG/DL (ref 70–99)

## 2024-06-10 PROCEDURE — 250N000011 HC RX IP 250 OP 636: Performed by: NURSE ANESTHETIST, CERTIFIED REGISTERED

## 2024-06-10 PROCEDURE — 29827 SHO ARTHRS SRG RT8TR CUF RPR: CPT | Mod: LT | Performed by: ORTHOPAEDIC SURGERY

## 2024-06-10 PROCEDURE — 258N000003 HC RX IP 258 OP 636: Mod: JZ

## 2024-06-10 PROCEDURE — 250N000026 HC DESFLURANE, PER MIN: Performed by: ORTHOPAEDIC SURGERY

## 2024-06-10 PROCEDURE — 29826 SHO ARTHRS SRG DECOMPRESSION: CPT | Mod: LT | Performed by: ORTHOPAEDIC SURGERY

## 2024-06-10 PROCEDURE — 258N000001 HC RX 258: Performed by: ORTHOPAEDIC SURGERY

## 2024-06-10 PROCEDURE — 250N000011 HC RX IP 250 OP 636: Mod: JZ | Performed by: NURSE ANESTHETIST, CERTIFIED REGISTERED

## 2024-06-10 PROCEDURE — 250N000025 HC SEVOFLURANE, PER MIN: Performed by: ORTHOPAEDIC SURGERY

## 2024-06-10 PROCEDURE — 999N000141 HC STATISTIC PRE-PROCEDURE NURSING ASSESSMENT: Performed by: ORTHOPAEDIC SURGERY

## 2024-06-10 PROCEDURE — C1713 ANCHOR/SCREW BN/BN,TIS/BN: HCPCS | Performed by: ORTHOPAEDIC SURGERY

## 2024-06-10 PROCEDURE — 29824 SHO ARTHRS SRG DSTL CLAVICLC: CPT | Mod: LT | Performed by: ORTHOPAEDIC SURGERY

## 2024-06-10 PROCEDURE — 360N000077 HC SURGERY LEVEL 4, PER MIN: Performed by: ORTHOPAEDIC SURGERY

## 2024-06-10 PROCEDURE — 272N000002 HC OR SUPPLY OTHER OPNP: Performed by: ORTHOPAEDIC SURGERY

## 2024-06-10 PROCEDURE — 29828 SHO ARTHRS SRG BICP TENODSIS: CPT | Mod: LT | Performed by: ORTHOPAEDIC SURGERY

## 2024-06-10 PROCEDURE — 370N000017 HC ANESTHESIA TECHNICAL FEE, PER MIN: Performed by: ORTHOPAEDIC SURGERY

## 2024-06-10 PROCEDURE — 710N000012 HC RECOVERY PHASE 2, PER MINUTE: Performed by: ORTHOPAEDIC SURGERY

## 2024-06-10 PROCEDURE — 710N000010 HC RECOVERY PHASE 1, LEVEL 2, PER MIN: Performed by: ORTHOPAEDIC SURGERY

## 2024-06-10 PROCEDURE — 64415 NJX AA&/STRD BRCH PLXS IMG: CPT | Mod: LT | Performed by: NURSE ANESTHETIST, CERTIFIED REGISTERED

## 2024-06-10 PROCEDURE — 250N000011 HC RX IP 250 OP 636: Performed by: ORTHOPAEDIC SURGERY

## 2024-06-10 PROCEDURE — 250N000013 HC RX MED GY IP 250 OP 250 PS 637

## 2024-06-10 PROCEDURE — 250N000009 HC RX 250: Performed by: NURSE ANESTHETIST, CERTIFIED REGISTERED

## 2024-06-10 PROCEDURE — 258N000003 HC RX IP 258 OP 636: Mod: JZ | Performed by: ORTHOPAEDIC SURGERY

## 2024-06-10 PROCEDURE — 272N000001 HC OR GENERAL SUPPLY STERILE: Performed by: ORTHOPAEDIC SURGERY

## 2024-06-10 PROCEDURE — 82962 GLUCOSE BLOOD TEST: CPT

## 2024-06-10 PROCEDURE — 29827 SHO ARTHRS SRG RT8TR CUF RPR: CPT | Performed by: NURSE ANESTHETIST, CERTIFIED REGISTERED

## 2024-06-10 PROCEDURE — 258N000003 HC RX IP 258 OP 636: Mod: JZ | Performed by: NURSE ANESTHETIST, CERTIFIED REGISTERED

## 2024-06-10 PROCEDURE — 258N000003 HC RX IP 258 OP 636

## 2024-06-10 DEVICE — FBRTK SPDBRG IMP SYS W/ BC SWVLK
Type: IMPLANTABLE DEVICE | Site: SHOULDER | Status: FUNCTIONAL
Brand: ARTHREX®

## 2024-06-10 RX ORDER — CEFAZOLIN SODIUM/WATER 2 G/20 ML
2 SYRINGE (ML) INTRAVENOUS SEE ADMIN INSTRUCTIONS
Status: DISCONTINUED | OUTPATIENT
Start: 2024-06-10 | End: 2024-06-10 | Stop reason: HOSPADM

## 2024-06-10 RX ORDER — HYDROCODONE BITARTRATE AND ACETAMINOPHEN 5; 325 MG/1; MG/1
1-2 TABLET ORAL EVERY 4 HOURS PRN
Qty: 40 TABLET | Refills: 0 | Status: SHIPPED | OUTPATIENT
Start: 2024-06-10 | End: 2024-07-18

## 2024-06-10 RX ORDER — NALOXONE HYDROCHLORIDE 0.4 MG/ML
0.1 INJECTION, SOLUTION INTRAMUSCULAR; INTRAVENOUS; SUBCUTANEOUS
Status: DISCONTINUED | OUTPATIENT
Start: 2024-06-10 | End: 2024-06-10 | Stop reason: HOSPADM

## 2024-06-10 RX ORDER — BUPIVACAINE HYDROCHLORIDE 5 MG/ML
INJECTION, SOLUTION EPIDURAL; INTRACAUDAL
Status: COMPLETED | OUTPATIENT
Start: 2024-06-10 | End: 2024-06-10

## 2024-06-10 RX ORDER — DEXAMETHASONE SODIUM PHOSPHATE 10 MG/ML
INJECTION, SOLUTION INTRAMUSCULAR; INTRAVENOUS
Status: COMPLETED | OUTPATIENT
Start: 2024-06-10 | End: 2024-06-10

## 2024-06-10 RX ORDER — ONDANSETRON 4 MG/1
4 TABLET, ORALLY DISINTEGRATING ORAL EVERY 30 MIN PRN
Status: DISCONTINUED | OUTPATIENT
Start: 2024-06-10 | End: 2024-06-10 | Stop reason: HOSPADM

## 2024-06-10 RX ORDER — SODIUM CHLORIDE, SODIUM LACTATE, POTASSIUM CHLORIDE, CALCIUM CHLORIDE 600; 310; 30; 20 MG/100ML; MG/100ML; MG/100ML; MG/100ML
INJECTION, SOLUTION INTRAVENOUS CONTINUOUS
Status: DISCONTINUED | OUTPATIENT
Start: 2024-06-10 | End: 2024-06-10 | Stop reason: HOSPADM

## 2024-06-10 RX ORDER — LIDOCAINE HYDROCHLORIDE 20 MG/ML
INJECTION, SOLUTION INFILTRATION; PERINEURAL PRN
Status: DISCONTINUED | OUTPATIENT
Start: 2024-06-10 | End: 2024-06-10

## 2024-06-10 RX ORDER — OXYCODONE HYDROCHLORIDE 5 MG/1
5 TABLET ORAL
Status: DISCONTINUED | OUTPATIENT
Start: 2024-06-10 | End: 2024-06-10 | Stop reason: HOSPADM

## 2024-06-10 RX ORDER — OXYCODONE HYDROCHLORIDE 5 MG/1
10 TABLET ORAL
Status: COMPLETED | OUTPATIENT
Start: 2024-06-10 | End: 2024-06-10

## 2024-06-10 RX ORDER — CEFAZOLIN SODIUM/WATER 2 G/20 ML
2 SYRINGE (ML) INTRAVENOUS
Status: DISCONTINUED | OUTPATIENT
Start: 2024-06-10 | End: 2024-06-10 | Stop reason: HOSPADM

## 2024-06-10 RX ORDER — FENTANYL CITRATE 50 UG/ML
50 INJECTION, SOLUTION INTRAMUSCULAR; INTRAVENOUS EVERY 5 MIN PRN
Status: DISCONTINUED | OUTPATIENT
Start: 2024-06-10 | End: 2024-06-10 | Stop reason: HOSPADM

## 2024-06-10 RX ORDER — PROPOFOL 10 MG/ML
INJECTION, EMULSION INTRAVENOUS PRN
Status: DISCONTINUED | OUTPATIENT
Start: 2024-06-10 | End: 2024-06-10

## 2024-06-10 RX ORDER — LIDOCAINE 40 MG/G
CREAM TOPICAL
Status: DISCONTINUED | OUTPATIENT
Start: 2024-06-10 | End: 2024-06-10 | Stop reason: HOSPADM

## 2024-06-10 RX ORDER — HYDROCODONE BITARTRATE AND ACETAMINOPHEN 5; 325 MG/1; MG/1
1 TABLET ORAL
Status: DISCONTINUED | OUTPATIENT
Start: 2024-06-10 | End: 2024-06-10 | Stop reason: HOSPADM

## 2024-06-10 RX ORDER — ONDANSETRON 2 MG/ML
4 INJECTION INTRAMUSCULAR; INTRAVENOUS EVERY 30 MIN PRN
Status: DISCONTINUED | OUTPATIENT
Start: 2024-06-10 | End: 2024-06-10 | Stop reason: HOSPADM

## 2024-06-10 RX ORDER — DEXAMETHASONE SODIUM PHOSPHATE 4 MG/ML
INJECTION, SOLUTION INTRA-ARTICULAR; INTRALESIONAL; INTRAMUSCULAR; INTRAVENOUS; SOFT TISSUE PRN
Status: DISCONTINUED | OUTPATIENT
Start: 2024-06-10 | End: 2024-06-10

## 2024-06-10 RX ORDER — HYDROMORPHONE HCL IN WATER/PF 6 MG/30 ML
0.2 PATIENT CONTROLLED ANALGESIA SYRINGE INTRAVENOUS EVERY 5 MIN PRN
Status: DISCONTINUED | OUTPATIENT
Start: 2024-06-10 | End: 2024-06-10 | Stop reason: HOSPADM

## 2024-06-10 RX ORDER — ONDANSETRON 2 MG/ML
INJECTION INTRAMUSCULAR; INTRAVENOUS PRN
Status: DISCONTINUED | OUTPATIENT
Start: 2024-06-10 | End: 2024-06-10

## 2024-06-10 RX ORDER — FENTANYL CITRATE 50 UG/ML
25 INJECTION, SOLUTION INTRAMUSCULAR; INTRAVENOUS EVERY 5 MIN PRN
Status: DISCONTINUED | OUTPATIENT
Start: 2024-06-10 | End: 2024-06-10 | Stop reason: HOSPADM

## 2024-06-10 RX ORDER — FENTANYL CITRATE 50 UG/ML
25 INJECTION, SOLUTION INTRAMUSCULAR; INTRAVENOUS
Status: DISCONTINUED | OUTPATIENT
Start: 2024-06-10 | End: 2024-06-10 | Stop reason: HOSPADM

## 2024-06-10 RX ORDER — DEXAMETHASONE SODIUM PHOSPHATE 10 MG/ML
4 INJECTION, SOLUTION INTRAMUSCULAR; INTRAVENOUS
Status: DISCONTINUED | OUTPATIENT
Start: 2024-06-10 | End: 2024-06-10 | Stop reason: HOSPADM

## 2024-06-10 RX ORDER — HYDROMORPHONE HCL IN WATER/PF 6 MG/30 ML
0.4 PATIENT CONTROLLED ANALGESIA SYRINGE INTRAVENOUS EVERY 5 MIN PRN
Status: DISCONTINUED | OUTPATIENT
Start: 2024-06-10 | End: 2024-06-10 | Stop reason: HOSPADM

## 2024-06-10 RX ADMIN — PHENYLEPHRINE HYDROCHLORIDE 100 MCG: 10 INJECTION INTRAVENOUS at 10:14

## 2024-06-10 RX ADMIN — SODIUM CHLORIDE, SODIUM LACTATE, POTASSIUM CHLORIDE, AND CALCIUM CHLORIDE: 600; 310; 30; 20 INJECTION, SOLUTION INTRAVENOUS at 11:09

## 2024-06-10 RX ADMIN — ONDANSETRON HYDROCHLORIDE 4 MG: 2 SOLUTION INTRAMUSCULAR; INTRAVENOUS at 09:50

## 2024-06-10 RX ADMIN — Medication 2 G: at 09:42

## 2024-06-10 RX ADMIN — DEXAMETHASONE SODIUM PHOSPHATE 4 MG: 4 INJECTION, SOLUTION INTRA-ARTICULAR; INTRALESIONAL; INTRAMUSCULAR; INTRAVENOUS; SOFT TISSUE at 09:50

## 2024-06-10 RX ADMIN — LIDOCAINE HYDROCHLORIDE 40 MG: 20 INJECTION, SOLUTION INFILTRATION; PERINEURAL at 09:50

## 2024-06-10 RX ADMIN — PHENYLEPHRINE HYDROCHLORIDE 100 MCG: 10 INJECTION INTRAVENOUS at 09:59

## 2024-06-10 RX ADMIN — PHENYLEPHRINE HYDROCHLORIDE 100 MCG: 10 INJECTION INTRAVENOUS at 09:50

## 2024-06-10 RX ADMIN — MIDAZOLAM 2 MG: 1 INJECTION INTRAMUSCULAR; INTRAVENOUS at 08:45

## 2024-06-10 RX ADMIN — BUPIVACAINE HYDROCHLORIDE 30 ML: 5 INJECTION, SOLUTION EPIDURAL; INTRACAUDAL; PERINEURAL at 09:07

## 2024-06-10 RX ADMIN — DEXAMETHASONE SODIUM PHOSPHATE 10 MG: 10 INJECTION, SOLUTION INTRAMUSCULAR; INTRAVENOUS at 09:07

## 2024-06-10 RX ADMIN — SODIUM CHLORIDE, SODIUM LACTATE, POTASSIUM CHLORIDE, AND CALCIUM CHLORIDE: 600; 310; 30; 20 INJECTION, SOLUTION INTRAVENOUS at 08:08

## 2024-06-10 RX ADMIN — PROPOFOL 140 MG: 10 INJECTION, EMULSION INTRAVENOUS at 09:50

## 2024-06-10 RX ADMIN — OXYCODONE HYDROCHLORIDE 10 MG: 5 TABLET ORAL at 12:18

## 2024-06-10 RX ADMIN — PHENYLEPHRINE HYDROCHLORIDE 100 MCG: 10 INJECTION INTRAVENOUS at 10:07

## 2024-06-10 ASSESSMENT — ACTIVITIES OF DAILY LIVING (ADL)
ADLS_ACUITY_SCORE: 29

## 2024-06-10 NOTE — OR NURSING
Pt sitting in chair. Eating, drinking and talking with no issues. Waiting on ride. Will continue to monitor.

## 2024-06-10 NOTE — ANESTHESIA POSTPROCEDURE EVALUATION
Patient: Iker Young    Procedure: Procedure(s):  Left Shoulder Arthroscopy ,Subacromial Decompression, Distal Clavicle Excision, Rotator Cuff Repair & Biceps Tenodesis       Anesthesia Type:  General    Note:  Disposition: Outpatient   Postop Pain Control: Uneventful            Sign Out: Well controlled pain   PONV: No   Neuro/Psych: Uneventful            Sign Out: Acceptable/Baseline neuro status   Airway/Respiratory: Uneventful            Sign Out: Acceptable/Baseline resp. status   CV/Hemodynamics: Uneventful            Sign Out: Acceptable CV status; No obvious hypovolemia; No obvious fluid overload   Other NRE: NONE   DID A NON-ROUTINE EVENT OCCUR? No           Last vitals:  Vitals Value Taken Time   /110 06/10/24 1150   Temp 96.08  F (35.6  C) 06/10/24 1156   Pulse 60 06/10/24 1154   Resp 11 06/10/24 1155   SpO2 92 % 06/10/24 1155   Vitals shown include unfiled device data.    Electronically Signed By: STEFFEN Win CRNA  Gabby 10, 2024  2:49 PM

## 2024-06-10 NOTE — BRIEF OP NOTE
[facility]    Brief Operative Note    Pre-operative diagnosis: Impingement of shoulder [M25.819]  Post-operative diagnosis left shoulder rotator cuff tear, type II SLAP tear, AC joint arthritis    Procedure: Left Shoulder Arthroscopy ,Subacromial Decompression, Distal Clavicle Excision, Rotator Cuff Repair & Biceps Tenodesis, Left - Shoulder    Surgeon: Surgeons and Role:     * Jaime Dean MD - Primary     * Amador Augustin PA - Assisting  Anesthesia: General   Estimated Blood Loss: Minimal    Drains: None  Specimens: * No specimens in log *  Findings:   As noted in the operative report.  Complications: None.  Implants:   Implant Name Type Inv. Item Serial No.  Lot No. LRB No. Used Action   [employer] IMP SYS FIBERTAK W/BIO-COMP [id number] - [id number] Metallic Hardware/[place] [employer] IMP SYS FIBERTAK W/BIO-COMP [id number]  [employer] [id number] Left 1 Implanted

## 2024-06-10 NOTE — ANESTHESIA PROCEDURE NOTES
Brachial plexus Procedure Note    Pre-Procedure   Staff -        CRNA: Fly Mason APRN CRNA       Other Anesthesia Staff: Antwan Sanchze       Performed By: CRNA and GREGORIO       Location: pre-op       Procedure Start/Stop Times: 6/10/2024 8:48 AM and 6/10/2024 9:07 AM       Pre-Anesthestic Checklist: patient identified, IV checked, site marked, risks and benefits discussed, informed consent, monitors and equipment checked, pre-op evaluation, at physician/surgeon's request and post-op pain management  Timeout:       Correct Patient: Yes        Correct Procedure: Yes        Correct Site: Yes        Correct Position: Yes        Correct Laterality: Yes        Site Marked: Yes  Procedure Documentation  Procedure: Brachial plexus       Diagnosis: SHOULDER PAIN       Laterality: left       Patient Position: supine       Patient Prep/Sterile Barriers: sterile gloves, mask       Skin prep: Chloraprep (interscalene approach).       Needle Type: insulated and short bevel       Needle Gauge: 22.        Needle Length (Inches): 3.13        Ultrasound guided       1. Ultrasound was used to identify targeted nerve, plexus, vascular marker, or fascial plane and place a needle adjacent to it in real-time.       2. Ultrasound was used to visualize the spread of anesthetic in close proximity to the above referenced structure.       3. A permanent image is entered into the patient's record.       4. The visualized anatomic structures appeared normal.       5. There were no apparent abnormal pathologic findings.       Nerve Stim: Initial Level 1 mA.  Lowest motor response 0.4 mA.    Assessment/Narrative         The placement was negative for: blood aspirated, painful injection and site bleeding       Paresthesias: No.       Bolus given via needle. no blood aspirated via catheter.        Secured via.        Insertion/Infusion Method: Single Shot       Complications: none    Medication(s) Administered   Bupivacaine 0.5% PF  "(Infiltration) - Infiltration   30 mL - 6/10/2024 9:07:00 AM  Dexamethasone 10 mg/mL PF (Perineural) - Perineural   10 mg - 6/10/2024 9:07:00 AM  Medication Administration Time: 6/10/2024 8:48 AM      FOR [facility] [place] ONLY:   Pain Team Contact information: please page the Pain Team Via [employer]. Search \"Pain\". During daytime hours, please page the attending first. At night please page the resident first.      "

## 2024-06-10 NOTE — INTERVAL H&P NOTE
"I have reviewed the surgical (or preoperative) H&P that is linked to this encounter, and examined the patient. There are no significant changes    Clinical Conditions Present on Arrival:  Clinically Significant Risk Factors Present on Admission                  # Obesity: Estimated body mass index is 36.57 kg/m  as calculated from the following:    Height as of this encounter: 1.689 m (5' 6.5\").    Weight as of this encounter: 104.3 kg (230 lb).       "

## 2024-06-10 NOTE — ANESTHESIA CARE TRANSFER NOTE
Patient: Iker Young    Procedure: Procedure(s):  Left Shoulder Arthroscopy ,Subacromial Decompression, Distal Clavicle Excision, Rotator Cuff Repair & Biceps Tenodesis       Diagnosis: Impingement of shoulder [M25.819]  Diagnosis Additional Information: No value filed.    Anesthesia Type:   General     Note:    Oropharynx: oropharynx clear of all foreign objects  Level of Consciousness: awake  Oxygen Supplementation: face mask  Level of Supplemental Oxygen (L/min / FiO2): 8  Independent Airway: airway patency satisfactory and stable  Dentition: dentition unchanged  Vital Signs Stable: post-procedure vital signs reviewed and stable  Report to RN Given: handoff report given  Patient transferred to: PACU    Handoff Report: Identifed the Patient, Identified the Reponsible Provider, Reviewed the pertinent medical history, Discussed the surgical course, Reviewed Intra-OP anesthesia mangement and issues during anesthesia, Set expectations for post-procedure period and Allowed opportunity for questions and acknowledgement of understanding  Vitals:  Vitals Value Taken Time   /77 06/10/24 1130   Temp 96.44  F (35.8  C) 06/10/24 1131   Pulse 60 06/10/24 1130   Resp 15 06/10/24 1131   SpO2 99 % 06/10/24 1131   Vitals shown include unfiled device data.    Electronically Signed By: STEFFEN Islas CRNA  Gabby 10, 2024  11:32 AM

## 2024-06-10 NOTE — OR NURSING
PACU Transfer Note    Iker Young was transferred to 735 via cart.  Equipment used for transport:  none.  Accompanied by:  Tanesha VIZCAINO  Prescriptions were: escribed    PACU Respiratory Event Documentation     1) Episodes of Apnea greater than or equal to 10 seconds: 0    2) Bradypnea - less than 8 breaths per minute: 0    3) Pain score on 0 to 10 scale: 6    4) Pain-sedation mismatch (yes or no): no    5) Repeated 02 desaturation less than 90% (yes or no): no    Anesthesia notified? (yes or no): no    Any of the above events occuring repeatedly in separate 30 minute intervals may be considered recurrent PACU respiratory events.    Patient stable and meets phase 1 discharge criteria for transport from PACU.

## 2024-06-10 NOTE — ANESTHESIA PREPROCEDURE EVALUATION
Anesthesia Pre-Procedure Evaluation    Patient: Iker Young   MRN: [id number] : 1960        Procedure : Procedure(s):  Left Shoulder Arthroscopy ,Subacromial Decompression, Distal Clavicle Excision, Rotator Cuff Repair & Biceps Tenodesis          Past Medical History:   Diagnosis Date    Chest pain     2016    Diabetes (H)     Other specified postprocedural states     Status post colonoscopy    Personal history of other medical treatment (CODE)     2009,MRI cervical spine      Past Surgical History:   Procedure Laterality Date    ARTHROSCOPY SHOULDER ROTATOR CUFF REPAIR, SUBACROMIAL DECOMP, DIST CLAVICLE RESECTN, BICEP TENOTOMY Right 2020    Procedure: Diagnostic Right Shulder Scope w/ extensive debridement, and distal clavicle excision.;  Surgeon: Jaime Dean MD;  Location: [facility] OR    COLONOSCOPY      No Comments Provided    COLONOSCOPY N/A 2024    Procedure: Colonoscopy WITH HEMORRHOIDECTOMY;  Surgeon: Aaron Glover MD;  Location: [facility] OR    FUSION LUMBAR ANTERIOR ONE LEVEL      ,Back surgery x 5 (fusion, hardware removal, stimulator and removal)    OTHER SURGICAL HISTORY      ,PERIPHERAL NERVE STIMULATOR,Lumbar nerve stimulator and subsequent removal    OTHER SURGICAL HISTORY      ,REMOVAL OF FOREIGN BODY, subsequent removal    OTHER SURGICAL HISTORY      2085,INCISION AND DRAINAGE,from infection from nerve stimulator    OTHER SURGICAL HISTORY      2009,2073,SCAN-MRI INTERPRETATION,MRI cervical spine.      Allergies   Allergen Reactions    Betadine [Povidone Iodine] Rash and Dermatitis     Severe blistering     Liquid Adhesive Dermatitis      Social History     Tobacco Use    Smoking status: Never    Smokeless tobacco: Never   Substance Use Topics    Alcohol use: Not Currently     Comment: rare      Wt Readings from Last 1 Encounters:   06/10/24 104.3 kg (230 lb)        Anesthesia Evaluation   Pt has had prior anesthetic. Type: MAC and General.     History of anesthetic complications  - .  Slow to wake.    ROS/MED HX  ENT/Pulmonary:       Neurologic:     (+)    peripheral neuropathy,                            Cardiovascular:     (+) Dyslipidemia hypertension- -   -  - -                           valvular problems/murmurs type: AS     Previous cardiac testing   Echo: Date: 6/19/24 Results:  Interpretation Summary  There is moderate aortic stenosis. The AV is calcified with restricted leaflet  opening. The Vmax is 3.3 m/s, the mean gradient is 23 mmHg, the calculated ELZA  is 1.4 cm2, and the DI is 0.37.  Global and regional left ventricular function is normal with an EF of 60-65%.  Global right ventricular function is normal. The right ventricle is normal  size.  IVC diameter <2.1 cm collapsing >50% with sniff suggests a normal RA pressure  of 3 mmHg.  There is no prior study for direct comparison.    Stress Test:  Date: Results:    ECG Reviewed:  Date: Results:    Cath:  Date: Results:   (-) CAD   METS/Exercise Tolerance:     Hematologic:       Musculoskeletal:       GI/Hepatic:       Renal/Genitourinary:       Endo:     (+)  type II DM,        thyroid problem, hypothyroidism,    Obesity,       Psychiatric/Substance Use:     (+)    H/O chronic opiod use .     Infectious Disease:       Malignancy:       Other:      (+)  , H/O Chronic Pain,         Physical Exam    Airway        Mallampati: I   TM distance: > 3 FB   Neck ROM: full   Mouth opening: > 3 cm    Respiratory Devices and Support         Dental       (+) Multiple visibly decayed, broken teeth      Cardiovascular          Rhythm and rate: regular   (+) murmur       Pulmonary           breath sounds clear to auscultation           OUTSIDE LABS:  CBC:   Lab Results   Component Value Date    WBC 10.4 03/19/2024    WBC 10.8 03/03/2023    HGB 12.1 (L) 06/03/2024    HGB 12.4 (L) 03/19/2024    HCT 38.4 (L) 03/19/2024    HCT 38.3 (L) 03/03/2023     03/19/2024     03/03/2023     BMP:   Lab Results  "  Component Value Date     06/03/2024     03/19/2024    POTASSIUM 5.0 06/03/2024    POTASSIUM 5.6 (H) 03/19/2024    CHLORIDE 101 06/03/2024    CHLORIDE 103 03/19/2024    CO2 26 06/03/2024    CO2 22 03/19/2024    BUN 19.4 06/03/2024    BUN 19.9 03/19/2024    CR 1.01 06/03/2024    CR 1.10 03/19/2024     (H) 06/10/2024     (H) 06/03/2024     COAGS: No results found for: \"PTT\", \"INR\", \"FIBR\"  POC: No results found for: \"BGM\", \"HCG\", \"HCGS\"  HEPATIC:   Lab Results   Component Value Date    ALBUMIN 4.8 03/19/2024    PROTTOTAL 8.2 03/19/2024    ALT 30 03/19/2024    AST 26 03/19/2024    ALKPHOS 106 03/19/2024    BILITOTAL 0.4 03/19/2024     OTHER:   Lab Results   Component Value Date    A1C 5.6 06/03/2024    ALYSA 9.8 06/03/2024    TSH 0.32 03/19/2024    SED 14 03/03/2023       Anesthesia Plan    ASA Status:  3    NPO Status:  NPO Appropriate    Anesthesia Type: General.     - Airway: LMA   Induction: Intravenous.   Maintenance: Balanced.        Consents    Anesthesia Plan(s) and associated risks, benefits, and realistic alternatives discussed. Questions answered and patient/representative(s) expressed understanding.     - Discussed: Risks, Benefits and Alternatives for BOTH SEDATION and the PROCEDURE were discussed     - Discussed with:  Patient      - Extended Intubation/Ventilatory Support Discussed: Yes.      - Patient is DNR/DNI Status: No     Use of blood products discussed: Yes.     - Discussed with: Patient.     Postoperative Care    Pain management: IV analgesics, Peripheral nerve block (Single Shot), Multi-modal analgesia.   PONV prophylaxis: Ondansetron (or other 5HT-3), Dexamethasone or Solumedrol     Comments:               Antwan Sanchez I have reviewed the pertinent notes and labs in the chart from the past 30 days and (re)examined the patient.  Any updates or changes from those notes are reflected in this note.              # Obesity: Estimated body mass index is 36.57 kg/m  as " "calculated from the following:    Height as of this encounter: 1.689 m (5' 6.5\").    Weight as of this encounter: 104.3 kg (230 lb).      "

## 2024-06-10 NOTE — OR NURSING
Patient and responsible adult given discharge instructions with no questions regarding instructions. Maribeth score 19. Pain level 3/10.  Discharged from unit via wheelchair . Patient discharged to home .

## 2024-06-10 NOTE — OP NOTE
Preop Dx: Left shoulder rotator cuff tear, AC joint arthritis, type II SLAP tear    Postop Dx: Same    Procedure: Left shoulder arthroscopy with subacromial decompression, distal clavicle excision, medium rotator cuff repair and biceps tenodesis    Surgeon: Jaime Dean MD    Assistant: SARAH Lozano/RN    Anesthesia: General with postop pain control per anesthesia nerve block    Indications: Patient is 63-year-old gentleman with significant pain in his left shoulder.  MRI revealed that he had a high-grade partial-thickness rotator cuff tear as well as significant AC joint arthritis and a type II SLAP tear.  All the risks and benefits surgical invention were discussed with him and he wished to proceed    Description: Patient was taken to the operating room after adequate induction anesthesia, was positioned, prepped and draped in usual sterile fashion the operative table.  Universal protocol timeout was initiated and once all in the room in agreement, an incision was made in the posterior of the shoulder for the posterior portal.  The scope trocar and cannula were advanced into the glenohumeral joint the trocar was swapped for the camera.  The glenoid cartilage was intact.  The humeral cartilage was intact.  The rotator cuff had a high-grade partial-thickness tear involving the anterior infraspinatus and posterior supraspinatus tendon.  There is only a small amount of tendon still intact on the bursal side of the rotator cuff footprint.  The biceps tendon appeared to be intact but there was an obvious type II SLAP tear associated with a Willie complex and the anterior and superior region of the glenoid.  A biceps tenodesis was undertaken utilizing my standard technique and the biceps was released at the superior glenoid labrum utilizing an arthroscopic scissors.  The camera is then removed from the glenohumeral joint and placed in the subacromial space.  Once in the subacromial space and anterolateral portal  was created via needle causation and a complete bursectomy was performed of the subacromial space.  Attention was then turned to the biceps tenodesis sutures which were brought out the anterior portal and subsequently tied over the rotator interval with the arm in external rotation to prevent internal rotation contracture.  The ends of suture were then cut.  Attention was turned to the undersurface of the acromion where the coracoacromial ligament was released off the undersurface the acromion.  This exposed a small broad-based enthesophyte of the anterior acromion which was subsequently burred down until flat with a 4 mm barrel bur.  Adequate resection was documented with a camera.  Posterior lateral portal was created via medialization and the camera is moved to this was used to position for viewing of the rotator cuff repair.  The rotator cuff footprint was exposed and prepared utilizing a combination of radiofrequency ablator and 4 mm barrel bur.  After we had delineated the extent of the rotator cuff tear from the outside 3 anchors were placed medially and 2 anchors were placed laterally for a crosshatch pattern speed bridge repair of the rotator cuff and in anatomic fashion.  This came together quite nicely.  Camera is then removed from the posterior lateral portal and placed in the anterolateral portal free of the AC joint which was denuded of soft tissue both inferiorly and anteriorly utilizing radiofrequency ablator.  The 4 mm barrel bur was used to bur down the distal clavicle approximately 5 mm.  Adequate resection was documented with a camera.  This is concluded the arthroscopy.  4 nylon was used in a buried figure-of-eight fashion close skin of all 4 portals and the patient was taken stable initial postanesthesia care and after application of sterile dressing and a sling.  He will be in a medium rotator cuff repair protocol with biceps precautions.

## 2024-06-10 NOTE — DISCHARGE INSTRUCTIONS
[facility]  Adult Discharge Orders & Instructions      For 24 hours after surgery:  Get plenty of rest.  A responsible adult must stay with you for at least 24 hours after you leave the hospital.   You may feel lightheaded.  IF so, sit for a few minutes before standing.  Have someone help you get up.   You may have a slight fever. Call the doctor if your fever is over 101 F (38.3 C) (taken under the tongue) or lasts longer than 24 hours.  You may have a dry mouth, a sore throat, muscle aches or trouble sleeping.  These should go away after 24 hours.  Do not make important or legal decisions.  6.   Do not drive or use heavy equipment.  If you have weakness or tingling, don't drive or use heavy equipment until this feeling goes away.                                                                                                                                                                         To contact a doctor, call    [phone]______________     Surgeon Contact Information  If you have questions or concerns related to your procedure please contact your surgeon through [facility] at [phone].

## 2024-06-10 NOTE — ANESTHESIA PROCEDURE NOTES
Airway         Procedure Start/Stop Times: 6/10/2024 9:50 AM and 6/10/2024 9:52 AM  Staff -        CRNA: Fly Mason APRN CRNA       Performed By: CRNA  Consent for Airway        Urgency: elective  Indications and Patient Condition         Mask difficulty assessment: 2 - vent by mask + OA or adjuvant +/- NMBA    Final Airway Details       Final airway type: supraglottic airway    Supraglottic Airway Details        Type: LMA       Brand: I-Gel       LMA size: 5    Post intubation assessment        Placement verified by: capnometry and equal breath sounds        Number of attempts at approach: 1       Number of other approaches attempted: 0       Secured with: tape       Ease of procedure: easy       Dentition: Intact and Unchanged    Medication(s) Administered   Medication Administration Time: 6/10/2024 9:50 AM

## 2024-06-17 ENCOUNTER — OFFICE VISIT (OUTPATIENT)
Dept: ORTHOPEDICS | Facility: OTHER | Age: 64
End: 2024-06-17
Attending: ORTHOPAEDIC SURGERY
Payer: MEDICARE

## 2024-06-17 DIAGNOSIS — M25.512 LEFT SHOULDER PAIN, UNSPECIFIED CHRONICITY: ICD-10-CM

## 2024-06-17 DIAGNOSIS — M25.511 RIGHT SHOULDER PAIN, UNSPECIFIED CHRONICITY: Primary | ICD-10-CM

## 2024-06-17 PROCEDURE — G0463 HOSPITAL OUTPT CLINIC VISIT: HCPCS

## 2024-06-17 PROCEDURE — 250N000011 HC RX IP 250 OP 636: Mod: JZ | Performed by: ORTHOPAEDIC SURGERY

## 2024-06-17 PROCEDURE — 250N000009 HC RX 250: Performed by: ORTHOPAEDIC SURGERY

## 2024-06-17 PROCEDURE — 20610 DRAIN/INJ JOINT/BURSA W/O US: CPT | Mod: RT

## 2024-06-17 PROCEDURE — 20610 DRAIN/INJ JOINT/BURSA W/O US: CPT | Mod: RT | Performed by: ORTHOPAEDIC SURGERY

## 2024-06-17 RX ORDER — LIDOCAINE HYDROCHLORIDE 10 MG/ML
4 INJECTION, SOLUTION EPIDURAL; INFILTRATION; INTRACAUDAL; PERINEURAL ONCE
Status: COMPLETED | OUTPATIENT
Start: 2024-06-17 | End: 2024-06-17

## 2024-06-17 RX ORDER — TRIAMCINOLONE ACETONIDE 40 MG/ML
40 INJECTION, SUSPENSION INTRA-ARTICULAR; INTRAMUSCULAR ONCE
Status: COMPLETED | OUTPATIENT
Start: 2024-06-17 | End: 2024-06-17

## 2024-06-17 RX ADMIN — LIDOCAINE HYDROCHLORIDE 4 ML: 10 INJECTION, SOLUTION INFILTRATION; PERINEURAL at 11:23

## 2024-06-17 RX ADMIN — TRIAMCINOLONE ACETONIDE 40 MG: 40 INJECTION, SUSPENSION INTRA-ARTICULAR; INTRAMUSCULAR at 11:23

## 2024-06-17 NOTE — PROGRESS NOTES
Subjective:    63-year-old gentleman status post left shoulder subacromial decompression, distal clavicle excision, medium rotator cuff repair and biceps tenodesis.  He is doing really well at this point complains of right shoulder pain.  He has known right shoulder rotator cuff tear arthropathy.  He is wonder if he can get another injection into his right shoulder today    Objective:    On examination today is a 63-year-old gentleman no acute distress.  Very pleasant examination.  Wounds are healing nicely.  Sutures removed and Steri-Strips were applied.  Is not put through range of motion.  He has a full range of motion of his right shoulder with severe weakness with supraspinatus and infraspinatus testing.    Assessment:    63-year-old gentleman with right shoulder rotator cuff tear arthropathy and left shoulder arthroscopy with subacromial decompression, distal clavicle excision, medium rotator cuff repair and biceps tenodesis now 1 week out.    Plan:    We have given him an injection to the right shoulder.  He tolerated well and good relief of his pain.  He is starting physical therapy for the left shoulder shortly.  Will see him back in approximately 1 month.    A steroid injection was performed at right subacromial space using 1% plain Lidocaine and 40 mg of Kenalog. This was well tolerated.

## 2024-07-02 ENCOUNTER — TRANSFERRED RECORDS (OUTPATIENT)
Dept: HEALTH INFORMATION MANAGEMENT | Facility: OTHER | Age: 64
End: 2024-07-02

## 2024-07-15 ENCOUNTER — OFFICE VISIT (OUTPATIENT)
Dept: ORTHOPEDICS | Facility: OTHER | Age: 64
End: 2024-07-15
Attending: ORTHOPAEDIC SURGERY
Payer: MEDICARE

## 2024-07-15 DIAGNOSIS — M25.512 LEFT SHOULDER PAIN, UNSPECIFIED CHRONICITY: Primary | ICD-10-CM

## 2024-07-15 PROCEDURE — 99495 TRANSJ CARE MGMT MOD F2F 14D: CPT | Performed by: ORTHOPAEDIC SURGERY

## 2024-07-15 PROCEDURE — 99024 POSTOP FOLLOW-UP VISIT: CPT | Performed by: ORTHOPAEDIC SURGERY

## 2024-07-15 PROCEDURE — G0463 HOSPITAL OUTPT CLINIC VISIT: HCPCS

## 2024-07-15 NOTE — PROGRESS NOTES
Subjective:    63-year-old gentleman who is now 5 weeks status post left shoulder arthroscopy with subacromial compression, distal clavicle excision, medium rotator cuff repair and biceps tenodesis.  He is doing really well at this point but has not started physical therapy yet that starts on Monday.    Objective:    On examination today his wounds are healing nicely.  His arm remains in the sling.  He is neurovascularly intact    Assessment:    63-year-old gentleman doing well status post subacromial decompression, distal clavicle excision, medium rotator cuff repair and biceps tenodesis now 5 weeks out.    Plan:    Start physical therapy continue physical therapy he should do really well with that.  His right shoulder is bothering him again and he understands that he can need a reverse total shoulder at some point for that one.

## 2024-07-18 ENCOUNTER — OFFICE VISIT (OUTPATIENT)
Dept: FAMILY MEDICINE | Facility: OTHER | Age: 64
End: 2024-07-18
Attending: FAMILY MEDICINE
Payer: COMMERCIAL

## 2024-07-18 ENCOUNTER — OFFICE VISIT (OUTPATIENT)
Dept: FAMILY MEDICINE | Facility: OTHER | Age: 64
End: 2024-07-18
Attending: FAMILY MEDICINE
Payer: MEDICARE

## 2024-07-18 VITALS
RESPIRATION RATE: 16 BRPM | DIASTOLIC BLOOD PRESSURE: 74 MMHG | SYSTOLIC BLOOD PRESSURE: 126 MMHG | WEIGHT: 228 LBS | HEIGHT: 68 IN | TEMPERATURE: 95.8 F | HEART RATE: 60 BPM | BODY MASS INDEX: 34.56 KG/M2

## 2024-07-18 VITALS
RESPIRATION RATE: 16 BRPM | SYSTOLIC BLOOD PRESSURE: 126 MMHG | HEART RATE: 60 BPM | DIASTOLIC BLOOD PRESSURE: 74 MMHG | TEMPERATURE: 95.8 F | WEIGHT: 228 LBS | BODY MASS INDEX: 34.56 KG/M2 | HEIGHT: 68 IN

## 2024-07-18 DIAGNOSIS — E11.42 DIABETIC POLYNEUROPATHY ASSOCIATED WITH TYPE 2 DIABETES MELLITUS (H): ICD-10-CM

## 2024-07-18 DIAGNOSIS — G89.29 CHRONIC RIGHT-SIDED LOW BACK PAIN WITHOUT SCIATICA: ICD-10-CM

## 2024-07-18 DIAGNOSIS — M54.50 CHRONIC RIGHT-SIDED LOW BACK PAIN WITHOUT SCIATICA: ICD-10-CM

## 2024-07-18 DIAGNOSIS — L97.521 DIABETIC ULCER OF TOE OF LEFT FOOT ASSOCIATED WITH TYPE 2 DIABETES MELLITUS, LIMITED TO BREAKDOWN OF SKIN (H): ICD-10-CM

## 2024-07-18 DIAGNOSIS — H25.9 SENILE CATARACT, UNSPECIFIED AGE-RELATED CATARACT TYPE, UNSPECIFIED LATERALITY: Primary | ICD-10-CM

## 2024-07-18 DIAGNOSIS — E11.621 DIABETIC ULCER OF TOE OF LEFT FOOT ASSOCIATED WITH TYPE 2 DIABETES MELLITUS, LIMITED TO BREAKDOWN OF SKIN (H): ICD-10-CM

## 2024-07-18 DIAGNOSIS — Z12.5 SCREENING FOR PROSTATE CANCER: ICD-10-CM

## 2024-07-18 DIAGNOSIS — Z00.00 ENCOUNTER FOR MEDICARE ANNUAL WELLNESS EXAM: ICD-10-CM

## 2024-07-18 LAB
CREAT UR-MCNC: 157 MG/DL
PSA SERPL DL<=0.01 NG/ML-MCNC: 0.52 NG/ML (ref 0–4.5)

## 2024-07-18 PROCEDURE — 80375 DRUG/SUBSTANCE NOS 1-3: CPT | Mod: ZL | Performed by: FAMILY MEDICINE

## 2024-07-18 PROCEDURE — 80360 METHYLPHENIDATE: CPT | Mod: ZL | Performed by: FAMILY MEDICINE

## 2024-07-18 PROCEDURE — G0463 HOSPITAL OUTPT CLINIC VISIT: HCPCS

## 2024-07-18 PROCEDURE — G0439 PPPS, SUBSEQ VISIT: HCPCS | Performed by: FAMILY MEDICINE

## 2024-07-18 PROCEDURE — 36415 COLL VENOUS BLD VENIPUNCTURE: CPT | Mod: ZL | Performed by: FAMILY MEDICINE

## 2024-07-18 PROCEDURE — G0103 PSA SCREENING: HCPCS | Mod: ZL | Performed by: FAMILY MEDICINE

## 2024-07-18 PROCEDURE — 99213 OFFICE O/P EST LOW 20 MIN: CPT | Mod: 25 | Performed by: FAMILY MEDICINE

## 2024-07-18 PROCEDURE — 99213 OFFICE O/P EST LOW 20 MIN: CPT | Mod: 24 | Performed by: FAMILY MEDICINE

## 2024-07-18 RX ORDER — OXYCODONE HYDROCHLORIDE 10 MG/1
10 TABLET ORAL EVERY 4 HOURS PRN
Qty: 180 TABLET | Refills: 0 | Status: SHIPPED | OUTPATIENT
Start: 2024-09-12

## 2024-07-18 RX ORDER — OXYCODONE HYDROCHLORIDE 10 MG/1
10 TABLET ORAL EVERY 4 HOURS PRN
Qty: 180 TABLET | Refills: 0 | Status: SHIPPED | OUTPATIENT
Start: 2024-07-18

## 2024-07-18 RX ORDER — OXYCODONE HYDROCHLORIDE 10 MG/1
10 TABLET ORAL EVERY 4 HOURS PRN
Qty: 180 TABLET | Refills: 0 | Status: SHIPPED | OUTPATIENT
Start: 2024-08-15

## 2024-07-18 SDOH — HEALTH STABILITY: PHYSICAL HEALTH: ON AVERAGE, HOW MANY MINUTES DO YOU ENGAGE IN EXERCISE AT THIS LEVEL?: 20 MIN

## 2024-07-18 SDOH — HEALTH STABILITY: PHYSICAL HEALTH: ON AVERAGE, HOW MANY DAYS PER WEEK DO YOU ENGAGE IN MODERATE TO STRENUOUS EXERCISE (LIKE A BRISK WALK)?: 3 DAYS

## 2024-07-18 ASSESSMENT — ANXIETY QUESTIONNAIRES
7. FEELING AFRAID AS IF SOMETHING AWFUL MIGHT HAPPEN: NOT AT ALL
3. WORRYING TOO MUCH ABOUT DIFFERENT THINGS: NOT AT ALL
8. IF YOU CHECKED OFF ANY PROBLEMS, HOW DIFFICULT HAVE THESE MADE IT FOR YOU TO DO YOUR WORK, TAKE CARE OF THINGS AT HOME, OR GET ALONG WITH OTHER PEOPLE?: NOT DIFFICULT AT ALL
GAD7 TOTAL SCORE: 0
1. FEELING NERVOUS, ANXIOUS, OR ON EDGE: NOT AT ALL
7. FEELING AFRAID AS IF SOMETHING AWFUL MIGHT HAPPEN: NOT AT ALL
6. BECOMING EASILY ANNOYED OR IRRITABLE: NOT AT ALL
5. BEING SO RESTLESS THAT IT IS HARD TO SIT STILL: NOT AT ALL
IF YOU CHECKED OFF ANY PROBLEMS ON THIS QUESTIONNAIRE, HOW DIFFICULT HAVE THESE PROBLEMS MADE IT FOR YOU TO DO YOUR WORK, TAKE CARE OF THINGS AT HOME, OR GET ALONG WITH OTHER PEOPLE: NOT DIFFICULT AT ALL
2. NOT BEING ABLE TO STOP OR CONTROL WORRYING: NOT AT ALL
4. TROUBLE RELAXING: NOT AT ALL

## 2024-07-18 ASSESSMENT — PAIN SCALES - PAIN ENJOYMENT GENERAL ACTIVITY SCALE (PEG)
PEG_TOTALSCORE: 6.67
PEG_TOTALSCORE: 6.67
INTERFERED_ENJOYMENT_LIFE: 10 - COMPLETELY INTERFERES
INTERFERED_ENJOYMENT_LIFE: 10
INTERFERED_GENERAL_ACTIVITY: 5
AVG_PAIN_PASTWEEK: 5
AVG_PAIN_PASTWEEK: 5
INTERFERED_GENERAL_ACTIVITY: 5

## 2024-07-18 ASSESSMENT — SOCIAL DETERMINANTS OF HEALTH (SDOH): HOW OFTEN DO YOU GET TOGETHER WITH FRIENDS OR RELATIVES?: MORE THAN THREE TIMES A WEEK

## 2024-07-18 ASSESSMENT — PAIN SCALES - GENERAL
PAINLEVEL: MILD PAIN (3)
PAINLEVEL: MILD PAIN (3)

## 2024-07-18 ASSESSMENT — PATIENT HEALTH QUESTIONNAIRE - PHQ9
10. IF YOU CHECKED OFF ANY PROBLEMS, HOW DIFFICULT HAVE THESE PROBLEMS MADE IT FOR YOU TO DO YOUR WORK, TAKE CARE OF THINGS AT HOME, OR GET ALONG WITH OTHER PEOPLE: NOT DIFFICULT AT ALL
SUM OF ALL RESPONSES TO PHQ QUESTIONS 1-9: 0
SUM OF ALL RESPONSES TO PHQ QUESTIONS 1-9: 0

## 2024-07-18 NOTE — NURSING NOTE
"Patient presents to the clinic for work comp follow up.      Chief Complaint   Patient presents with    Work Comp       Initial /74 (BP Location: Right arm, Patient Position: Sitting, Cuff Size: Adult Large)   Pulse 60   Temp (!) 95.8  F (35.4  C) (Tympanic)   Resp 16   Ht 1.715 m (5' 7.5\")   Wt 103.4 kg (228 lb)   BMI 35.18 kg/m   Estimated body mass index is 35.18 kg/m  as calculated from the following:    Height as of this encounter: 1.715 m (5' 7.5\").    Weight as of this encounter: 103.4 kg (228 lb).  Medication Reconciliation: complete        Odessa Bernardo LPN     "

## 2024-07-18 NOTE — PROGRESS NOTES
Preoperative Evaluation  [facility]  [address]  [place] [address]  Phone: [phone]  Fax: [phone]  Primary Provider: Sohail Harris MD  Pre-op Performing Provider: Sohail Harris MD  Jul 18, 2024 7/18/2024   Surgical Information   What procedure is being done? Left Eye Cataract Removal   Facility or Hospital where procedure/surgery will be performed: [facility]   Who is doing the procedure / surgery? [facility]   Date of surgery / procedure: 8-14-24   Time of surgery / procedure: TBD   Where do you plan to recover after surgery? at home with family      Fax number for surgical facility: [phone]    Assessment & Plan     The proposed surgical procedure is considered LOW risk.    (H25.9) Senile cataract, unspecified age-related cataract type, unspecified laterality  (primary encounter diagnosis)  Comment: end stage  Plan: proceed with surgical repair    (E11.42) Diabetic polyneuropathy associated with type 2 diabetes mellitus (H)  Comment: stable diabetes   Plan:      (Z00.00) Encounter for Medicare annual wellness exam  Comment:    Plan:      (Z12.5) Screening for prostate cancer  Comment:    Plan: PSA Screen [facility]             (E11.621,  L97.521) Diabetic ulcer of toe of left foot associated with type 2 diabetes mellitus, limited to breakdown of skin (H)  Comment: to see wound care NP  Plan: Wound Care Referral                     - No identified additional risk factors other than previously addressed         Recommendation  Approval given to proceed with proposed procedure, without further diagnostic evaluation.    Lui Dong is a 64 year old, presenting for the following:  Medicare Visit (Wellness/)          7/18/2024     8:22 AM   Additional Questions   Roomed by gia calderón lpn     HPI related to upcoming procedure: progressive loss of vision, with one cataract completed and now getting other done        7/18/2024   Pre-Op Questionnaire   Have you ever  had a heart attack or stroke? No   Have you ever had surgery on your heart or blood vessels, such as a stent placement, a coronary artery bypass, or surgery on an artery in your head, neck, heart, or legs? No   Do you have chest pain with activity? No   Do you have a history of heart failure? No   Do you currently have a cold, bronchitis or symptoms of other infection? No   Do you have a cough, shortness of breath, or wheezing? No   Do you or anyone in your family have previous history of blood clots? (!) YES     Do you or does anyone in your family have a serious bleeding problem such as prolonged bleeding following surgeries or cuts? No   Have you ever had problems with anemia or been told to take iron pills? No   Have you had any abnormal blood loss such as black, tarry or bloody stools? No   Have you ever had a blood transfusion? No   Are you willing to have a blood transfusion if it is medically needed before, during, or after your surgery? Yes   Have you or any of your relatives ever had problems with anesthesia? (!) YES     Do you have sleep apnea, excessive snoring or daytime drowsiness? No   Do you have any artifical heart valves or other implanted medical devices like a pacemaker, defibrillator, or continuous glucose monitor? No   Do you have artificial joints? No   Are you allergic to latex? No      Health Care Directive  Patient does not have a Health Care Directive or Living Will: Discussed advance care planning with patient; information given to patient to review.    Preoperative Review of    reviewed - controlled substances reflected in medication list.          Patient Active Problem List    Diagnosis Date Noted    Hypothyroidism, unspecified type 03/19/2024     Priority: Medium    Vertigo 03/19/2024     Priority: Medium    Diabetic polyneuropathy associated with type 2 diabetes mellitus (H) 04/07/2023     Priority: Medium    Healthcare maintenance 01/18/2023     Priority: Medium    Chronic,  continuous use of opioids 12/01/2021     Priority: Medium    Diabetes mellitus, type 2 (H) 07/15/2021     Priority: Medium    Morbid obesity (H) 04/01/2021     Priority: Medium    Failed back surgical syndrome 12/28/2020     Priority: Medium    Atelectasis 08/24/2020     Priority: Medium    Back pain, chronic 02/07/2018     Priority: Medium    Degeneration of cervical intervertebral disc 02/07/2018     Priority: Medium    Grief reaction with prolonged bereavement 02/15/2017     Priority: Medium    Aortic valve sclerosis 12/08/2016     Priority: Medium    Knee pain, chronic 06/18/2015     Priority: Medium    Primary osteoarthritis of right knee 06/18/2015     Priority: Medium    Controlled substance agreement signed 12/6/19 07/02/2013     Priority: Medium    Allergic rhinitis 06/14/2011     Priority: Medium    HYPERLIPIDEMIA LDL GOAL <130 10/31/2010     Priority: Medium    PAIN CHRONIC( [facility]) 12/04/2007     Priority: Medium    Obesity 04/24/2007     Priority: Medium     Problem list name updated by automated process. Provider to review      Essential hypertension      Priority: Medium     Problem list name updated by automated process. Provider to review      Hyperlipidemia      Priority: Medium     Problem list name updated by automated process. Provider to review        Past Medical History:   Diagnosis Date    Chest pain     12/12/2016    Diabetes (H)     Other specified postprocedural states     Status post colonoscopy    Personal history of other medical treatment (CODE)     04/2009,MRI cervical spine     Past Surgical History:   Procedure Laterality Date    ARTHROSCOPY SHOULDER ROTATOR CUFF REPAIR, SUBACROMIAL DECOMP, DIST CLAVICLE RESECTN, BICEP TENOTOMY Right 8/24/2020    Procedure: Diagnostic Right Shulder Scope w/ extensive debridement, and distal clavicle excision.;  Surgeon: Jaime Dean MD;  Location: [facility] OR    ARTHROSCOPY SHOULDER ROTATOR CUFF REPAIR, SUBACROMIAL DECOMP, DIST CLAVICLE RESECTN, BICEP  TENOTOMY Left 6/10/2024    Procedure: Left Shoulder Arthroscopy ,Subacromial Decompression, Distal Clavicle Excision, Rotator Cuff Repair & Biceps Tenodesis;  Surgeon: Jaime Dean MD;  Location: [facility] OR    COLONOSCOPY      No Comments Provided    COLONOSCOPY N/A 4/4/2024    Procedure: Colonoscopy WITH HEMORRHOIDECTOMY;  Surgeon: Aaron Glover MD;  Location: [facility] OR    FUSION LUMBAR ANTERIOR ONE LEVEL      2004,Back surgery x 5 (fusion, hardware removal, stimulator and removal)    OTHER SURGICAL HISTORY      2005,205736,PERIPHERAL NERVE STIMULATOR,Lumbar nerve stimulator and subsequent removal    OTHER SURGICAL HISTORY      2005,205448,REMOVAL OF FOREIGN BODY, subsequent removal    OTHER SURGICAL HISTORY      208566,INCISION AND DRAINAGE,from infection from nerve stimulator    OTHER SURGICAL HISTORY      04/2009,207388,SCAN-MRI INTERPRETATION,MRI cervical spine.     Current Outpatient Medications   Medication Sig Dispense Refill    acetaminophen (TYLENOL) 500 MG tablet Take 500 mg by mouth every 4 hours as needed       atenolol (TENORMIN) 100 MG tablet Take 0.5 tablets (50 mg) by mouth daily 45 tablet 4    atorvastatin (LIPITOR) 40 MG tablet TAKE 1 TABLET (40 MG) BY MOUTH DAILY 90 tablet 4    blood glucose (NO BRAND SPECIFIED) test strip Use to test blood sugar 1 times daily or as directed. (Patient not taking: Reported on 7/18/2024) 90 strip 4    blood glucose monitoring (NO BRAND SPECIFIED) meter device kit Use to test blood sugar 1 times daily or as directed. (Patient not taking: Reported on 7/18/2024) 1 kit 0    insulin pen needle (31G X 8 MM) 31G X 8 MM miscellaneous Use 1 pen needles weekly or as directed. 30 each 4    levothyroxine (SYNTHROID/LEVOTHROID) 50 MCG tablet TAKE 1 TABLET (50 MCG) BY MOUTH DAILY 90 tablet 4    lisinopril (ZESTRIL) 20 MG tablet TAKE 1 TABLET (20 MG) BY MOUTH DAILY 90 tablet 4    metFORMIN (GLUCOPHAGE) 1000 MG tablet TAKE 1 TABLET (1,000 MG) BYMOUTH 2 TIMES DAILY (WITH MEALS) 180  "tablet 4    naloxone (NARCAN) 4 MG/0.1ML nasal spray Spray 1 spray (4 mg) into one nostril alternating nostrils once as needed for opioid reversal every 2-3 minutes until assistance arrives 0.2 mL 3    nortriptyline (PAMELOR) 50 MG capsule Take 1 capsule (50 mg) by mouth at bedtime 90 capsule 4    [START ON 8/15/2024] oxyCODONE IR (ROXICODONE) 10 MG tablet Take 1 tablet (10 mg) by mouth every 4 hours as needed for severe pain 180 tablet 0    [START ON 9/12/2024] oxyCODONE IR (ROXICODONE) 10 MG tablet Take 1 tablet (10 mg) by mouth every 4 hours as needed for severe pain 180 tablet 0    oxyCODONE IR (ROXICODONE) 10 MG tablet Take 1 tablet (10 mg) by mouth every 4 hours as needed for severe pain 180 tablet 0    sertraline (ZOLOFT) 100 MG tablet TAKE 1 TABLET (100 MG) BY MOUTH DAILY 90 tablet 4    TRUEplus Lancets 28G MISC Inject 1 lancet Subcutaneous daily 100 each 4       Allergies   Allergen Reactions    Betadine [Povidone Iodine] Rash and Dermatitis     Severe blistering     Liquid Adhesive Dermatitis        Social History     Tobacco Use    Smoking status: Never    Smokeless tobacco: Never   Substance Use Topics    Alcohol use: Not Currently     Comment: rare       History   Drug Use Unknown               Objective    /74 (BP Location: Right arm, Patient Position: Sitting, Cuff Size: Adult Large)   Pulse 60   Temp (!) 95.8  F (35.4  C) (Tympanic)   Resp 16   Ht 1.715 m (5' 7.5\")   Wt 103.4 kg (228 lb)   BMI 35.18 kg/m     Estimated body mass index is 35.18 kg/m  as calculated from the following:    Height as of this encounter: 1.715 m (5' 7.5\").    Weight as of this encounter: 103.4 kg (228 lb).  Physical Exam  GENERAL: alert and no distress  EYES: Eyes grossly normal to inspection, PERRL and conjunctivae and sclerae normal  HENT: ear canals and TM's normal, nose and mouth without ulcers or lesions  NECK: no adenopathy, no asymmetry, masses, or scars  RESP: lungs clear to auscultation - no rales, " rhonchi or wheezes  CV: regular rate and rhythm, normal S1 S2, no S3 or S4, no murmur, click or rub, no peripheral edema  ABDOMEN: soft, nontender, no hepatosplenomegaly, no masses and bowel sounds normal  MS: no gross musculoskeletal defects noted, no edema  SKIN: no suspicious lesions or rashes  NEURO: Normal strength and tone, mentation intact and speech normal  PSYCH: mentation appears normal, affect normal/bright    Recent Labs   Lab Test 06/03/24  1025 03/19/24  1414   HGB 12.1* 12.4*   PLT  --  223    137   POTASSIUM 5.0 5.6*   CR 1.01 1.10   A1C 5.6 5.9        Diagnostics  No labs were ordered during this visit.   No EKG required for low risk surgery (cataract, skin procedure, breast biopsy, etc).    Revised Cardiac Risk Index (RCRI)  The patient has the following serious cardiovascular risks for perioperative complications:   - No serious cardiac risks = 0 points     RCRI Interpretation: 0 points: Class I (very low risk - 0.4% complication rate)         Signed Electronically by: Sohail Harris MD  Copy of this evaluation report is provided to requesting physician.        Answers submitted by the patient for this visit:  Patient Health Questionnaire (Submitted on 7/18/2024)  If you checked off any problems, how difficult have these problems made it for you to do your work, take care of things at home, or get along with other people?: Not difficult at all  PHQ9 TOTAL SCORE: 0  VISHNU-7 (Submitted on 7/18/2024)  VISHNU 7 TOTAL SCORE: 0  Diabetes Visit (Submitted on 7/18/2024)  Chief Complaint: Chronic problems general questions HPI Form  Frequency of checking blood sugars:: not at all  Diabetic concerns:: none  Paraesthesia present:: numbness in feet, burning in feet, redness, sores, or blisters on feet, blurry vision, weight loss

## 2024-07-18 NOTE — PROGRESS NOTES
Preventive Care Visit  [facility]  Sohail Harris MD, Family Medicine  Jul 18, 2024      Assessment & Plan     (Z00.00) Encounter for Medicare annual wellness exam  (primary encounter diagnosis)  Comment:    Plan:      (E11.42) Diabetic polyneuropathy associated with type 2 diabetes mellitus (H)  Comment: see below. A1c has come down nicely.  Plan:      (Z12.5) Screening for prostate cancer  Comment:    Plan: PSA Screen GH             (E11.621,  L97.521) Diabetic ulcer of toe of left foot associated with type 2 diabetes mellitus, limited to breakdown of skin (H)  Comment: this is recurrent, was nearly healed but now has returned. Discussed with him ways to make sure there is no pressure on the area, and will arrange follow up with our wound care NP.   Plan: Wound Care Referral                     Counseling  Appropriate preventive services were addressed with this patient via screening, questionnaire, or discussion as appropriate for fall prevention, nutrition, physical activity, Tobacco-use cessation, weight loss and cognition.  Checklist reviewing preventive services available has been given to the patient.  Reviewed patient's diet, addressing concerns and/or questions.   He is at risk for lack of exercise and has been provided with information to increase physical activity for the benefit of his well-being.   The patient was instructed to see the dentist every 6 months.   The patient was provided with written information regarding signs of hearing loss.   I have reviewed Opioid Use Disorder and Substance Use Disorder risk factors and made any needed referrals. I have reviewed the current pain treatment plan including non-opioid treatment options.          No follow-ups on file.    Lui Dong is a 64 year old, presenting for the following:  Medicare Visit (Wellness/)        7/18/2024     8:22 AM   Additional Questions   Roomed by gia calderón lpn         Health Care Directive  Patient does not  have a Health Care Directive or Living Will: Discussed advance care planning with patient; information given to patient to review.    History of Present Illness       Diabetes:   He presents for follow up of diabetes.    He is not checking blood glucose.         He has no concerns regarding his diabetes at this time.  He is having numbness in feet, burning in feet, redness, sores, or blisters on feet, blurry vision and weight loss.             Had left shoulder surgery recently. 5 weeks ago. Starting PT tomorrow, in sling now. Pain is worse at night, with laying on his back. Has right shoulder djd too, so with increased use of it has more right shoulder pain too.     Also due for a diabetes follow up. Not checking his sugars. Has lost about 70# total triggered initially after acute renal failure with influenza, but has simply kept it off. Has a recurrent sore on left 1st toe. Applying gauze now. Had resolved but it came back a few months ago. Has diabetes shoes, but not wearing them.    Recent Labs   Lab Test 06/03/24  1025 03/19/24  1414 11/08/23  1042 03/03/23  1042 01/12/23  1200 10/03/22  1220 07/15/21  1432 04/01/21  1431 12/28/20  1217   A1C 5.6 5.9 7.0*   < > 7.1* 6.8*   < > 9.9*  --    LDL  --   --  39  --   --  137*  --   --   --    HDL  --   --  62  --   --  49  --   --   --    TRIG  --   --  98  --   --  118  --   --   --    ALT  --  30  --   --   --   --   --  35 34   CR 1.01 1.10  --    < >  --  0.98   < > 0.73 0.81   GFRESTIMATED 83 74  --    < >  --  86   < > >90 >90   GFRESTBLACK  --   --   --   --   --   --   --  >90 >90   POTASSIUM 5.0 5.6*  --    < >  --  5.5*   < > 4.7 4.8   TSH  --  0.32  --   --  0.53  --   --   --   --     < > = values in this interval not displayed.      Current Outpatient Medications   Medication Sig Dispense Refill    acetaminophen (TYLENOL) 500 MG tablet Take 500 mg by mouth every 4 hours as needed       atenolol (TENORMIN) 100 MG tablet Take 0.5 tablets (50 mg) by mouth  daily 45 tablet 4    atorvastatin (LIPITOR) 40 MG tablet TAKE 1 TABLET (40 MG) BY MOUTH DAILY 90 tablet 4    blood glucose (NO BRAND SPECIFIED) test strip Use to test blood sugar 1 times daily or as directed. (Patient not taking: Reported on 7/18/2024) 90 strip 4    blood glucose monitoring (NO BRAND SPECIFIED) meter device kit Use to test blood sugar 1 times daily or as directed. (Patient not taking: Reported on 7/18/2024) 1 kit 0    insulin pen needle (31G X 8 MM) 31G X 8 MM miscellaneous Use 1 pen needles weekly or as directed. 30 each 4    levothyroxine (SYNTHROID/LEVOTHROID) 50 MCG tablet TAKE 1 TABLET (50 MCG) BY MOUTH DAILY 90 tablet 4    lisinopril (ZESTRIL) 20 MG tablet TAKE 1 TABLET (20 MG) BY MOUTH DAILY 90 tablet 4    metFORMIN (GLUCOPHAGE) 1000 MG tablet TAKE 1 TABLET (1,000 MG) BYMOUTH 2 TIMES DAILY (WITH MEALS) 180 tablet 4    naloxone (NARCAN) 4 MG/0.1ML nasal spray Spray 1 spray (4 mg) into one nostril alternating nostrils once as needed for opioid reversal every 2-3 minutes until assistance arrives 0.2 mL 3    nortriptyline (PAMELOR) 50 MG capsule Take 1 capsule (50 mg) by mouth at bedtime 90 capsule 4    oxyCODONE IR (ROXICODONE) 10 MG tablet Take 1 tablet (10 mg) by mouth every 4 hours as needed for severe pain 180 tablet 0    oxyCODONE IR (ROXICODONE) 10 MG tablet Take 1 tablet (10 mg) by mouth every 4 hours as needed for severe pain 180 tablet 0    oxyCODONE IR (ROXICODONE) 10 MG tablet Take 1 tablet (10 mg) by mouth every 4 hours as needed for severe pain 180 tablet 0    sertraline (ZOLOFT) 100 MG tablet TAKE 1 TABLET (100 MG) BY MOUTH DAILY 90 tablet 4    TRUEplus Lancets 28G MISC Inject 1 lancet Subcutaneous daily 100 each 4     Current Facility-Administered Medications   Medication Dose Route Frequency Provider Last Rate Last Admin    lidocaine (PF) (XYLOCAINE) 1 % injection 4 mL  4 mL Other Once Jaime Dean MD        triamcinolone (KENALOG-40) injection 40 mg  40 mg INTRA-ARTICULAR  Once Jaime Dean MD                         7/18/2024   General Health   How would you rate your overall physical health? (!) FAIR   Feel stress (tense, anxious, or unable to sleep) Not at all            7/18/2024   Nutrition   Diet: Diabetic            7/18/2024   Exercise   Days per week of moderate/strenous exercise 3 days   Average minutes spent exercising at this level 20 min            7/18/2024   Social Factors   Frequency of gathering with friends or relatives More than three times a week   Worry food won't last until get money to buy more No   Food not last or not have enough money for food? No   Do you have housing? (Housing is defined as stable permanent housing and does not include staying ouside in a car, in a tent, in an abandoned building, in an overnight shelter, or couch-surfing.) Yes   Are you worried about losing your housing? No   Lack of transportation? No   Unable to get utilities (heat,electricity)? No            7/18/2024   Fall Risk   Fallen 2 or more times in the past year? Yes   Trouble with walking or balance? Yes              7/18/2024   Activities of Daily Living- Home Safety   Needs help with the following daily activites None of the above   Safety concerns in the home None of the above            7/18/2024   Dental   Dentist two times every year? (!) NO            7/18/2024   Hearing Screening   Hearing concerns? (!) I FEEL THAT PEOPLE ARE MUMBLING OR NOT SPEAKING CLEARLY.    (!) I NEED TO ASK PEOPLE TO SPEAK UP OR REPEAT THEMSELVES.    (!) IT'S HARD TO FOLLOW A CONVERSATION IN A NOISY RESTAURANT OR CROWDED ROOM.    (!) TROUBLE UNDESTANDING A SPEAKER IN A PUBLIC MEETING OR [employer] SERVICE.    (!) TROUBLE UNDERSTANDING SOFT OR WHISPERED SPEECH.       Multiple values from one day are sorted in reverse-chronological order         7/18/2024   Driving Risk Screening   Patient/family members have concerns about driving No            7/18/2024   General Alertness/Fatigue Screening    Have you been more tired than usual lately? No            7/18/2024   Urinary Incontinence Screening   Bothered by leaking urine in past 6 months No            7/18/2024   TB Screening   Were you born outside of the [place]? No          Today's PHQ-9 Score:       7/18/2024     8:15 AM   PHQ-9 SCORE   PHQ-9 Total Score MyChart 0   PHQ-9 Total Score 0         7/18/2024   Substance Use   Alcohol more than 3/day or more than 7/wk No   Do you have a current opioid prescription? (!) YES   How severe/bad is pain from 1 to 10? 3/10   Do you use any other substances recreationally? No            7/18/2024     8:55 AM   Opioid Risk Tool   Alcohol 0   Illegal Drugs 0   Prescrition Drugs 0   Alcohol 0   Illegal Drugs 0   Prescription Drugs 0   Is the patient age 16-45 0   Psychological Disease: Attention-Deficit/Hyperactivity Disorder; Obsessive Compulsive Disorder; Bipolar Disorder; Schizophrenia 0   Depression 1   Total Score 1     Low Risk (0-3)  Moderate Risk (4-7)  High Risk (>8)  Social History     Tobacco Use    Smoking status: Never    Smokeless tobacco: Never   Vaping Use    Vaping status: Never Used   Substance Use Topics    Alcohol use: Not Currently     Comment: rare    Drug use: Never           7/18/2024   AAA Screening   Family history of Abdominal Aortic Aneurysm (AAA)? No      Last PSA:   Prostate Specific Antigen Screen   Date Value Ref Range Status   01/12/2023 0.57 0.00 - 4.50 ng/mL Final     ASCVD Risk   The ASCVD Risk score (William DK, et al., 2019) failed to calculate for the following reasons:    The valid total cholesterol range is 130 to 320 mg/dL            Reviewed and updated as needed this visit by Provider                      Current providers sharing in care for this patient include:  Patient Care Team:  Sohail Harris MD as PCP - General (Family Practice)  Sohail Harris MD as Assigned PCP  Jaime Dean MD as Assigned Musculoskeletal Provider  Aarti Stewart RN as Diabetes Educator  "(Diabetes Education)  Sohail Harris MD as Assigned Pain Medication Provider  Jaime Dean MD as MD (Orthopaedic Surgery)    The following health maintenance items are reviewed in Epic and correct as of today:  Health Maintenance   Topic Date Due    RSV VACCINE (Pregnancy & 60+) (1 - 1-dose 60+ series) Never done    MEDICARE ANNUAL WELLNESS VISIT  01/12/2024    COVID-19 Vaccine (5 - 2023-24 season) 08/23/2024    EYE EXAM  09/01/2024    INFLUENZA VACCINE (1) 09/01/2024    A1C  09/03/2024    LIPID  11/08/2024    DIABETIC FOOT EXAM  11/08/2024    URINE DRUG SCREEN  11/08/2024    MICROALBUMIN  03/19/2025    TSH W/FREE T4 REFLEX  03/19/2025    CONTROLLED SUBSTANCE AGREEMENT FOR CHRONIC PAIN MANAGEMENT  04/29/2025    BMP  06/03/2025    VISHNU ASSESSMENT  07/18/2025    FALL RISK ASSESSMENT  07/18/2025    PHQ-9  07/18/2025    ADVANCE CARE PLANNING  06/03/2029    COLORECTAL CANCER SCREENING  04/04/2034    DTAP/TDAP/TD IMMUNIZATION (2 - Td or Tdap) 04/23/2034    HEPATITIS C SCREENING  Completed    PHQ-2 (once per calendar year)  Completed    Pneumococcal Vaccine: 65+ Years  Completed    ZOSTER IMMUNIZATION  Completed    IPV IMMUNIZATION  Aged Out    HPV IMMUNIZATION  Aged Out    MENINGITIS IMMUNIZATION  Aged Out    RSV MONOCLONAL ANTIBODY  Aged Out            Objective    Exam  /74 (BP Location: Right arm, Patient Position: Sitting, Cuff Size: Adult Large)   Pulse 60   Temp (!) 95.8  F (35.4  C) (Tympanic)   Resp 16   Ht 1.715 m (5' 7.5\")   Wt 103.4 kg (228 lb)   BMI 35.18 kg/m     Estimated body mass index is 35.18 kg/m  as calculated from the following:    Height as of this encounter: 1.715 m (5' 7.5\").    Weight as of this encounter: 103.4 kg (228 lb).    Physical Exam  GENERAL: alert and no distress  EYES: Eyes grossly normal to inspection, PERRL and conjunctivae and sclerae normal  HENT: ear canals and TM's normal, nose and mouth without ulcers or lesions  NECK: no adenopathy, no asymmetry, masses, or " scars  RESP: lungs clear to auscultation - no rales, rhonchi or wheezes  CV: regular rate and rhythm, normal S1 S2, no S3 or S4, stable 2/6 systolic ejection murmur, click or rub, no peripheral edema  ABDOMEN: soft, nontender, no hepatosplenomegaly, no masses and bowel sounds normal  MS: no gross musculoskeletal defects noted, no edema  SKIN: no suspicious lesions or rashes  NEURO: Normal strength and tone, mentation intact and speech normal  PSYCH: mentation appears normal, affect normal/bright  Diabetic foot exam: normal DP and PT pulses, reduced sensation at heel, and ulceration at left medial toe, with pain, unstageable, covered by callous.       Results for orders placed or performed in visit on 07/18/24   PSA Screen GH     Status: Normal   Result Value Ref Range    Prostate Specific Antigen Screen 0.52 0.00 - 4.50 ng/mL    Narrative    This result is obtained using the Roche Elecsys total PSA method on the demetrius e601 immunoassay analyzer. Results obtained with different assay methods or kits cannot be used interchangeably.   Results for orders placed or performed in visit on 07/18/24   Urine Creatinine for Drug Screen Panel     Status: None   Result Value Ref Range    Creatinine Urine for Drug Screen 157 mg/dL   Drug Confirmation Panel Urine with Creatinine     Status: None (In process)    Narrative    The following orders were created for panel order Drug Confirmation Panel Urine with Creatinine.  Procedure                               Abnormality         Status                     ---------                               -----------         ------                     Urine Drug Confirmation ...[[id number]]                      In process                 Urine Creatinine for Eligio...[[id number]]                      Final result                 Please view results for these tests on the individual orders.           7/18/2024   Mini Cog   Clock Draw Score 2 Normal   3 Item Recall 2 objects recalled   Mini Cog  Total Score 4                 Signed Electronically by: Sohail Harris MD

## 2024-07-18 NOTE — NURSING NOTE
"Patient presents to the clinic for medicare wellness.    FOOD SECURITY SCREENING QUESTIONS:    The next two questions are to help us understand your food security.  If you are feeling you need any assistance in this area, we have resources available to support you today.    Hunger Vital Signs:  Within the past 12 months we worried whether our food would run out before we got money to buy more. Never  Within the past 12 months the food we bought just didn't last and we didn't have money to get more. Never    Advance Care Directive on file? no  Advance Care Directive provided to patient? Declined.    Chief Complaint   Patient presents with    Medicare Visit     Wellness         Initial /74 (BP Location: Right arm, Patient Position: Sitting, Cuff Size: Adult Large)   Pulse 60   Temp (!) 95.8  F (35.4  C) (Tympanic)   Resp 16   Ht 1.715 m (5' 7.5\")   Wt 103.4 kg (228 lb)   BMI 35.18 kg/m   Estimated body mass index is 35.18 kg/m  as calculated from the following:    Height as of this encounter: 1.715 m (5' 7.5\").    Weight as of this encounter: 103.4 kg (228 lb).  Medication Reconciliation: complete        Odessa Bernardo LPN     "

## 2024-07-18 NOTE — PATIENT INSTRUCTIONS
Patient Education   Preventive Care Advice   This is general advice given by our system to help you stay healthy. However, your care team may have specific advice just for you. Please talk to your care team about your preventive care needs.  Nutrition  Eat 5 or more servings of fruits and vegetables each day.  Try wheat bread, brown rice and whole grain pasta (instead of white bread, rice, and pasta).  Get enough calcium and vitamin D. Check the label on foods and aim for 100% of the RDA (recommended daily allowance).  Lifestyle  Exercise at least 150 minutes each week  (30 minutes a day, 5 days a week).  Do muscle strengthening activities 2 days a week. These help control your weight and prevent disease.  No smoking.  Wear sunscreen to prevent skin cancer.  Have a dental exam and cleaning every 6 months.  Yearly exams  See your health care team every year to talk about:  Any changes in your health.  Any medicines your care team has prescribed.  Preventive care, family planning, and ways to prevent chronic diseases.  Shots (vaccines)   HPV shots (up to age 26), if you've never had them before.  Hepatitis B shots (up to age 59), if you've never had them before.  COVID-19 shot: Get this shot when it's due.  Flu shot: Get a flu shot every year.  Tetanus shot: Get a tetanus shot every 10 years.  Pneumococcal, hepatitis A, and RSV shots: Ask your care team if you need these based on your risk.  Shingles shot (for age 50 and up)  General health tests  Diabetes screening:  Starting at age 35, Get screened for diabetes at least every 3 years.  If you are younger than age 35, ask your care team if you should be screened for diabetes.  Cholesterol test: At age 39, start having a cholesterol test every 5 years, or more often if advised.  Bone density scan (DEXA): At age 50, ask your care team if you should have this scan for osteoporosis (brittle bones).  Hepatitis C: Get tested at least once in your life.  STIs (sexually  transmitted infections)  Before age 24: Ask your care team if you should be screened for STIs.  After age 24: Get screened for STIs if you're at risk. You are at risk for STIs (including HIV) if:  You are sexually active with more than one person.  You don't use condoms every time.  You or a partner was diagnosed with a sexually transmitted infection.  If you are at risk for HIV, ask about PrEP medicine to prevent HIV.  Get tested for HIV at least once in your life, whether you are at risk for HIV or not.  Cancer screening tests  Cervical cancer screening: If you have a cervix, begin getting regular cervical cancer screening tests starting at age 21.  Breast cancer scan (mammogram): If you've ever had breasts, begin having regular mammograms starting at age 40. This is a scan to check for breast cancer.  Colon cancer screening: It is important to start screening for colon cancer at age 45.  Have a colonoscopy test every 10 years (or more often if you're at risk) Or, ask your provider about stool tests like a FIT test every year or Cologuard test every 3 years.  To learn more about your testing options, visit:   .  For help making a decision, visit:   https://bit.ly/wy21787.  Prostate cancer screening test: If you have a prostate, ask your care team if a prostate cancer screening test (PSA) at age 55 is right for you.  Lung cancer screening: If you are a current or former smoker ages 50 to 80, ask your care team if ongoing lung cancer screenings are right for you.  For informational purposes only. Not to replace the advice of your health care provider. Copyright   2023 [facility]. All rights reserved. Clinically reviewed by the [facility] Transitions Program. [employer] 505385 - REV 01/24.  Preventing Falls: Care Instructions  Injuries and health problems such as trouble walking or poor eyesight can increase your risk of falling. So can some medicines. But there are things you can do to help  "prevent falls. You can exercise to get stronger. You can also arrange your home to make it safer.    Talk to your doctor about the medicines you take. Ask if any of them increase the risk of falls and whether they can be changed or stopped.   Try to exercise regularly. It can help improve your strength and balance. This can help lower your risk of falling.     Practice fall safety and prevention.    Wear low-heeled shoes that fit well and give your feet good support. Talk to your doctor if you have foot problems that make this hard.  Carry a cellphone or wear a medical alert device that you can use to call for help.  Use stepladders instead of chairs to reach high objects. Don't climb if you're at risk for falls. Ask for help, if needed.  Wear the correct eyeglasses, if you need them.    Make your home safer.    Remove rugs, cords, clutter, and furniture from walkways.  Keep your house well lit. Use night-lights in hallways and bathrooms.  Install and use sturdy handrails on stairways.  Wear nonskid footwear, even inside. Don't walk barefoot or in socks without shoes.    Be safe outside.    Use handrails, curb cuts, and ramps whenever possible.  Keep your hands free by using a shoulder bag or backpack.  Try to walk in well-lit areas. Watch out for uneven ground, changes in pavement, and debris.  Be careful in the winter. Walk on the grass or gravel when sidewalks are slippery. Use de-icer on steps and walkways. Add non-slip devices to shoes.    Put grab bars and nonskid mats in your shower or tub and near the toilet. Try to use a shower chair or bath bench when bathing.   Get into a tub or shower by putting in your weaker leg first. Get out with your strong side first. Have a phone or medical alert device in the bathroom with you.   Where can you learn more?  Go to https://www."[employer].net/patiented  Enter G117 in the search box to learn more about \"Preventing Falls: Care Instructions.\"  Current as of: July 17, " 2023               Content Version: 14.0    5333-8982 [employer].   Care instructions adapted under license by your healthcare professional. If you have questions about a medical condition or this instruction, always ask your healthcare professional. [employer] disclaims any warranty or liability for your use of this information.      Hearing Loss: Care Instructions  Overview     Hearing loss is a sudden or slow decrease in how well you hear. It can range from slight to profound. Permanent hearing loss can occur with aging. It also can happen when you are exposed long-term to loud noise. Examples include listening to loud music, riding motorcycles, or being around other loud machines.  Hearing loss can affect your work and home life. It can make you feel lonely or depressed. You may feel that you have lost your independence. But hearing aids and other devices can help you hear better and feel connected to others.  Follow-up care is a key part of your treatment and safety. Be sure to make and go to all appointments, and call your doctor if you are having problems. It's also a good idea to know your test results and keep a list of the medicines you take.  How can you care for yourself at home?  Avoid loud noises whenever possible. This helps keep your hearing from getting worse.  Always wear hearing protection around loud noises.  Wear a hearing aid as directed.  A professional can help you pick a hearing aid that will work best for you.  You can also get hearing aids over the counter for mild to moderate hearing loss.  Have hearing tests as your doctor suggests. They can show whether your hearing has changed. Your hearing aid may need to be adjusted.  Use other devices as needed. These may include:  Telephone amplifiers and hearing aids that can connect to a television, stereo, radio, or microphone.  Devices that use lights or vibrations. These alert you to the doorbell, a ringing  "telephone, or a baby monitor.  Television closed-captioning. This shows the words at the bottom of the screen. Most new TVs can do this.  TTY (text telephone). This lets you type messages back and forth on the telephone instead of talking or listening. These devices are also called TDD. When messages are typed on the keyboard, they are sent over the phone line to a receiving TTY. The message is shown on a monitor.  Use text messaging, social media, and email if it is hard for you to communicate by telephone.  Try to learn a listening technique called speechreading. It is not lipreading. You pay attention to people's gestures, expressions, posture, and tone of voice. These clues can help you understand what a person is saying. Face the person you are talking to, and have them face you. Make sure the lighting is good. You need to see the other person's face clearly.  Think about counseling if you need help to adjust to your hearing loss.  When should you call for help?  Watch closely for changes in your health, and be sure to contact your doctor if:    You think your hearing is getting worse.     You have new symptoms, such as dizziness or nausea.   Where can you learn more?  Go to https://www.[employer].net/patiented  Enter R798 in the search box to learn more about \"Hearing Loss: Care Instructions.\"  Current as of: September 27, 2023               Content Version: 14.0    7829-9702 [employer].   Care instructions adapted under license by your healthcare professional. If you have questions about a medical condition or this instruction, always ask your healthcare professional. [employer] disclaims any warranty or liability for your use of this information.      Chronic Pain: Care Instructions  Your Care Instructions     Chronic pain is pain that lasts a long time (months or even years) and may or may not have a clear cause. It is different from acute pain, which usually does have a clear " cause--like an injury or illness--and gets better over time. Chronic pain:  Lasts over time but may vary from day to day.  Does not go away despite efforts to end it.  May disrupt your sleep and lead to fatigue.  May cause depression or anxiety.  May make your muscles tense, causing more pain.  Can disrupt your work, hobbies, home life, and relationships with friends and family.  Chronic pain is a very real condition. It is not just in your head. Treatment can help and usually includes several methods used together, such as medicines, physical therapy, exercise, and other treatments. Learning how to relax and changing negative thought patterns can also help you cope.  Chronic pain is complex. Taking an active role in your treatment will help you better manage your pain. Tell your doctor if you have trouble dealing with your pain. You may have to try several things before you find what works best for you.  Follow-up care is a key part of your treatment and safety. Be sure to make and go to all appointments, and call your doctor if you are having problems. It's also a good idea to know your test results and keep a list of the medicines you take.  How can you care for yourself at home?  Pace yourself. Break up large jobs into smaller tasks. Save harder tasks for days when you have less pain, or go back and forth between hard tasks and easier ones. Take rest breaks.  Relax, and reduce stress. Relaxation techniques such as deep breathing or meditation can help.  Keep moving. Gentle, daily exercise can help reduce pain over the long run. Try low- or no-impact exercises such as walking, swimming, and stationary biking. Do stretches to stay flexible.  Try heat, cold packs, and massage.  Get enough sleep. Chronic pain can make you tired and drain your energy. Talk with your doctor if you have trouble sleeping because of pain.  Think positive. Your thoughts can affect your pain level. Do things that you enjoy to distract  yourself when you have pain instead of focusing on the pain. See a movie, read a book, listen to music, or spend time with a friend.  If you think you are depressed, talk to your doctor about treatment.  Keep a daily pain diary. Record how your moods, thoughts, sleep patterns, activities, and medicine affect your pain. You may find that your pain is worse during or after certain activities or when you are feeling a certain emotion. Having a record of your pain can help you and your doctor find the best ways to treat your pain.  Take pain medicines exactly as directed.  If the doctor gave you a prescription medicine for pain, take it as prescribed.  If you are not taking a prescription pain medicine, ask your doctor if you can take an over-the-counter medicine.  Reducing constipation caused by pain medicine  Talk to your doctor about a laxative. If a laxative doesn't work, your doctor may suggest a prescription medicine.  Include fruits, vegetables, beans, and whole grains in your diet each day. These foods are high in fiber.  If your doctor recommends it, get more exercise. Walking is a good choice. Bit by bit, increase the amount you walk every day. Try for at least 30 minutes on most days of the week.  Schedule time each day for a bowel movement. A daily routine may help. Take your time and do not strain when having a bowel movement.  When should you call for help?   Call your doctor now or seek immediate medical care if:    Your pain gets worse or is out of control.     You feel down or blue, or you do not enjoy things like you once did. You may be depressed, which is common in people with chronic pain. Depression can be treated.     You have vomiting or cramps for more than 2 hours.   Watch closely for changes in your health, and be sure to contact your doctor if:    You cannot sleep because of pain.     You are very worried or anxious about your pain.     You have trouble taking your pain medicine.     You have  "any concerns about your pain medicine.     You have trouble with bowel movements, such as:  No bowel movement in 3 days.  Blood in the anal area, in your stool, or on the toilet paper.  Diarrhea for more than 24 hours.   Where can you learn more?  Go to https://www.[employer].net/patiented  Enter N004 in the search box to learn more about \"Chronic Pain: Care Instructions.\"  Current as of: July 10, 2023               Content Version: 14.0    7374-3881 [employer].   Care instructions adapted under license by your healthcare professional. If you have questions about a medical condition or this instruction, always ask your healthcare professional. [employer] disclaims any warranty or liability for your use of this information.         "

## 2024-07-18 NOTE — PROGRESS NOTES
Assessment & Plan     (M54.50,  G89.29) Chronic right-sided low back pain without sciatica  Comment: he is using several modalities for pain relief.  reviewed, and is stable. Is on high dose. He self acknowledges a need to tapering and s interested, however just had left shoulder surgery and wants to delay a taper for another few months. Discussed with him what a taper would look like. Perhaps very slowly over 6-12 months even.   Plan: oxyCODONE IR (ROXICODONE) 10 MG tablet,         oxyCODONE IR (ROXICODONE) 10 MG tablet,         oxyCODONE IR (ROXICODONE) 10 MG tablet, Drug         Confirmation Panel Urine with Creatinine                         No follow-ups on file.    Lui Dong is a 64 year old, presenting for the following health issues:  Work Comp      7/18/2024     8:22 AM   Additional Questions   Roomed by gia calderón lpn     HPI       Pain History:  When did you first notice your pain? Many years ago   Have you seen this provider for your pain in the past? Yes   Where in your body do you have pain? Low back, bilateral shoulders  Are you seeing anyone else for your pain? No        3/19/2024    12:50 PM 6/3/2024     9:37 AM 7/18/2024     8:15 AM   PHQ-9 SCORE   PHQ-9 Total Score MyChart 4 (Minimal depression) 4 (Minimal depression) 0   PHQ-9 Total Score 4 4 0           3/19/2024    12:52 PM 6/3/2024     9:55 AM 7/18/2024     8:15 AM   VISHNU-7 SCORE   Total Score 2 (minimal anxiety) 2 (minimal anxiety) 0 (minimal anxiety)   Total Score 2 2 0               Chronic Pain Follow Up:    Location of pain: back  Analgesia/pain control:    - Recent changes:  left shoulder surgery    - Overall control: Tolerable with discomfort    - Current treatments: opiates, ice weight loss (which helped a lot)   Adherence:     - Do you ever take more pain medicine than prescribed? No    - When did you take your last dose of pain medicine?  today   Adverse effects: No   PDMP Review         Value Time User    State PDMP site  "checked  Yes 7/18/2024  9:07 AM Sohail Harris MD          Last CSA Agreement:   CSA -- Patient Level:     [Media Unavailable] Controlled Substance Agreement - Opioid - Scan on 4/29/2024 11:51 AM   [Media Unavailable] Controlled Substance Agreement - Opioid - Scan on 5/19/2023  2:02 PM   [Media Unavailable] Controlled Substance Agreement - Opioid - Scan on 5/27/2022 11:03 AM: Opioid   [Media Unavailable] Controlled Substance Agreement - Opioid - Scan on 5/6/2021 10:06 AM: CONTROLLED SUBSTANCE AGREEMENT OPIOID       Last UDS: 9/1/2022 7/18/2024     8:55 AM   OPIOID RISK TOOL TOTAL SCORE   Total Score 1     Low Risk (0-3)  Moderate Risk (4-7)  High Risk (>8)                    Objective    /74 (BP Location: Right arm, Patient Position: Sitting, Cuff Size: Adult Large)   Pulse 60   Temp (!) 95.8  F (35.4  C) (Tympanic)   Resp 16   Ht 1.715 m (5' 7.5\")   Wt 103.4 kg (228 lb)   BMI 35.18 kg/m    Body mass index is 35.18 kg/m .  Physical Exam   GENERAL: alert and no distress  ABDOMEN: soft, nontender, no hepatosplenomegaly, no masses and bowel sounds normal  MS: no gross musculoskeletal defects noted, no edema. No current back pain on palpation and negative straight leg raise bilat            Signed Electronically by: Sohail Harris MD    "

## 2024-07-23 LAB
OXYCODONE UR CFM-MCNC: 1560 NG/ML
OXYCODONE/CREAT UR: 994 NG/MG {CREAT}
OXYMORPHONE UR CFM-MCNC: 5980 NG/ML
OXYMORPHONE/CREAT UR: 3809 NG/MG {CREAT}

## 2024-08-14 ENCOUNTER — TRANSFERRED RECORDS (OUTPATIENT)
Dept: HEALTH INFORMATION MANAGEMENT | Facility: OTHER | Age: 64
End: 2024-08-14
Payer: COMMERCIAL

## 2024-08-14 DIAGNOSIS — I10 ESSENTIAL HYPERTENSION: ICD-10-CM

## 2024-08-14 LAB — RETINOPATHY: NEGATIVE

## 2024-08-15 RX ORDER — ATENOLOL 100 MG/1
50 TABLET ORAL DAILY
Qty: 45 TABLET | Refills: 1 | Status: SHIPPED | OUTPATIENT
Start: 2024-08-15

## 2024-08-15 NOTE — TELEPHONE ENCOUNTER
[facility] sent Rx request for the following:      Requested Prescriptions   Pending Prescriptions Disp Refills    atenolol (TENORMIN) 100 MG tablet [Pharmacy Med Name: ATENOLOL 100MG TABLET] 45 tablet 4     Sig: TAKE 1/2 TABLET BY MOUTH DAILY       Beta-Blockers Protocol Passed - 8/15/2024  4:35 PM   Pharmacy comment: HE SAYS HE IS TAKING 1 TABLET BY MOUTH DAILY  Last Prescription Date:   4/2/24  Last Fill Qty/Refills:         45, R-4    Last Office Visit:              7/18/24   Future Office visit:             Next 5 appointments (look out 90 days)      Aug 19, 2024 11:00 AM  (Arrive by 10:45 AM)  Return Visit with Jaime Dean MD  [facility] ([facility] ) [address]  [place] [address]  [phone]     Aug 20, 2024 2:15 PM  (Arrive by 2:00 PM)  Provider Visit with Anamika Paulino NP  [facility] ([facility] ) [address]  [place] [address]  [phone]     Oct 14, 2024 10:45 AM  (Arrive by 10:30 AM)  Provider Visit with Sohail Harris MD  [facility] ([facility] ) [address]  [place] [address]  [phone]     Oct 14, 2024 11:15 AM  (Arrive by 11:00 AM)  Provider Visit with Sohail Harris MD  [facility] ([facility] ) [address]  [place] [address]  [phone]         Ana Larose RN on 8/15/2024 at 4:36 PM

## 2024-08-19 ENCOUNTER — OFFICE VISIT (OUTPATIENT)
Dept: ORTHOPEDICS | Facility: OTHER | Age: 64
End: 2024-08-19
Attending: ORTHOPAEDIC SURGERY
Payer: MEDICARE

## 2024-08-19 DIAGNOSIS — M25.511 RIGHT SHOULDER PAIN, UNSPECIFIED CHRONICITY: Primary | ICD-10-CM

## 2024-08-19 PROCEDURE — 99495 TRANSJ CARE MGMT MOD F2F 14D: CPT | Performed by: ORTHOPAEDIC SURGERY

## 2024-08-19 PROCEDURE — 99024 POSTOP FOLLOW-UP VISIT: CPT | Performed by: ORTHOPAEDIC SURGERY

## 2024-08-19 PROCEDURE — G0463 HOSPITAL OUTPT CLINIC VISIT: HCPCS

## 2024-08-19 NOTE — PROGRESS NOTES
Subjective:    64-year-old gentleman is now 10-week status post left shoulder arthroscopy with subacromial compression, distal clavicle excision, medium rotator cuff repair biceps tenodesis.  No real complaints with the left shoulder which is just a little bit sore today.  His biggest problem is with his right one because he is having to use it more given that the left 1 is laid up.  He has rotator cuff deficient shoulder on the right and early rotator cuff tear arthropathy.  He is about a month shy of getting another shot    Objective:    On examination today his shoulder has near full range of motion at this point.  He is neurovascularly intact    Assessment:    64-year-old gentleman doing well status post left shoulder arthroscopy subacromial decompression, distal clavicle excision, medium rotator cuff repair and biceps tenodesis.    Plan:    Continue physical therapy.  Will see him back in a month for an injection in the right shoulder

## 2024-08-20 ENCOUNTER — OFFICE VISIT (OUTPATIENT)
Dept: INTERNAL MEDICINE | Facility: OTHER | Age: 64
End: 2024-08-20
Attending: NURSE PRACTITIONER
Payer: MEDICARE

## 2024-08-20 VITALS
SYSTOLIC BLOOD PRESSURE: 132 MMHG | BODY MASS INDEX: 34.98 KG/M2 | HEART RATE: 70 BPM | DIASTOLIC BLOOD PRESSURE: 80 MMHG | RESPIRATION RATE: 16 BRPM | TEMPERATURE: 97 F | HEIGHT: 68 IN | WEIGHT: 230.8 LBS | OXYGEN SATURATION: 97 %

## 2024-08-20 DIAGNOSIS — E11.621 DIABETIC ULCER OF LEFT GREAT TOE (H): Primary | ICD-10-CM

## 2024-08-20 DIAGNOSIS — L97.529 DIABETIC ULCER OF LEFT GREAT TOE (H): Primary | ICD-10-CM

## 2024-08-20 DIAGNOSIS — E11.42 TYPE 2 DIABETES MELLITUS WITH DIABETIC POLYNEUROPATHY, WITHOUT LONG-TERM CURRENT USE OF INSULIN (H): ICD-10-CM

## 2024-08-20 PROCEDURE — 97597 DBRDMT OPN WND 1ST 20 CM/<: CPT | Performed by: NURSE PRACTITIONER

## 2024-08-20 PROCEDURE — 99213 OFFICE O/P EST LOW 20 MIN: CPT | Mod: 25 | Performed by: NURSE PRACTITIONER

## 2024-08-20 PROCEDURE — G0463 HOSPITAL OUTPT CLINIC VISIT: HCPCS | Mod: 25

## 2024-08-20 ASSESSMENT — PAIN SCALES - GENERAL: PAINLEVEL: NO PAIN (0)

## 2024-08-20 NOTE — PROGRESS NOTES
[facility] PROGRESS NOTE    Assessment:     ICD-10-CM    1. Diabetic ulcer of left great toe (H)  E11.621     L97.529       2. Type 2 diabetes mellitus with diabetic polyneuropathy, without long-term current use of insulin (H)  E11.42           Plan:   Dressing change every 3 days.  Cleanse with soap and water, rinse clear, pat dry then apply Optifoam cut to size and secure with Medipore tape.  Follow up in wound clinic in 2-3 weeks.  Monitor closely for any s/sx of wound worsening or evidence of infection as reviewed and discussed at visit.  Follow up urgently with any worsening or infection concerns.    Time spent today on history intake, record review, physical examination, reviewing lab and diagnostic studies, counseling and care coordination: 24 minutes.   4 of the 24 total minutes spent on sharp debridement    Subjective:  Patient seen today to follow up on diabetic ulcer left great toe.  He states that this has been occurring off and on.  I saw patient for this same problem a couple of years ago.  He did get diabetic footwear but does not wear them.  He states they were too tight.  He wears  loafers.  He states these are comfortable.  He does have orthotics but is not using those.  Diabetes is well-controlled.      Past Medical History:   Diagnosis Date    Chest pain     12/12/2016    Diabetes (H)     Other specified postprocedural states     Status post colonoscopy    Personal history of other medical treatment (CODE)     04/2009,MRI cervical spine     Past Surgical History:   Procedure Laterality Date    ARTHROSCOPY SHOULDER ROTATOR CUFF REPAIR, SUBACROMIAL DECOMP, DIST CLAVICLE RESECTN, BICEP TENOTOMY Right 8/24/2020    Procedure: Diagnostic Right Shulder Scope w/ extensive debridement, and distal clavicle excision.;  Surgeon: Jaime Dean MD;  Location:  OR    ARTHROSCOPY SHOULDER ROTATOR CUFF REPAIR, SUBACROMIAL DECOMP, DIST CLAVICLE RESECTN, BICEP TENOTOMY Left 6/10/2024    Procedure: Left  Shoulder Arthroscopy ,Subacromial Decompression, Distal Clavicle Excision, Rotator Cuff Repair & Biceps Tenodesis;  Surgeon: Jaime Dean MD;  Location: [facility] OR    COLONOSCOPY      No Comments Provided    COLONOSCOPY N/A 4/4/2024    Procedure: Colonoscopy WITH HEMORRHOIDECTOMY;  Surgeon: Aaron Glover MD;  Location: [facility] OR    FUSION LUMBAR ANTERIOR ONE LEVEL      2004,Back surgery x 5 (fusion, hardware removal, stimulator and removal)    OTHER SURGICAL HISTORY      2005,205736,PERIPHERAL NERVE STIMULATOR,Lumbar nerve stimulator and subsequent removal    OTHER SURGICAL HISTORY      2005,[id number],REMOVAL OF FOREIGN BODY, subsequent removal    OTHER SURGICAL HISTORY      [id number],INCISION AND DRAINAGE,from infection from nerve stimulator    OTHER SURGICAL HISTORY      04/2009,207388,SCAN-MRI INTERPRETATION,MRI cervical spine.     Betadine [povidone iodine] and Liquid adhesive  Current Outpatient Medications   Medication Sig Dispense Refill    acetaminophen (TYLENOL) 500 MG tablet Take 500 mg by mouth every 4 hours as needed       atenolol (TENORMIN) 100 MG tablet TAKE 1/2 TABLET BY MOUTH DAILY 45 tablet 1    atorvastatin (LIPITOR) 40 MG tablet TAKE 1 TABLET (40 MG) BY MOUTH DAILY 90 tablet 4    insulin pen needle (31G X 8 MM) 31G X 8 MM miscellaneous Use 1 pen needles weekly or as directed. 30 each 4    levothyroxine (SYNTHROID/LEVOTHROID) 50 MCG tablet TAKE 1 TABLET (50 MCG) BY MOUTH DAILY 90 tablet 4    lisinopril (ZESTRIL) 20 MG tablet TAKE 1 TABLET (20 MG) BY MOUTH DAILY 90 tablet 4    metFORMIN (GLUCOPHAGE) 1000 MG tablet TAKE 1 TABLET (1,000 MG) BYMOUTH 2 TIMES DAILY (WITH MEALS) 180 tablet 4    naloxone (NARCAN) 4 MG/0.1ML nasal spray Spray 1 spray (4 mg) into one nostril alternating nostrils once as needed for opioid reversal every 2-3 minutes until assistance arrives 0.2 mL 3    nortriptyline (PAMELOR) 50 MG capsule Take 1 capsule (50 mg) by mouth at bedtime 90 capsule 4    oxyCODONE IR (ROXICODONE) 10 MG  "tablet Take 1 tablet (10 mg) by mouth every 4 hours as needed for severe pain 180 tablet 0    [START ON 9/12/2024] oxyCODONE IR (ROXICODONE) 10 MG tablet Take 1 tablet (10 mg) by mouth every 4 hours as needed for severe pain 180 tablet 0    oxyCODONE IR (ROXICODONE) 10 MG tablet Take 1 tablet (10 mg) by mouth every 4 hours as needed for severe pain 180 tablet 0    sertraline (ZOLOFT) 100 MG tablet TAKE 1 TABLET (100 MG) BY MOUTH DAILY 90 tablet 4    TRUEplus Lancets 28G MISC Inject 1 lancet Subcutaneous daily 100 each 4    blood glucose (NO BRAND SPECIFIED) test strip Use to test blood sugar 1 times daily or as directed. (Patient not taking: Reported on 7/18/2024) 90 strip 4    blood glucose monitoring (NO BRAND SPECIFIED) meter device kit Use to test blood sugar 1 times daily or as directed. (Patient not taking: Reported on 7/18/2024) 1 kit 0     Current Facility-Administered Medications   Medication Dose Route Frequency Provider Last Rate Last Admin    lidocaine (PF) (XYLOCAINE) 1 % injection 4 mL  4 mL Other Once Jaime Dean MD        triamcinolone (KENALOG-40) injection 40 mg  40 mg INTRA-ARTICULAR Once Jaime Dean MD           Review of Systems:  Denies fever, chills, odorous and purulent drainage, surrounding redness and warmth.    Objective:   /80 (BP Location: Right arm, Patient Position: Sitting, Cuff Size: Adult Regular)   Pulse 70   Temp 97  F (36.1  C) (Tympanic)   Resp 16   Ht 1.727 m (5' 8\")   Wt 104.7 kg (230 lb 12.8 oz)   SpO2 97%   BMI 35.09 kg/m    Physical Exam     Pleasant gentleman no acute distress.  Affect normal.  Alert and oriented x 4.  Left foot DP PT intact.  Positive diabetic neuropathy.    Wound Type:  Diabetic ulcer  Location:  Left great toe medial aspect  Wound Measurements:   postdebridement: 0.1 cm x 0.2 cm x 0.1 cm  Wound Base:  Light pink, open to subcutaneous tissue  Undermining:  None  Tunneling:  None  Drainage:  Small serous  Periwound Tissue:  Prior to " debridement wound was surrounded by hyperkeratotic devitalized tissue and brown firm slough covering wound bed.  No surrounding erythema, warmth or maceration  Debridement:  Sharp; after permission granted by patient 15 blade scalpel was used to sharply debride devitalized tissue  Treatment:  Wound irrigated with Anasept, Optifoam cut to size and placed over wound and secured with Medipore tape      WHITNEY Smith

## 2024-08-20 NOTE — NURSING NOTE
"Chief Complaint   Patient presents with    WOUND CARE       FOOD SECURITY SCREENING QUESTIONS  Hunger Vital Signs:  Within the past 12 months we worried whether our food would run out before we got money to buy more. Never  Within the past 12 months the food we bought just didn't last and we didn't have money to get more. Never  Elizabeth Wild RN 8/20/2024 2:29 PM      Initial /80 (BP Location: Right arm, Patient Position: Sitting, Cuff Size: Adult Regular)   Pulse 70   Temp 97  F (36.1  C) (Tympanic)   Resp 16   Ht 1.727 m (5' 8\")   Wt 104.7 kg (230 lb 12.8 oz)   SpO2 97%   BMI 35.09 kg/m   Estimated body mass index is 35.09 kg/m  as calculated from the following:    Height as of this encounter: 1.727 m (5' 8\").    Weight as of this encounter: 104.7 kg (230 lb 12.8 oz).  Medication Reconciliation: complete    Elizabeth Wild RN    "

## 2024-08-20 NOTE — TELEPHONE ENCOUNTER
Pt is having a lot of back pain, can you find him a same day appt this week?  Did not want to see any other physician.  Thank you!    Brianna Julio on 2/1/2021 at 1:19 PM     DM2 (diabetes mellitus, type 2)

## 2024-10-14 ENCOUNTER — OFFICE VISIT (OUTPATIENT)
Dept: FAMILY MEDICINE | Facility: OTHER | Age: 64
End: 2024-10-14
Attending: FAMILY MEDICINE
Payer: MEDICARE

## 2024-10-14 ENCOUNTER — OFFICE VISIT (OUTPATIENT)
Dept: FAMILY MEDICINE | Facility: OTHER | Age: 64
End: 2024-10-14
Attending: FAMILY MEDICINE
Payer: OTHER MISCELLANEOUS

## 2024-10-14 VITALS
OXYGEN SATURATION: 98 % | WEIGHT: 236.8 LBS | RESPIRATION RATE: 18 BRPM | SYSTOLIC BLOOD PRESSURE: 126 MMHG | TEMPERATURE: 97.6 F | BODY MASS INDEX: 36.01 KG/M2 | HEART RATE: 73 BPM | DIASTOLIC BLOOD PRESSURE: 80 MMHG

## 2024-10-14 VITALS
BODY MASS INDEX: 36.01 KG/M2 | SYSTOLIC BLOOD PRESSURE: 126 MMHG | DIASTOLIC BLOOD PRESSURE: 80 MMHG | TEMPERATURE: 97.6 F | HEART RATE: 73 BPM | OXYGEN SATURATION: 98 % | RESPIRATION RATE: 18 BRPM | WEIGHT: 236.8 LBS

## 2024-10-14 DIAGNOSIS — M54.50 CHRONIC RIGHT-SIDED LOW BACK PAIN WITHOUT SCIATICA: Primary | ICD-10-CM

## 2024-10-14 DIAGNOSIS — G89.29 CHRONIC RIGHT-SIDED LOW BACK PAIN WITHOUT SCIATICA: Primary | ICD-10-CM

## 2024-10-14 DIAGNOSIS — Z29.11 NEED FOR VACCINATION AGAINST RESPIRATORY SYNCYTIAL VIRUS: ICD-10-CM

## 2024-10-14 DIAGNOSIS — M54.50 CHRONIC RIGHT-SIDED LOW BACK PAIN WITHOUT SCIATICA: ICD-10-CM

## 2024-10-14 DIAGNOSIS — E11.42 DIABETIC POLYNEUROPATHY ASSOCIATED WITH TYPE 2 DIABETES MELLITUS (H): ICD-10-CM

## 2024-10-14 DIAGNOSIS — E11.42 TYPE 2 DIABETES MELLITUS WITH DIABETIC POLYNEUROPATHY, WITHOUT LONG-TERM CURRENT USE OF INSULIN (H): Primary | ICD-10-CM

## 2024-10-14 DIAGNOSIS — G89.29 CHRONIC RIGHT-SIDED LOW BACK PAIN WITHOUT SCIATICA: ICD-10-CM

## 2024-10-14 DIAGNOSIS — E03.9 HYPOTHYROIDISM, UNSPECIFIED TYPE: ICD-10-CM

## 2024-10-14 LAB
CHOLEST SERPL-MCNC: 107 MG/DL
EST. AVERAGE GLUCOSE BLD GHB EST-MCNC: 126 MG/DL
FASTING STATUS PATIENT QL REPORTED: YES
HBA1C MFR BLD: 6 %
HDLC SERPL-MCNC: 60 MG/DL
LDLC SERPL CALC-MCNC: 29 MG/DL
NONHDLC SERPL-MCNC: 47 MG/DL
TRIGL SERPL-MCNC: 88 MG/DL

## 2024-10-14 PROCEDURE — 99214 OFFICE O/P EST MOD 30 MIN: CPT | Performed by: FAMILY MEDICINE

## 2024-10-14 PROCEDURE — G2211 COMPLEX E/M VISIT ADD ON: HCPCS | Performed by: FAMILY MEDICINE

## 2024-10-14 PROCEDURE — 36415 COLL VENOUS BLD VENIPUNCTURE: CPT | Mod: ZL | Performed by: FAMILY MEDICINE

## 2024-10-14 PROCEDURE — 90480 ADMN SARSCOV2 VAC 1/ONLY CMP: CPT

## 2024-10-14 PROCEDURE — 91320 SARSCV2 VAC 30MCG TRS-SUC IM: CPT

## 2024-10-14 PROCEDURE — G0008 ADMIN INFLUENZA VIRUS VAC: HCPCS

## 2024-10-14 PROCEDURE — G0463 HOSPITAL OUTPT CLINIC VISIT: HCPCS | Mod: 25

## 2024-10-14 PROCEDURE — 83036 HEMOGLOBIN GLYCOSYLATED A1C: CPT | Mod: ZL | Performed by: FAMILY MEDICINE

## 2024-10-14 PROCEDURE — 80061 LIPID PANEL: CPT | Mod: ZL | Performed by: FAMILY MEDICINE

## 2024-10-14 RX ORDER — NORTRIPTYLINE HYDROCHLORIDE 50 MG/1
50 CAPSULE ORAL AT BEDTIME
Qty: 90 CAPSULE | Refills: 4 | Status: SHIPPED | OUTPATIENT
Start: 2024-10-14

## 2024-10-14 RX ORDER — OXYCODONE HYDROCHLORIDE 10 MG/1
10 TABLET ORAL EVERY 4 HOURS PRN
Qty: 180 TABLET | Refills: 0 | Status: SHIPPED | OUTPATIENT
Start: 2024-10-14

## 2024-10-14 RX ORDER — OXYCODONE HYDROCHLORIDE 10 MG/1
10 TABLET ORAL EVERY 4 HOURS PRN
Qty: 180 TABLET | Refills: 0 | Status: SHIPPED | OUTPATIENT
Start: 2024-11-11

## 2024-10-14 RX ORDER — OXYCODONE HYDROCHLORIDE 10 MG/1
10 TABLET ORAL EVERY 4 HOURS PRN
Qty: 180 TABLET | Refills: 0 | Status: SHIPPED | OUTPATIENT
Start: 2024-12-09

## 2024-10-14 ASSESSMENT — PAIN SCALES - GENERAL
PAINLEVEL: MILD PAIN (3)
PAINLEVEL: MILD PAIN (2)

## 2024-10-14 NOTE — NURSING NOTE
"Chief Complaint   Patient presents with    Diabetes       Initial /80   Pulse 73   Temp 97.6  F (36.4  C) (Tympanic)   Resp 18   Wt 107.4 kg (236 lb 12.8 oz)   SpO2 98%   BMI 36.01 kg/m   Estimated body mass index is 36.01 kg/m  as calculated from the following:    Height as of 8/20/24: 1.727 m (5' 8\").    Weight as of this encounter: 107.4 kg (236 lb 12.8 oz).  Medication Reconciliation: complete        "

## 2024-10-14 NOTE — PROGRESS NOTES
"  Assessment & Plan     (M54.50,  G89.29) Chronic right-sided low back pain without sciatica  (primary encounter diagnosis)  Comment:  reviewed and low risk for misuse or abuse. Refilled below without changes.   Plan: oxyCODONE IR (ROXICODONE) 10 MG tablet,         oxyCODONE IR (ROXICODONE) 10 MG tablet,         oxyCODONE IR (ROXICODONE) 10 MG tablet                   BMI  Estimated body mass index is 36.01 kg/m  as calculated from the following:    Height as of 8/20/24: 1.727 m (5' 8\").    Weight as of this encounter: 107.4 kg (236 lb 12.8 oz).             Return in about 3 months (around 1/14/2025).    Lui Dong is a 64 year old, presenting for the following health issues:  Work Comp        10/14/2024    10:36 AM   Additional Questions   Roomed by LUCAS Kay   Accompanied by Self         10/14/2024    10:36 AM   Patient Reported Additional Medications   Patient reports taking the following new medications N/A     HPI     In the last week he has had more low back pain. Feels due to foot issues, and waling differently. Was walking up to 1.5 miles at a time, now has pain just walking around the grocery store. Has gained a little weight, 5# or so in the last 3 months. Is still down 55# from his max however.   Pain History:  When did you first notice your pain? Chronic   Have you seen this provider for your pain in the past? Yes   Where in your body do you have pain? Low back  Are you seeing anyone else for your pain? No        3/19/2024    12:50 PM 6/3/2024     9:37 AM 7/18/2024     8:15 AM   PHQ-9 SCORE   PHQ-9 Total Score MyChart 4 (Minimal depression) 4 (Minimal depression) 0   PHQ-9 Total Score 4 4 0           3/19/2024    12:52 PM 6/3/2024     9:55 AM 7/18/2024     8:15 AM   VISHNU-7 SCORE   Total Score 2 (minimal anxiety) 2 (minimal anxiety) 0 (minimal anxiety)   Total Score 2 2 0           11/8/2023     9:51 AM 2/5/2024    10:50 AM 10/14/2024    10:42 AM   PEG Score   PEG Total Score 3 5 6           " 11/8/2023     9:51 AM 2/5/2024    10:50 AM 10/14/2024    10:42 AM   PEG Score   PEG Total Score 3 5 6       Chronic Pain Follow Up:    Location of pain: low back.   Analgesia/pain control:    - Recent changes:  return of foot neuropathy    - Overall control: Tolerable with discomfort    - Current treatments: opiates, Pamelor, tylenol, Max Freeze (topical)   Adherence:     - Do you ever take more pain medicine than prescribed? No    - When did you take your last dose of pain medicine?  today   Adverse effects: No   PDMP Review         Value Time User    State PDMP site checked  Yes 10/14/2024 11:02 AM Sohail Harris MD          Last CSA Agreement:   CSA -- Patient Level:     [Media Unavailable] Controlled Substance Agreement - Opioid - Scan on 4/29/2024 11:51 AM   [Media Unavailable] Controlled Substance Agreement - Opioid - Scan on 5/19/2023  2:02 PM   [Media Unavailable] Controlled Substance Agreement - Opioid - Scan on 5/27/2022 11:03 AM: Opioid   [Media Unavailable] Controlled Substance Agreement - Opioid - Scan on 5/6/2021 10:06 AM: CONTROLLED SUBSTANCE AGREEMENT OPIOID       Last UDS: 7/23/2024 7/18/2024     8:55 AM   OPIOID RISK TOOL TOTAL SCORE   Total Score 1     Low Risk (0-3)  Moderate Risk (4-7)  High Risk (>8)                    Objective    /80   Pulse 73   Temp 97.6  F (36.4  C) (Tympanic)   Resp 18   Wt 107.4 kg (236 lb 12.8 oz)   SpO2 98%   BMI 36.01 kg/m    Body mass index is 36.01 kg/m .  Physical Exam   GENERAL: alert and no distress  MS: no gross musculoskeletal defects noted, no edema  BACK: no CVA tenderness, left L4/5 paralumbar tenderness. Negative straight leg raise bilateral, normal lower extremity strength   PSYCH: mentation appears normal, affect normal/bright            Signed Electronically by: Sohail Harris MD

## 2024-10-14 NOTE — NURSING NOTE
"Chief Complaint   Patient presents with    Work Comp       Initial /80   Pulse 73   Temp 97.6  F (36.4  C) (Tympanic)   Resp 18   Wt 107.4 kg (236 lb 12.8 oz)   SpO2 98%   BMI 36.01 kg/m   Estimated body mass index is 36.01 kg/m  as calculated from the following:    Height as of 8/20/24: 1.727 m (5' 8\").    Weight as of this encounter: 107.4 kg (236 lb 12.8 oz).  Medication Reconciliation: complete          "

## 2024-10-14 NOTE — PROGRESS NOTES
"  Assessment & Plan     (E11.42) Type 2 diabetes mellitus with diabetic polyneuropathy, without long-term current use of insulin (H)  (primary encounter diagnosis)  Comment: good control  Plan: no med changes      (M54.50,  G89.29) Chronic right-sided low back pain without sciatica  Comment: this is chronic, and adequately controlled  Plan: nortriptyline (PAMELOR) 50 MG capsule        refilled    (Z29.11) Need for vaccination against respiratory syncytial virus  Comment: at pharm   Plan: RSV vaccine, bivalent, ABRYSVO, injection            (E11.42) Diabetic polyneuropathy associated with type 2 diabetes mellitus (H)  Comment: a bit worse  Plan: HEMOGLOBIN A1C, Lipid Profile        Given prior severe SATNAM, will not resume neurontin or lyrica. Continue with weight loss and TCA (pamelor)     The longitudinal plan of care for the diagnosis(es)/condition(s) as documented were addressed during this visit. Due to the added complexity in care, I will continue to support Iker in the subsequent management and with ongoing continuity of care.        BMI  Estimated body mass index is 36.01 kg/m  as calculated from the following:    Height as of 8/20/24: 1.727 m (5' 8\").    Weight as of this encounter: 107.4 kg (236 lb 12.8 oz).             No follow-ups on file.    Subjective   Iker is a 64 year old, presenting for the following health issues:  Diabetes      10/14/2024    10:44 AM   Additional Questions   Roomed by LUCAS Kay   Accompanied by Self         10/14/2024    10:44 AM   Patient Reported Additional Medications   Patient reports taking the following new medications N/A     HPI     Diabetes follow up. He does not check his sugar at all. Reports his foot neuropathic pain has come back, rather quickly he is unsure as to what triggered this.gained about 5# over the last few months. Was on neurontin at one point, was stopped due to SATNAM, when his GFR dropped to 7.     Foot ulcer on left toe is nearly resolved. Was seeing " our wound care NP for this. Has no sensation on the bottoms of his feet.     Recent Labs   Lab Test 06/03/24  1025 03/19/24  1414 11/08/23  1042 03/03/23  1042 01/12/23  1200 10/03/22  1220 07/15/21  1432 04/01/21  1431 12/28/20  1217   A1C 5.6 5.9 7.0*   < > 7.1* 6.8*   < > 9.9*  --    LDL  --   --  39  --   --  137*  --   --   --    HDL  --   --  62  --   --  49  --   --   --    TRIG  --   --  98  --   --  118  --   --   --    ALT  --  30  --   --   --   --   --  35 34   CR 1.01 1.10  --    < >  --  0.98   < > 0.73 0.81   GFRESTIMATED 83 74  --    < >  --  86   < > >90 >90   GFRESTBLACK  --   --   --   --   --   --   --  >90 >90   POTASSIUM 5.0 5.6*  --    < >  --  5.5*   < > 4.7 4.8   TSH  --  0.32  --   --  0.53  --   --   --   --     < > = values in this interval not displayed.          Current Outpatient Medications   Medication Sig Dispense Refill    acetaminophen (TYLENOL) 500 MG tablet Take 500 mg by mouth every 4 hours as needed       atenolol (TENORMIN) 100 MG tablet TAKE 1/2 TABLET BY MOUTH DAILY 45 tablet 1    atorvastatin (LIPITOR) 40 MG tablet TAKE 1 TABLET (40 MG) BY MOUTH DAILY 90 tablet 4    insulin pen needle (31G X 8 MM) 31G X 8 MM miscellaneous Use 1 pen needles weekly or as directed. 30 each 4    levothyroxine (SYNTHROID/LEVOTHROID) 50 MCG tablet TAKE 1 TABLET (50 MCG) BY MOUTH DAILY 90 tablet 4    lisinopril (ZESTRIL) 20 MG tablet TAKE 1 TABLET (20 MG) BY MOUTH DAILY 90 tablet 4    metFORMIN (GLUCOPHAGE) 1000 MG tablet TAKE 1 TABLET (1,000 MG) BYMOUTH 2 TIMES DAILY (WITH MEALS) 180 tablet 4    naloxone (NARCAN) 4 MG/0.1ML nasal spray Spray 1 spray (4 mg) into one nostril alternating nostrils once as needed for opioid reversal every 2-3 minutes until assistance arrives 0.2 mL 3    nortriptyline (PAMELOR) 50 MG capsule Take 1 capsule (50 mg) by mouth at bedtime 90 capsule 4    sertraline (ZOLOFT) 100 MG tablet TAKE 1 TABLET (100 MG) BY MOUTH DAILY 90 tablet 4    TRUEplus Lancets 28G MISC Inject  1 lancet Subcutaneous daily 100 each 4    blood glucose (NO BRAND SPECIFIED) test strip Use to test blood sugar 1 times daily or as directed. (Patient not taking: Reported on 7/18/2024) 90 strip 4    blood glucose monitoring (NO BRAND SPECIFIED) meter device kit Use to test blood sugar 1 times daily or as directed. (Patient not taking: Reported on 7/18/2024) 1 kit 0    oxyCODONE IR (ROXICODONE) 10 MG tablet Take 1 tablet (10 mg) by mouth every 4 hours as needed for severe pain. 180 tablet 0    [START ON 11/11/2024] oxyCODONE IR (ROXICODONE) 10 MG tablet Take 1 tablet (10 mg) by mouth every 4 hours as needed for severe pain. 180 tablet 0    [START ON 12/9/2024] oxyCODONE IR (ROXICODONE) 10 MG tablet Take 1 tablet (10 mg) by mouth every 4 hours as needed for severe pain. 180 tablet 0     Current Facility-Administered Medications   Medication Dose Route Frequency Provider Last Rate Last Admin    lidocaine (PF) (XYLOCAINE) 1 % injection 4 mL  4 mL Other Once Jaime Dean MD        triamcinolone (KENALOG-40) injection 40 mg  40 mg INTRA-ARTICULAR Once Jaime Dean MD                             Objective    /80   Pulse 73   Temp 97.6  F (36.4  C) (Tympanic)   Resp 18   Wt 107.4 kg (236 lb 12.8 oz)   SpO2 98%   BMI 36.01 kg/m    Body mass index is 36.01 kg/m .  Physical Exam   GENERAL: alert and no distress  RESP: lungs clear to auscultation - no rales, rhonchi or wheezes  CV: regular rate and rhythm, normal S1 S2, no S3 or S4, no murmur, click or rub, no peripheral edema   PSYCH: mentation appears normal, affect normal/bright  Diabetic foot exam: normal DP and PT pulses, near complete resolution of left 1st toe ulcerative lesions, abnormal sensory exam to heels, and abnormal monofilament exam    Results for orders placed or performed in visit on 10/14/24   Lipid Profile     Status: None   Result Value Ref Range    Cholesterol 107 <200 mg/dL    Triglycerides 88 <150 mg/dL    Direct Measure HDL 60 >=40  mg/dL    LDL Cholesterol Calculated 29 <100 mg/dL    Non HDL Cholesterol 47 <130 mg/dL    Patient Fasting > 8hrs? Yes     Narrative    Cholesterol  Desirable: < 200 mg/dL  Borderline High: 200 - 239 mg/dL  High: >= 240 mg/dL    Triglycerides  Normal: < 150 mg/dL  Borderline High: 150 - 199 mg/dL  High: 200-499 mg/dL  Very High: >= 500 mg/dL    Direct Measure HDL  Female: >= 50 mg/dL   Male: >= 40 mg/dL    LDL Cholesterol  Desirable: < 100 mg/dL  Above Desirable: 100 - 129 mg/dL   Borderline High: 130 - 159 mg/dL   High:  160 - 189 mg/dL   Very High: >= 190 mg/dL    Non HDL Cholesterol  Desirable: < 130 mg/dL  Above Desirable: 130 - 159 mg/dL  Borderline High: 160 - 189 mg/dL  High: 190 - 219 mg/dL  Very High: >= 220 mg/dL   HEMOGLOBIN A1C     Status: Abnormal   Result Value Ref Range    Estimated Average Glucose 126 (H) <117 mg/dL    Hemoglobin A1C 6.0 (H) <5.7 %             Signed Electronically by: Sohail Harris MD

## 2024-11-18 ENCOUNTER — OFFICE VISIT (OUTPATIENT)
Dept: ORTHOPEDICS | Facility: OTHER | Age: 64
End: 2024-11-18
Attending: ORTHOPAEDIC SURGERY
Payer: MEDICARE

## 2024-11-18 DIAGNOSIS — M25.511 RIGHT SHOULDER PAIN, UNSPECIFIED CHRONICITY: Primary | ICD-10-CM

## 2024-11-18 DIAGNOSIS — M25.512 LEFT SHOULDER PAIN, UNSPECIFIED CHRONICITY: ICD-10-CM

## 2024-11-18 PROCEDURE — 96372 THER/PROPH/DIAG INJ SC/IM: CPT | Performed by: ORTHOPAEDIC SURGERY

## 2024-11-18 PROCEDURE — 250N000009 HC RX 250: Performed by: ORTHOPAEDIC SURGERY

## 2024-11-18 PROCEDURE — G0463 HOSPITAL OUTPT CLINIC VISIT: HCPCS

## 2024-11-18 PROCEDURE — 250N000011 HC RX IP 250 OP 636: Mod: JZ | Performed by: ORTHOPAEDIC SURGERY

## 2024-11-18 RX ORDER — LIDOCAINE HYDROCHLORIDE 10 MG/ML
8 INJECTION, SOLUTION EPIDURAL; INFILTRATION; INTRACAUDAL; PERINEURAL ONCE
Status: COMPLETED | OUTPATIENT
Start: 2024-11-18 | End: 2024-11-18

## 2024-11-18 RX ORDER — TRIAMCINOLONE ACETONIDE 40 MG/ML
40 INJECTION, SUSPENSION INTRA-ARTICULAR; INTRAMUSCULAR ONCE
Status: COMPLETED | OUTPATIENT
Start: 2024-11-18 | End: 2024-11-18

## 2024-11-18 RX ADMIN — TRIAMCINOLONE ACETONIDE 40 MG: 40 INJECTION, SUSPENSION INTRA-ARTICULAR; INTRAMUSCULAR at 13:52

## 2024-11-18 RX ADMIN — LIDOCAINE HYDROCHLORIDE 4 ML: 10 INJECTION, SOLUTION INFILTRATION; PERINEURAL at 13:52

## 2024-11-18 RX ADMIN — LIDOCAINE HYDROCHLORIDE 8 ML: 10 INJECTION, SOLUTION INFILTRATION; PERINEURAL at 13:53

## 2024-11-18 RX ADMIN — TRIAMCINOLONE ACETONIDE 40 MG: 40 INJECTION, SUSPENSION INTRA-ARTICULAR; INTRAMUSCULAR at 13:54

## 2024-11-18 NOTE — PROGRESS NOTES
SUBJECTIVE:  Iker is a 64-year-old male now about 6 months status post a left shoulder arthroscopy with subacromial decompression, distal clavicle excision, biceps tenodesis and medium rotator cuff repair.  Today, he reports his left shoulder has been bothersome for roughly the past 4-6 weeks.  He cannot identify any particular aggravating event but notes being quite active and is regularly engaged in repetitive motion of the arms and states driving seems to be especially bothersome.  Ever since the left shoulder arthroscopy he notes his right shoulder has been quite painful as well because he has been increasingly dependent on it through his recovery.  He has known rotator cuff tear arthropathy of the right shoulder.     OBJECTIVE: 64-year-old male alert and oriented in no acute distress.  He has near full range of motion of the right shoulder.  His left shoulder however is limited to roughly 130 degrees with both forward flexion and abduction.  Internal rotation of the left upper extremity is to the hip.  Of the left shoulder he is moderately tender to palpation over Codman's point as well as the bicipital groove.  He is otherwise neurovascularly intact.    ASSESSMENT/PLAN:  1. Right shoulder pain, unspecified chronicity (Primary)  A steroid injection was performed at right subacromial space using 1% plain Lidocaine and 40 mg of Kenalog. This was well tolerated and he noticed immediate improvement of pain.  Will plan to follow-up on an as-needed basis.  - lidocaine (PF) (XYLOCAINE) 1 % injection 8 mL  - triamcinolone (KENALOG-40) injection 40 mg    2. Left shoulder pain, unspecified chronicity  A steroid injection was performed at left subacromial space using 1% plain Lidocaine and 40 mg of Kenalog. This was well tolerated and he noticed immediate improvement of pain.  We discussed he should restart his physical therapy exercises at home.  He would prefer to avoid formal physical therapy as he lives almost an hour  away.  Will follow-up on an as-needed basis.  - lidocaine (PF) (XYLOCAINE) 1 % injection 8 mL  - triamcinolone (KENALOG-40) injection 40 mg

## 2025-01-10 ENCOUNTER — OFFICE VISIT (OUTPATIENT)
Dept: FAMILY MEDICINE | Facility: OTHER | Age: 65
End: 2025-01-10
Attending: FAMILY MEDICINE
Payer: MEDICARE

## 2025-01-10 VITALS
TEMPERATURE: 97.2 F | SYSTOLIC BLOOD PRESSURE: 120 MMHG | HEART RATE: 105 BPM | OXYGEN SATURATION: 95 % | WEIGHT: 229.8 LBS | HEIGHT: 68 IN | BODY MASS INDEX: 34.83 KG/M2 | DIASTOLIC BLOOD PRESSURE: 75 MMHG | RESPIRATION RATE: 16 BRPM

## 2025-01-10 DIAGNOSIS — G89.29 CHRONIC RIGHT-SIDED LOW BACK PAIN WITHOUT SCIATICA: Primary | ICD-10-CM

## 2025-01-10 DIAGNOSIS — E11.69 TYPE 2 DIABETES MELLITUS WITH OTHER SPECIFIED COMPLICATION, WITHOUT LONG-TERM CURRENT USE OF INSULIN (H): ICD-10-CM

## 2025-01-10 DIAGNOSIS — L97.529 NON-PRESSURE CHRONIC ULCER OF OTHER PART OF LEFT FOOT WITH UNSPECIFIED SEVERITY (H): ICD-10-CM

## 2025-01-10 DIAGNOSIS — M54.50 CHRONIC RIGHT-SIDED LOW BACK PAIN WITHOUT SCIATICA: Primary | ICD-10-CM

## 2025-01-10 PROBLEM — E66.01 MORBID OBESITY (H): Status: RESOLVED | Noted: 2021-04-01 | Resolved: 2025-01-10

## 2025-01-10 PROCEDURE — G0463 HOSPITAL OUTPT CLINIC VISIT: HCPCS

## 2025-01-10 RX ORDER — OXYCODONE HYDROCHLORIDE 10 MG/1
10 TABLET ORAL EVERY 4 HOURS PRN
Qty: 180 TABLET | Refills: 0 | Status: SHIPPED | OUTPATIENT
Start: 2025-01-10

## 2025-01-10 RX ORDER — OXYCODONE HYDROCHLORIDE 10 MG/1
10 TABLET ORAL EVERY 4 HOURS PRN
Qty: 180 TABLET | Refills: 0 | Status: SHIPPED | OUTPATIENT
Start: 2025-02-07

## 2025-01-10 RX ORDER — OXYCODONE HYDROCHLORIDE 10 MG/1
10 TABLET ORAL EVERY 4 HOURS PRN
Qty: 180 TABLET | Refills: 0 | Status: SHIPPED | OUTPATIENT
Start: 2025-03-07

## 2025-01-10 ASSESSMENT — PAIN SCALES - GENERAL: PAINLEVEL_OUTOF10: MODERATE PAIN (5)

## 2025-01-10 NOTE — NURSING NOTE
"Chief Complaint   Patient presents with    Follow Up     Work Comp       Initial /75 (BP Location: Left arm, Patient Position: Sitting, Cuff Size: Adult Regular)   Pulse 105   Temp 97.2  F (36.2  C) (Temporal)   Resp 16   Ht 1.727 m (5' 8\")   Wt 104.2 kg (229 lb 12.8 oz)   SpO2 95%   BMI 34.94 kg/m   Estimated body mass index is 34.94 kg/m  as calculated from the following:    Height as of this encounter: 1.727 m (5' 8\").    Weight as of this encounter: 104.2 kg (229 lb 12.8 oz).  Medication Review: complete    The next two questions are to help us understand your food security.  If you are feeling you need any assistance in this area, we have resources available to support you today.          1/10/2025   SDOH- Food Insecurity   Within the past 12 months, did you worry that your food would run out before you got money to buy more? N   Within the past 12 months, did the food you bought just not last and you didn t have money to get more? N         Health Care Directive:  Patient does not have a Health Care Directive: Discussed advance care planning with patient; however, patient declined at this time.    Kandy Sexton      "

## 2025-01-10 NOTE — PROGRESS NOTES
"  Assessment & Plan     (M54.50,  G89.29) Chronic right-sided low back pain without sciatica  (primary encounter diagnosis)  Comment: this is WC. He is stable. Some improvement with his significant weight loss. Has a little wt gain more recently and he is motivated to not gain further.  reviewed. Low risk for misuse of diversion. Refilled below.   Plan: oxyCODONE IR (ROXICODONE) 10 MG tablet,         oxyCODONE IR (ROXICODONE) 10 MG tablet,         oxyCODONE IR (ROXICODONE) 10 MG tablet             (L97.529) Non-pressure chronic ulcer of other part of left foot with unspecified severity (H)  Comment: is now resolved  Plan:  diabetes showed and daily foot inspectoins    (E11.69) Type 2 diabetes mellitus with other specified complication, without long-term current use of insulin (H)  Comment:  good control  Plan:  follow up in separate non WC visit.           BMI  Estimated body mass index is 34.94 kg/m  as calculated from the following:    Height as of this encounter: 1.727 m (5' 8\").    Weight as of this encounter: 104.2 kg (229 lb 12.8 oz).             Return in about 3 months (around 4/10/2025).    Lui Dong is a 64 year old, presenting for the following health issues:  Follow Up (Work Comp)      1/10/2025     2:39 PM   Additional Questions   Roomed by Kandy ANDRE     History of Present Illness       Back Pain:  He presents for follow up of back pain. Patient's back pain is a chronic problem.  Location of back pain:  Right lower back and left lower back  Description of back pain: fullness  Back pain spreads: nowhere    Since patient first noticed back pain, pain is: unchanged  Does back pain interfere with his job:  Yes       He eats 0-1 servings of fruits and vegetables daily.He consumes 0 sweetened beverage(s) daily.He exercises with enough effort to increase his heart rate 9 or less minutes per day.  He exercises with enough effort to increase his heart rate 3 or less days per week.   He is taking " medications regularly.         Pain History:  When did you first notice your pain? Decades ago   Have you seen this provider for your pain in the past? Yes   Where in your body do you have pain? back  Are you seeing anyone else for your pain? No        3/19/2024    12:50 PM 6/3/2024     9:37 AM 7/18/2024     8:15 AM   PHQ-9 SCORE   PHQ-9 Total Score MyChart 4 (Minimal depression) 4 (Minimal depression) 0   PHQ-9 Total Score 4 4 0           3/19/2024    12:52 PM 6/3/2024     9:55 AM 7/18/2024     8:15 AM   VISHNU-7 SCORE   Total Score 2 (minimal anxiety) 2 (minimal anxiety) 0 (minimal anxiety)   Total Score 2 2 0           2/5/2024    10:50 AM 10/14/2024    10:42 AM 1/10/2025     2:48 PM   PEG Score   PEG Total Score 5 6 4.33           Chronic Pain Follow Up:    Location of pain: low back, bilateral shoulders  Analgesia/pain control:    - Recent changes:  no    - Overall control: Tolerable with discomfort    - Current treatments: opiates   Adherence:     - Do you ever take more pain medicine than prescribed? No    - When did you take your last dose of pain medicine?  today   Adverse effects: No   PDMP Review         Value Time User    State PDMP site checked  Yes 10/14/2024 11:02 AM Sohail Harris MD          Last CSA Agreement:   CSA -- Patient Level:     [Media Unavailable] Controlled Substance Agreement - Opioid - Scan on 4/29/2024 11:51 AM   [Media Unavailable] Controlled Substance Agreement - Opioid - Scan on 5/19/2023  2:02 PM   [Media Unavailable] Controlled Substance Agreement - Opioid - Scan on 5/27/2022 11:03 AM: Opioid   [Media Unavailable] Controlled Substance Agreement - Opioid - Scan on 5/6/2021 10:06 AM: CONTROLLED SUBSTANCE AGREEMENT OPIOID       Last UDS: 7/23/2024 7/18/2024     8:55 AM   OPIOID RISK TOOL TOTAL SCORE   Total Score 1     Low Risk (0-3)  Moderate Risk (4-7)  High Risk (>8)              He also has diabetes. Has stopped ice cream and nearly all bread and has lost almost 70# total with  "slight weight gain over the holidays. His left foot ulcer is now healed over.           Objective    /75 (BP Location: Left arm, Patient Position: Sitting, Cuff Size: Adult Regular)   Pulse 105   Temp 97.2  F (36.2  C) (Temporal)   Resp 16   Ht 1.727 m (5' 8\")   Wt 104.2 kg (229 lb 12.8 oz)   SpO2 95%   BMI 34.94 kg/m    Body mass index is 34.94 kg/m .  Physical Exam   GENERAL: alert and no distress  RESP: lungs clear to auscultation - no rales, rhonchi or wheezes  CV: regular rate and rhythm, normal S1 S2, no S3 or S4, 2/6 systolic ejection murmur, click or rub, no peripheral edema   BACK: no CVA tenderness, no paralumbar tenderness. Negative straight leg raise bilateral.             Signed Electronically by: Sohail Harris MD    "

## 2025-03-25 DIAGNOSIS — E11.42 TYPE 2 DIABETES MELLITUS WITH DIABETIC POLYNEUROPATHY, WITHOUT LONG-TERM CURRENT USE OF INSULIN (H): ICD-10-CM

## 2025-03-25 DIAGNOSIS — I10 ESSENTIAL HYPERTENSION: ICD-10-CM

## 2025-03-25 DIAGNOSIS — E03.9 HYPOTHYROIDISM, UNSPECIFIED TYPE: ICD-10-CM

## 2025-03-25 RX ORDER — ATENOLOL 100 MG/1
50 TABLET ORAL DAILY
Qty: 45 TABLET | Refills: 1 | Status: SHIPPED | OUTPATIENT
Start: 2025-03-25

## 2025-03-25 RX ORDER — ATORVASTATIN CALCIUM 40 MG/1
40 TABLET, FILM COATED ORAL DAILY
Qty: 90 TABLET | Refills: 4 | Status: SHIPPED | OUTPATIENT
Start: 2025-03-25

## 2025-03-25 RX ORDER — LEVOTHYROXINE SODIUM 50 UG/1
50 TABLET ORAL DAILY
Qty: 90 TABLET | Refills: 4 | Status: SHIPPED | OUTPATIENT
Start: 2025-03-25

## 2025-03-29 ENCOUNTER — HEALTH MAINTENANCE LETTER (OUTPATIENT)
Age: 65
End: 2025-03-29

## 2025-04-07 ENCOUNTER — OFFICE VISIT (OUTPATIENT)
Dept: FAMILY MEDICINE | Facility: OTHER | Age: 65
End: 2025-04-07
Attending: FAMILY MEDICINE
Payer: COMMERCIAL

## 2025-04-07 ENCOUNTER — OFFICE VISIT (OUTPATIENT)
Dept: FAMILY MEDICINE | Facility: OTHER | Age: 65
End: 2025-04-07
Attending: FAMILY MEDICINE
Payer: OTHER MISCELLANEOUS

## 2025-04-07 VITALS
HEART RATE: 91 BPM | BODY MASS INDEX: 35.67 KG/M2 | OXYGEN SATURATION: 97 % | WEIGHT: 234.6 LBS | TEMPERATURE: 96.8 F | DIASTOLIC BLOOD PRESSURE: 82 MMHG | RESPIRATION RATE: 18 BRPM | SYSTOLIC BLOOD PRESSURE: 130 MMHG

## 2025-04-07 VITALS
DIASTOLIC BLOOD PRESSURE: 82 MMHG | WEIGHT: 234 LBS | BODY MASS INDEX: 35.58 KG/M2 | SYSTOLIC BLOOD PRESSURE: 130 MMHG | OXYGEN SATURATION: 97 % | HEART RATE: 91 BPM | RESPIRATION RATE: 18 BRPM | TEMPERATURE: 96.8 F

## 2025-04-07 DIAGNOSIS — E11.42 TYPE 2 DIABETES MELLITUS WITH DIABETIC POLYNEUROPATHY, WITHOUT LONG-TERM CURRENT USE OF INSULIN (H): Primary | ICD-10-CM

## 2025-04-07 DIAGNOSIS — M54.50 CHRONIC RIGHT-SIDED LOW BACK PAIN WITHOUT SCIATICA: ICD-10-CM

## 2025-04-07 DIAGNOSIS — I10 ESSENTIAL HYPERTENSION: ICD-10-CM

## 2025-04-07 DIAGNOSIS — G89.29 CHRONIC RIGHT-SIDED LOW BACK PAIN WITHOUT SCIATICA: ICD-10-CM

## 2025-04-07 LAB
CREAT UR-MCNC: 49.8 MG/DL
EST. AVERAGE GLUCOSE BLD GHB EST-MCNC: 128 MG/DL
HBA1C MFR BLD: 6.1 %
MICROALBUMIN UR-MCNC: 53.9 MG/L
MICROALBUMIN/CREAT UR: 108.23 MG/G CR (ref 0–17)
TSH SERPL DL<=0.005 MIU/L-ACNC: 0.32 UIU/ML (ref 0.3–4.2)

## 2025-04-07 PROCEDURE — 3075F SYST BP GE 130 - 139MM HG: CPT | Performed by: FAMILY MEDICINE

## 2025-04-07 PROCEDURE — 99213 OFFICE O/P EST LOW 20 MIN: CPT | Performed by: FAMILY MEDICINE

## 2025-04-07 PROCEDURE — 82570 ASSAY OF URINE CREATININE: CPT | Mod: ZL | Performed by: FAMILY MEDICINE

## 2025-04-07 PROCEDURE — 84443 ASSAY THYROID STIM HORMONE: CPT | Mod: ZL | Performed by: FAMILY MEDICINE

## 2025-04-07 PROCEDURE — 83036 HEMOGLOBIN GLYCOSYLATED A1C: CPT | Mod: ZL | Performed by: FAMILY MEDICINE

## 2025-04-07 PROCEDURE — 99214 OFFICE O/P EST MOD 30 MIN: CPT | Performed by: FAMILY MEDICINE

## 2025-04-07 PROCEDURE — 82043 UR ALBUMIN QUANTITATIVE: CPT | Mod: ZL | Performed by: FAMILY MEDICINE

## 2025-04-07 PROCEDURE — G0463 HOSPITAL OUTPT CLINIC VISIT: HCPCS

## 2025-04-07 PROCEDURE — 1125F AMNT PAIN NOTED PAIN PRSNT: CPT | Performed by: FAMILY MEDICINE

## 2025-04-07 PROCEDURE — 36415 COLL VENOUS BLD VENIPUNCTURE: CPT | Mod: ZL | Performed by: FAMILY MEDICINE

## 2025-04-07 PROCEDURE — 3079F DIAST BP 80-89 MM HG: CPT | Performed by: FAMILY MEDICINE

## 2025-04-07 PROCEDURE — G2211 COMPLEX E/M VISIT ADD ON: HCPCS | Performed by: FAMILY MEDICINE

## 2025-04-07 RX ORDER — OXYCODONE HYDROCHLORIDE 10 MG/1
10 TABLET ORAL EVERY 4 HOURS PRN
Qty: 180 TABLET | Refills: 0 | Status: SHIPPED | OUTPATIENT
Start: 2025-04-07

## 2025-04-07 RX ORDER — OXYCODONE HYDROCHLORIDE 10 MG/1
10 TABLET ORAL EVERY 4 HOURS PRN
Qty: 180 TABLET | Refills: 0 | Status: SHIPPED | OUTPATIENT
Start: 2025-06-02

## 2025-04-07 RX ORDER — ATENOLOL 100 MG/1
50 TABLET ORAL DAILY
Qty: 45 TABLET | Refills: 4 | Status: SHIPPED | OUTPATIENT
Start: 2025-04-07

## 2025-04-07 RX ORDER — OXYCODONE HYDROCHLORIDE 10 MG/1
10 TABLET ORAL EVERY 4 HOURS PRN
Qty: 180 TABLET | Refills: 0 | Status: SHIPPED | OUTPATIENT
Start: 2025-05-05

## 2025-04-07 RX ORDER — LISINOPRIL 20 MG/1
20 TABLET ORAL DAILY
Qty: 90 TABLET | Refills: 4 | Status: SHIPPED | OUTPATIENT
Start: 2025-04-07

## 2025-04-07 ASSESSMENT — ANXIETY QUESTIONNAIRES
IF YOU CHECKED OFF ANY PROBLEMS ON THIS QUESTIONNAIRE, HOW DIFFICULT HAVE THESE PROBLEMS MADE IT FOR YOU TO DO YOUR WORK, TAKE CARE OF THINGS AT HOME, OR GET ALONG WITH OTHER PEOPLE: NOT DIFFICULT AT ALL
5. BEING SO RESTLESS THAT IT IS HARD TO SIT STILL: NOT AT ALL
GAD7 TOTAL SCORE: 1
1. FEELING NERVOUS, ANXIOUS, OR ON EDGE: NOT AT ALL
GAD7 TOTAL SCORE: 1
6. BECOMING EASILY ANNOYED OR IRRITABLE: SEVERAL DAYS
GAD7 TOTAL SCORE: 1
4. TROUBLE RELAXING: NOT AT ALL
7. FEELING AFRAID AS IF SOMETHING AWFUL MIGHT HAPPEN: NOT AT ALL
3. WORRYING TOO MUCH ABOUT DIFFERENT THINGS: NOT AT ALL
8. IF YOU CHECKED OFF ANY PROBLEMS, HOW DIFFICULT HAVE THESE MADE IT FOR YOU TO DO YOUR WORK, TAKE CARE OF THINGS AT HOME, OR GET ALONG WITH OTHER PEOPLE?: NOT DIFFICULT AT ALL
2. NOT BEING ABLE TO STOP OR CONTROL WORRYING: NOT AT ALL
7. FEELING AFRAID AS IF SOMETHING AWFUL MIGHT HAPPEN: NOT AT ALL

## 2025-04-07 ASSESSMENT — PAIN SCALES - PAIN ENJOYMENT GENERAL ACTIVITY SCALE (PEG)
AVG_PAIN_PASTWEEK: 5
INTERFERED_GENERAL_ACTIVITY: 4
INTERFERED_ENJOYMENT_LIFE: 4
INTERFERED_GENERAL_ACTIVITY: 4
PEG_TOTALSCORE: 4.33
PEG_TOTALSCORE: 4.33
AVG_PAIN_PASTWEEK: 5
INTERFERED_ENJOYMENT_LIFE: 4

## 2025-04-07 ASSESSMENT — PATIENT HEALTH QUESTIONNAIRE - PHQ9
10. IF YOU CHECKED OFF ANY PROBLEMS, HOW DIFFICULT HAVE THESE PROBLEMS MADE IT FOR YOU TO DO YOUR WORK, TAKE CARE OF THINGS AT HOME, OR GET ALONG WITH OTHER PEOPLE: SOMEWHAT DIFFICULT
SUM OF ALL RESPONSES TO PHQ QUESTIONS 1-9: 6
SUM OF ALL RESPONSES TO PHQ QUESTIONS 1-9: 6

## 2025-04-07 ASSESSMENT — PAIN SCALES - GENERAL
PAINLEVEL_OUTOF10: SEVERE PAIN (8)
PAINLEVEL_OUTOF10: SEVERE PAIN (10)

## 2025-04-07 NOTE — PROGRESS NOTES
"  Assessment & Plan     (E11.42) Type 2 diabetes mellitus with diabetic polyneuropathy, without long-term current use of insulin (H)  (primary encounter diagnosis)  Comment: nerve pain is worsening. I offered Neurontin, but he wants to next try acupuncture.   Plan: Hemoglobin A1c, TSH Reflex GH, Albumin Random         Urine Quantitative with Creat Ratio             (I10) Essential hypertension  Comment: is at goal  Plan: atenolol (TENORMIN) 100 MG tablet, lisinopril         (ZESTRIL) 20 MG tablet             The longitudinal plan of care for the diagnosis(es)/condition(s) as documented were addressed during this visit. Due to the added complexity in care, I will continue to support Iker in the subsequent management and with ongoing continuity of care.        BMI  Estimated body mass index is 35.58 kg/m  as calculated from the following:    Height as of 1/10/25: 1.727 m (5' 8\").    Weight as of this encounter: 106.1 kg (234 lb).             Return in about 3 months (around 7/7/2025).    Subjective   Iker is a 64 year old, presenting for the following health issues:  Diabetes    HPI      He is not checking sugars, but follows a diet closely. Has gained back about 5#. Had lost 64# total. More trouble walking outside lately with ice on roads, so less active lately. In the last few days has had more foot neuropathy. He has stopped eating bread, but some potato chips lately.     Recent Labs   Lab Test 04/07/25  1201 10/14/24  1135 06/03/24  1025 03/19/24  1414 11/08/23  1042 03/03/23  1042 01/12/23  1200 10/03/22  1220 07/15/21  1432 04/01/21  1431 12/28/20  1217   A1C 6.1* 6.0* 5.6 5.9 7.0*   < > 7.1* 6.8*   < > 9.9*  --    LDL  --  29  --   --  39  --   --  137*  --   --   --    HDL  --  60  --   --  62  --   --  49  --   --   --    TRIG  --  88  --   --  98  --   --  118  --   --   --    ALT  --   --   --  30  --   --   --   --   --  35 34   CR  --   --  1.01 1.10  --    < >  --  0.98   < > 0.73 0.81   GFRESTIMATED  -- "   --  83 74  --    < >  --  86   < > >90 >90   GFRESTBLACK  --   --   --   --   --   --   --   --   --  >90 >90   POTASSIUM  --   --  5.0 5.6*  --    < >  --  5.5*   < > 4.7 4.8   TSH  --   --   --  0.32  --   --  0.53  --   --   --   --     < > = values in this interval not displayed.              Current Outpatient Medications   Medication Sig Dispense Refill    acetaminophen (TYLENOL) 500 MG tablet Take 500 mg by mouth every 4 hours as needed       atenolol (TENORMIN) 100 MG tablet Take 0.5 tablets (50 mg) by mouth daily. 45 tablet 4    atorvastatin (LIPITOR) 40 MG tablet TAKE 1 TABLET (40 MG) BY MOUTH DAILY 90 tablet 4    blood glucose (NO BRAND SPECIFIED) test strip Use to test blood sugar 1 times daily or as directed. 90 strip 4    blood glucose monitoring (NO BRAND SPECIFIED) meter device kit Use to test blood sugar 1 times daily or as directed. 1 kit 0    insulin pen needle (31G X 8 MM) 31G X 8 MM miscellaneous Use 1 pen needles weekly or as directed. 30 each 4    levothyroxine (SYNTHROID/LEVOTHROID) 50 MCG tablet TAKE 1 TABLET (50 MCG) BY MOUTH DAILY 90 tablet 4    lisinopril (ZESTRIL) 20 MG tablet Take 1 tablet (20 mg) by mouth daily. 90 tablet 4    metFORMIN (GLUCOPHAGE) 1000 MG tablet TAKE 1 TABLET (1,000 MG) BYMOUTH 2 TIMES DAILY (WITH MEALS) 180 tablet 4    naloxone (NARCAN) 4 MG/0.1ML nasal spray Spray 1 spray (4 mg) into one nostril alternating nostrils once as needed for opioid reversal every 2-3 minutes until assistance arrives 0.2 mL 3    nortriptyline (PAMELOR) 50 MG capsule Take 1 capsule (50 mg) by mouth at bedtime. 90 capsule 4    sertraline (ZOLOFT) 100 MG tablet TAKE 1 TABLET (100 MG) BY MOUTH DAILY 90 tablet 4    TRUEplus Lancets 28G MISC Inject 1 lancet Subcutaneous daily 100 each 4    [START ON 5/5/2025] oxyCODONE IR (ROXICODONE) 10 MG tablet Take 1 tablet (10 mg) by mouth every 4 hours as needed for severe pain. 180 tablet 0    [START ON 6/2/2025] oxyCODONE IR (ROXICODONE) 10 MG tablet  Take 1 tablet (10 mg) by mouth every 4 hours as needed for severe pain. 180 tablet 0    oxyCODONE IR (ROXICODONE) 10 MG tablet Take 1 tablet (10 mg) by mouth every 4 hours as needed for severe pain. 180 tablet 0     No current facility-administered medications for this visit.                     Objective    /82   Pulse 91   Temp 96.8  F (36  C) (Temporal)   Resp 18   Wt 106.1 kg (234 lb)   SpO2 97%   BMI 35.58 kg/m    Body mass index is 35.58 kg/m .  Physical Exam       GENERAL: alert and no distress  RESP: lungs clear to auscultation - no rales, rhonchi or wheezes  CV: regular rate and rhythm, normal S1 S2, no S3 or S4, no murmur, click or rub, no peripheral edema   PSYCH: mentation appears normal, affect normal/bright    Results for orders placed or performed in visit on 04/07/25   Hemoglobin A1c     Status: Abnormal   Result Value Ref Range    Estimated Average Glucose 128 (H) <117 mg/dL    Hemoglobin A1C 6.1 (H) <5.7 %             Signed Electronically by: Sohail Harris MD

## 2025-04-07 NOTE — NURSING NOTE
"Chief Complaint   Patient presents with    Work Comp     Pain management       Initial /82   Pulse 91   Temp 96.8  F (36  C) (Temporal)   Resp 18   Wt 106.4 kg (234 lb 9.6 oz)   SpO2 97%   BMI 35.67 kg/m   Estimated body mass index is 35.67 kg/m  as calculated from the following:    Height as of 1/10/25: 1.727 m (5' 8\").    Weight as of this encounter: 106.4 kg (234 lb 9.6 oz).  Medication Reconciliation: complete        "

## 2025-04-07 NOTE — LETTER

## 2025-04-07 NOTE — NURSING NOTE
"Chief Complaint   Patient presents with    Diabetes       Initial /82   Pulse 91   Temp 96.8  F (36  C) (Temporal)   Resp 18   Wt 106.1 kg (234 lb)   SpO2 97%   BMI 35.58 kg/m   Estimated body mass index is 35.58 kg/m  as calculated from the following:    Height as of 1/10/25: 1.727 m (5' 8\").    Weight as of this encounter: 106.1 kg (234 lb).  Medication Reconciliation: complete          "

## 2025-04-07 NOTE — PROGRESS NOTES
{PROVIDER CHARTING PREFERENCE:266850}    Lui Dong is a 64 year old, presenting for the following health issues:  Diabetes    HPI        Diabetes Follow-up    How often are you checking your blood sugar? Not at all  What concerns do you have today about your diabetes? None   Do you have any of these symptoms? (Select all that apply)  Numbness in feet, Burning in feet, and Redness, sores, or blisters on feet      BP Readings from Last 2 Encounters:   04/07/25 130/82   01/10/25 120/75     Hemoglobin A1C (%)   Date Value   10/14/2024 6.0 (H)   06/03/2024 5.6   04/01/2021 9.9 (H)     LDL Cholesterol Calculated (mg/dL)   Date Value   10/14/2024 29   11/08/2023 39   06/18/2015 139 (H)       {Reference  Diabetes Management Resources  Blood Glucose Log - 3 weeks  Blood Glucose Log with Food and Insulin Record :770424}  How many servings of fruits and vegetables do you eat daily?  0-1  On average, how many sweetened beverages do you drink each day (Examples: soda, juice, sweet tea, etc.  Do NOT count diet or artificially sweetened beverages)?   0  How many days per week do you exercise enough to make your heart beat faster? 3 or less  How many minutes a day do you exercise enough to make your heart beat faster? 30 - 60  How many days per week do you miss taking your medication? 0  {additonal problems for provider to add (Optional):865421}    {ROS Picklists (Optional):562426}      Objective    There were no vitals taken for this visit.  There is no height or weight on file to calculate BMI.  Physical Exam   {Exam List (Optional):972829}    {Diagnostic Test Results (Optional):197603}        Signed Electronically by: Sohail Harris MD  {Email feedback regarding this note to [email or web address]   :[id number]}

## 2025-04-07 NOTE — PROGRESS NOTES
"  Assessment & Plan     (M54.50,  G89.29) Chronic right-sided low back pain without sciatica  Comment: this is a failed fusion, and essentially unchanged.  reviewed, low risk for diversion or misuse. Refilled without changes.  Plan: oxyCODONE IR (ROXICODONE) 10 MG tablet,         oxyCODONE IR (ROXICODONE) 10 MG tablet,         oxyCODONE IR (ROXICODONE) 10 MG tablet                   BMI  Estimated body mass index is 35.67 kg/m  as calculated from the following:    Height as of 1/10/25: 1.727 m (5' 8\").    Weight as of this encounter: 106.4 kg (234 lb 9.6 oz).             No follow-ups on file.    Lui Dong is a 64 year old, presenting for the following health issues:  Work Comp (Pain management)      4/7/2025    11:14 AM   Additional Questions   Roomed by LUCAS Kay   Accompanied by Self         4/7/2025    11:14 AM   Patient Reported Additional Medications   Patient reports taking the following new medications N/A     History of Present Illness       Back Pain:  He presents for follow up of back pain. Patient's back pain is a chronic problem.  Location of back pain:  Other  Description of back pain: dull ache  Back pain spreads: nowhere    Since patient first noticed back pain, pain is: unchanged  Does back pain interfere with his job:  No       He eats 0-1 servings of fruits and vegetables daily.He consumes 0 sweetened beverage(s) daily.He exercises with enough effort to increase his heart rate 9 or less minutes per day.  He exercises with enough effort to increase his heart rate 3 or less days per week.   He is taking medications regularly.          Pain History:  When did you first notice your pain? Chronic   Have you seen this provider for your pain in the past? Yes   Where in your body do you have pain? Back  Are you seeing anyone else for your pain? No        6/3/2024     9:37 AM 7/18/2024     8:15 AM 4/7/2025    11:11 AM   PHQ-9 SCORE   PHQ-9 Total Score MyChart 4 (Minimal depression) 0 6 (Mild " depression)   PHQ-9 Total Score 4 0 6        Patient-reported           6/3/2024     9:55 AM 7/18/2024     8:15 AM 4/7/2025    11:13 AM   VISHNU-7 SCORE   Total Score 2 (minimal anxiety) 0 (minimal anxiety) 1 (minimal anxiety)   Total Score 2 0 1        Patient-reported               2/5/2024    10:50 AM 10/14/2024    10:42 AM 1/10/2025     2:48 PM   PEG Score   PEG Total Score 5 6 4.33       Chronic Pain Follow Up:    Location of pain: low back, bilateral feet  Analgesia/pain control:    - Recent changes:  foot nerve pain had resolved, but now back much worse    - Overall control: Tolerable with discomfort    - Current treatments: opiates, pending accupuncture consult   Adherence:     - Do you ever take more pain medicine than prescribed? No    - When did you take your last dose of pain medicine?  today   Adverse effects: No   PDMP Review         Value Time User    State PDMP site checked  Yes 1/10/2025  3:23 PM Sohail Harris MD          Last CSA Agreement:   CSA -- Patient Level:     [Media Unavailable] Controlled Substance Agreement - Opioid - Scan on 4/29/2024 11:51 AM   [Media Unavailable] Controlled Substance Agreement - Opioid - Scan on 5/19/2023  2:02 PM   [Media Unavailable] Controlled Substance Agreement - Opioid - Scan on 5/27/2022 11:03 AM: Opioid   [Media Unavailable] Controlled Substance Agreement - Opioid - Scan on 5/6/2021 10:06 AM: CONTROLLED SUBSTANCE AGREEMENT OPIOID       Last UDS: 7/23/2024 7/18/2024     8:55 AM   OPIOID RISK TOOL TOTAL SCORE   Total Score 1     Low Risk (0-3)  Moderate Risk (4-7)  High Risk (>8)                    Objective    /82   Pulse 91   Temp 96.8  F (36  C) (Temporal)   Resp 18   Wt 106.4 kg (234 lb 9.6 oz)   SpO2 97%   BMI 35.67 kg/m    Body mass index is 35.67 kg/m .  Physical Exam   GENERAL: alert and no distress  CV: regular rate and rhythm, normal S1 S2, no S3 or S4, no murmur, click or rub, no peripheral edema   PSYCH: mentation appears normal,  affect normal/bright  Negative straight leg raise bilateral. Mild lumbar pain on palpation. Over right l2/3 FACET AREA.              Signed Electronically by: Sohail Harris MD

## 2025-07-01 ENCOUNTER — OFFICE VISIT (OUTPATIENT)
Dept: FAMILY MEDICINE | Facility: OTHER | Age: 65
End: 2025-07-01
Attending: PHYSICIAN ASSISTANT
Payer: OTHER MISCELLANEOUS

## 2025-07-01 VITALS
TEMPERATURE: 96.7 F | BODY MASS INDEX: 36.04 KG/M2 | HEART RATE: 60 BPM | WEIGHT: 237 LBS | RESPIRATION RATE: 20 BRPM | DIASTOLIC BLOOD PRESSURE: 74 MMHG | SYSTOLIC BLOOD PRESSURE: 138 MMHG

## 2025-07-01 DIAGNOSIS — G89.29 CHRONIC RIGHT-SIDED LOW BACK PAIN WITHOUT SCIATICA: ICD-10-CM

## 2025-07-01 DIAGNOSIS — M54.50 CHRONIC RIGHT-SIDED LOW BACK PAIN WITHOUT SCIATICA: ICD-10-CM

## 2025-07-01 LAB — CREAT UR-MCNC: 89 MG/DL

## 2025-07-01 PROCEDURE — 80353 DRUG SCREENING COCAINE: CPT | Mod: ZL | Performed by: PHYSICIAN ASSISTANT

## 2025-07-01 RX ORDER — OXYCODONE HYDROCHLORIDE 10 MG/1
10 TABLET ORAL EVERY 4 HOURS PRN
Qty: 180 TABLET | Refills: 0 | Status: SHIPPED | OUTPATIENT
Start: 2025-07-31

## 2025-07-01 RX ORDER — OXYCODONE HYDROCHLORIDE 10 MG/1
10 TABLET ORAL EVERY 4 HOURS PRN
Qty: 180 TABLET | Refills: 0 | Status: SHIPPED | OUTPATIENT
Start: 2025-08-30

## 2025-07-01 RX ORDER — OXYCODONE HYDROCHLORIDE 10 MG/1
10 TABLET ORAL EVERY 4 HOURS PRN
Qty: 180 TABLET | Refills: 0 | Status: SHIPPED | OUTPATIENT
Start: 2025-07-01

## 2025-07-01 ASSESSMENT — ANXIETY QUESTIONNAIRES
6. BECOMING EASILY ANNOYED OR IRRITABLE: NOT AT ALL
1. FEELING NERVOUS, ANXIOUS, OR ON EDGE: NOT AT ALL
IF YOU CHECKED OFF ANY PROBLEMS ON THIS QUESTIONNAIRE, HOW DIFFICULT HAVE THESE PROBLEMS MADE IT FOR YOU TO DO YOUR WORK, TAKE CARE OF THINGS AT HOME, OR GET ALONG WITH OTHER PEOPLE: NOT DIFFICULT AT ALL
GAD7 TOTAL SCORE: 0
GAD7 TOTAL SCORE: 0
8. IF YOU CHECKED OFF ANY PROBLEMS, HOW DIFFICULT HAVE THESE MADE IT FOR YOU TO DO YOUR WORK, TAKE CARE OF THINGS AT HOME, OR GET ALONG WITH OTHER PEOPLE?: NOT DIFFICULT AT ALL
7. FEELING AFRAID AS IF SOMETHING AWFUL MIGHT HAPPEN: NOT AT ALL
GAD7 TOTAL SCORE: 0
4. TROUBLE RELAXING: NOT AT ALL
5. BEING SO RESTLESS THAT IT IS HARD TO SIT STILL: NOT AT ALL
7. FEELING AFRAID AS IF SOMETHING AWFUL MIGHT HAPPEN: NOT AT ALL
3. WORRYING TOO MUCH ABOUT DIFFERENT THINGS: NOT AT ALL
2. NOT BEING ABLE TO STOP OR CONTROL WORRYING: NOT AT ALL

## 2025-07-01 ASSESSMENT — PAIN SCALES - PAIN ENJOYMENT GENERAL ACTIVITY SCALE (PEG)
PEG_TOTALSCORE: 7
INTERFERED_ENJOYMENT_LIFE: 5
INTERFERED_GENERAL_ACTIVITY: 8
AVG_PAIN_PASTWEEK: 8
INTERFERED_ENJOYMENT_LIFE: 5
PEG_TOTALSCORE: 7
AVG_PAIN_PASTWEEK: 8
INTERFERED_GENERAL_ACTIVITY: 8

## 2025-07-01 ASSESSMENT — PATIENT HEALTH QUESTIONNAIRE - PHQ9
SUM OF ALL RESPONSES TO PHQ QUESTIONS 1-9: 0
10. IF YOU CHECKED OFF ANY PROBLEMS, HOW DIFFICULT HAVE THESE PROBLEMS MADE IT FOR YOU TO DO YOUR WORK, TAKE CARE OF THINGS AT HOME, OR GET ALONG WITH OTHER PEOPLE: NOT DIFFICULT AT ALL
SUM OF ALL RESPONSES TO PHQ QUESTIONS 1-9: 0

## 2025-07-01 ASSESSMENT — PAIN SCALES - GENERAL: PAINLEVEL_OUTOF10: MODERATE PAIN (5)

## 2025-07-01 NOTE — PROGRESS NOTES
Assessment & Plan       ICD-10-CM    1. Chronic right-sided low back pain without sciatica  M54.50 oxyCODONE IR (ROXICODONE) 10 MG tablet    G89.29 oxyCODONE IR (ROXICODONE) 10 MG tablet     oxyCODONE IR (ROXICODONE) 10 MG tablet     Drug Confirmation Panel Urine with Creatinine        Chronic right sided low back pain without sciatica - no red flag symptoms such as loss of bowel or bladder function, saddle anesthesia, etc.     Maintenance of person-centered care plan - goals, personal strengths, clinical needs, desired outcomes: Ongoing.     How is patient functioning/improving/not improving with the current pain protocol / RX:   - What is patient unable to do or has difficulty completing because of pain: progressive bending, lifting, twisting. Lifting snow, mowing lawn, etc.   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   - Encouraged regular stretching, walking, exercise. Healthy meals and diet.     Facilitation and coordination of any necessary behavorial health treatment: Ongoing.   - Encouraged counseling and behavorial health management to help with chronic pain, and if any variable anxiety / depression symptoms develop.     Communication and care coordination between relevant practitioners furnishing care (PT/OT, complementary and integrative approaches, community-based care): Ongoing.     PDMP Review         Value Time User    State PDMP site checked  Yes 7/1/2025 11:41 AM Ana Huston PA-C          Last CSA Agreement:   CSA -- Patient Level:     [Media Unavailable] Controlled Substance Agreement - Opioid - Scan on 4/7/2025 11:56 AM   [Media Unavailable] Controlled Substance Agreement - Opioid - Scan on 4/29/2024 11:51 AM   [Media Unavailable] Controlled Substance Agreement - Opioid - Scan on 5/19/2023  2:02 PM   [Media Unavailable] Controlled Substance  "Agreement - Opioid - Scan on 5/27/2022 11:03 AM: Opioid   [Media Unavailable] Controlled Substance Agreement - Opioid - Scan on 5/6/2021 10:06 AM: CONTROLLED SUBSTANCE AGREEMENT OPIOID       Last UDS: 7/1/2025    BMI  Estimated body mass index is 36.04 kg/m  as calculated from the following:    Height as of 1/10/25: 1.727 m (5' 8\").    Weight as of this encounter: 107.5 kg (237 lb).   Weight management plan: Discussed healthy diet and exercise guidelines    Follow-up  Return in about 3 months (around 10/1/2025) for Diabetes follow up, Med Refills - Chronic Pain.    Subjective   Iker is a 64 year old, presenting for the following health issues:  Recheck Medication        7/1/2025    10:49 AM   Additional Questions   Roomed by gia caldernó lpn   Accompanied by spouse     History of Present Illness       Reason for visit:  WC meds   He is taking medications regularly.     Iker presents to the clinic today for medication follow up. He has chronic low back and foot pain from previous work comp injury. He is currently on Oxycodone IR 10 mg, #180 per month. He follows up every 3 months for refills.     CSA 4/7/25, UTOX 7/18/24.      Pain History:  When did you first notice your pain? - Chronic pain: Persistent or recurrent pain lasting longer than 3 months.   Have you seen this provider for your pain in the past? YES  Are you seeing anyone else for your pain?  PCP        7/1/2025    10:53 AM   Last PHQ-9   1.  Little interest or pleasure in doing things 0    2.  Feeling down, depressed, or hopeless 0    3.  Trouble falling or staying asleep, or sleeping too much 0    4.  Feeling tired or having little energy 0    5.  Poor appetite or overeating 0    6.  Feeling bad about yourself 0    7.  Trouble concentrating 0    8.  Moving slowly or restless 0    Q9: Thoughts of better off dead/self-harm past 2 weeks 0    PHQ-9 Total Score 0        Proxy-reported         7/18/2024     8:15 AM 4/7/2025    11:11 AM 7/1/2025    10:53 AM   PHQ "   PHQ-9 Total Score 0 6  0    Q9: Thoughts of better off dead/self-harm past 2 weeks Not at all  Not at all Not at all        Patient-reported    Proxy-reported         7/1/2025    10:53 AM   VISHNU-7    1. Feeling nervous, anxious, or on edge 0    2. Not being able to stop or control worrying 0    3. Worrying too much about different things 0    4. Trouble relaxing 0    5. Being so restless that it is hard to sit still 0    6. Becoming easily annoyed or irritable 0    7. Feeling afraid, as if something awful might happen 0    VISHNU-7 Total Score 0    If you checked any problems, how difficult have they made it for you to do your work, take care of things at home, or get along with other people? Not difficult at all        Proxy-reported         7/18/2024     8:15 AM 4/7/2025    11:13 AM 7/1/2025    10:53 AM   VISHNU-7 SCORE   Total Score 0 (minimal anxiety) 1 (minimal anxiety) 0 (minimal anxiety)   Total Score 0 1  0        Patient-reported    Proxy-reported         2/5/2024    10:50 AM 10/14/2024    10:42 AM 1/10/2025     2:48 PM   PEG Score   PEG Total Score 5 6 4.33     Chronic Pain Follow Up:  - Chronic pain diagnoses / problems: low back pain     Analgesia/pain control:   - Recent changes:  none   - Overall control: Tolerable with discomfort   - Current treatments: Oxycodone for chronic pain management.   - Quantity per month: 180  - Clinic visit frequency: Q 3 months     - Benzodiazepine use: NO    Adherence:     - Do you ever take more pain medicine than prescribed? No    - When did you take your last dose of pain medicine?  6 AM today   Adverse effects: No     Review of Systems  Constitutional, HEENT, cardiovascular, pulmonary, GI, , musculoskeletal, neuro, skin, endocrine and psych systems are negative, except as otherwise noted.    Objective    /74 (BP Location: Right arm, Patient Position: Sitting, Cuff Size: Adult Large)   Pulse 60   Temp (!) 96.7  F (35.9  C) (Tympanic)   Resp 20   Wt 107.5 kg (237  lb)   BMI 36.04 kg/m    Body mass index is 36.04 kg/m .  Physical Exam   GENERAL: alert and no distress  EYES: Eyes grossly normal to inspection  RESP: lungs clear to auscultation - no rales, rhonchi or wheezes  CV: regular rate and rhythm, normal S1 S2, no S3 or S4, no murmur, click or rub, no peripheral edema  MS: no gross musculoskeletal defects noted, no edema  SKIN: no suspicious lesions or rashes  PSYCH: mentation appears normal, affect normal/bright    Results for orders placed or performed in visit on 07/01/25   Urine Creatinine for Drug Screen Panel     Status: None   Result Value Ref Range    Creatinine Urine for Drug Screen 89 mg/dL   Drug Confirmation Panel Urine with Creatinine     Status: None (In process)    Narrative    The following orders were created for panel order Drug Confirmation Panel Urine with Creatinine.  Procedure                               Abnormality         Status                     ---------                               -----------         ------                     Urine Drug Confirmation...[[id number]]                      In process                 Urine Creatinine for DrHarry..[[id number]]                      Final result                 Please view results for these tests on the individual orders.     Signed Electronically by: Ana Huston PA-C

## 2025-07-03 LAB
OXYCODONE UR CFM-MCNC: 313 NG/ML
OXYCODONE/CREAT UR: 352 NG/MG {CREAT}
OXYMORPHONE UR CFM-MCNC: 5760 NG/ML
OXYMORPHONE/CREAT UR: 6472 NG/MG {CREAT}

## 2025-07-12 ENCOUNTER — HEALTH MAINTENANCE LETTER (OUTPATIENT)
Age: 65
End: 2025-07-12

## 2025-08-23 ENCOUNTER — HEALTH MAINTENANCE LETTER (OUTPATIENT)
Age: 65
End: 2025-08-23

## (undated) DEVICE — BUR ARTHREX COOLCUT OVAL 8 FLUTE 4.0MMX13CM AR-8400OBE

## (undated) DEVICE — SUCTION MANIFOLD NEPTUNE 2 SYS 4 PORT 0702-020-000

## (undated) DEVICE — DRSG ABDOMINAL PAD UNSTERILE 5X9" 9190

## (undated) DEVICE — NDL SPINAL 18GA 3.5" 405184

## (undated) DEVICE — ARTHROSCOPIC CANNULA TWIST-IN PURPLE 7MMX7CM AR-6570

## (undated) DEVICE — TUBING ARTHROSCOPY PUMP ARTHREX AR-6410

## (undated) DEVICE — BUR ARTHREX COOLCUT EXCALIBUR 4.0MMX13CM AR-8400EX

## (undated) DEVICE — PREP CHLORAPREP 26ML TINTED ORANGE  260815

## (undated) DEVICE — TUBING SUCTION 10'X3/16" N510

## (undated) DEVICE — TUBING SET ARTHREX DUALWAVE OUTFLOW AR-6430

## (undated) DEVICE — GLOVE PROTEXIS PI ORTHO PF 8.5 2D73HT76

## (undated) DEVICE — GLOVE PROTEXIS PI ORTHO PF 9.0 2D73HT90

## (undated) DEVICE — KIT SHOULDER POSITIONING BEACH CHAIR ARM HOLDER AR-1644

## (undated) DEVICE — ENDO BRUSH CHANNEL MASTER CLEANING 2-4.2MM BW-412T

## (undated) DEVICE — DRAPE ARTHROSCOPY SHOULDER BEACHCHAIR 29369

## (undated) DEVICE — SLEEVE COMPRESSION SCD KNEE MED 74022

## (undated) DEVICE — CANNULA PASSPORT BUTTON 8X3CM AR-6592-08-30

## (undated) DEVICE — ENDO KIT COMPLIANCE DYKENDOCMPLY

## (undated) DEVICE — GLOVE BIOGEL PI SZ 8.5 40885

## (undated) DEVICE — DRAPE STERI U 1015

## (undated) DEVICE — ABLATOR ARTHREX APOLLO RF MP90 ASPIRATING 90DEG AR-9811

## (undated) DEVICE — DRAPE U-SHAPED ORTHOARTS 60X70" 89331

## (undated) DEVICE — GLOVE PROTEXIS POWDER FREE SMT 8.5 2D72PT85X

## (undated) DEVICE — SOL WATER 1500ML

## (undated) DEVICE — SU ETHILON 3-0 PS-2 18" 1669H

## (undated) DEVICE — PACK SHOULDER ARTHROSCOPY SOP15SAFCA

## (undated) DEVICE — DRAPE STERI TOWEL LG 1010

## (undated) DEVICE — GLOVE PROTEXIS BLUE W/NEU-THERA 8.5  2D73EB85

## (undated) DEVICE — Device

## (undated) DEVICE — DRSG GAUZE 4X4" TRAY 6939

## (undated) DEVICE — BUR ARTHREX COOLCUT DISSECTOR 4.0MMX13CM AR-8400DS

## (undated) DEVICE — SU FIBERWIRE #2 FIBERSTICK 50"  AR-7209

## (undated) DEVICE — SU ETHILON 3-0 FS-1 18" 669H

## (undated) RX ORDER — OXYCODONE AND ACETAMINOPHEN 5; 325 MG/1; MG/1
TABLET ORAL
Status: DISPENSED
Start: 2020-08-24

## (undated) RX ORDER — LIDOCAINE HYDROCHLORIDE 10 MG/ML
INJECTION, SOLUTION EPIDURAL; INFILTRATION; INTRACAUDAL; PERINEURAL
Status: DISPENSED
Start: 2021-11-22

## (undated) RX ORDER — ONDANSETRON 2 MG/ML
INJECTION INTRAMUSCULAR; INTRAVENOUS
Status: DISPENSED
Start: 2024-06-10

## (undated) RX ORDER — CEFAZOLIN SODIUM/WATER 2 G/20 ML
SYRINGE (ML) INTRAVENOUS
Status: DISPENSED
Start: 2024-06-10

## (undated) RX ORDER — LIDOCAINE HYDROCHLORIDE 10 MG/ML
INJECTION, SOLUTION INFILTRATION; PERINEURAL
Status: DISPENSED
Start: 2023-01-16

## (undated) RX ORDER — BUPIVACAINE HYDROCHLORIDE 2.5 MG/ML
INJECTION, SOLUTION EPIDURAL; INFILTRATION; INTRACAUDAL
Status: DISPENSED
Start: 2020-08-24

## (undated) RX ORDER — PROPOFOL 10 MG/ML
INJECTION, EMULSION INTRAVENOUS
Status: DISPENSED
Start: 2024-04-04

## (undated) RX ORDER — TRIAMCINOLONE ACETONIDE 40 MG/ML
INJECTION, SUSPENSION INTRA-ARTICULAR; INTRAMUSCULAR
Status: DISPENSED
Start: 2022-02-28

## (undated) RX ORDER — FENTANYL CITRATE-0.9 % NACL/PF 10 MCG/ML
PLASTIC BAG, INJECTION (ML) INTRAVENOUS
Status: DISPENSED
Start: 2024-04-04

## (undated) RX ORDER — TRIAMCINOLONE ACETONIDE 40 MG/ML
INJECTION, SUSPENSION INTRA-ARTICULAR; INTRAMUSCULAR
Status: DISPENSED
Start: 2024-06-17

## (undated) RX ORDER — LIDOCAINE HYDROCHLORIDE 10 MG/ML
INJECTION, SOLUTION INFILTRATION; PERINEURAL
Status: DISPENSED
Start: 2024-02-05

## (undated) RX ORDER — PHENYLEPHRINE HYDROCHLORIDE 10 MG/ML
INJECTION INTRAVENOUS
Status: DISPENSED
Start: 2020-08-24

## (undated) RX ORDER — LIDOCAINE HYDROCHLORIDE 10 MG/ML
INJECTION, SOLUTION EPIDURAL; INFILTRATION; INTRACAUDAL; PERINEURAL
Status: DISPENSED
Start: 2022-07-18

## (undated) RX ORDER — LIDOCAINE HYDROCHLORIDE 10 MG/ML
INJECTION, SOLUTION INFILTRATION; PERINEURAL
Status: DISPENSED
Start: 2024-06-17

## (undated) RX ORDER — LIDOCAINE HYDROCHLORIDE 10 MG/ML
INJECTION, SOLUTION INFILTRATION; PERINEURAL
Status: DISPENSED
Start: 2024-11-18

## (undated) RX ORDER — OXYCODONE HYDROCHLORIDE 5 MG/1
TABLET ORAL
Status: DISPENSED
Start: 2024-06-10

## (undated) RX ORDER — PROPOFOL 10 MG/ML
INJECTION, EMULSION INTRAVENOUS
Status: DISPENSED
Start: 2024-06-10

## (undated) RX ORDER — TRIAMCINOLONE ACETONIDE 40 MG/ML
INJECTION, SUSPENSION INTRA-ARTICULAR; INTRAMUSCULAR
Status: DISPENSED
Start: 2021-11-22

## (undated) RX ORDER — LIDOCAINE HYDROCHLORIDE 10 MG/ML
INJECTION, SOLUTION EPIDURAL; INFILTRATION; INTRACAUDAL; PERINEURAL
Status: DISPENSED
Start: 2022-02-28

## (undated) RX ORDER — TRIAMCINOLONE ACETONIDE 40 MG/ML
INJECTION, SUSPENSION INTRA-ARTICULAR; INTRAMUSCULAR
Status: DISPENSED
Start: 2021-08-16

## (undated) RX ORDER — TRIAMCINOLONE ACETONIDE 40 MG/ML
INJECTION, SUSPENSION INTRA-ARTICULAR; INTRAMUSCULAR
Status: DISPENSED
Start: 2024-11-18

## (undated) RX ORDER — TRIAMCINOLONE ACETONIDE 40 MG/ML
INJECTION, SUSPENSION INTRA-ARTICULAR; INTRAMUSCULAR
Status: DISPENSED
Start: 2020-08-24

## (undated) RX ORDER — LIDOCAINE HYDROCHLORIDE 10 MG/ML
INJECTION, SOLUTION EPIDURAL; INFILTRATION; INTRACAUDAL; PERINEURAL
Status: DISPENSED
Start: 2020-11-02

## (undated) RX ORDER — SODIUM CHLORIDE 9 MG/ML
INJECTION, SOLUTION INTRAVENOUS
Status: DISPENSED
Start: 2020-08-24

## (undated) RX ORDER — TRIAMCINOLONE ACETONIDE 40 MG/ML
INJECTION, SUSPENSION INTRA-ARTICULAR; INTRAMUSCULAR
Status: DISPENSED
Start: 2024-02-05

## (undated) RX ORDER — DEXAMETHASONE SODIUM PHOSPHATE 4 MG/ML
INJECTION, SOLUTION INTRA-ARTICULAR; INTRALESIONAL; INTRAMUSCULAR; INTRAVENOUS; SOFT TISSUE
Status: DISPENSED
Start: 2024-06-10

## (undated) RX ORDER — TRIAMCINOLONE ACETONIDE 40 MG/ML
INJECTION, SUSPENSION INTRA-ARTICULAR; INTRAMUSCULAR
Status: DISPENSED
Start: 2020-11-02

## (undated) RX ORDER — CEFAZOLIN SODIUM IN 0.9 % NACL 3 G/100 ML
INTRAVENOUS SOLUTION, PIGGYBACK (ML) INTRAVENOUS
Status: DISPENSED
Start: 2020-08-24

## (undated) RX ORDER — LIDOCAINE HYDROCHLORIDE 10 MG/ML
INJECTION, SOLUTION EPIDURAL; INFILTRATION; INTRACAUDAL; PERINEURAL
Status: DISPENSED
Start: 2021-08-16

## (undated) RX ORDER — EPINEPHRINE 1 MG/ML
INJECTION INTRAMUSCULAR; INTRAVENOUS; SUBCUTANEOUS
Status: DISPENSED
Start: 2024-06-10

## (undated) RX ORDER — TRIAMCINOLONE ACETONIDE 40 MG/ML
INJECTION, SUSPENSION INTRA-ARTICULAR; INTRAMUSCULAR
Status: DISPENSED
Start: 2022-07-18

## (undated) RX ORDER — EPINEPHRINE 1 MG/ML(1)
AMPUL (ML) INJECTION
Status: DISPENSED
Start: 2020-08-24

## (undated) RX ORDER — TRIAMCINOLONE ACETONIDE 40 MG/ML
INJECTION, SUSPENSION INTRA-ARTICULAR; INTRAMUSCULAR
Status: DISPENSED
Start: 2023-01-16